# Patient Record
Sex: MALE | Race: WHITE | NOT HISPANIC OR LATINO | Employment: FULL TIME
[De-identification: names, ages, dates, MRNs, and addresses within clinical notes are randomized per-mention and may not be internally consistent; named-entity substitution may affect disease eponyms.]

---

## 2017-01-20 ENCOUNTER — SURGERY (OUTPATIENT)
Age: 52
End: 2017-01-20

## 2017-01-20 ENCOUNTER — ANESTHESIA EVENT (OUTPATIENT)
Dept: ENDOSCOPY | Facility: HOSPITAL | Age: 52
End: 2017-01-20
Payer: COMMERCIAL

## 2017-01-20 ENCOUNTER — ANESTHESIA (OUTPATIENT)
Dept: ENDOSCOPY | Facility: HOSPITAL | Age: 52
End: 2017-01-20
Payer: COMMERCIAL

## 2017-01-20 PROBLEM — Z13.9 SCREENING: Status: ACTIVE | Noted: 2017-01-20

## 2017-01-20 PROCEDURE — D9220A PRA ANESTHESIA: Mod: 33,CRNA,, | Performed by: NURSE ANESTHETIST, CERTIFIED REGISTERED

## 2017-01-20 PROCEDURE — D9220A PRA ANESTHESIA: Mod: 33,ANES,, | Performed by: ANESTHESIOLOGY

## 2017-01-20 PROCEDURE — 63600175 PHARM REV CODE 636 W HCPCS: Performed by: NURSE ANESTHETIST, CERTIFIED REGISTERED

## 2017-01-20 RX ORDER — PROPOFOL 10 MG/ML
VIAL (ML) INTRAVENOUS CONTINUOUS PRN
Status: DISCONTINUED | OUTPATIENT
Start: 2017-01-20 | End: 2017-01-20

## 2017-01-20 RX ORDER — PROPOFOL 10 MG/ML
VIAL (ML) INTRAVENOUS
Status: DISCONTINUED | OUTPATIENT
Start: 2017-01-20 | End: 2017-01-20

## 2017-01-20 RX ADMIN — PROPOFOL 200 MG: 10 INJECTION, EMULSION INTRAVENOUS at 08:01

## 2017-01-20 RX ADMIN — PROPOFOL 200 MCG/KG/MIN: 10 INJECTION, EMULSION INTRAVENOUS at 08:01

## 2017-01-20 NOTE — ANESTHESIA RELEASE NOTE
Anesthesia Release from PACU Note    Patient: Edi Marie    Procedure(s) Performed: Procedure(s) (LRB):  COLONOSCOPY (N/A)    Anesthesia type: GA    Post pain: Adequate analgesia    Post assessment: no apparent anesthetic complications    Last Vitals:   Vitals:    01/20/17 0931   BP: 123/83   Pulse: 69   Resp: 18   Temp:    SpO2: 98%       Post vital signs: stable    Level of consciousness: awake    Complications: none    Airway Patency: patent    Respiratory: spontaneous    Cardiovascular: stable    Hydration: euvolemic

## 2017-01-20 NOTE — TRANSFER OF CARE
"Anesthesia Transfer of Care Note    Patient: Edi Marie    Procedure(s) Performed: Procedure(s) (LRB):  COLONOSCOPY (N/A)    Patient location: PACU    Anesthesia Type: general    Transport from OR: Transported from OR on room air with adequate spontaneous ventilation    Post pain: adequate analgesia    Post assessment: no apparent anesthetic complications and tolerated procedure well    Post vital signs: stable    Level of consciousness: sedated    Nausea/Vomiting: no nausea/vomiting    Complications: none          Last vitals:   Visit Vitals    /79 (BP Location: Left arm, Patient Position: Lying, BP Method: Automatic)    Pulse 82    Temp 36.4 °C (97.5 °F) (Oral)    Resp 18    Ht 5' 11" (1.803 m)    Wt 86.2 kg (190 lb)    SpO2 98%    BMI 26.5 kg/m2     "

## 2017-01-20 NOTE — ANESTHESIA POSTPROCEDURE EVALUATION
"Anesthesia Post Evaluation    Patient: Edi Marie    Procedure(s) Performed: Procedure(s) (LRB):  COLONOSCOPY (N/A)    Final Anesthesia Type: general  Patient location during evaluation: PACU  Patient participation: Yes- Able to Participate  Level of consciousness: awake and alert  Post-procedure vital signs: reviewed and stable  Pain management: adequate  Airway patency: patent  PONV status at discharge: No PONV  Anesthetic complications: no      Cardiovascular status: blood pressure returned to baseline  Respiratory status: spontaneous ventilation and room air  Hydration status: euvolemic  Follow-up not needed.        Visit Vitals    /83 (BP Location: Left arm, Patient Position: Sitting, BP Method: Automatic)    Pulse 69    Temp 36.6 °C (97.8 °F) (Axillary)    Resp 18    Ht 5' 11" (1.803 m)    Wt 86.2 kg (190 lb)    SpO2 98%    BMI 26.5 kg/m2       Pain/Hiro Score: Pain Assessment Performed: Yes (1/20/2017  9:32 AM)  Presence of Pain: denies (1/20/2017  9:32 AM)  Ihro Score: 10 (1/20/2017  9:32 AM)      "

## 2017-01-20 NOTE — ANESTHESIA PREPROCEDURE EVALUATION
01/20/2017  Edi Marie is a 51 y.o., male.    [facility] Anesthesia Evaluation    I have reviewed the Patient Summary Reports.    I have reviewed the Nursing Notes.      Review of Systems  Anesthesia Hx:  No problems with previous Anesthesia    Hematology/Oncology:  Hematology Normal   Oncology Normal     EENT/Dental:EENT/Dental Normal   Cardiovascular:  Cardiovascular Normal     Pulmonary:  Pulmonary Normal    Renal/:   Chronic Renal Disease renal calculi    Hepatic/GI:  Hepatic/GI Normal    Musculoskeletal:  Musculoskeletal Normal    Neurological:  Neurology Normal    Endocrine:  Endocrine Normal    Dermatological:  Skin Normal    Psych:  Psychiatric Normal           Physical Exam  General:  Well nourished    Airway/Jaw/Neck:  Airway Findings: Mouth Opening: Normal Tongue: Normal  General Airway Assessment: Adult  Mallampati: II  TM Distance: Normal, at least 6 cm        Eyes/Ears/Nose:  EYES/EARS/NOSE FINDINGS: Normal   Dental:  Dental Findings: In tact   Chest/Lungs:  Chest/Lungs Clear    Heart/Vascular:  Heart Findings: Normal Heart murmur: negative Vascular Findings: Normal    Abdomen:  Abdomen Findings: Normal    Musculoskeletal:  Musculoskeletal Findings: Normal   Skin:  Skin Findings: Normal    Mental Status:  Mental Status Findings: Normal        Anesthesia Plan  Type of Anesthesia, risks & benefits discussed:  Anesthesia Type:  general  Patient's Preference:   Intra-op Monitoring Plan:   Intra-op Monitoring Plan Comments:   Post Op Pain Control Plan:   Post Op Pain Control Plan Comments:   Induction:   IV  Beta Blocker:  Patient is not currently on a Beta-Blocker (No further documentation required).       Informed Consent: Patient understands risks and agrees with Anesthesia plan.  Questions answered. Anesthesia consent signed with patient.  ASA Score: 2     Day of Surgery Review of History &  Physical:    H&P update referred to the surgeon.         Ready For Surgery From Anesthesia Perspective.

## 2017-01-27 ENCOUNTER — TELEPHONE (OUTPATIENT)
Dept: ENDOSCOPY | Facility: HOSPITAL | Age: 52
End: 2017-01-27

## 2017-04-09 ENCOUNTER — PATIENT MESSAGE (OUTPATIENT)
Dept: INTERNAL MEDICINE | Facility: CLINIC | Age: 52
End: 2017-04-09

## 2017-04-09 DIAGNOSIS — R06.83 SNORING: Primary | ICD-10-CM

## 2017-05-05 ENCOUNTER — PATIENT MESSAGE (OUTPATIENT)
Dept: INTERNAL MEDICINE | Facility: CLINIC | Age: 52
End: 2017-05-05

## 2017-08-07 ENCOUNTER — OFFICE VISIT (OUTPATIENT)
Dept: OTOLARYNGOLOGY | Facility: CLINIC | Age: 52
End: 2017-08-07
Payer: COMMERCIAL

## 2017-08-07 VITALS
DIASTOLIC BLOOD PRESSURE: 77 MMHG | BODY MASS INDEX: 27.99 KG/M2 | HEART RATE: 86 BPM | TEMPERATURE: 98 F | SYSTOLIC BLOOD PRESSURE: 113 MMHG | WEIGHT: 199.94 LBS | HEIGHT: 71 IN

## 2017-08-07 DIAGNOSIS — S09.92XD NASAL TRAUMA, SUBSEQUENT ENCOUNTER: ICD-10-CM

## 2017-08-07 DIAGNOSIS — J34.89 NASAL VALVE BLOCKAGE: ICD-10-CM

## 2017-08-07 DIAGNOSIS — J34.2 NASAL SEPTAL DEVIATION: ICD-10-CM

## 2017-08-07 DIAGNOSIS — J34.89 NASAL OBSTRUCTION: Primary | ICD-10-CM

## 2017-08-07 DIAGNOSIS — R06.83 SNORING: ICD-10-CM

## 2017-08-07 PROCEDURE — 99203 OFFICE O/P NEW LOW 30 MIN: CPT | Mod: S$GLB,,, | Performed by: OTOLARYNGOLOGY

## 2017-08-07 PROCEDURE — 3008F BODY MASS INDEX DOCD: CPT | Mod: S$GLB,,, | Performed by: OTOLARYNGOLOGY

## 2017-08-07 PROCEDURE — 99999 PR PBB SHADOW E&M-EST. PATIENT-LVL III: CPT | Mod: PBBFAC,,, | Performed by: OTOLARYNGOLOGY

## 2017-08-07 NOTE — PATIENT INSTRUCTIONS
Indications for elective septoplasty, turbinate reduction procedures and balloon sinuplasty discussed  Pt. introduced to Dr. RACIEL Archer re: septoplasty + procedures for nasal obstruction  Avoid ASA

## 2017-08-07 NOTE — PROGRESS NOTES
"Subjective:       Patient ID: Edi Marie is a 52 y.o. male.    Chief Complaint: No chief complaint on file.    HPI: Mr. Marie is a 52-year-old  gentleman with a long-standing history of septal deviation for over 30 years.  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  He consulted with Dr. ALEXANDER Florence with regard to the possibility of balloon sinuplasty as a relatively "quick fix"  for his nasal obstruction symptoms specifically.  He was hoping to avoid anesthesia and surgery specifically.  He was advised to consider an elective septoplasty procedure under anesthesia.  The patient is asking my opinion about treatment options for his problem.  He has a previous history of nasal trauma.  He admits to snoring.  He completed an annual physical exam a Dr. More Villanueva in July in 2016.  His medical problem list includeshistiocytosis, spermatocele and surgical history of vasectomy.  His handwritten reason for the visit today is "nasal obstruction".    PMH:   PSH:  Family hx:  ALL:  Habits: 2 alcoholic drinks /week; 3 cups of  Coffee daily  OCC: Executive  Review of Systems     Nose:  Positive for snoring.    Other:  Negative for rash.         Objective:    /77 P 86 T 97.6 Ht 5'11" Wt 199 lbs  Gen.  Alert and oriented gentleman in no acute distress.  I have introduced him Dr. RACIEL Archer later during the visit today.  He also performed an examination on the patient nose.  Physical Exam   Constitutional: He is oriented to person, place, and time. He appears well-developed and well-nourished.   HENT:   Head: Normocephalic.   Right Ear: Hearing, tympanic membrane and ear canal normal. No drainage. No foreign bodies. No mastoid tenderness. Tympanic membrane is not perforated. No decreased hearing is noted.   Left Ear: Hearing, tympanic membrane and ear canal normal. No drainage. No foreign bodies. No mastoid tenderness. Tympanic membrane is not perforated. No decreased hearing is noted.   Nose: Nasal " deformity present. No nose lacerations or nasal septal hematoma. No epistaxis. Right sinus exhibits no maxillary sinus tenderness and no frontal sinus tenderness. Left sinus exhibits no maxillary sinus tenderness and no frontal sinus tenderness.       Mouth/Throat: Uvula is midline, oropharynx is clear and moist and mucous membranes are normal. He does not have dentures. No oral lesions. No trismus in the jaw. No uvula swelling or dental caries. No oropharyngeal exudate or tonsillar abscesses.   Neck: No thyromegaly present.   Pulmonary/Chest: Effort normal. No stridor.   Lymphadenopathy:     He has no cervical adenopathy.   Neurological: He is alert and oriented to person, place, and time.   Skin: No rash noted.   Psychiatric: His behavior is normal.       Assessment:       1. Nasal obstruction, right  90%    2. Nasal trauma, subsequent encounter    3. Snoring    4. Nasal valve blockage, right      5.    Nasal septal deviation, right superior/anterior, left inferior  Plan:         Indications for elective septoplasty, turbinate reduction procedures and balloon sinuplasty discussed  Pt. introduced to Dr. RACIEL Archer re: septoplasty + procedures for nasal obstruction  Avoid ASA

## 2017-09-08 ENCOUNTER — PATIENT MESSAGE (OUTPATIENT)
Dept: INTERNAL MEDICINE | Facility: CLINIC | Age: 52
End: 2017-09-08

## 2017-09-11 ENCOUNTER — HOSPITAL ENCOUNTER (OUTPATIENT)
Dept: RADIOLOGY | Facility: HOSPITAL | Age: 52
Discharge: HOME | End: 2017-09-11
Attending: INTERNAL MEDICINE
Payer: COMMERCIAL

## 2017-09-11 ENCOUNTER — OFFICE VISIT (OUTPATIENT)
Dept: INTERNAL MEDICINE | Facility: CLINIC | Age: 52
End: 2017-09-11
Payer: COMMERCIAL

## 2017-09-11 VITALS
OXYGEN SATURATION: 97 % | HEART RATE: 67 BPM | SYSTOLIC BLOOD PRESSURE: 124 MMHG | WEIGHT: 201.75 LBS | BODY MASS INDEX: 28.24 KG/M2 | DIASTOLIC BLOOD PRESSURE: 82 MMHG | HEIGHT: 71 IN

## 2017-09-11 DIAGNOSIS — M79.604 PAIN IN BOTH LOWER EXTREMITIES: Primary | ICD-10-CM

## 2017-09-11 DIAGNOSIS — M79.605 PAIN IN BOTH LOWER EXTREMITIES: Primary | ICD-10-CM

## 2017-09-11 DIAGNOSIS — M79.604 PAIN IN BOTH LOWER EXTREMITIES: ICD-10-CM

## 2017-09-11 DIAGNOSIS — M79.605 PAIN IN BOTH LOWER EXTREMITIES: ICD-10-CM

## 2017-09-11 PROCEDURE — 99214 OFFICE O/P EST MOD 30 MIN: CPT | Mod: S$GLB,,, | Performed by: INTERNAL MEDICINE

## 2017-09-11 PROCEDURE — 73521 X-RAY EXAM HIPS BI 2 VIEWS: CPT | Mod: TC

## 2017-09-11 PROCEDURE — 73521 X-RAY EXAM HIPS BI 2 VIEWS: CPT | Mod: 26,,, | Performed by: RADIOLOGY

## 2017-09-11 PROCEDURE — 99999 PR PBB SHADOW E&M-EST. PATIENT-LVL III: CPT | Mod: PBBFAC,,, | Performed by: INTERNAL MEDICINE

## 2017-09-11 PROCEDURE — 3008F BODY MASS INDEX DOCD: CPT | Mod: S$GLB,,, | Performed by: INTERNAL MEDICINE

## 2017-09-11 NOTE — PROGRESS NOTES
Subjective:      Patient ID: Edi Marie is a 52 y.o. male.    Chief Complaint: Leg Pain    HPI:  Leg Pain    Incident onset: No incident, years on and off. There was no injury mechanism. Pain location: Both legs, moves around. The quality of the pain is described as aching. The pain is moderate (moderate at night, times he has to get up at night). Pertinent negatives include no inability to bear weight or muscle weakness. Nothing aggravates the symptoms. He has tried nothing for the symptoms.   The symptoms are worse at night., it is an aching not a jumping or moving.  He has not had any back problems  Patient has not been flexible  Played soccer in college, no major injury      Patient Active Problem List   Diagnosis    Spermatocele    Histiocytosis    H/O vasectomy (05/30/2014)    Screening     Past Medical History:   Diagnosis Date    Hemorrhoid     Histiocytosis     Kidney stones     Spermatocele      Past Surgical History:   Procedure Laterality Date    APPENDECTOMY      COLONOSCOPY N/A 1/20/2017    Procedure: COLONOSCOPY;  Surgeon: Danis Frank MD;  Location: [address];  Service: Endoscopy;  Laterality: N/A;    KNEE SURGERY      right knee    LITHOTRIPSY       Family History   Problem Relation Age of Onset    Heart disease Father      MI 2004    Cancer Paternal Grandfather      colon    Melanoma Neg Hx     Lupus Neg Hx     Psoriasis Neg Hx     Eczema Neg Hx      Review of Systems   Constitutional: Negative for activity change and unexpected weight change.   HENT: Negative for hearing loss, rhinorrhea and trouble swallowing.    Eyes: Negative for discharge and visual disturbance.   Respiratory: Negative for chest tightness and wheezing.    Cardiovascular: Negative for chest pain and palpitations.   Gastrointestinal: Negative for blood in stool, constipation, diarrhea and vomiting.   Endocrine: Negative for polydipsia and polyuria.   Genitourinary: Negative for difficulty  "urinating, hematuria and urgency.   Musculoskeletal: Negative for arthralgias, joint swelling and neck pain.   Neurological: Negative for weakness and headaches.   Psychiatric/Behavioral: Negative for confusion and dysphoric mood.     Objective:     Vitals:    09/11/17 0913   BP: 124/82   Pulse: 67   SpO2: 97%   Weight: 91.5 kg (201 lb 11.5 oz)   Height: 5' 11" (1.803 m)   PainSc:   4   PainLoc: Leg     Body mass index is 28.13 kg/m².  Physical Exam   Abdomen: bowel sound normal  Right hip: restriction motion significant  Left hip: internal rotation better than left   When attempting a straight leg raise both thighs cramp  Assessment:     1. Pain in both lower extremities      Plan:   Edi was seen today for leg pain.    Diagnoses and all orders for this visit:    Pain in both lower extremities  -     X-Ray Hip 2 View Left; Future  -     X-Ray Hip 2 View Right; Future  -     Ambulatory consult to Sports Medicine       Return in about 1 month (around 10/11/2017) for Follow up .       Medication List      as of 9/11/2017  9:48 AM     You have not been prescribed any medications.       "

## 2017-09-11 NOTE — PATIENT INSTRUCTIONS
Consult: Dr. Cavazos Sports Medicine  Xray of right and left hip    If needed  Follow up in one month

## 2017-09-18 ENCOUNTER — PATIENT MESSAGE (OUTPATIENT)
Dept: OTOLARYNGOLOGY | Facility: CLINIC | Age: 52
End: 2017-09-18

## 2017-09-20 ENCOUNTER — OFFICE VISIT (OUTPATIENT)
Dept: SPORTS MEDICINE | Facility: CLINIC | Age: 52
End: 2017-09-20
Payer: COMMERCIAL

## 2017-09-20 VITALS — BODY MASS INDEX: 28.14 KG/M2 | TEMPERATURE: 98 F | WEIGHT: 201 LBS | HEIGHT: 71 IN

## 2017-09-20 DIAGNOSIS — M16.0 PRIMARY OSTEOARTHRITIS OF BOTH HIPS: Primary | ICD-10-CM

## 2017-09-20 PROCEDURE — 99243 OFF/OP CNSLTJ NEW/EST LOW 30: CPT | Mod: 25,S$GLB,, | Performed by: FAMILY MEDICINE

## 2017-09-20 PROCEDURE — 20610 DRAIN/INJ JOINT/BURSA W/O US: CPT | Mod: 50,S$GLB,, | Performed by: FAMILY MEDICINE

## 2017-09-20 PROCEDURE — 99999 PR PBB SHADOW E&M-EST. PATIENT-LVL III: CPT | Mod: PBBFAC,,, | Performed by: FAMILY MEDICINE

## 2017-09-20 RX ORDER — TRIAMCINOLONE ACETONIDE 40 MG/ML
40 INJECTION, SUSPENSION INTRA-ARTICULAR; INTRAMUSCULAR
Status: DISCONTINUED | OUTPATIENT
Start: 2017-09-20 | End: 2017-09-20 | Stop reason: HOSPADM

## 2017-09-20 RX ADMIN — TRIAMCINOLONE ACETONIDE 40 MG: 40 INJECTION, SUSPENSION INTRA-ARTICULAR; INTRAMUSCULAR at 09:09

## 2017-09-20 NOTE — PROCEDURES
Large Joint Aspiration/Injection  Date/Time: 9/20/2017 9:06 AM  Performed by: MARCO CAPUTO  Authorized by: MARCO CAPUTO     Consent Done?:  Yes (Verbal)  Indications:  Pain  Procedure site marked: Yes    Timeout: Prior to procedure the correct patient, procedure, and site was verified      Location:  Hip  Site:  R greater trochanteric bursa and L greater trochanteric bursa  Prep: Patient was prepped and draped in usual sterile fashion    Ultrasonic Guidance for needle placement: No  Needle size:  22 G  Approach:  Lateral  Medications:  40 mg triamcinolone acetonide 40 mg/mL; 40 mg triamcinolone acetonide 40 mg/mL  Patient tolerance:  Patient tolerated the procedure well with no immediate complications

## 2017-09-20 NOTE — LETTER
September 20, 2017      More Villanueva MD  [address]  [facility] [address]           [facility]  [address]  [facility] [address]  Phone: [phone]          Patient: Edi Marie   MR Number: [id number]   YOB: 1965   Date of Visit: 9/20/2017       Dear Dr. More Villanueva:    Thank you for referring Edi Marie to me for evaluation. Attached you will find relevant portions of my assessment and plan of care.    If you have questions, please do not hesitate to call me. I look forward to following Edi Marie along with you.    Sincerely,    Mariano Ortiz MD    Enclosure  CC:  No Recipients    If you would like to receive this communication electronically, please contact [email or web address] or [phone] to request more information on [facility] Link access.    For providers and/or their staff who would like to refer a patient to [facility], please contact us through our one-stop-shop provider referral line, [facility], at [phone].    If you feel you have received this communication in error or would no longer like to receive these types of communications, please e-mail [email or web address]

## 2017-09-20 NOTE — PROGRESS NOTES
Edi Marie, a 52 y.o. male, is here for evaluation of RIGHT and LEFT hip.     This patient visit is a consult from the following provider: More Villanueva  Today's office visit notes will be made available to the consulting/refering provider via the mail, a fax, and/or an in basket message through the electronic medical record    HISTORY OF PRESENT ILLNESS   Location: anterior thigh, bilateral   Onset: insidious  Palliative:    Relative rest   Oral analgesics  Provocative:   ADLs   Prior: none  Progression: worsening discomfort   Quality:    Ache, worse in evening    Sharp at times   Radiation: none  Severity: per nursing documentation  Timing: intermittent with use  Trauma: none      Review of systems (ROS):  A 10+ review of systems was performed with pertinent positives and negatives noted above in the history of present illness. Other systems were negative unless otherwise specified.    PHYSICAL EXAMINATION  General:  The patient is alert and oriented x 3.  Mood is pleasant.  Observation of ears, eyes and nose reveal no gross abnormalities.  HEENT: NCAT, sclera nonicteric  Lungs: Respirations are equal and unlabored.   Gait is coordinated. Patient can toe walk and heel walk without difficulty.    HIP/PELVIS EXAMINATION    Observation/Inspection  Gait:   Nonantalgic   Alignment:  Neutral   Scars:   None   Muscle atrophy: None   Effusion:  None   Warmth:  None   Discoloration:   None   Leg lengths:   Equal   Pelvis:    Level     Tenderness/Crepitus (T/C):      T / C  Trochanteric bursa   - / -  Piriformis    - / -  SI joint    - / -  Psoas tendon   - / -  Rectus insertion  - / -  Adductor insertion  - / -  Pubic symphysis  - / -    ROM: (* = pain)    Flexion:      90 degrees*  External rotation:   40 degrees*  Internal rotation with axial load:  30 degrees*  Internal rotation without axial load:  40 degrees*  Abduction:    45 degrees  Adduction:     20 degrees    Special Tests:  Pain w/ forced internal  rotation (FADIR):  +   Pain w/ forced external rotation (WES):  +  Circumduction test:     -  Stinchfield test:     +  Log roll:       +   Snapping hip (internal):    -   Sit-up pain:      -   Resisted sit-up pain:     -   Resisted sit-up with adductor contraction pain:  -   Step-down test:     +  Trendelenburg test:     -  Bridge test      +     Extremity Neuro-vascular Examination:   Sensation:  Grossly intact to light touch all dermatomal regions.   Motor Function:  Fully intact motor function at hip, knee, foot and ankle    DTRs;  quadriceps and  achilles 2+.  No clonus and downgoing Babinski.    Vascular status:  DP and PT pulses 2+, brisk capillary refill, symmetric.    Skin:  intact, compartments soft.    Other Findings:    ASSESSMENT & PLAN  Assessment:   #1 Tonnis Grade III osteoarthritis of hip, bilateral   W/ greater trochanteric bursitis    No evidence of neurologic pathology  No evidence of vascular pathology    Imaging studies reviewed:   X-ray pelvis and hip, bilateral 17.09    Plan:    We discussed the importance of appropriate diet, weight, and regular exercise including quadriceps strengthening     We discussed options including:  #1 watchful waiting  #2 physical therapy aimed at:   Core stability   RoM hip   Strengthening quadriceps   Gait training   #3 injection therapy:   CSI GTB    Right,     Left,    CSI iaHip    Right,     Left,    Orthobiologics   #4 consultation re: THAs     The patient chooses #3 csi gtb bilat    Pain management: handout given  Bracing:   Physical therapy:   Activity (e.g. 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 fPT, f/u in 12 w  Ineffective-->CSI iaHip, f/u in 2 w   Ineffective-->orthobiologics?  Should symptoms worsen or fail to resolve, consider:  Revisiting the above options

## 2017-10-16 ENCOUNTER — OFFICE VISIT (OUTPATIENT)
Dept: SPORTS MEDICINE | Facility: CLINIC | Age: 52
End: 2017-10-16
Payer: COMMERCIAL

## 2017-10-16 VITALS — HEIGHT: 71 IN | WEIGHT: 201 LBS | BODY MASS INDEX: 28.14 KG/M2 | TEMPERATURE: 99 F

## 2017-10-16 DIAGNOSIS — M70.62 GREATER TROCHANTERIC BURSITIS, LEFT: ICD-10-CM

## 2017-10-16 DIAGNOSIS — M70.61 GREATER TROCHANTERIC BURSITIS OF RIGHT HIP: Primary | ICD-10-CM

## 2017-10-16 PROCEDURE — 99999 PR PBB SHADOW E&M-EST. PATIENT-LVL III: CPT | Mod: PBBFAC,,, | Performed by: FAMILY MEDICINE

## 2017-10-16 PROCEDURE — 99214 OFFICE O/P EST MOD 30 MIN: CPT | Mod: S$GLB,,, | Performed by: FAMILY MEDICINE

## 2017-10-16 NOTE — PROGRESS NOTES
Edi Marie, a 52 y.o. male, is here for evaluation of RIGHT and LEFT hip.     HISTORY OF PRESENT ILLNESS   Location: anterior thigh, bilateral   Onset: insidious  Palliative:    Relative rest   Oral analgesics   CSI, GTB, 09/20/17, 70%  Provocative:   ADLs   Prior: none  Progression: resolving discomfort   Quality:    Ache, worse in evening    Sharp at times   Radiation: none  Severity: per nursing documentation  Timing: intermittent with use  Trauma: none      Review of systems (ROS):  A 10+ review of systems was performed with pertinent positives and negatives noted above in the history of present illness. Other systems were negative unless otherwise specified.    PHYSICAL EXAMINATION  General:  The patient is alert and oriented x 3.  Mood is pleasant.  Observation of ears, eyes and nose reveal no gross abnormalities.  HEENT: NCAT, sclera nonicteric  Lungs: Respirations are equal and unlabored.   Gait is coordinated. Patient can toe walk and heel walk without difficulty.    HIP/PELVIS EXAMINATION    Observation/Inspection  Gait:   Nonantalgic   Alignment:  Neutral   Scars:   None   Muscle atrophy: None   Effusion:  None   Warmth:  None   Discoloration:   None   Leg lengths:   Equal   Pelvis:    Level     Tenderness/Crepitus (T/C):      T / C  Trochanteric bursa   - / -  Piriformis    - / -  SI joint    - / -  Psoas tendon   - / -  Rectus insertion  - / -  Adductor insertion  - / -  Pubic symphysis  - / -    ROM: (* = pain)    Flexion:      90 degrees*  External rotation:   40 degrees*  Internal rotation with axial load:  30 degrees*  Internal rotation without axial load:  40 degrees*  Abduction:    45 degrees  Adduction:     20 degrees    Special Tests:  Pain w/ forced internal rotation (FADIR):  +   Pain w/ forced external rotation (WES):  +  Circumduction test:     -  Stinchfield test:     +  Log roll:       +   Snapping hip (internal):    -   Sit-up pain:      -   Resisted sit-up pain:     -  "  Resisted sit-up with adductor contraction pain:  -   Step-down test:     +  Trendelenburg test:     -  Bridge test      +     Extremity Neuro-vascular Examination:   Sensation:  Grossly intact to light touch all dermatomal regions.   Motor Function:  Fully intact motor function at hip, knee, foot and ankle    DTRs;  quadriceps and  achilles 2+.  No clonus and downgoing Babinski.    Vascular status:  DP and PT pulses 2+, brisk capillary refill, symmetric.    Skin:  intact, compartments soft.    Other Findings:    ASSESSMENT & PLAN  Assessment:   #1 Tonnis Grade III osteoarthritis of hip, bilateral   W/ greater trochanteric bursitis    No evidence of neurologic pathology  No evidence of vascular pathology    Imaging studies reviewed:   X-ray pelvis and hip, bilateral 17.09    Plan:    We discussed the importance of appropriate diet, weight, and regular exercise including quadriceps strengthening     We discussed options including:  #1 watchful waiting  #2 physical therapy aimed at:   Core stability   RoM hip   Strengthening quadriceps   Gait training   #3 injection therapy:   CSI GTB    Right, effective 70%, repeat frequency     Left, effective 70%, repeat frequency   CSI iaHip    Right,     Left,    Orthobiologics   #4 consultation re: THAs     The patient chooses #2    Pain management: handout given  Bracing:   Physical therapy: hgPT, handout given, begin as above   Activity (e.g. 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 The patient demonstrated understanding of the exercises and proper " technique of their execution. This interaction took 15 minutes.

## 2017-10-25 ENCOUNTER — OFFICE VISIT (OUTPATIENT)
Dept: OTOLARYNGOLOGY | Facility: CLINIC | Age: 52
End: 2017-10-25
Payer: COMMERCIAL

## 2017-10-25 VITALS
WEIGHT: 197.56 LBS | BODY MASS INDEX: 27.55 KG/M2 | DIASTOLIC BLOOD PRESSURE: 83 MMHG | SYSTOLIC BLOOD PRESSURE: 139 MMHG | HEART RATE: 91 BPM

## 2017-10-25 DIAGNOSIS — J34.2 NASAL SEPTAL DEVIATION: ICD-10-CM

## 2017-10-25 DIAGNOSIS — R06.83 SNORING: ICD-10-CM

## 2017-10-25 DIAGNOSIS — M95.0 NASAL DEFORMITY, ACQUIRED: Primary | ICD-10-CM

## 2017-10-25 DIAGNOSIS — J34.3 NASAL TURBINATE HYPERTROPHY: ICD-10-CM

## 2017-10-25 DIAGNOSIS — J34.89 NASAL OBSTRUCTION: ICD-10-CM

## 2017-10-25 PROCEDURE — 99999 PR PBB SHADOW E&M-EST. PATIENT-LVL III: CPT | Mod: PBBFAC,,, | Performed by: OTOLARYNGOLOGY

## 2017-10-25 PROCEDURE — 99214 OFFICE O/P EST MOD 30 MIN: CPT | Mod: S$GLB,,, | Performed by: OTOLARYNGOLOGY

## 2017-10-25 NOTE — LETTER
October 25, 2017      Norberto Mullins III, MD  [address]  [facility] [address]           [facility]  [address]  [facility] [address]  Phone: [phone]  Fax: [phone]          Patient: Edi Marie   MR Number: [id number]   YOB: 1965   Date of Visit: 10/25/2017       Dear Dr. Norberto Mullins III:    Thank you for referring Edi Marie to me for evaluation. Attached you will find relevant portions of my assessment and plan of care.    If you have questions, please do not hesitate to call me. I look forward to following Edi Marie along with you.    Sincerely,    Sutton KASIE Archer III, MD    Enclosure  CC:  No Recipients    If you would like to receive this communication electronically, please contact [email or web address] or [phone] to request more information on [facility] Link access.    For providers and/or their staff who would like to refer a patient to [facility], please contact us through our one-stop-shop provider referral line, [facility], at [phone].    If you feel you have received this communication in error or would no longer like to receive these types of communications, please e-mail [email or web address]

## 2017-10-26 NOTE — CONSULTS
Mr. Marie presents referred by Dr. Mullins for consultation.    VITAL SIGNS:  Per nurses' notes.    CHIEF COMPLAINT:  Nasal obstruction and snoring.    HISTORY OF PRESENT ILLNESS:  This is a 52-year-old white male who was seen by   Dr. Mullins this past August for similar complaints.  He was found to have   marked nasal deformity with nasal obstruction.  He was referred to me for   evaluation and possible surgical intervention.    REVIEW OF SYSTEMS:  CONSTITUTIONAL: Weight loss or weight gain: Negative.  ALLERGY/IMMUNOLOGIC: Negative.  ENT/Mouth:  Hearing Loss/Dizziness/Tinnitus: Negative.  Ear Infections/Otalgia: Negative.  Rhinitis/Sinusitis/Epistaxis: Negative.  Headache/Facial Pain: Negative.  Nasal Obstruction/Snoring/ANAY: Negative.  Throat: Infections/Pain: Negative.  Hoarseness/Speech Disturbance: Negative.  Salivary Glands Disorder: Negative.  Trauma: Hx: Negative.    Cardiovascular:  MI/Angina: Negative.  Hypertension: Negative.  Endo: DM/Steroids: Negative.  Eyes: Negative.  GI: Dysphagia/Reflux: Negative.  : GYN Pregnancy: Negative.      Renal: Dialysis: Negative.  Lymph: Neck Mass/Lymphadenopathy: Negative.  Musculoskeletal: Negative.  Hem: Bleeding Disorders/Anemia: Negative.  Neuro: Cranial/Neuralgia: Negative.  Pulm: Asthma/SOB/Cough: Negative.  Skin/Breast: Negative.    PAST MEDICAL/FAMILY/SOCIAL HISTORY:    Additional Past Medical History   ENT Surgery: Negative.   Occupational Exposure: Negative.    Problems: Negative.   Cancer: Negative.   Positive for kidney stones, histiocytosis.  Past surgeries include   appendectomy, knee surgery, lithotripsy and colonoscopy as well as a nasal   surgery and possible septoplasty 30 years ago when he was in high school in   [place] tonsil after a nasal trauma.  He states he also had a nasal trauma   after that time and has had persistent nasal obstruction since then.  He denies   any symptoms of sleep apnea.  No daytime somnolence.  He does  snore loudly,   however, according to his wife.    Past Family History   Family history hearing loss: Negative.   Family history cancer: Positive for colon cancer.   Positive for heart disease.    Past Social History   Tobacco: Nonsmoker.   Alcohol: Regular social drinker.    MEDICATIONS:  Per MedCard.    ALLERGIES:  Per MedCard.    EXAMINATION:  General Appearance:  Well-developed, well-nourished 52-year-old white male in no   apparent distress.  Communication Ability:  Good.  EARS, NOSE, THROAT, MOUTH;  EARS:   External auditory canals: Clear.   Hearing: Grossly Intact.   Tympanic membranes: Clear.  NOSE:   External: With bilateral internal valve collapse, right greater than left and a   positive [place] maneuver.   Intranasal: He has a marked septal deviation to the right side, 2+ turbinates,   all of these deformities creating approximately 90% obstruction.  MOUTH:   Intraorally: Lips, teeth and gums: Normal.   Oropharynx: Normal.   Mucosa: Normal.  THROAT:   Tongue: Normal.   Palate: Significantly elongated palate.   Tonsils: He has 2+ tonsils.   Posterior pharynx: Normal.  HEAD/FACE INSPECTION: Normal and atraumatic.   Palpation/Percussion:  Non tender.   Facial Strength: Normal and symmetric.   Salivary glands: Normal.    NECK: Supple.  THYROID: No masses.  LYMPHATICS: No nodes.  RESPIRATORY:   Effort: Normal.  EYES:   Ocular Mobility: Normal.   Vision: Grossly intact.  NEURO/PSYCH:   Cranial nerves: 2-12 grossly intact.   Orientation: x3.   Mood/Affect: Normal.    IMPRESSION:  A 52-year-old white male with nasal and septal deformity and nasal   obstruction.    RECOMMENDATION:  I have discussed my findings with him in detail as well as my   recommendations for treatment.  My recommendation would be for nasal   reconstruction with possible osteotomies, bilateral  grafts,   septoplasty, submucosal resection of turbinates and also possible auricular   cartilage if his previous septal surgery has left him  with minimal septal   cartilage remaining.  He will discuss this with his wife.  He does wish to   proceed before the end of the year if he does go with the surgical option.  He   will contact us by tomorrow to let us know.      GERI  dd: 10/25/2017 14:15:50 (CDT)  td: 10/26/2017 00:14:40 (CDT)  Doc ID   #[id number]  Job ID #[id number]    CC:

## 2017-11-06 ENCOUNTER — OFFICE VISIT (OUTPATIENT)
Dept: SPORTS MEDICINE | Facility: CLINIC | Age: 52
End: 2017-11-06
Payer: COMMERCIAL

## 2017-11-06 VITALS — HEIGHT: 71 IN | TEMPERATURE: 98 F | BODY MASS INDEX: 27.58 KG/M2 | WEIGHT: 197 LBS

## 2017-11-06 DIAGNOSIS — M25.552 BILATERAL HIP PAIN: ICD-10-CM

## 2017-11-06 DIAGNOSIS — R53.81 PHYSICAL DECONDITIONING: ICD-10-CM

## 2017-11-06 DIAGNOSIS — M70.61 GREATER TROCHANTERIC BURSITIS OF BOTH HIPS: ICD-10-CM

## 2017-11-06 DIAGNOSIS — M25.551 BILATERAL HIP PAIN: ICD-10-CM

## 2017-11-06 DIAGNOSIS — M16.0 PRIMARY OSTEOARTHRITIS OF BOTH HIPS: Primary | ICD-10-CM

## 2017-11-06 DIAGNOSIS — M70.62 GREATER TROCHANTERIC BURSITIS OF BOTH HIPS: ICD-10-CM

## 2017-11-06 PROCEDURE — 99999 PR PBB SHADOW E&M-EST. PATIENT-LVL III: CPT | Mod: PBBFAC,,, | Performed by: FAMILY MEDICINE

## 2017-11-06 PROCEDURE — 20610 DRAIN/INJ JOINT/BURSA W/O US: CPT | Mod: 50,S$GLB,, | Performed by: FAMILY MEDICINE

## 2017-11-06 PROCEDURE — 99214 OFFICE O/P EST MOD 30 MIN: CPT | Mod: 25,S$GLB,, | Performed by: FAMILY MEDICINE

## 2017-11-06 RX ORDER — TRIAMCINOLONE ACETONIDE 40 MG/ML
40 INJECTION, SUSPENSION INTRA-ARTICULAR; INTRAMUSCULAR
Status: DISCONTINUED | OUTPATIENT
Start: 2017-11-06 | End: 2017-11-06 | Stop reason: HOSPADM

## 2017-11-06 RX ADMIN — TRIAMCINOLONE ACETONIDE 40 MG: 40 INJECTION, SUSPENSION INTRA-ARTICULAR; INTRAMUSCULAR at 03:11

## 2017-11-06 NOTE — PROGRESS NOTES
Edi Mraie, a 52 y.o. male, is here for evaluation of RIGHT and LEFT hip.     HISTORY OF PRESENT ILLNESS   Location: anterior thigh, bilateral, L > R   Onset: insidious  Palliative:    Relative rest   Oral analgesics   CSI, GTB, 09/20/17, 70%   HgPT, noncompliant   Provocative:   ADLs   Prior: none  Progression: worsening discomfort   Quality:    Ache, worse in evening    Sharp at times   Radiation: none  Severity: per nursing documentation  Timing: intermittent with use  Trauma: none      Review of systems (ROS):  A 10+ review of systems was performed with pertinent positives and negatives noted above in the history of present illness. Other systems were negative unless otherwise specified.    PHYSICAL EXAMINATION  General:  The patient is alert and oriented x 3.  Mood is pleasant.  Observation of ears, eyes and nose reveal no gross abnormalities.  HEENT: NCAT, sclera nonicteric  Lungs: Respirations are equal and unlabored.   Gait is coordinated. Patient can toe walk and heel walk without difficulty.    HIP/PELVIS EXAMINATION    Observation/Inspection  Gait:   Nonantalgic   Alignment:  Neutral   Scars:   None   Muscle atrophy: None   Effusion:  None   Warmth:  None   Discoloration:   None   Leg lengths:   Equal   Pelvis:    Level     Tenderness/Crepitus (T/C):      T / C  Trochanteric bursa   - / -  Piriformis    - / -  SI joint    - / -  Psoas tendon   - / -  Rectus insertion  - / -  Adductor insertion  - / -  Pubic symphysis  - / -    ROM: (* = pain)    Flexion:      90 degrees*  External rotation:   40 degrees*  Internal rotation with axial load:  30 degrees*  Internal rotation without axial load:  40 degrees*  Abduction:    45 degrees  Adduction:     20 degrees    Special Tests:  Pain w/ forced internal rotation (FADIR):  +   Pain w/ forced external rotation (WES):  +  Circumduction test:     -  Stinchfield test:     +  Log roll:       +   Snapping hip (internal):    -   Sit-up pain:      -   Resisted  sit-up pain:     -   Resisted sit-up with adductor contraction pain:  -   Step-down test:     +  Trendelenburg test:     -  Bridge test      +     Extremity Neuro-vascular Examination:   Sensation:  Grossly intact to light touch all dermatomal regions.   Motor Function:  Fully intact motor function at hip, knee, foot and ankle    DTRs;  quadriceps and  achilles 2+.  No clonus and downgoing Babinski.    Vascular status:  DP and PT pulses 2+, brisk capillary refill, symmetric.    Skin:  intact, compartments soft.    Other Findings:    ASSESSMENT & PLAN  Assessment:   #1 Ton[place] Grade III osteoarthritis of hip, bilateral   W/ greater trochanteric bursitis    No evidence of neurologic pathology  No evidence of vascular pathology    Imaging studies reviewed:   X-ray pelvis and hip, bilateral 17.09    Plan:    We discussed the importance of appropriate diet, weight, and regular exercise including quadriceps strengthening     We discussed options including:  #1 watchful waiting  #2 physical therapy aimed at:   Core stability   RoM hip   Strengthening quadriceps   Gait training   #3 injection therapy:   CSI GTB    Right, effective 70%, repeat     Left, effective 70%, repeat    CSI iaHip    Right,     Left,    Orthobiologics   #4 consultation re: THAs     The patient chooses #2 and #3 csi gtb bilat    Pain management: handout given  Bracing:   Physical therapy:    hgPT, handout given, prior as above    fPT, @ [facility], begin as above   Activity (e.g. 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  Should symptoms worsen or fail to resolve, consider:  Revisiting the above options

## 2017-11-06 NOTE — PROCEDURES
Large Joint Aspiration/Injection  Date/Time: 11/6/2017 3:42 PM  Performed by: MARCO CAPUTO  Authorized by: MARCO CAPUTO     Consent Done?:  Yes (Verbal)  Indications:  Pain  Procedure site marked: Yes    Timeout: Prior to procedure the correct patient, procedure, and site was verified      Location:  Hip  Site:  R greater trochanteric bursa and L greater trochanteric bursa  Prep: Patient was prepped and draped in usual sterile fashion    Ultrasonic Guidance for needle placement: No  Needle size:  22 G  Approach:  Lateral  Medications:  40 mg triamcinolone acetonide 40 mg/mL; 40 mg triamcinolone acetonide 40 mg/mL  Patient tolerance:  Patient tolerated the procedure well with no immediate complications

## 2017-11-16 ENCOUNTER — HOSPITAL ENCOUNTER (INPATIENT)
Facility: HOSPITAL | Age: 52
LOS: 2 days | Discharge: HOME | DRG: 301 | End: 2017-11-19
Attending: EMERGENCY MEDICINE | Admitting: INTERNAL MEDICINE
Payer: COMMERCIAL

## 2017-11-16 DIAGNOSIS — S89.90XA LEG INJURY: ICD-10-CM

## 2017-11-16 DIAGNOSIS — I82.412 ACUTE DEEP VEIN THROMBOSIS (DVT) OF FEMORAL VEIN OF LEFT LOWER EXTREMITY: ICD-10-CM

## 2017-11-16 DIAGNOSIS — M79.606 LEG PAIN: ICD-10-CM

## 2017-11-16 DIAGNOSIS — I82.422 ACUTE DEEP VEIN THROMBOSIS (DVT) OF ILIAC VEIN OF LEFT LOWER EXTREMITY: Primary | ICD-10-CM

## 2017-11-16 DIAGNOSIS — I82.402 ACUTE DEEP VEIN THROMBOSIS (DVT) OF LEFT LOWER EXTREMITY, UNSPECIFIED VEIN: ICD-10-CM

## 2017-11-16 DIAGNOSIS — S89.92XA INJURY OF LEFT LOWER EXTREMITY, INITIAL ENCOUNTER: ICD-10-CM

## 2017-11-16 LAB
ALBUMIN SERPL BCP-MCNC: 4 G/DL
ALP SERPL-CCNC: 92 U/L
ALT SERPL W/O P-5'-P-CCNC: 23 U/L
ANION GAP SERPL CALC-SCNC: 10 MMOL/L
AST SERPL-CCNC: 17 U/L
BASOPHILS # BLD AUTO: 0.02 K/UL
BASOPHILS NFR BLD: 0.2 %
BILIRUB SERPL-MCNC: 0.9 MG/DL
BUN SERPL-MCNC: 26 MG/DL
CALCIUM SERPL-MCNC: 9.8 MG/DL
CHLORIDE SERPL-SCNC: 105 MMOL/L
CO2 SERPL-SCNC: 24 MMOL/L
CREAT SERPL-MCNC: 1 MG/DL
DIFFERENTIAL METHOD: ABNORMAL
EOSINOPHIL # BLD AUTO: 0.1 K/UL
EOSINOPHIL NFR BLD: 1.4 %
ERYTHROCYTE [DISTWIDTH] IN BLOOD BY AUTOMATED COUNT: 13.6 %
EST. GFR  (AFRICAN AMERICAN): >60 ML/MIN/1.73 M^2
EST. GFR  (NON AFRICAN AMERICAN): >60 ML/MIN/1.73 M^2
GLUCOSE SERPL-MCNC: 100 MG/DL
HCT VFR BLD AUTO: 46.7 %
HGB BLD-MCNC: 15.9 G/DL
INR PPP: 1
LYMPHOCYTES # BLD AUTO: 1.5 K/UL
LYMPHOCYTES NFR BLD: 15.4 %
MCH RBC QN AUTO: 31.2 PG
MCHC RBC AUTO-ENTMCNC: 34 G/DL
MCV RBC AUTO: 92 FL
MONOCYTES # BLD AUTO: 1.1 K/UL
MONOCYTES NFR BLD: 10.8 %
NEUTROPHILS # BLD AUTO: 7.2 K/UL
NEUTROPHILS NFR BLD: 71.9 %
PLATELET # BLD AUTO: 234 K/UL
PMV BLD AUTO: 9.7 FL
POTASSIUM SERPL-SCNC: 4.7 MMOL/L
PROT SERPL-MCNC: 7.9 G/DL
PROTHROMBIN TIME: 10.7 SEC
RBC # BLD AUTO: 5.1 M/UL
SODIUM SERPL-SCNC: 139 MMOL/L
WBC # BLD AUTO: 10.03 K/UL

## 2017-11-16 PROCEDURE — 25000003 PHARM REV CODE 250: Performed by: EMERGENCY MEDICINE

## 2017-11-16 PROCEDURE — G0378 HOSPITAL OBSERVATION PER HR: HCPCS

## 2017-11-16 PROCEDURE — 80053 COMPREHEN METABOLIC PANEL: CPT

## 2017-11-16 PROCEDURE — 85610 PROTHROMBIN TIME: CPT

## 2017-11-16 PROCEDURE — 99285 EMERGENCY DEPT VISIT HI MDM: CPT

## 2017-11-16 PROCEDURE — 20000000 HC ICU ROOM

## 2017-11-16 PROCEDURE — 85025 COMPLETE CBC W/AUTO DIFF WBC: CPT

## 2017-11-16 RX ORDER — HYDROCODONE BITARTRATE AND ACETAMINOPHEN 5; 325 MG/1; MG/1
1 TABLET ORAL EVERY 4 HOURS PRN
Status: DISCONTINUED | OUTPATIENT
Start: 2017-11-16 | End: 2017-11-19 | Stop reason: HOSPADM

## 2017-11-16 RX ORDER — HEPARIN SODIUM,PORCINE/D5W 25000/250
17 INTRAVENOUS SOLUTION INTRAVENOUS CONTINUOUS
Status: DISCONTINUED | OUTPATIENT
Start: 2017-11-16 | End: 2017-11-17

## 2017-11-16 RX ADMIN — HYDROCODONE BITARTRATE AND ACETAMINOPHEN 1 TABLET: 5; 325 TABLET ORAL at 11:11

## 2017-11-17 ENCOUNTER — SURGERY (OUTPATIENT)
Age: 52
End: 2017-11-17

## 2017-11-17 LAB
APTT BLDCRRT: 51.4 SEC
BASOPHILS # BLD AUTO: 0.03 K/UL
BASOPHILS NFR BLD: 0.3 %
DIFFERENTIAL METHOD: NORMAL
EOSINOPHIL # BLD AUTO: 0.2 K/UL
EOSINOPHIL NFR BLD: 1.9 %
ERYTHROCYTE [DISTWIDTH] IN BLOOD BY AUTOMATED COUNT: 13.9 %
FACT X PPP CHRO-ACNC: 0.41 IU/ML
HCT VFR BLD AUTO: 43.9 %
HGB BLD-MCNC: 14.6 G/DL
LYMPHOCYTES # BLD AUTO: 1.8 K/UL
LYMPHOCYTES NFR BLD: 20.4 %
MCH RBC QN AUTO: 30.7 PG
MCHC RBC AUTO-ENTMCNC: 33.3 G/DL
MCV RBC AUTO: 92 FL
MONOCYTES # BLD AUTO: 0.9 K/UL
MONOCYTES NFR BLD: 9.7 %
NEUTROPHILS # BLD AUTO: 6 K/UL
NEUTROPHILS NFR BLD: 67.1 %
PLATELET # BLD AUTO: 231 K/UL
PMV BLD AUTO: 9.8 FL
RBC # BLD AUTO: 4.76 M/UL
WBC # BLD AUTO: 8.97 K/UL

## 2017-11-17 PROCEDURE — 85025 COMPLETE CBC W/AUTO DIFF WBC: CPT

## 2017-11-17 PROCEDURE — 37212 THROMBOLYTIC VENOUS THERAPY: CPT | Mod: ,,, | Performed by: INTERNAL MEDICINE

## 2017-11-17 PROCEDURE — 63600175 PHARM REV CODE 636 W HCPCS: Performed by: EMERGENCY MEDICINE

## 2017-11-17 PROCEDURE — 63600175 PHARM REV CODE 636 W HCPCS

## 2017-11-17 PROCEDURE — 85520 HEPARIN ASSAY: CPT

## 2017-11-17 PROCEDURE — 25000003 PHARM REV CODE 250: Performed by: INTERNAL MEDICINE

## 2017-11-17 PROCEDURE — 20000000 HC ICU ROOM

## 2017-11-17 PROCEDURE — 85730 THROMBOPLASTIN TIME PARTIAL: CPT

## 2017-11-17 PROCEDURE — 94761 N-INVAS EAR/PLS OXIMETRY MLT: CPT

## 2017-11-17 PROCEDURE — 36415 COLL VENOUS BLD VENIPUNCTURE: CPT

## 2017-11-17 PROCEDURE — 06HN33Z INSERTION OF INFUSION DEVICE INTO LEFT FEMORAL VEIN, PERCUTANEOUS APPROACH: ICD-10-PCS | Performed by: INTERNAL MEDICINE

## 2017-11-17 PROCEDURE — 63600175 PHARM REV CODE 636 W HCPCS: Performed by: INTERNAL MEDICINE

## 2017-11-17 PROCEDURE — 99223 1ST HOSP IP/OBS HIGH 75: CPT | Mod: ,,, | Performed by: INTERNAL MEDICINE

## 2017-11-17 PROCEDURE — 25500020 PHARM REV CODE 255

## 2017-11-17 PROCEDURE — 25000003 PHARM REV CODE 250: Performed by: EMERGENCY MEDICINE

## 2017-11-17 PROCEDURE — 25000003 PHARM REV CODE 250

## 2017-11-17 PROCEDURE — 99152 MOD SED SAME PHYS/QHP 5/>YRS: CPT | Mod: ,,, | Performed by: INTERNAL MEDICINE

## 2017-11-17 PROCEDURE — 3E04317 INTRODUCTION OF OTHER THROMBOLYTIC INTO CENTRAL VEIN, PERCUTANEOUS APPROACH: ICD-10-PCS | Performed by: INTERNAL MEDICINE

## 2017-11-17 PROCEDURE — 37212 THROMBOLYTIC VENOUS THERAPY: CPT

## 2017-11-17 PROCEDURE — C1769 GUIDE WIRE: HCPCS

## 2017-11-17 PROCEDURE — 99223 1ST HOSP IP/OBS HIGH 75: CPT | Mod: 57,,, | Performed by: NURSE PRACTITIONER

## 2017-11-17 RX ORDER — ONDANSETRON 2 MG/ML
4 INJECTION INTRAMUSCULAR; INTRAVENOUS EVERY 12 HOURS PRN
Status: DISCONTINUED | OUTPATIENT
Start: 2017-11-17 | End: 2017-11-19 | Stop reason: HOSPADM

## 2017-11-17 RX ADMIN — BIVALIRUDIN 1.75 MG/KG/HR: 250 INJECTION, POWDER, LYOPHILIZED, FOR SOLUTION INTRAVENOUS at 09:11

## 2017-11-17 RX ADMIN — HYDROCODONE BITARTRATE AND ACETAMINOPHEN 1 TABLET: 5; 325 TABLET ORAL at 09:11

## 2017-11-17 RX ADMIN — BIVALIRUDIN 1.75 MG/KG/HR: 250 INJECTION, POWDER, LYOPHILIZED, FOR SOLUTION INTRAVENOUS at 11:11

## 2017-11-17 RX ADMIN — HEPARIN SODIUM AND DEXTROSE 17 UNITS/KG/HR: 10000; 5 INJECTION INTRAVENOUS at 11:11

## 2017-11-17 NOTE — H&P
[facility]  Cardiology  History and Physical     Patient Name: Edi Marie  MRN: [id number]  Admission Date: 11/16/2017  Code Status: Full Code   Attending Provider: Guanaco Lolyd MD   Primary Care Physician: More Villanueva MD  Principal Problem:<principal problem not specified>    Patient information was obtained from patient and ER records.     Subjective:     Chief Complaint:  LLE pain and swelling      HPI:  Edi Marie is a 52 y.o. male with a PMHx of kidney stones who presented to the ED with complaint of left leg pain. The patient reports he hit his left ankle at work about a month ago, the area formed a lump and bruising. His pain has been progressively worsening over the past month. Began at his calf and now encompasses his entire left leg.  He has also noted calf swelling. He reports the pain makes it difficult for him to withstand weight on his left leg. No prolonged immobility or long distance travel. He has no prior history of DVT. No history of bleeding. His mother has a history of DVT.     Past Medical History:   Diagnosis Date    Arthritis     Hemorrhoid     Histiocytosis     Kidney stones     Spermatocele        Past Surgical History:   Procedure Laterality Date    APPENDECTOMY      COLONOSCOPY N/A 1/20/2017    Procedure: COLONOSCOPY;  Surgeon: Danis Frank MD;  Location: [facility] ([address] [place] [address]);  Service: Endoscopy;  Laterality: N/A;    KNEE SURGERY      right knee    LITHOTRIPSY         Review of patient's allergies indicates:  No Known Allergies    No current facility-administered medications on file prior to encounter.      No current outpatient prescriptions on file prior to encounter.     Family History     Problem Relation (Age of Onset)    Cancer Paternal Grandfather    Heart disease Father        Social History Main Topics    Smoking status: Never Smoker    Smokeless tobacco: Never Used    Alcohol use 4.2 oz/week     7 Glasses of wine per week    Drug  use: No    Sexual activity: Yes     Review of Systems   Constitution: Negative. Negative for weakness, malaise/fatigue, weight gain and weight loss.   HENT: Negative.  Negative for nosebleeds.    Eyes: Negative.    Cardiovascular: Positive for leg swelling. Negative for chest pain, claudication, dyspnea on exertion, near-syncope, orthopnea, palpitations, paroxysmal nocturnal dyspnea and syncope.   Respiratory: Negative.  Negative for cough, hemoptysis and shortness of breath.    Endocrine: Negative.    Hematologic/Lymphatic: Negative.  Negative for bleeding problem. Does not bruise/bleed easily.   Skin: Negative.    Musculoskeletal: Positive for myalgias. Negative for falls.   Gastrointestinal: Negative.  Negative for abdominal pain, change in bowel habit, constipation, diarrhea, heartburn, hematemesis, hematochezia, hemorrhoids, melena, nausea and vomiting.   Genitourinary: Negative.  Negative for hematuria.   Neurological: Negative.  Negative for headaches, light-headedness, numbness and paresthesias.   Psychiatric/Behavioral: Negative.    Allergic/Immunologic: Negative.      Objective:     Vital Signs (Most Recent):  Temp: 97.5 °F (36.4 °C) (11/17/17 0731)  Pulse: 77 (11/17/17 0731)  Resp: 17 (11/17/17 0731)  BP: 112/74 (11/17/17 0731)  SpO2: 96 % (11/17/17 0405) Vital Signs (24h Range):  Temp:  [97.5 °F (36.4 °C)-98.3 °F (36.8 °C)] 97.5 °F (36.4 °C)  Pulse:  [73-85] 77  Resp:  [15-20] 17  SpO2:  [93 %-98 %] 96 %  BP: (112-159)/() 112/74     Weight: 83.9 kg (185 lb)  Body mass index is 25.8 kg/m².    SpO2: 96 %  O2 Device (Oxygen Therapy): room air      Intake/Output Summary (Last 24 hours) at 11/17/17 0902  Last data filed at 11/17/17 0500   Gross per 24 hour   Intake           107.25 ml   Output              475 ml   Net          -367.75 ml       Lines/Drains/Airways     Peripheral Intravenous Line                 Peripheral IV - Single Lumen 11/16/17 1938 Left Forearm less than 1 day         Peripheral  IV - Single Lumen 11/16/17 2048 Right Forearm less than 1 day                Physical Exam   Constitutional: He is oriented to person, place, and time. He appears well-developed and well-nourished. No distress.   HENT:   Head: Normocephalic and atraumatic.   Eyes: Right eye exhibits no discharge. Left eye exhibits no discharge.   Neck: No JVD present.   Cardiovascular: Normal rate, regular rhythm, normal heart sounds and intact distal pulses.  Exam reveals no gallop and no friction rub.    No murmur heard.  Pulmonary/Chest: Effort normal and breath sounds normal. He has no wheezes. He has no rales.   Abdominal: Soft. Bowel sounds are normal.   Musculoskeletal: He exhibits edema to LLE, tenderness to palpation, venous engorgement.   Neurological: He is alert and oriented to person, place, and time.   Skin: Skin is warm and dry. He is not diaphoretic.   Psychiatric: He has a normal mood and affect. His behavior is normal. Judgment and thought content normal.       Significant Labs:   BMP:   Recent Labs  Lab 11/16/17 1939         K 4.7      CO2 24   BUN 26*   CREATININE 1.0   CALCIUM 9.8   , CMP   Recent Labs  Lab 11/16/17 1939      K 4.7      CO2 24      BUN 26*   CREATININE 1.0   CALCIUM 9.8   PROT 7.9   ALBUMIN 4.0   BILITOT 0.9   ALKPHOS 92   AST 17   ALT 23   ANIONGAP 10   ESTGFRAFRICA >60   EGFRNONAA >60   , CBC   Recent Labs  Lab 11/16/17 1939 11/17/17  0508   WBC 10.03 8.97   HGB 15.9 14.6   HCT 46.7 43.9    231   , INR   Recent Labs  Lab 11/16/17 1939   INR 1.0    and All pertinent lab results from the last 24 hours have been reviewed.    Significant Imaging:      Venous US LLE  Clinical history: Leg pain    Findings:    Duplex and color flow Doppler is remarkable for thrombus involving the left common femoral vein, left superficial femoral vein, left popliteal vein, left peroneal vein.  Thrombus is partially occlusive involving the left common femoral vein,  with occlusive thrombus involving the remainder of the thrombosed vessels.  The posterior tibial veins and anterior tibial veins are patent.  There is partial thrombus involving the left iliac vein.  There is no reflux to suggest valvular incompetence.    Assessment and Plan:     Acute deep vein thrombosis (DVT) of left lower extremity    Patient admitted with extensive DVT of LLE following traumatic ankle injury 1 mo ago.   US performed noting thrombus involving the left common femoral vein, left superficial femoral vein, left popliteal vein, left peroneal vein.  Thrombus is partially occlusive involving the left common femoral vein, with occlusive thrombus involving the remainder of the thrombosed vessels.  The posterior tibial veins and anterior tibial veins are patent.  There is partial thrombus involving the left iliac vein.  Swelling and tenderness on exam, difficulty bearing weight.  Heparin gtt  NPO for catheter directed thrombolysis today; will initiate NOAC prior to discharge            VTE Risk Mitigation         Ordered     heparin 25,000 units in dextrose 5% 250 mL (100 units/mL) bolus from bag; PRN BOLUS  As needed (PRN)     Route:  Intravenous        11/16/17 2309     heparin 25,000 units in dextrose 5% 250 mL (100 units/mL) bolus from bag; PRN BOLUS  As needed (PRN)     Route:  Intravenous        11/16/17 2309     Low Risk of VTE  Once      11/16/17 2223     heparin 25,000 units in dextrose 5% 250 mL (100 units/mL) infusion; MALE  Continuous     Route:  Intravenous        11/16/17 2107          Artem Martinez NP  Cardiology   [facility]

## 2017-11-17 NOTE — ASSESSMENT & PLAN NOTE
Patient admitted with extensive DVT of LLE following traumatic ankle injury 1 mo ago.   US performed noting thrombus involving the left common femoral vein, left superficial femoral vein, left popliteal vein, left peroneal vein.  Thrombus is partially occlusive involving the left common femoral vein, with occlusive thrombus involving the remainder of the thrombosed vessels.  The posterior tibial veins and anterior tibial veins are patent.  There is partial thrombus involving the left iliac vein.  Swelling and tenderness on exam, difficulty bearing weight.  Heparin gtt  NPO for catheter directed thrombolysis today; will initiate NOAC prior to discharge

## 2017-11-17 NOTE — SUBJECTIVE & OBJECTIVE
Past Medical History:   Diagnosis Date    Arthritis     Hemorrhoid     Histiocytosis     Kidney stones     Spermatocele        Past Surgical History:   Procedure Laterality Date    APPENDECTOMY      COLONOSCOPY N/A 1/20/2017    Procedure: COLONOSCOPY;  Surgeon: Danis Frank MD;  Location: [address];  Service: Endoscopy;  Laterality: N/A;    KNEE SURGERY      right knee    LITHOTRIPSY         Review of patient's allergies indicates:  No Known Allergies    No current facility-administered medications on file prior to encounter.      No current outpatient prescriptions on file prior to encounter.     Family History     Problem Relation (Age of Onset)    Cancer Paternal Grandfather    Heart disease Father        Social History Main Topics    Smoking status: Never Smoker    Smokeless tobacco: Never Used    Alcohol use 4.2 oz/week     7 Glasses of wine per week    Drug use: No    Sexual activity: Yes     Review of Systems   Constitution: Negative. Negative for weakness, malaise/fatigue, weight gain and weight loss.   HENT: Negative.  Negative for nosebleeds.    Eyes: Negative.    Cardiovascular: Positive for leg swelling. Negative for chest pain, claudication, dyspnea on exertion, near-syncope, orthopnea, palpitations, paroxysmal nocturnal dyspnea and syncope.   Respiratory: Negative.  Negative for cough, hemoptysis and shortness of breath.    Endocrine: Negative.    Hematologic/Lymphatic: Negative.  Negative for bleeding problem. Does not bruise/bleed easily.   Skin: Negative.    Musculoskeletal: Positive for myalgias. Negative for falls.   Gastrointestinal: Negative.  Negative for abdominal pain, change in bowel habit, constipation, diarrhea, heartburn, hematemesis, hematochezia, hemorrhoids, melena, nausea and vomiting.   Genitourinary: Negative.  Negative for hematuria.   Neurological: Negative.  Negative for headaches, light-headedness, numbness and paresthesias.   Psychiatric/Behavioral:  Negative.    Allergic/Immunologic: Negative.      Objective:     Vital Signs (Most Recent):  Temp: 97.5 °F (36.4 °C) (11/17/17 0731)  Pulse: 77 (11/17/17 0731)  Resp: 17 (11/17/17 0731)  BP: 112/74 (11/17/17 0731)  SpO2: 96 % (11/17/17 0405) Vital Signs (24h Range):  Temp:  [97.5 °F (36.4 °C)-98.3 °F (36.8 °C)] 97.5 °F (36.4 °C)  Pulse:  [73-85] 77  Resp:  [15-20] 17  SpO2:  [93 %-98 %] 96 %  BP: (112-159)/() 112/74     Weight: 83.9 kg (185 lb)  Body mass index is 25.8 kg/m².    SpO2: 96 %  O2 Device (Oxygen Therapy): room air      Intake/Output Summary (Last 24 hours) at 11/17/17 0902  Last data filed at 11/17/17 0500   Gross per 24 hour   Intake           107.25 ml   Output              475 ml   Net          -367.75 ml       Lines/Drains/Airways     Peripheral Intravenous Line                 Peripheral IV - Single Lumen 11/16/17 1938 Left Forearm less than 1 day         Peripheral IV - Single Lumen 11/16/17 2048 Right Forearm less than 1 day                Physical Exam   Constitutional: He is oriented to person, place, and time. He appears well-developed and well-nourished. No distress.   HENT:   Head: Normocephalic and atraumatic.   Eyes: Right eye exhibits no discharge. Left eye exhibits no discharge.   Neck: No JVD present.   Cardiovascular: Normal rate, regular rhythm, normal heart sounds and intact distal pulses.  Exam reveals no gallop and no friction rub.    No murmur heard.  Pulmonary/Chest: Effort normal and breath sounds normal. He has no wheezes. He has no rales.   Abdominal: Soft. Bowel sounds are normal.   Musculoskeletal: He exhibits edema.   Neurological: He is alert and oriented to person, place, and time.   Skin: Skin is warm and dry. He is not diaphoretic.   Psychiatric: He has a normal mood and affect. His behavior is normal. Judgment and thought content normal.       Significant Labs:   BMP:   Recent Labs  Lab 11/16/17 1939         K 4.7      CO2 24   BUN 26*    CREATININE 1.0   CALCIUM 9.8   , CMP   Recent Labs  Lab 11/16/17 1939      K 4.7      CO2 24      BUN 26*   CREATININE 1.0   CALCIUM 9.8   PROT 7.9   ALBUMIN 4.0   BILITOT 0.9   ALKPHOS 92   AST 17   ALT 23   ANIONGAP 10   ESTGFRAFRICA >60   EGFRNONAA >60   , CBC   Recent Labs  Lab 11/16/17 1939 11/17/17  0508   WBC 10.03 8.97   HGB 15.9 14.6   HCT 46.7 43.9    231   , INR   Recent Labs  Lab 11/16/17 1939   INR 1.0    and All pertinent lab results from the last 24 hours have been reviewed.    Significant Imaging:      Venous US LLE  Clinical history: Leg pain    Findings:    Duplex and color flow Doppler is remarkable for thrombus involving the left common femoral vein, left superficial femoral vein, left popliteal vein, left peroneal vein.  Thrombus is partially occlusive involving the left common femoral vein, with occlusive thrombus involving the remainder of the thrombosed vessels.  The posterior tibial veins and anterior tibial veins are patent.  There is partial thrombus involving the left iliac vein.  There is no reflux to suggest valvular incompetence.

## 2017-11-17 NOTE — ED NOTES
Called back by floor nurse. Nurse reports that room is not set up for pt at this time. Informed nurse that room has been assigned for over allowed 30 minutes.

## 2017-11-17 NOTE — HPI
Edi Marie is a 52 y.o. male with a PMHx of kidney stones who presented to the ED with complaint of left leg pain. The patient reports he hit his left ankle at work about a month ago, the area formed a lump and bruising. His pain has been progressively worsening over the past month. Calf pain began approximately  2 weeks ago and now encompasses his entire left leg.  He has also noted calf swelling. He reports the pain makes it difficult for him to withstand weight on his left leg. No prolonged immobility or long distance travel. He has no prior history of DVT, cancer, or known clotting disorders. No history of bleeding. His mother has a history of DVT.

## 2017-11-17 NOTE — PLAN OF CARE
TN went to meet with patient, family present at bedside.  Patient is independent and lives at home with his wife.  He does not have home health or medical equipment at home. Patient drives to follow-up appointments. No anticipated discharge needs, will continue to follow.    Future Appointments  Date Time Provider Department Center   12/5/2017 3:00 PM Artem Martinez NP [facility] CARDIO [facility]     Follow-up With  Details  Why  Contact Info   Artem Martinez NP  On 12/5/2017  at 3:00 pm--Cardiology Follow-Up  [address] [place] [address]  [phone]        11/17/17 1028   Discharge Assessment   Assessment Type Discharge Planning Assessment   Confirmed/corrected address and phone number on facesheet? Yes   Assessment information obtained from? Patient   Prior to hospitilization cognitive status: Alert/Oriented   Prior to hospitalization functional status: Independent   Current cognitive status: Alert/Oriented   Current Functional Status: Independent   Facility Arrived From: Home   Lives With spouse   Able to Return to Prior Arrangements yes   Is patient able to care for self after discharge? Yes   Who are your caregiver(s) and their phone number(s)? Promise Banks-[phone]   Patient's perception of discharge disposition home or selfcare   Readmission Within The Last 30 Days no previous admission in last 30 days   Equipment Currently Used at Home none   Do you have any problems affording any of your prescribed medications? No   Is the patient taking medications as prescribed? yes   Does the patient have transportation home? Yes   Transportation Available family or friend will provide   Dialysis Name and Scheduled days N/A   Does the patient receive services at the [facility]? No   Discharge Plan A Home with family   Discharge Plan B Home with family;Home Health   Patient/Family In Agreement With Plan yes     Esther Morales RN  Transition Navigator  [phone]

## 2017-11-17 NOTE — ED PROVIDER NOTES
Encounter Date: 11/16/2017    SCRIBE #1 NOTE: I, Opal Dorado, am scribing for, and in the presence of, Dr. Nolasco.       History     Chief Complaint   Patient presents with    Leg Injury     left ankle injured on month ago when he struck it on pallet at work, now having increase pain to left calf with swelling, denies SOB     Time seen by provider: 6:40 PM    This is a 52 y.o. male with a PMHx of kidney stones who presents with complaint of left leg pain. The patient reports he hit his left ankle at work about a month ago, the area formed a lump and bruising and still is painful. He describes last night he felt that the pain and lump moved to his calf and he noted calf swelling. He reports the pain made it difficult for him to bare weight on his left leg last night. He is concerned for a blood clot as his mother has a history of DVT and he is going on a 9 hour car ride tomorrow. The patient has no other complaints.       The history is provided by the patient.     Review of patient's allergies indicates:  No Known Allergies  Past Medical History:   Diagnosis Date    Hemorrhoid     Histiocytosis     Kidney stones     Spermatocele      Past Surgical History:   Procedure Laterality Date    APPENDECTOMY      COLONOSCOPY N/A 1/20/2017    Procedure: COLONOSCOPY;  Surgeon: Danis Frank MD;  Location: [facility] ([address] [place] [address]);  Service: Endoscopy;  Laterality: N/A;    KNEE SURGERY      right knee    LITHOTRIPSY       Family History   Problem Relation Age of Onset    Heart disease Father      MI 2004    Cancer Paternal Grandfather      colon    Melanoma Neg Hx     Lupus Neg Hx     Psoriasis Neg Hx     Eczema Neg Hx      Social History   Substance Use Topics    Smoking status: Never Smoker    Smokeless tobacco: Never Used    Alcohol use 4.2 oz/week     7 Glasses of wine per week     Review of Systems   Constitutional: Negative for chills and fever.   HENT: Negative for facial swelling.    Eyes: Negative for  visual disturbance.   Respiratory: Negative for shortness of breath.    Cardiovascular: Positive for leg swelling. Negative for chest pain.   Gastrointestinal: Negative for abdominal pain.   Genitourinary: Negative for difficulty urinating.   Musculoskeletal: Positive for gait problem (secondary to pain) and myalgias.   Skin: Negative for color change.   Neurological: Negative for speech difficulty, weakness and numbness.       Physical Exam     Initial Vitals [11/16/17 1830]   BP Pulse Resp Temp SpO2   (!) 159/108 85 20 98.3 °F (36.8 °C) 97 %      MAP       125         Physical Exam    Nursing note and vitals reviewed.  Constitutional: He appears well-developed and well-nourished. He is not diaphoretic. No distress.   HENT:   Head: Normocephalic and atraumatic.   Eyes: Conjunctivae and EOM are normal. Pupils are equal, round, and reactive to light. No scleral icterus.   Neck: Normal range of motion. Neck supple.   Cardiovascular: Normal rate, regular rhythm, normal heart sounds and intact distal pulses.   No murmur heard.  Pulses:       Dorsalis pedis pulses are 2+ on the right side, and 2+ on the left side.        Posterior tibial pulses are 2+ on the right side, and 2+ on the left side.   Pulmonary/Chest: Breath sounds normal. No respiratory distress. He has no wheezes. He has no rhonchi. He has no rales.   Abdominal: Soft. Bowel sounds are normal. He exhibits no distension. There is no tenderness.   Musculoskeletal: Normal range of motion. He exhibits edema and tenderness.   +elizabeth's sign, left. Calf tenderness. Mild swelling. Old bruise noted to area.    Neurological: He is alert and oriented to person, place, and time. He has normal strength. No cranial nerve deficit.   Skin: Skin is warm and dry. No rash noted. No erythema.   Psychiatric: He has a normal mood and affect.         ED Course   Procedures  Labs Reviewed   CBC W/ AUTO DIFFERENTIAL - Abnormal; Notable for the following:        Result Value    MCH  31.2 (*)     Mono # 1.1 (*)     Lymph% 15.4 (*)     All other components within normal limits   COMPREHENSIVE METABOLIC PANEL - Abnormal; Notable for the following:     BUN, Bld 26 (*)     All other components within normal limits   PROTIME-INR        Imaging Results          X-Ray Tibia Fibula 2 View Left (Final result)  Result time 11/16/17 19:32:50    Final result by Micah Escalera MD (11/16/17 19:32:50)                 Impression:       No evidence of acute fracture or malalignment of the left tibia/fibula.              Electronically signed by: MICAH ESCALERA MD  Date:     11/16/17  Time:    19:32              Narrative:    Exam: [id number]  11/16/17  19:20:00 [id number] ([facility]) : XR TIBIA FIBULA 2 VIEW LEFT    Technique:    Frontal, lateral, and oblique radiographs of the left tibia/fibula.    Comparison:     None     Findings:      The bone mineralization is within normal limits.  There is joint space narrowing in the distal tibiofibular articulation.  The soft tissues are unremarkable.  There is no evidence of a fracture or dislocation of the left tibia/fibula.                             US Lower Extremity Veins Left (Final result)     Abnormal  Result time 11/16/17 19:09:10    Final result by Terry Macedo MD (11/16/17 19:09:10)                 Impression:        Deep venous thrombosis extending from the left iliac vein to the peroneal vein, noting thrombosis is partial involving the left iliac vein and common femoral vein, with complete occlusion involving the superficial femoral vein, popliteal vein, peroneal vein.    EPIC Notification System activated.        Electronically signed by: TERRY MACEDO MD  Date:     11/16/17  Time:    19:09              Narrative:    Comparison: None    Clinical history: Leg pain    Findings:    Duplex and color flow Doppler is remarkable for thrombus involving the left common femoral vein, left superficial femoral vein, left popliteal vein, left peroneal vein.  Thrombus is  partially occlusive involving the left common femoral vein, with occlusive thrombus involving the remainder of the thrombosed vessels.  The posterior tibial veins and anterior tibial veins are patent.  There is partial thrombus involving the left iliac vein.  There is no reflux to suggest valvular incompetence.                                 Medical Decision Making:   History:   Old Medical Records: I decided to obtain old medical records.  Clinical Tests:   Radiological Study: Ordered and Reviewed  ED Management:  53 yo male with extensive left lower extremity DVT. Case discussed with Dr. Lloyd who will admit the patient and start on heparin drip.                   ED Course      Clinical Impression:   The primary encounter diagnosis was Acute deep vein thrombosis (DVT) of left lower extremity, unspecified vein. Diagnoses of Leg injury and Leg pain were also pertinent to this visit.          I, Dr. Orlando Moore, personally performed the services described in this documentation. All medical record entries made by the scribe were at my direction and in my presence. I have reviewed the chart and agree that the record reflects my personal performance and is accurate and complete. Orlando Moore MD.  12:39 AM 11/17/2017                   Orlando Moore MD  11/16/17 2101       Orlando Moore MD  11/17/17 0039

## 2017-11-17 NOTE — ED NOTES
Wife at BS. Pt and wife updated on plan of care. Pt resting in bed. No acute distress noted. Respirations even and unlabored. Will continue to monitor.

## 2017-11-17 NOTE — PLAN OF CARE
Problem: Venous Thromboembolic Disease, DVT and PE (Infant)  Goal: Signs and Symptoms of Listed Potential Problems Will be Absent, Minimized or Managed (Venous Thromboembolic Disease, DVT and PE)  Signs and symptoms of listed potential problems will be absent, minimized or managed by discharge/transition of care (reference Venous Thromboembolic Disease, DVT and PE (Infant) CPG).  Heparin therapy initiated and maintained. Labs acquired and monitored. All safety measures maintained

## 2017-11-17 NOTE — ED NOTES
Pt presents to ED with left lower leg injury X 1 month ago. Pt reports that pain has been persistent since the injury. Reports that he hit his ankle on a pallet at work. Pt reports that the pain extended into his calf. Pt reports that he wants to be evaluated because he is supposed to go on a road trip tomorrow. Reports that mother advised him it may be a blood clot.

## 2017-11-17 NOTE — ED NOTES
Attempted to call report. Nurse reports that she is currently in a pt's room and unable to take report at this time. Informed nurse that RN will be up for BS report.

## 2017-11-18 PROCEDURE — 94761 N-INVAS EAR/PLS OXIMETRY MLT: CPT

## 2017-11-18 PROCEDURE — 63600175 PHARM REV CODE 636 W HCPCS: Performed by: INTERNAL MEDICINE

## 2017-11-18 PROCEDURE — 20000000 HC ICU ROOM

## 2017-11-18 PROCEDURE — 25000003 PHARM REV CODE 250: Performed by: EMERGENCY MEDICINE

## 2017-11-18 PROCEDURE — 25000003 PHARM REV CODE 250: Performed by: INTERNAL MEDICINE

## 2017-11-18 PROCEDURE — 99232 SBSQ HOSP IP/OBS MODERATE 35: CPT | Mod: ,,, | Performed by: INTERNAL MEDICINE

## 2017-11-18 RX ADMIN — BIVALIRUDIN 1.75 MG/KG/HR: 250 INJECTION, POWDER, LYOPHILIZED, FOR SOLUTION INTRAVENOUS at 03:11

## 2017-11-18 RX ADMIN — HYDROCODONE BITARTRATE AND ACETAMINOPHEN 1 TABLET: 5; 325 TABLET ORAL at 02:11

## 2017-11-18 RX ADMIN — BIVALIRUDIN 1.75 MG/KG/HR: 250 INJECTION, POWDER, LYOPHILIZED, FOR SOLUTION INTRAVENOUS at 10:11

## 2017-11-18 RX ADMIN — HYDROCODONE BITARTRATE AND ACETAMINOPHEN 1 TABLET: 5; 325 TABLET ORAL at 08:11

## 2017-11-18 RX ADMIN — ALTEPLASE 10 MG: 2.2 INJECTION, POWDER, LYOPHILIZED, FOR SOLUTION INTRAVENOUS at 06:11

## 2017-11-18 RX ADMIN — BIVALIRUDIN 1.75 MG/KG/HR: 250 INJECTION, POWDER, LYOPHILIZED, FOR SOLUTION INTRAVENOUS at 12:11

## 2017-11-18 RX ADMIN — BIVALIRUDIN 1.75 MG/KG/HR: 250 INJECTION, POWDER, LYOPHILIZED, FOR SOLUTION INTRAVENOUS at 08:11

## 2017-11-18 RX ADMIN — BIVALIRUDIN 1.75 MG/KG/HR: 250 INJECTION, POWDER, LYOPHILIZED, FOR SOLUTION INTRAVENOUS at 04:11

## 2017-11-18 RX ADMIN — ALTEPLASE 10 MG: 2.2 INJECTION, POWDER, LYOPHILIZED, FOR SOLUTION INTRAVENOUS at 02:11

## 2017-11-18 RX ADMIN — BIVALIRUDIN 1.75 MG/KG/HR: 250 INJECTION, POWDER, LYOPHILIZED, FOR SOLUTION INTRAVENOUS at 06:11

## 2017-11-18 RX ADMIN — BIVALIRUDIN 1.75 MG/KG/HR: 250 INJECTION, POWDER, LYOPHILIZED, FOR SOLUTION INTRAVENOUS at 02:11

## 2017-11-18 RX ADMIN — ALTEPLASE 10 MG: 2.2 INJECTION, POWDER, LYOPHILIZED, FOR SOLUTION INTRAVENOUS at 10:11

## 2017-11-18 RX ADMIN — HYDROCODONE BITARTRATE AND ACETAMINOPHEN 1 TABLET: 5; 325 TABLET ORAL at 06:11

## 2017-11-18 NOTE — PROGRESS NOTES
Received care on patient present from [facility] with Nurse and Tech. Patient is AAOX4 VS stable, patient is without any pain. patient verbalized he is having some sensation to the Left leg where the Eckos system is connected he is on Alteplase, infusing through the system. Coolant fluid is NS is infusing through the Eckos system. Left foot is cool to touch noted all toes are with some small discoloration, noted good capillary refill. Noted posterior pulse with audible and palpable. All lines are free flowing. POC  Discussed with patient and wife, orientation to the ICU all questions and concerns addressed.

## 2017-11-18 NOTE — PLAN OF CARE
Problem: Patient Care Overview  Goal: Plan of Care Review  Pt. on room air.  Will cont to monitor

## 2017-11-18 NOTE — PROGRESS NOTES
[facility]  Cardiology  Progress Note    Patient Name: Edi Marie  MRN: [id number]  Admission Date: 11/16/2017  Hospital Length of Stay: 1 days  Code Status: Full Code   Attending Physician: Guanaco Lloyd MD   Primary Care Physician: More Villanueva MD  Expected Discharge Date:   Principal Problem:<principal problem not specified>    Subjective:     Hospital Course:   11/17/2017 Patient admitted with extensive DVTs to left common femoral vein, left superficial femoral vein, left popliteal vein, left peroneal vein. Partial thrombus involving the left iliac vein. Partially occlusive thrombus  involving the left common femoral vein, with occlusive thrombus involving the remainder of the thrombosed vessels.  The posterior tibial veins and anterior tibial veins are patent.   There is no reflux to suggest valvular incompetence. Patient with exquisite tenderness and painful weight bearing to LLE. LLE edematous. NPO for catheter directed thrombolysis this afternoon.     Interval History: Patient doing well with no acute concerns. Pain in left leg is improved. No bleeding.      Review of Systems   Constitution: Negative.   HENT: Negative.    Eyes: Negative.    Cardiovascular: Negative.    Respiratory: Negative.    Endocrine: Negative.    Hematologic/Lymphatic: Negative.    Skin: Negative.    Musculoskeletal: Negative.    Gastrointestinal: Negative.    Neurological: Negative.    Psychiatric/Behavioral: Negative.      Objective:     Vital Signs (Most Recent):  Temp: 98.7 °F (37.1 °C) (11/18/17 1115)  Pulse: 77 (11/18/17 1130)  Resp: 13 (11/18/17 1130)  BP: 113/74 (11/18/17 1130)  SpO2: 96 % (11/18/17 1130) Vital Signs (24h Range):  Temp:  [98.1 °F (36.7 °C)-98.7 °F (37.1 °C)] 98.7 °F (37.1 °C)  Pulse:  [63-84] 77  Resp:  [8-30] 13  SpO2:  [93 %-97 %] 96 %  BP: ()/(63-88) 113/74     Weight: 83.9 kg (184 lb 15.5 oz)  Body mass index is 25.8 kg/m².     SpO2: 96 %  O2 Device (Oxygen Therapy): room  air      Intake/Output Summary (Last 24 hours) at 11/18/17 1149  Last data filed at 11/18/17 1115   Gross per 24 hour   Intake          1177.42 ml   Output             2355 ml   Net         -1177.58 ml       Lines/Drains/Airways     Peripheral Intravenous Line                 Peripheral IV - Single Lumen 11/16/17 1938 Left Forearm 1 day         Peripheral IV - Single Lumen 11/16/17 2048 Right Forearm 1 day                Physical Exam   Constitutional: He is oriented to person, place, and time. He appears well-developed and well-nourished. No distress.   Examination of the digits showed no clubbing or cyanosis   HENT:   Head: Normocephalic and atraumatic.   Eyes: Conjunctivae are normal. Pupils are equal, round, and reactive to light. Right eye exhibits no discharge.   Neck: Normal range of motion. Neck supple. No JVD present. No thyromegaly present.   No carotid bruits   Cardiovascular: Normal rate, regular rhythm, S1 normal, S2 normal, normal heart sounds, intact distal pulses and normal pulses.  PMI is not displaced.  Exam reveals no gallop, no friction rub and no opening snap.    No murmur heard.  Pulmonary/Chest: Effort normal and breath sounds normal. No respiratory distress. He has no wheezes. He has no rales. He exhibits no tenderness.   Abdominal: Soft. Bowel sounds are normal. He exhibits no distension and no mass. There is no tenderness. There is no guarding.   No hepatosplenomegaly   Musculoskeletal: Normal range of motion. He exhibits no edema or tenderness.   Lymphadenopathy:     He has no cervical adenopathy.   Neurological: He is alert and oriented to person, place, and time.   Skin: Skin is warm. No rash noted. He is not diaphoretic. No erythema.   Psychiatric: He has a normal mood and affect.   Nursing note and vitals reviewed.      Significant Labs:   BMP:   Recent Labs  Lab 11/16/17 1939         K 4.7      CO2 24   BUN 26*   CREATININE 1.0   CALCIUM 9.8   , CBC   Recent  Labs  Lab 11/16/17  1939 11/17/17  0508   WBC 10.03 8.97   HGB 15.9 14.6   HCT 46.7 43.9    231    and Troponin No results for input(s): TROPONINI in the last 48 hours.    Significant Imaging: X-Ray: CXR: X-Ray Chest 1 View (CXR): No results found for this visit on 11/16/17.    Assessment and Plan:         Acute deep vein thrombosis (DVT) of left lower extremity    Patient admitted with extensive DVT of LLE following traumatic ankle injury 1 mo ago.   US performed noting thrombus involving the left common femoral vein, left superficial femoral vein, left popliteal vein, left peroneal vein.  Thrombus is partially occlusive involving the left common femoral vein, with occlusive thrombus involving the remainder of the thrombosed vessels.  The posterior tibial veins and anterior tibial veins are patent.  There is partial thrombus involving the left iliac vein.  EKOS catheter placed in CFV to popliteal vein.    Continuous tPA at 1 mg/hr and angiomax                 VTE Risk Mitigation         Ordered     Low Risk of VTE  Once      11/16/17 6460          Loly Garrison MD  Cardiology  [facility]

## 2017-11-18 NOTE — PROCEDURES
Post Procedure Note: Catheter directed thrombolysis - EKOS    The pt was brought to the cath lab and under sterile technique, left ATV pedal access obtained under US guidance and placement confirmed with fluoroscopy. 50 cm EKOS catheter placed in CFV to popliteal vein. Please see full report for details. The pt tolerated the procedure well without complications. Sheath sutured in place.     Vitals:    11/17/17 0851 11/17/17 1131 11/17/17 1614 11/17/17 2018   BP:  118/74 122/76    BP Location:   Left arm    Patient Position:   Lying    Pulse:  79 80    Resp:  18 19    Temp:  97.8 °F (36.6 °C) 98.1 °F (36.7 °C)    TempSrc:   Oral    SpO2: 95%  96% 95%   Weight:       Height:             Gen: NAD  Ext:  no evidence of hematoma    Plan:  -Post cath care per protocol  -Continuous tPA at 1 mg/hr and angiomax

## 2017-11-18 NOTE — SUBJECTIVE & OBJECTIVE
Interval History: Patient doing well with no acute concerns. Pain in left leg is improved. No bleeding.      Review of Systems   Constitution: Negative.   HENT: Negative.    Eyes: Negative.    Cardiovascular: Negative.    Respiratory: Negative.    Endocrine: Negative.    Hematologic/Lymphatic: Negative.    Skin: Negative.    Musculoskeletal: Negative.    Gastrointestinal: Negative.    Neurological: Negative.    Psychiatric/Behavioral: Negative.      Objective:     Vital Signs (Most Recent):  Temp: 98.7 °F (37.1 °C) (11/18/17 1115)  Pulse: 77 (11/18/17 1130)  Resp: 13 (11/18/17 1130)  BP: 113/74 (11/18/17 1130)  SpO2: 96 % (11/18/17 1130) Vital Signs (24h Range):  Temp:  [98.1 °F (36.7 °C)-98.7 °F (37.1 °C)] 98.7 °F (37.1 °C)  Pulse:  [63-84] 77  Resp:  [8-30] 13  SpO2:  [93 %-97 %] 96 %  BP: ()/(63-88) 113/74     Weight: 83.9 kg (184 lb 15.5 oz)  Body mass index is 25.8 kg/m².     SpO2: 96 %  O2 Device (Oxygen Therapy): room air      Intake/Output Summary (Last 24 hours) at 11/18/17 1149  Last data filed at 11/18/17 1115   Gross per 24 hour   Intake          1177.42 ml   Output             2355 ml   Net         -1177.58 ml       Lines/Drains/Airways     Peripheral Intravenous Line                 Peripheral IV - Single Lumen 11/16/17 1938 Left Forearm 1 day         Peripheral IV - Single Lumen 11/16/17 2048 Right Forearm 1 day                Physical Exam   Constitutional: He is oriented to person, place, and time. He appears well-developed and well-nourished. No distress.   Examination of the digits showed no clubbing or cyanosis   HENT:   Head: Normocephalic and atraumatic.   Eyes: Conjunctivae are normal. Pupils are equal, round, and reactive to light. Right eye exhibits no discharge.   Neck: Normal range of motion. Neck supple. No JVD present. No thyromegaly present.   No carotid bruits   Cardiovascular: Normal rate, regular rhythm, S1 normal, S2 normal, normal heart sounds, intact distal pulses and normal  pulses.  PMI is not displaced.  Exam reveals no gallop, no friction rub and no opening snap.    No murmur heard.  Pulmonary/Chest: Effort normal and breath sounds normal. No respiratory distress. He has no wheezes. He has no rales. He exhibits no tenderness.   Abdominal: Soft. Bowel sounds are normal. He exhibits no distension and no mass. There is no tenderness. There is no guarding.   No hepatosplenomegaly   Musculoskeletal: Normal range of motion. He exhibits no edema or tenderness.   Lymphadenopathy:     He has no cervical adenopathy.   Neurological: He is alert and oriented to person, place, and time.   Skin: Skin is warm. No rash noted. He is not diaphoretic. No erythema.   Psychiatric: He has a normal mood and affect.   Nursing note and vitals reviewed.      Significant Labs:   BMP:   Recent Labs  Lab 11/16/17 1939         K 4.7      CO2 24   BUN 26*   CREATININE 1.0   CALCIUM 9.8   , CBC   Recent Labs  Lab 11/16/17 1939 11/17/17  0508   WBC 10.03 8.97   HGB 15.9 14.6   HCT 46.7 43.9    231    and Troponin No results for input(s): TROPONINI in the last 48 hours.    Significant Imaging: X-Ray: CXR: X-Ray Chest 1 View (CXR): No results found for this visit on 11/16/17.

## 2017-11-18 NOTE — PLAN OF CARE
Problem: Patient Care Overview  Goal: Plan of Care Review  Outcome: Ongoing (interventions implemented as appropriate)   11/17/17 1500   Coping/Psychosocial   Plan Of Care Reviewed With patient;spouse   Plan of care reviewed with patient, spouse at bedside, medication effect and side effect reviewed with patient, pending venogram procedure as ordered by Md. Prepared for procedure, voices understanding, continue on heparin drip as ordered, no apparent distress noted, no evidence of bleeding present. Encourage patient to call as needed, voices understanding, call light within his reach, bed in low position, continue to monitor.

## 2017-11-18 NOTE — PROGRESS NOTES
Upon hourly assessment, noted patient left extremity is warm to touch just as above the line indicator which was marked at the beginning of his admission. Patient verbalized feeling more sensation to touch when asked, he remain without any pain.

## 2017-11-18 NOTE — PLAN OF CARE
Informed by Juan Daniel, pharmacy, that angiomax will run out shortly and they will be unable to refill this medication  Informed Dr. Garrison, ordering physician, of this. Dr. Garrison stated when that happens we will just infuse the TPA currently infusing.

## 2017-11-18 NOTE — PLAN OF CARE
Problem: Patient Care Overview  Goal: Plan of Care Review  Pt received on RA , no apparent distress noted. Will cont to monitor.

## 2017-11-19 VITALS
HEART RATE: 91 BPM | WEIGHT: 184.75 LBS | DIASTOLIC BLOOD PRESSURE: 80 MMHG | TEMPERATURE: 99 F | BODY MASS INDEX: 25.86 KG/M2 | SYSTOLIC BLOOD PRESSURE: 121 MMHG | RESPIRATION RATE: 33 BRPM | OXYGEN SATURATION: 95 % | HEIGHT: 71 IN

## 2017-11-19 PROBLEM — S89.90XA LEG INJURY: Status: ACTIVE | Noted: 2017-11-19

## 2017-11-19 LAB — POCT GLUCOSE: 99 MG/DL (ref 70–110)

## 2017-11-19 PROCEDURE — 25000003 PHARM REV CODE 250: Performed by: INTERNAL MEDICINE

## 2017-11-19 PROCEDURE — 99239 HOSP IP/OBS DSCHRG MGMT >30: CPT | Mod: ,,, | Performed by: INTERNAL MEDICINE

## 2017-11-19 PROCEDURE — 25000003 PHARM REV CODE 250: Performed by: EMERGENCY MEDICINE

## 2017-11-19 PROCEDURE — 94761 N-INVAS EAR/PLS OXIMETRY MLT: CPT

## 2017-11-19 PROCEDURE — 63600175 PHARM REV CODE 636 W HCPCS: Performed by: INTERNAL MEDICINE

## 2017-11-19 RX ORDER — HYDROCODONE BITARTRATE AND ACETAMINOPHEN 5; 325 MG/1; MG/1
1 TABLET ORAL EVERY 4 HOURS PRN
Qty: 20 TABLET | Refills: 0 | Status: SHIPPED | OUTPATIENT
Start: 2017-11-19 | End: 2017-11-21 | Stop reason: SDUPTHER

## 2017-11-19 RX ADMIN — HYDROCODONE BITARTRATE AND ACETAMINOPHEN 1 TABLET: 5; 325 TABLET ORAL at 05:11

## 2017-11-19 RX ADMIN — ALTEPLASE 10 MG: 2.2 INJECTION, POWDER, LYOPHILIZED, FOR SOLUTION INTRAVENOUS at 03:11

## 2017-11-19 RX ADMIN — BIVALIRUDIN 1.75 MG/KG/HR: 250 INJECTION, POWDER, LYOPHILIZED, FOR SOLUTION INTRAVENOUS at 08:11

## 2017-11-19 RX ADMIN — BIVALIRUDIN 1.75 MG/KG/HR: 250 INJECTION, POWDER, LYOPHILIZED, FOR SOLUTION INTRAVENOUS at 05:11

## 2017-11-19 RX ADMIN — BIVALIRUDIN 1.75 MG/KG/HR: 250 INJECTION, POWDER, LYOPHILIZED, FOR SOLUTION INTRAVENOUS at 03:11

## 2017-11-19 NOTE — PLAN OF CARE
"Dangled on the side of the bed for 10 minutes, denies any lightheadedness, denies pain, stiffness only; ambulated 150 feet; tolerated well. Returned to bed complained of feeling a "little woozy" Noted lowered blood pressure and heart rate; blood sugar checked reading 99. Encouraged to eat more at supper; given orange juice; had been following a low carb diet at home; explained that you are recovering and your body needs more fuel to heal. After drinking OJ, felt better; HR and blood pressure back to previous range reading.  "

## 2017-11-19 NOTE — PLAN OF CARE
Problem: Patient Care Overview  Goal: Plan of Care Review  Outcome: Ongoing (interventions implemented as appropriate)  Pt remains on Ekos machine and tolerating well. Left leg remains warm and pink with brisk cap refill and palpable pulses throughout to BLE. Pt has been medicated for pain with 1 pain pill and received good relief. Pt's wife remained at his bedside throughout the night. Pt stable and resting.

## 2017-11-19 NOTE — DISCHARGE SUMMARY
DC Summary    Admission Date: 11/17/2017  Discharge Date: 11/19/2017  Attending Physician: Loly Garrison    Condition on Discharge: Stable    Final Diagnosis:   Acute DVT of left leg  Procedures:   EKOS cathter placement with Tpa and angiomax   History of Present Illness : Refer to H&P admission note    Laboratory/Data :   Lab Results   Component Value Date     11/16/2017    K 4.7 11/16/2017     11/16/2017    CO2 24 11/16/2017    BUN 26 (H) 11/16/2017    CREATININE 1.0 11/16/2017     11/16/2017    AST 17 11/16/2017    ALT 23 11/16/2017    ALBUMIN 4.0 11/16/2017    PROT 7.9 11/16/2017    BILITOT 0.9 11/16/2017    WBC 8.97 11/17/2017    HGB 14.6 11/17/2017    HCT 43.9 11/17/2017    MCV 92 11/17/2017     11/17/2017    INR 1.0 11/16/2017    PSA 0.70 07/12/2016    CHOL 210 (H) 07/12/2016    HDL 52 07/12/2016    LDLCALC 138.6 07/12/2016    TRIG 97 07/12/2016       Hospital Course: Patient with acute DVT of left leg. He had EKOS catheter placed with Tpa and angiomax. Pain improved. Patient feeling better after catheter was removed. He walked around unit with no acute concerns.   eliquis 10 mg po bid for 7 days  Then eliquis 5 mg po bid    Discharge Medications:    Edi Marie   Home Medication Instructions KIMBERLY:[id number]    Printed on:11/19/17 8554   Medication Information                      apixaban 5 mg Tab  Take 2 tablets (10 mg total) by mouth 2 (two) times daily.             apixaban 5 mg Tab  Take 1 tablet (5 mg total) by mouth 2 (two) times daily.             hydrocodone-acetaminophen 5-325mg (NORCO) 5-325 mg per tablet  Take 1 tablet by mouth every 4 (four) hours as needed.               Discharge Instructions: Heart healthy diet, compliance with medications  Follow up Appointments: f/u with NP 12/5/2017    Disposition: discharge HOME    The importance anticoagulation was explained to patient in details. Anticoagulation is to prevent CVA and PE and Sudden death. With  anticoagulation there is risk of bleeding but benefits clearly out weight any risk. Patient advice to report any bleeding to cardiologist as soon as possible and do not discontinue medication without consent of cardiologist. All patient questions were answered regarding medication and patient verbalized understanding.    Time spent on discharge 30 minutes, planning and evaluating patient for discharge.

## 2017-11-19 NOTE — PLAN OF CARE
Plan to discharge home ; IV's removed; given information on DVT, medication, read  and encouraged to review again at home and follow- up appointment with Artem; verbalized understanding of instructions. Escorted via wheelchair to the main entrance for discharge.

## 2017-11-19 NOTE — PLAN OF CARE
Sheath pulled; pressure held for  20 minutes; no hematoma, bruising or swelling noted at the puncture site; pressure dressing applied.

## 2017-11-19 NOTE — PLAN OF CARE
Discharge orders noted, no HH or HME ordered.    Future Appointments  Date Time Provider Department Center   12/5/2017 3:00 PM Artem Martinez, [facility]       Pt's nurse will go over medications/signs and symptoms prior to discharge       11/19/17 1709   Final Note   Assessment Type Final Discharge Note   Discharge Disposition Home   What phone number can be called within the next 1-3 days to see how you are doing after discharge? [phone]   Hospital Follow Up  Appt(s) scheduled? Yes   Right Care Referral Info   Post Acute Recommendation No Care     Mitzi Wadsworth, RN Transitional Navigator  [phone]

## 2017-11-19 NOTE — PLAN OF CARE
Problem: Patient Care Overview  Goal: Plan of Care Review  Outcome: Ongoing (interventions implemented as appropriate)  Received pt on RA; no distress noted

## 2017-11-20 DIAGNOSIS — I82.4Y2 ACUTE DEEP VEIN THROMBOSIS (DVT) OF PROXIMAL VEIN OF LEFT LOWER EXTREMITY: Primary | ICD-10-CM

## 2017-11-21 RX ORDER — HYDROCODONE BITARTRATE AND ACETAMINOPHEN 5; 325 MG/1; MG/1
1 TABLET ORAL EVERY 4 HOURS PRN
Qty: 20 TABLET | Refills: 0 | Status: SHIPPED | OUTPATIENT
Start: 2017-11-21 | End: 2017-12-21

## 2017-11-21 NOTE — TELEPHONE ENCOUNTER
----- Message from Clarissa Godoy sent at 11/21/2017 10:25 AM CST -----  Contact: self, [phone]  Patient is calling back in regards to prescription requested. Please advise.

## 2017-11-29 ENCOUNTER — HOSPITAL ENCOUNTER (OUTPATIENT)
Dept: RADIOLOGY | Facility: HOSPITAL | Age: 52
Discharge: HOME | End: 2017-11-29
Attending: NURSE PRACTITIONER
Payer: COMMERCIAL

## 2017-11-29 DIAGNOSIS — I82.4Y2 ACUTE DEEP VEIN THROMBOSIS (DVT) OF PROXIMAL VEIN OF LEFT LOWER EXTREMITY: ICD-10-CM

## 2017-11-29 PROCEDURE — 93971 EXTREMITY STUDY: CPT | Mod: 26,,, | Performed by: RADIOLOGY

## 2017-11-29 PROCEDURE — 93971 EXTREMITY STUDY: CPT | Mod: TC

## 2017-11-30 ENCOUNTER — PATIENT MESSAGE (OUTPATIENT)
Dept: INTERNAL MEDICINE | Facility: CLINIC | Age: 52
End: 2017-11-30

## 2017-11-30 ENCOUNTER — PATIENT MESSAGE (OUTPATIENT)
Dept: CARDIOLOGY | Facility: CLINIC | Age: 52
End: 2017-11-30

## 2017-12-05 ENCOUNTER — OFFICE VISIT (OUTPATIENT)
Dept: CARDIOLOGY | Facility: CLINIC | Age: 52
End: 2017-12-05
Payer: COMMERCIAL

## 2017-12-05 VITALS — DIASTOLIC BLOOD PRESSURE: 78 MMHG | SYSTOLIC BLOOD PRESSURE: 116 MMHG | WEIGHT: 187.5 LBS | BODY MASS INDEX: 26.15 KG/M2

## 2017-12-05 DIAGNOSIS — I82.422 ACUTE DEEP VEIN THROMBOSIS (DVT) OF ILIAC VEIN OF LEFT LOWER EXTREMITY: Primary | ICD-10-CM

## 2017-12-05 DIAGNOSIS — I87.002 POST-THROMBOTIC SYNDROME OF LEFT LOWER EXTREMITY: ICD-10-CM

## 2017-12-05 LAB
POC ACTIVATED CLOTTING TIME K: 202 SEC (ref 74–137)
SAMPLE: ABNORMAL

## 2017-12-05 PROCEDURE — 99214 OFFICE O/P EST MOD 30 MIN: CPT | Mod: S$GLB,,, | Performed by: NURSE PRACTITIONER

## 2017-12-05 PROCEDURE — 99999 PR PBB SHADOW E&M-EST. PATIENT-LVL III: CPT | Mod: PBBFAC,,, | Performed by: NURSE PRACTITIONER

## 2017-12-05 NOTE — PROGRESS NOTES
Subjective:       Patient ID: Edi Marie is a 52 y.o. male with recent hospitalization for extensive DVT here for follow up     Chief Complaint: Hospital Follow up     11/17/2017 Patient admitted with extensive DVTs to left common femoral vein, left superficial femoral vein, left popliteal vein, left peroneal vein. Partial thrombus involving the left iliac vein. Partially occlusive thrombus  involving the left common femoral vein, with occlusive thrombus involving the remainder of the thrombosed vessels.  The posterior tibial veins and anterior tibial veins are patent.   There is no reflux to suggest valvular incompetence. Patient with exquisite tenderness and painful weight bearing to LLE. LLE edematous. Patient underwent catheter directed thrombolysis with EKOS Tpa and angiomax.  He was subsequently started on Eliquis. Denies any bleeding or excessive bruising.   US repeated on 11/20/2017- There is reidentification of largely occlusive thrombus within the left external iliac, common femoral, femoral, popliteal, and peroneal veins.  There is thrombosis of the posterior tibial veins, not seen previously.  The anterior tibial and greater saphenous veins are patent.   Patient recalls ankle pain began without injury. He was seen by sports med for SI dysfunction and mentioned the ankle pain to sports med who felt occult injury was cause of pain. His LLE pain progressed over the course of a month. He continues to have edema and pain to LLE and is concerned that his clot burden has not decreased. Patient given extensive education on acute vs chronic DVT, NOAC, indications for further invasive procedures, complications of DVT including possibility of clot migration causing massive PE and death. He expressed plans to travel up [place] to visit family via airline and has several more trips planned in the upcoming months. He was advised against airline travel as well as long road trips. POC discussed in detail with  patient and wife to continue NOAC, repeat venous US in 2 mo, thigh high compression stocking to LLE, frequent ambulation. He will be referred to heme/onc once DVT resolved for hypercoag work up. All questions were answered to patient's satisfaction. Printed information given regarding DVT.       Review of Systems   Constitutional: Negative for diaphoresis and fatigue.   HENT: Negative.    Eyes: Negative.    Respiratory: Negative.  Negative for cough, chest tightness, shortness of breath and wheezing.    Cardiovascular: Positive for leg swelling. Negative for chest pain and palpitations.   Gastrointestinal: Negative.  Negative for blood in stool.   Endocrine: Negative.    Genitourinary: Negative.    Musculoskeletal: Positive for arthralgias and myalgias.   Skin: Negative.    Allergic/Immunologic: Negative.    Neurological: Negative.    Hematological: Negative.  Does not bruise/bleed easily.   Psychiatric/Behavioral: Negative.        Objective:      Physical Exam   Constitutional: He is oriented to person, place, and time. He appears well-developed and well-nourished. No distress.   HENT:   Head: Normocephalic and atraumatic.   Eyes: Right eye exhibits no discharge. Left eye exhibits no discharge.   Neck: No JVD present.   Cardiovascular: Normal rate, regular rhythm, normal heart sounds and intact distal pulses.  Exam reveals no gallop and no friction rub.    No murmur heard.  Pulmonary/Chest: Effort normal and breath sounds normal. No respiratory distress. He has no wheezes. He has no rales. He exhibits no tenderness.   Abdominal: Soft. Bowel sounds are normal.   Musculoskeletal: Normal range of motion. He exhibits edema (2+ LLE) and tenderness (LLE).   Neurological: He is alert and oriented to person, place, and time.   Skin: Skin is warm and dry. Capillary refill takes less than 2 seconds. He is not diaphoretic.   Psychiatric: He has a normal mood and affect. His behavior is normal. Judgment and thought content  normal.       Assessment:       1. Acute deep vein thrombosis (DVT) of iliac vein of left lower extremity    2. Post-thrombotic syndrome of left lower extremity        Plan:       Edi was seen today for follow-up.    Diagnoses and all orders for this visit:    Acute deep vein thrombosis (DVT) of iliac vein of left lower extremity  -     US Lower Extremity Veins Left; Future    Post-thrombotic syndrome of left lower extremity  -     US Lower Extremity Veins Left; Future    Please refer to HPI for POC, extensively detailed       Vitals:    12/05/17 1508   BP: 116/78   Weight: 85 kg (187 lb 8 oz)       Results for orders placed or performed during the hospital encounter of 11/16/17   CBC auto differential   Result Value Ref Range    WBC 10.03 3.90 - 12.70 K/uL    RBC 5.10 4.60 - 6.20 M/uL    Hemoglobin 15.9 14.0 - 18.0 g/dL    Hematocrit 46.7 40.0 - 54.0 %    MCV 92 82 - 98 fL    MCH 31.2 (H) 27.0 - 31.0 pg    MCHC 34.0 32.0 - 36.0 g/dL    RDW 13.6 11.5 - 14.5 %    Platelets 234 150 - 350 K/uL    MPV 9.7 9.2 - 12.9 fL    Gran # 7.2 1.8 - 7.7 K/uL    Lymph # 1.5 1.0 - 4.8 K/uL    Mono # 1.1 (H) 0.3 - 1.0 K/uL    Eos # 0.1 0.0 - 0.5 K/uL    Baso # 0.02 0.00 - 0.20 K/uL    Gran% 71.9 38.0 - 73.0 %    Lymph% 15.4 (L) 18.0 - 48.0 %    Mono% 10.8 4.0 - 15.0 %    Eosinophil% 1.4 0.0 - 8.0 %    Basophil% 0.2 0.0 - 1.9 %    Differential Method Automated    Comprehensive metabolic panel   Result Value Ref Range    Sodium 139 136 - 145 mmol/L    Potassium 4.7 3.5 - 5.1 mmol/L    Chloride 105 95 - 110 mmol/L    CO2 24 23 - 29 mmol/L    Glucose 100 70 - 110 mg/dL    BUN, Bld 26 (H) 6 - 20 mg/dL    Creatinine 1.0 0.5 - 1.4 mg/dL    Calcium 9.8 8.7 - 10.5 mg/dL    Total Protein 7.9 6.0 - 8.4 g/dL    Albumin 4.0 3.5 - 5.2 g/dL    Total Bilirubin 0.9 0.1 - 1.0 mg/dL    Alkaline Phosphatase 92 55 - 135 U/L    AST 17 10 - 40 U/L    ALT 23 10 - 44 U/L    Anion Gap 10 8 - 16 mmol/L    eGFR if African American >60 >60 mL/min/1.73 m^2     eGFR if non African American >60 >60 mL/min/1.73 m^2   Protime-INR   Result Value Ref Range    Prothrombin Time 10.7 9.0 - 12.5 sec    INR 1.0 0.8 - 1.2   Anti-Xa Heparin Monitoring   Result Value Ref Range    Heparin Anti-Xa 0.41 0.30 - 0.70 IU/mL   CBC auto differential   Result Value Ref Range    WBC 8.97 3.90 - 12.70 K/uL    RBC 4.76 4.60 - 6.20 M/uL    Hemoglobin 14.6 14.0 - 18.0 g/dL    Hematocrit 43.9 40.0 - 54.0 %    MCV 92 82 - 98 fL    MCH 30.7 27.0 - 31.0 pg    MCHC 33.3 32.0 - 36.0 g/dL    RDW 13.9 11.5 - 14.5 %    Platelets 231 150 - 350 K/uL    MPV 9.8 9.2 - 12.9 fL    Gran # 6.0 1.8 - 7.7 K/uL    Lymph # 1.8 1.0 - 4.8 K/uL    Mono # 0.9 0.3 - 1.0 K/uL    Eos # 0.2 0.0 - 0.5 K/uL    Baso # 0.03 0.00 - 0.20 K/uL    Gran% 67.1 38.0 - 73.0 %    Lymph% 20.4 18.0 - 48.0 %    Mono% 9.7 4.0 - 15.0 %    Eosinophil% 1.9 0.0 - 8.0 %    Basophil% 0.3 0.0 - 1.9 %    Differential Method Automated    APTT   Result Value Ref Range    aPTT 51.4 (H) 21.0 - 32.0 sec   POCT glucose   Result Value Ref Range    POCT Glucose 99 70 - 110 mg/dL   ISTAT ACT-K   Result Value Ref Range    POC ACTIVATED CLOTTING TIME K 202 (H) 74 - 137 sec    Sample KENNA      Current Outpatient Prescriptions on File Prior to Visit   Medication Sig Dispense Refill    apixaban 5 mg Tab Take 1 tablet (5 mg total) by mouth 2 (two) times daily. 60 tablet 3    hydrocodone-acetaminophen 5-325mg (NORCO) 5-325 mg per tablet Take 1 tablet by mouth every 4 (four) hours as needed. 20 tablet 0     No current facility-administered medications on file prior to visit.

## 2017-12-14 ENCOUNTER — OFFICE VISIT (OUTPATIENT)
Dept: INTERNAL MEDICINE | Facility: CLINIC | Age: 52
End: 2017-12-14
Payer: COMMERCIAL

## 2017-12-14 VITALS
DIASTOLIC BLOOD PRESSURE: 82 MMHG | SYSTOLIC BLOOD PRESSURE: 121 MMHG | HEIGHT: 71 IN | OXYGEN SATURATION: 98 % | WEIGHT: 188.94 LBS | BODY MASS INDEX: 26.45 KG/M2 | HEART RATE: 87 BPM

## 2017-12-14 DIAGNOSIS — I82.402 ACUTE DEEP VEIN THROMBOSIS (DVT) OF LEFT LOWER EXTREMITY, UNSPECIFIED VEIN: Primary | ICD-10-CM

## 2017-12-14 PROCEDURE — 99999 PR PBB SHADOW E&M-EST. PATIENT-LVL IV: CPT | Mod: PBBFAC,,, | Performed by: INTERNAL MEDICINE

## 2017-12-14 PROCEDURE — 99214 OFFICE O/P EST MOD 30 MIN: CPT | Mod: S$GLB,,, | Performed by: INTERNAL MEDICINE

## 2017-12-14 NOTE — PROGRESS NOTES
Subjective:      Patient ID: Edi Marie is a 52 y.o. male.    Chief Complaint: Hospital Follow Up    HPI:  HPI   11/2017  Details     Narrative        Date of Procedure:  11/17/2017    A. Indication/Pre-Operative Diagnosis     The patient is a 52 year old male that was referred for catheterization by Dr. Guanaco Chua for Extensive ilio-fem-pop DVT with post phlebitic syndrome.     B. Summary/Post-Operative Diagnosis       Successful placement of EKOS catheter into Left iliac vein via RATV access.    C. HPI     I have reviewed the history and physical completed on 11/17/2017. The patient has been examined and I concur with the findings from 11/17/2017.     Patient history was obtained from the patient.     Counseling: The patient was counseled regarding the potential risks and benefits of this procedure, as well as possible alternative approaches to the problem and gave informed consent.    The ASA Score was Class III.     [facility] Risk Score for MACE is 1.20%. [facility] Risk Score for Death is 0.10%.     Height: 71 in.      Weight: 185 lbs.      BMI: 25.80 kg/m2    OUTPATIENT MEDICATIONS: Medications were reviewed.  ALLERGIES: Allergies were reviewed.    Laboratory data revealed:     11/16/2017 PTI  10.7, NA  139, GLU  100, CREAT  1.0, BUN  26, INR  1.0, K  4.7   11/17/2017 WBCIR  8.97, HGB  14.6, PLT  231, HCT  43.9   11/17/2017 APTT  51.4                D. Details of Procedure     PROCEDURES PERFORMED: Peripheral Thrombolysis and Venogram - Cath Lab Procedure    ANESTHESIA: Conscious sedation was achieved with 125 mcg of FENTANYL 100MCG/2ML and 3 mg of Versed. Local anesthesia was achieved with 20 ml of Lidocaine 1%. Moderate conscious sedation was performed and cardiorespiratory functions were monitored the   entire procedure by Merced Early RN. Sedation began at 07:23 PM and concluded at 08:14 PM, totalling 50.9 minutes.     PRIMARY SURGEON: Taurus Godfrey MD    COMPLICATIONS: There were no  complications.    Medications given on sterile field: Lidocaine 1% (20 ml).    Medications given during procedure: Heparin 1000u/500cc Bag (3 bags), Versed (3 mg), Fentanyl 100mcg/2ml (125 mcg) and Angiomax (1.75 mg/kg/hr).    The patient was brought to the catheterization laboratory after premedication with oral Benadryl 50 mg. Bilateral groin prepped and draped. The Kit, Manifold was flushed and connected to contrast.     Left  ANTERIOR TIBIAL VEIN    After local anesthesia, a Micropuncture W/ 4cm Needle was inserted into the distal Left Anterior Tibial Vein.     INFERIOR VENA CAVA    A Wire Reg Ex Angled .035 X 260 was inserted into the infra renal Inferior Vena Cava. The Micropuncture W/ 4cm Needle was removed.     Left  ANTERIOR TIBIAL VEIN    A Sheath 6fr X 11cm was inserted into the distal Left Anterior Tibial Vein. The Wire Reg Ex Angled .035 X 260 was removed.     INFERIOR VENA CAVA    A Wire Amplatz Ss .035 X 260cm was inserted into the infra renal Inferior Vena Cava.     Left  COMMON FEMORAL VEIN    A Cath Ekos 50cm X 135cm was inserted into the Left Common Femoral Vein. The Wire Amplatz Ss .035 X 260cm was removed. Infusion initiated. The Sheath 6fr X 11cm was sutured in.     The Cath Ekos 50cm X 135cm was secured in place. Total Visipaque injected was 10.0 ml. Total Visipaque used was 150.0 ml.       Fluoroscopy Time 9.6 minutes   Radiation Dose 23.6 mGy   Contrast Injected 10 ml Visipaque   Contrast Used  150 ml Visipaque            Procedure log documented by Merced Early RN and verified by Taurus Godfrey MD    ESTIMATED BLOOD LOSS is < 50 cc.    SPECIMEN: No specimen.     E. Recommendations     1. Routine post-cath care.  2. tPA at 1 mg/hr  3. Angiomax   4. Continue x approx 36 hours  5. F/u venous doppler at 2 weeks  6. NOAC after catheter directed thrombolysis    I certify that I was present for catheter insertion, catheter manipulation, angiography, and angiographic interpretation of this  patient.     This document has been electronically    SIGNED BY: Taurus Godfrey MD On: 11/22/2017 17:38     Reviewed By List     Artem Martinez NP on 12/5/2017 12:05   External Result Report     External Result Report   Narrative     Left lower extremity duplex venous ultrasound.    Technique: The left lower extremity deep venous system was imaged by ultrasound from the groin to the upper calf.  Veins were analyzed with transducer compression, color doppler, and venous waveform analysis.  This exam does not exclude clot in the deep muscular veins.    Comparison: 11/16/17    FINDINGS:    There is reidentification of largely occlusive thrombus within the left external iliac, common femoral, femoral, popliteal, and peroneal veins.  There is thrombosis of the posterior tibial veins, not seen previously.  The anterior tibial and greater saphenous veins are patent.   Impression          Extensive largely occlusive deep vein thrombosis of the left lower extremity.      Electronically signed by: AIXA YOUNGBLOOD MD  Date: 11/29/17  Time: 17:03     Encounter     View Encounter              Office Visit     12/5/2017  [place] - Cardiology   Artem Martinez NP   Internal Medicine   Acute deep vein thrombosis (DVT) of iliac vein of left lower extremity +1 more   Dx   Follow-up   Reason for Visit    Blood Pressure     Flowsheet Row Most Recent Value   /78   Additional Documentation     Vitals:    /78    Wt 85 kg (187 lb 8 oz)    BMI 26.15 kg/m²    BSA 2.06 m²    Flowsheets:    Custom Formula Data,    Anthropometrics       SmartForms:    [facility] AMB - FALL RISK       Encounter Info:    Billing Info,    History,    Detailed Report,    Patient Education,    Care Plan,    Allergies,    Patient-Entered Questionnaires       Instructions         Return in about 2 months (around 2/5/2018).    After Visit Summary (Printed 12/5/2017)   Fall Risk     Patient Mobility Status: Ambulatory   Number of falls in the past 12  months?: 0   Fall Risk?: No   Progress Notes     Expand All Collapse All    Subjective:       Patient ID: Edi Marie is a 52 y.o. male with recent hospitalization for extensive DVT here for follow up      Chief Complaint: Hospital Follow up      11/17/2017 Patient admitted with extensive DVTs to left common femoral vein, left superficial femoral vein, left popliteal vein, left peroneal vein. Partial thrombus involving the left iliac vein. Partially occlusive thrombus  involving the left common femoral vein, with occlusive thrombus involving the remainder of the thrombosed vessels.  The posterior tibial veins and anterior tibial veins are patent.   There is no reflux to suggest valvular incompetence. Patient with exquisite tenderness and painful weight bearing to LLE. LLE edematous. Patient underwent catheter directed thrombolysis with EKOS Tpa and angiomax.  He was subsequently started on Eliquis. Denies any bleeding or excessive bruising.   US repeated on 11/20/2017- There is reidentification of largely occlusive thrombus within the left external iliac, common femoral, femoral, popliteal, and peroneal veins.  There is thrombosis of the posterior tibial veins, not seen previously.  The anterior tibial and greater saphenous veins are patent.   Patient recalls ankle pain began without injury. He was seen by sports med for SI dysfunction and mentioned the ankle pain to sports med who felt occult injury was cause of pain. His LLE pain progressed over the course of a month. He continues to have edema and pain to LLE and is concerned that his clot burden has not decreased. Patient given extensive education on acute vs chronic DVT, NOAC, indications for further invasive procedures, complications of DVT including possibility of clot migration causing massive PE and death. He expressed plans to travel up north to visit family via airline and has several more trips planned in the upcoming months. He was advised against  airline travel as well as long road trips. POC discussed in detail with patient and wife to continue NOAC, repeat venous US in 2 mo, thigh high compression stocking to LLE, frequent ambulation. He will be referred to heme/onc once DVT resolved for hypercoag work up. All questions were answered to patient's satisfaction. Printed information given regarding DVT.              Patient Active Problem List   Diagnosis    Spermatocele    Histiocytosis    H/O vasectomy (05/30/2014)    Screening    Acute deep vein thrombosis (DVT) of left lower extremity    Leg injury    Post-thrombotic syndrome of left lower extremity     Past Medical History:   Diagnosis Date    Arthritis     Hemorrhoid     Histiocytosis     Kidney stones     Spermatocele      Past Surgical History:   Procedure Laterality Date    APPENDECTOMY      COLONOSCOPY N/A 1/20/2017    Procedure: COLONOSCOPY;  Surgeon: Danis Frank MD;  Location: [facility] ([address] [place] [address]);  Service: Endoscopy;  Laterality: N/A;    KNEE SURGERY      right knee    LITHOTRIPSY       Family History   Problem Relation Age of Onset    Heart disease Father      MI 2004    Cancer Paternal Grandfather      colon    Melanoma Neg Hx     Lupus Neg Hx     Psoriasis Neg Hx     Eczema Neg Hx      Review of Systems   Constitutional: Negative for activity change and unexpected weight change.   HENT: Negative for hearing loss, rhinorrhea and trouble swallowing.    Eyes: Negative for discharge and visual disturbance.   Respiratory: Negative for chest tightness and wheezing.    Cardiovascular: Negative for chest pain and palpitations.   Gastrointestinal: Negative for blood in stool, constipation, diarrhea and vomiting.   Endocrine: Negative for polydipsia and polyuria.   Genitourinary: Negative for difficulty urinating, hematuria and urgency.   Musculoskeletal: Negative for arthralgias, joint swelling and neck pain.   Neurological: Negative for weakness and headaches.  "  Psychiatric/Behavioral: Negative for confusion and dysphoric mood.   : no additional symptoms  Objective:     Vitals:    12/14/17 1322   BP: 121/82   Pulse: 87   SpO2: 98%   Weight: 85.7 kg (188 lb 15 oz)   Height: 5' 11" (1.803 m)   PainSc: 0-No pain     Body mass index is 26.35 kg/m².  Physical Exam: no exam  Assessment:     1. Acute deep vein thrombosis (DVT) of left lower extremity, unspecified vein      Plan:   Edi was seen today for hospital follow up.    Diagnoses and all orders for this visit:    Acute deep vein thrombosis (DVT) of left lower extremity, unspecified vein  2nd opinion regarding the extent of this DVT and whether the patient may need filter    Etiology of extensive DVT.  -     Ambulatory consult to Hematology  -     Ambulatory Referral to Interventional Cardiology      Return in about 1 month (around 1/14/2018) for Follow up.     Medication List          Accurate as of 12/14/17  2:40 PM. If you have any questions, ask your nurse or doctor.               CONTINUE taking these medications    apixaban 5 mg Tab  Take 1 tablet (5 mg total) by mouth 2 (two) times daily.     hydrocodone-acetaminophen 5-325mg 5-325 mg per tablet  Commonly known as:  NORCO  Take 1 tablet by mouth every 4 (four) hours as needed.            "

## 2017-12-18 ENCOUNTER — TELEPHONE (OUTPATIENT)
Dept: HEMATOLOGY/ONCOLOGY | Facility: CLINIC | Age: 52
End: 2017-12-18

## 2017-12-21 ENCOUNTER — INITIAL CONSULT (OUTPATIENT)
Dept: CARDIOLOGY | Facility: CLINIC | Age: 52
End: 2017-12-21
Payer: COMMERCIAL

## 2017-12-21 ENCOUNTER — INITIAL CONSULT (OUTPATIENT)
Dept: HEMATOLOGY/ONCOLOGY | Facility: CLINIC | Age: 52
End: 2017-12-21
Payer: COMMERCIAL

## 2017-12-21 VITALS
DIASTOLIC BLOOD PRESSURE: 86 MMHG | HEART RATE: 75 BPM | OXYGEN SATURATION: 99 % | SYSTOLIC BLOOD PRESSURE: 139 MMHG | BODY MASS INDEX: 26.23 KG/M2 | WEIGHT: 187.38 LBS | HEIGHT: 71 IN

## 2017-12-21 VITALS
BODY MASS INDEX: 26.27 KG/M2 | RESPIRATION RATE: 18 BRPM | DIASTOLIC BLOOD PRESSURE: 83 MMHG | TEMPERATURE: 98 F | OXYGEN SATURATION: 99 % | SYSTOLIC BLOOD PRESSURE: 126 MMHG | HEART RATE: 74 BPM | HEIGHT: 71 IN | WEIGHT: 187.63 LBS

## 2017-12-21 DIAGNOSIS — I82.402 ACUTE DEEP VEIN THROMBOSIS (DVT) OF LEFT LOWER EXTREMITY, UNSPECIFIED VEIN: ICD-10-CM

## 2017-12-21 DIAGNOSIS — I82.402 ACUTE DEEP VEIN THROMBOSIS (DVT) OF LEFT LOWER EXTREMITY, UNSPECIFIED VEIN: Primary | ICD-10-CM

## 2017-12-21 DIAGNOSIS — I87.002 POST-THROMBOTIC SYNDROME OF LEFT LOWER EXTREMITY: Primary | ICD-10-CM

## 2017-12-21 PROCEDURE — 99999 PR PBB SHADOW E&M-EST. PATIENT-LVL IV: CPT | Mod: PBBFAC,,, | Performed by: INTERNAL MEDICINE

## 2017-12-21 PROCEDURE — 99214 OFFICE O/P EST MOD 30 MIN: CPT | Mod: S$GLB,,, | Performed by: INTERNAL MEDICINE

## 2017-12-21 PROCEDURE — 99205 OFFICE O/P NEW HI 60 MIN: CPT | Mod: S$GLB,,, | Performed by: INTERNAL MEDICINE

## 2017-12-21 PROCEDURE — 99999 PR PBB SHADOW E&M-EST. PATIENT-LVL III: CPT | Mod: PBBFAC,,,

## 2017-12-21 NOTE — PROGRESS NOTES
PATIENT: Edi Marie  MRN: [id number]  DATE: 12/21/2017      Diagnosis:   1. Acute deep vein thrombosis (DVT) of left lower extremity, unspecified vein        Chief Complaint: No chief complaint on file.    Subjective:   1.  Initial History: Mr. Marie is a 52 y.o. who initially presented to [facility] on 11/16/17 with complaint of left leg pain.  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  The patient then began having worsening swelling of the left leg over the subsequent month.  When in the hospital the patient underwent an US on 11/16/17 showing partially occlusive thrombus involving the left common femoral vein and left iliac vein with completely occlusive thrombus of the left superficial femoral vein, left popliteal vein, and left peroneal vein. The patient then underwent catheter assisted thrombolysis on 11/17/17 with the use of a EKOS device.  The patient was then continued on continuous tPA and angiomax for 30 hours and then discharged home on Eliquis 10mg PO for 7 days and then 5mg BID thereafter.  The patient then had a follow up US performed on 11/29/17 which showed  PCP on  reidentification of largely occlusive thrombus within the left external iliac, common femoral, femoral, popliteal, and peroneal veins and new thrombosis of the posterior tibial vein.  The patient saw his PCP on 12/14/17 and was then sent to the Hematology clinic given the new thrombus in the posterior tibial vein seen.  The patient denies any history of clotting.  He has undergone prior surgeries with appendectomy and right knee surgery without developing clots.  The patient states his mother developed a clot in an unknown location after a groin injury when she was 60.  SHe was on coumadin for 2 years and then taken off.  The patient denies any other family history of clotting.  The patient denies any present sings of bleeding  including mucocutaneous bleeding, hematemesis, hemoptysis, hematuria, melena, BRBPR.  The patient denies any FH of early pregnancy loss.  He states he has a daughter with suspected Behçet disease.    Past Medical History:   Past Medical History:   Diagnosis Date    Arthritis     Hemorrhoid     Histiocytosis     Kidney stones     Spermatocele        Past Surgical HIstory:   Past Surgical History:   Procedure Laterality Date    APPENDECTOMY      COLONOSCOPY N/A 1/20/2017    Procedure: COLONOSCOPY;  Surgeon: Danis Frank MD;  Location: [address];  Service: Endoscopy;  Laterality: N/A;    KNEE SURGERY      right knee    LITHOTRIPSY         Family History:   Family History   Problem Relation Age of Onset    Heart disease Father      MI 2004    Cancer Paternal Grandfather      colon    Melanoma Neg Hx     Lupus Neg Hx     Psoriasis Neg Hx     Eczema Neg Hx        Social History:  reports that he has never smoked. He has never used smokeless tobacco. He reports that he drinks about 4.2 oz of alcohol per week . He reports that he does not use drugs.    Allergies:  Review of patient's allergies indicates:  No Known Allergies    Medications:  Current Outpatient Prescriptions   Medication Sig Dispense Refill    apixaban 5 mg Tab Take 1 tablet (5 mg total) by mouth 2 (two) times daily. 60 tablet 3     No current facility-administered medications for this visit.        Review of Systems   Constitutional: Negative for chills, fever and unexpected weight change.   HENT: Negative for sore throat and trouble swallowing.    Eyes: Negative for photophobia and visual disturbance.   Respiratory: Negative for cough, chest tightness and shortness of breath.    Cardiovascular: Positive for leg swelling (worse at the end of the day. ). Negative for chest pain and palpitations.   Gastrointestinal: Negative for abdominal pain, constipation, diarrhea, nausea and vomiting.   Endocrine: Negative for cold intolerance  "and heat intolerance.   Genitourinary: Negative for difficulty urinating, dysuria and hematuria.   Musculoskeletal: Negative for arthralgias, back pain and myalgias.   Skin: Negative for color change and rash.   Neurological: Negative for dizziness, light-headedness, numbness and headaches.       ECOG Performance Status: 1   Objective:      Vitals:   Vitals:    12/21/17 0916   BP: 126/83   BP Location: Left arm   Patient Position: Sitting   BP Method: Large (Automatic)   Pulse: 74   Resp: 18   Temp: 98.2 °F (36.8 °C)   TempSrc: Oral   SpO2: 99%   Weight: 85.1 kg (187 lb 9.8 oz)   Height: 5' 11" (1.803 m)     BMI: Body mass index is 26.17 kg/m².    Physical Exam   Constitutional: He is oriented to person, place, and time. He appears well-developed and well-nourished. No distress.   HENT:   Head: Normocephalic and atraumatic.   Mouth/Throat: Oropharynx is clear and moist. No oropharyngeal exudate.   Eyes: EOM are normal. Pupils are equal, round, and reactive to light. Right eye exhibits no discharge. Left eye exhibits no discharge. No scleral icterus.   Neck: Normal range of motion. No JVD present.   Cardiovascular: Normal rate, regular rhythm, normal heart sounds and intact distal pulses.  Exam reveals no gallop and no friction rub.    No murmur heard.  Pulmonary/Chest: Effort normal and breath sounds normal. No respiratory distress. He has no wheezes. He has no rales. He exhibits no tenderness.   Abdominal: Soft. Bowel sounds are normal. He exhibits no distension and no mass. There is no tenderness. There is no rebound.   Musculoskeletal: Normal range of motion. He exhibits no edema or tenderness.   Swelling in the left leg.   Neurological: He is alert and oriented to person, place, and time. No cranial nerve deficit. Coordination normal.   Skin: Skin is warm and dry. No rash noted. He is not diaphoretic. No erythema.   Psychiatric: He has a normal mood and affect. His behavior is normal.       Laboratory Data:  No " visits with results within 1 Week(s) from this visit.   Latest known visit with results is:   Admission on 11/16/2017, Discharged on 11/19/2017   Component Date Value Ref Range Status    WBC 11/16/2017 10.03  3.90 - 12.70 K/uL Final    RBC 11/16/2017 5.10  4.60 - 6.20 M/uL Final    Hemoglobin 11/16/2017 15.9  14.0 - 18.0 g/dL Final    Hematocrit 11/16/2017 46.7  40.0 - 54.0 % Final    MCV 11/16/2017 92  82 - 98 fL Final    MCH 11/16/2017 31.2* 27.0 - 31.0 pg Final    MCHC 11/16/2017 34.0  32.0 - 36.0 g/dL Final    RDW 11/16/2017 13.6  11.5 - 14.5 % Final    Platelets 11/16/2017 234  150 - 350 K/uL Final    MPV 11/16/2017 9.7  9.2 - 12.9 fL Final    Gran # 11/16/2017 7.2  1.8 - 7.7 K/uL Final    Lymph # 11/16/2017 1.5  1.0 - 4.8 K/uL Final    Mono # 11/16/2017 1.1* 0.3 - 1.0 K/uL Final    Eos # 11/16/2017 0.1  0.0 - 0.5 K/uL Final    Baso # 11/16/2017 0.02  0.00 - 0.20 K/uL Final    Gran% 11/16/2017 71.9  38.0 - 73.0 % Final    Lymph% 11/16/2017 15.4* 18.0 - 48.0 % Final    Mono% 11/16/2017 10.8  4.0 - 15.0 % Final    Eosinophil% 11/16/2017 1.4  0.0 - 8.0 % Final    Basophil% 11/16/2017 0.2  0.0 - 1.9 % Final    Differential Method 11/16/2017 Automated   Final    Sodium 11/16/2017 139  136 - 145 mmol/L Final    Potassium 11/16/2017 4.7  3.5 - 5.1 mmol/L Final    Chloride 11/16/2017 105  95 - 110 mmol/L Final    CO2 11/16/2017 24  23 - 29 mmol/L Final    Glucose 11/16/2017 100  70 - 110 mg/dL Final    BUN, Bld 11/16/2017 26* 6 - 20 mg/dL Final    Creatinine 11/16/2017 1.0  0.5 - 1.4 mg/dL Final    Calcium 11/16/2017 9.8  8.7 - 10.5 mg/dL Final    Total Protein 11/16/2017 7.9  6.0 - 8.4 g/dL Final    Albumin 11/16/2017 4.0  3.5 - 5.2 g/dL Final    Total Bilirubin 11/16/2017 0.9  0.1 - 1.0 mg/dL Final    Comment: For infants and newborns, interpretation of results should be based  on gestational age, weight and in agreement with clinical  observations.  Premature Infant recommended  reference ranges:  Up to 24 hours.............<8.0 mg/dL  Up to 48 hours............<12.0 mg/dL  3-5 days..................<15.0 mg/dL  6-29 days.................<15.0 mg/dL      Alkaline Phosphatase 11/16/2017 92  55 - 135 U/L Final    AST 11/16/2017 17  10 - 40 U/L Final    ALT 11/16/2017 23  10 - 44 U/L Final    Anion Gap 11/16/2017 10  8 - 16 mmol/L Final    eGFR if African American 11/16/2017 >60  >60 mL/min/1.73 m^2 Final    eGFR if non African American 11/16/2017 >60  >60 mL/min/1.73 m^2 Final    Comment: Calculation used to obtain the estimated glomerular filtration  rate (eGFR) is the CKD-EPI equation.       Prothrombin Time 11/16/2017 10.7  9.0 - 12.5 sec Final    INR 11/16/2017 1.0  0.8 - 1.2 Final    Comment: Coumadin Therapy:  2.0 - 3.0 for INR for all indicators except mechanical heart valves  and antiphospholipid syndromes which should use 2.5 - 3.5.      Heparin Anti-Xa 11/17/2017 0.41  0.30 - 0.70 IU/mL Final    Comment: Expected therapeutic range for Unfractionated heparin (UFH)  is 0.3-0.7 IU/mL.  The therapeutic range for low molecular weight heparins   (LMWH) varies with the type and , but is   typically between 0.4 and 1.1 IU/mL.      WBC 11/17/2017 8.97  3.90 - 12.70 K/uL Final    RBC 11/17/2017 4.76  4.60 - 6.20 M/uL Final    Hemoglobin 11/17/2017 14.6  14.0 - 18.0 g/dL Final    Hematocrit 11/17/2017 43.9  40.0 - 54.0 % Final    MCV 11/17/2017 92  82 - 98 fL Final    MCH 11/17/2017 30.7  27.0 - 31.0 pg Final    MCHC 11/17/2017 33.3  32.0 - 36.0 g/dL Final    RDW 11/17/2017 13.9  11.5 - 14.5 % Final    Platelets 11/17/2017 231  150 - 350 K/uL Final    MPV 11/17/2017 9.8  9.2 - 12.9 fL Final    Gran # 11/17/2017 6.0  1.8 - 7.7 K/uL Final    Lymph # 11/17/2017 1.8  1.0 - 4.8 K/uL Final    Mono # 11/17/2017 0.9  0.3 - 1.0 K/uL Final    Eos # 11/17/2017 0.2  0.0 - 0.5 K/uL Final    Baso # 11/17/2017 0.03  0.00 - 0.20 K/uL Final    Gran% 11/17/2017 67.1  38.0 -  73.0 % Final    Lymph% 11/17/2017 20.4  18.0 - 48.0 % Final    Mono% 11/17/2017 9.7  4.0 - 15.0 % Final    Eosinophil% 11/17/2017 1.9  0.0 - 8.0 % Final    Basophil% 11/17/2017 0.3  0.0 - 1.9 % Final    Differential Method 11/17/2017 Automated   Final    aPTT 11/17/2017 51.4* 21.0 - 32.0 sec Final    POCT Glucose 11/19/2017 99  70 - 110 mg/dL Final    POC ACTIVATED CLOTTING TIME K 11/19/2017 202* 74 - 137 sec Final    Sample 11/19/2017 KENNA   Final         Imaging:    US LE 11/16/17  Duplex and color flow Doppler is remarkable for thrombus involving the left common femoral vein, left superficial femoral vein, left popliteal vein, left peroneal vein.  Thrombus is partially occlusive involving the left common femoral vein, with occlusive thrombus involving the remainder of the thrombosed vessels.  The posterior tibial veins and anterior tibial veins are patent.  There is partial thrombus involving the left iliac vein.  There is no reflux to suggest valvular incompetence.    US LE 11/29/17  There is reidentification of largely occlusive thrombus within the left external iliac, common femoral, femoral, popliteal, and peroneal veins.  There is thrombosis of the posterior tibial veins, not seen previously.  The anterior tibial and greater saphenous veins are patent.      Assessment:       1. Acute deep vein thrombosis (DVT) of left lower extremity, unspecified vein         Plan:     Acute deep vein thrombosis (DVT) of left lower extremity, unspecified vein  -The patient has recently been diagnosed with a provoked DVT after injuring his ankle at work.  -The patient does not require a hypercoagulable work up at this time as it is unlikely the patient has an inherited thrombophilia  -Will follow up with the patient with a repeat US of the LE and D-dimer 3 months form the start of treatment.  -The patient does not need a IVC filter placed as he has not proven he has failed treatment for his DVT and, in fact, the  patient states he is feeling better with regards to swelling and pain in his left leg since starting treatment.  -The most recent US shows a new clot in the posterior tibial veins which may have been present after instrumentation and tPA directed therapy on 11/17/17   -Would continue Eliquis 5mg twice daily until next clinic visit where at that time it will be determined based on d-dimer and US if the patient needs to continue on anticoagulation any longer.    Ayaan Ferrer MD PGY-IV  Hematology and Oncology  Pager:[phone]    Distress Screening Results: Psychosocial Distress screening score of Distress Score: 0 noted and reviewed. No intervention indicated.

## 2017-12-21 NOTE — Clinical Note
Gudelia Wyatt.  The patient will need a follow up at the end of February with an US of the LE right before the visit as well as a D-dimer on the day of the visit.  Thanks.

## 2017-12-21 NOTE — PROGRESS NOTES
Interventional Cardiology Clinic Note  Reason for Visit: New patient, recent LLE DVT s/p EKOS, 2nd opinion about IVC filter and activity  Referring physician: Dr. More Villanueva    HPI:   Mr. Marie is a 51 y/o gentleman with PMHx LLE DVT s/p EKOS, who now presents as a new patient for consideration of an IVC filter.    Patient was an otherwise healthy gentleman until his DVT, he had no other medical problems requiring medications. 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 He initially was seen by a foot and ankle specialist, but eventually went to the ED at [place] on 11/16/2017 with unbearable L calf pain. He was found to have deep venous thrombosis seen on LE U/S extending from the left iliac vein to the peroneal vein, with thrombosis that was partially involving the left iliac vein and common femoral vein, with complete occlusion involving the superficial femoral vein, popliteal vein, peroneal vein. He was taken for catheter-directed thrombolysis with EKOS, with subsequent resolution of his calf pain.    He was discharged on a 7 day course of Eliquis 10 mg BID, and has been continued on Eliquis 5 mg BID since then. A repeat LE U/S on 11/29/2017 showed reidentification of the largely occlusive thrombus within the left external iliac, common femoral, femoral, popliteal, and peroneal veins, as well as thrombosis of the posterior tibial veins, not seen previously, with patent anterior tibial and greater saphenous veins.    He otherwise feels well, denies any chest pain, shortness of breath, palpitations, claudication, syncope, PND, or orthopnea. He does still have some residual LLE edema around his calf, but no longer has any pain. He uses compression stockings on a regular basis. He is fairly active, lifts weights and goes to the gym regularly with no issue. He reports that he is now  following up with a hematologist.    He is a non-smoker.  Rare EtOH use.  Mother had DVT in her 60's, no other known bleeding disorders in the family.    Patient is planning air travel in late January and is concerned about his DVT burden. He would also like to discuss any limitations in his activity. In addition, he was sent by his PCP to our office for our opinion about placing an IVC filter.    ROS:    Constitution: Negative for fever, chills, weight loss or gain.   HENT: Negative for sore throat, rhinorrhea, or headache.  Eyes: Negative for blurred or double vision.   Cardiovascular: See above  Pulmonary: Negative for SOB   Gastrointestinal: Negative for abdominal pain, nausea, vomiting, or diarrhea.   : Negative for dysuria.   Neurological: Negative for focal weakness or sensory changes.  PMH:     Past Medical History:   Diagnosis Date    Arthritis     Hemorrhoid     Histiocytosis     Kidney stones     Spermatocele      Past Surgical History:   Procedure Laterality Date    APPENDECTOMY      COLONOSCOPY N/A 1/20/2017    Procedure: COLONOSCOPY;  Surgeon: Danis Frank MD;  Location: [address]);  Service: Endoscopy;  Laterality: N/A;    KNEE SURGERY      right knee    LITHOTRIPSY       Allergies:   Review of patient's allergies indicates:  No Known Allergies  Medications:     Current Outpatient Prescriptions on File Prior to Visit   Medication Sig Dispense Refill    apixaban 5 mg Tab Take 1 tablet (5 mg total) by mouth 2 (two) times daily. 60 tablet 3    [DISCONTINUED] hydrocodone-acetaminophen 5-325mg (NORCO) 5-325 mg per tablet Take 1 tablet by mouth every 4 (four) hours as needed. 20 tablet 0     No current facility-administered medications on file prior to visit.      Social History:     Social History   Substance Use Topics    Smoking status: Never Smoker    Smokeless tobacco: Never Used    Alcohol use 4.2 oz/week     7 Glasses of wine per week     Family History:     Family History  "  Problem Relation Age of Onset    Heart disease Father      MI 2004    Cancer Paternal Grandfather      colon    Melanoma Neg Hx     Lupus Neg Hx     Psoriasis Neg Hx     Eczema Neg Hx      Physical Exam:     Vitals:    12/21/17 1002 12/21/17 1004   BP: 137/88 139/86   BP Location: Left arm Right arm   Patient Position: Sitting Sitting   BP Method: Large (Automatic) Large (Automatic)   Pulse: 75 75   SpO2: 98% 99%   Weight: 85 kg (187 lb 6.3 oz) 85 kg (187 lb 6.3 oz)   Height: 5' 11" (1.803 m) 5' 11" (1.803 m)       Constitutional: NAD, conversant  HEENT: Sclera anicteric, PERRLA, EOMI  Neck: No JVD, no carotid bruits  CV: RRR, no murmur, normal S1/S2  Pulm: CTAB, no wheezes, rales, or ronchi  GI: Abdomen soft, NTND, +BS  Extremities: Left lower extremity with increased edema around the calf compared to the right, warm and well perfused, 2+ radial B/L, normal Singh's B/L, 2+ DP and PT pulses B/L  Skin: No ecchymosis, erythema, or ulcers  Psych: AOx3, appropriate affect  Neuro: No gross deficits    Labs:     Lab Results   Component Value Date     11/16/2017    K 4.7 11/16/2017     11/16/2017    CO2 24 11/16/2017    BUN 26 (H) 11/16/2017    CREATININE 1.0 11/16/2017    ANIONGAP 10 11/16/2017        Lab Results   Component Value Date    WBC 8.97 11/17/2017    HGB 14.6 11/17/2017    HCT 43.9 11/17/2017     11/17/2017    GRAN 6.0 11/17/2017    GRAN 67.1 11/17/2017     Lab Results   Component Value Date    CHOL 210 (H) 07/12/2016    HDL 52 07/12/2016    LDLCALC 138.6 07/12/2016    TRIG 97 07/12/2016          Imaging:     US Lower Extremity (11/16/2017)  IMPRESSION:  Deep venous thrombosis extending from the left iliac vein to the peroneal vein, noting thrombosis is partial involving the left iliac vein and common femoral vein, with complete occlusion involving the superficial femoral vein, popliteal vein, peroneal vein.    US Lower Extremity (11/29/2017)  FINDINGS:  There is reidentification of " largely occlusive thrombus within the left external iliac, common femoral, femoral, popliteal, and peroneal veins.  There is thrombosis of the posterior tibial veins, not seen previously.  The anterior tibial and greater saphenous veins are patent.    Assessment and Plan:     1. Chronic DVT of LLE as above  - Continue Eliquis 5 mg BID and compression stockings  - Would not recommend IVC filter at this time  - Recommended frequent ambulation and/or leg exercises while flying or driving  - Agree with hematology evaluation  - Follow up with outpatient cardiologist (Dr. Lloyd)    Signed:  Blayne Hanks MD  Cardiology Fellow, PGY-5  Pager: [phone]  12/21/2017 10:55 AM    I have personally taken the history and examined this patient and agree with the resident's note as stated above.  I discussed the effiacy of IVC filters for DVT with the patient at length.  He has no indication for an IVC filter at th is time.  On the basis of his 11/28/17 left lower extremity venous duplex study, his catheter directed thrombolysis procedure on 11/17/17 at [facility] was un successful.  However the patient has very minimal swelling or the left calf at this time nad denies any pain.  It is likely he has collateralized the veous drainage of his left lower extremity.  As he has no post DVT syndrome, eulalio further endovascular procedure on the left LE is indicated at this time.  -agree with ehamology follow-up to inestigate etiology of DVT  -agree with NOAC  -agree with wearing thigh high compression stocking on left leg  -addtionally 7/12/16 lipid panel reviewed; rec heart healthy diet  All of the patient's questions were answered.

## 2017-12-21 NOTE — LETTER
December 24, 2017      More Villanueva MD  [address]  [facility] [address]           [facility]  [address]  [facility] [address]  Phone: [phone]          Patient: Edi Marie   MR Number: [id number]   YOB: 1965   Date of Visit: 12/21/2017       Dear Dr. More Villanueva:    Thank you for referring Edi Marie to me for evaluation. Attached you will find relevant portions of my assessment and plan of care.    If you have questions, please do not hesitate to call me. I look forward to following Edi Marie along with you.    Sincerely,    Andrei Patrick MD    Enclosure  CC:  No Recipients    If you would like to receive this communication electronically, please contact [email or web address] or [phone] to request more information on [facility] access.    For providers and/or their staff who would like to refer a patient to [facility], please contact us through our one-stop-shop provider referral line, [facility], at [phone].    If you feel you have received this communication in error or would no longer like to receive these types of communications, please e-mail [email or web address]

## 2017-12-26 NOTE — PROGRESS NOTES
Patient seen and evaluated with Dr. Ferrer. Agree with assessment and plan.  Likely induced DVT by trauma.   SOFIA choosing wisely recommends against thrombophilia testing.  Recommend 3 months of NOAC therapy then return visit after 3 months of therapy with d-dimer and imaging to plan further therapy.

## 2018-02-26 ENCOUNTER — HOSPITAL ENCOUNTER (OUTPATIENT)
Dept: RADIOLOGY | Facility: HOSPITAL | Age: 53
Discharge: HOME | End: 2018-02-26
Attending: INTERNAL MEDICINE
Payer: COMMERCIAL

## 2018-02-26 DIAGNOSIS — I82.402 ACUTE DEEP VEIN THROMBOSIS (DVT) OF LEFT LOWER EXTREMITY, UNSPECIFIED VEIN: ICD-10-CM

## 2018-02-26 PROCEDURE — 93970 EXTREMITY STUDY: CPT | Mod: 26,,, | Performed by: RADIOLOGY

## 2018-02-26 PROCEDURE — 93970 EXTREMITY STUDY: CPT | Mod: TC

## 2018-03-01 ENCOUNTER — OFFICE VISIT (OUTPATIENT)
Dept: HEMATOLOGY/ONCOLOGY | Facility: CLINIC | Age: 53
End: 2018-03-01
Payer: COMMERCIAL

## 2018-03-01 ENCOUNTER — LAB VISIT (OUTPATIENT)
Dept: LAB | Facility: HOSPITAL | Age: 53
End: 2018-03-01
Attending: INTERNAL MEDICINE
Payer: COMMERCIAL

## 2018-03-01 VITALS
WEIGHT: 190.25 LBS | HEIGHT: 71 IN | DIASTOLIC BLOOD PRESSURE: 84 MMHG | RESPIRATION RATE: 18 BRPM | OXYGEN SATURATION: 98 % | BODY MASS INDEX: 26.64 KG/M2 | HEART RATE: 72 BPM | SYSTOLIC BLOOD PRESSURE: 132 MMHG | TEMPERATURE: 98 F

## 2018-03-01 DIAGNOSIS — I82.502 CHRONIC DEEP VEIN THROMBOSIS (DVT) OF LEFT LOWER EXTREMITY, UNSPECIFIED VEIN: Primary | ICD-10-CM

## 2018-03-01 DIAGNOSIS — I82.402 ACUTE DEEP VEIN THROMBOSIS (DVT) OF LEFT LOWER EXTREMITY, UNSPECIFIED VEIN: ICD-10-CM

## 2018-03-01 LAB — D DIMER PPP IA.FEU-MCNC: 0.22 MG/L FEU

## 2018-03-01 PROCEDURE — 36415 COLL VENOUS BLD VENIPUNCTURE: CPT

## 2018-03-01 PROCEDURE — 85379 FIBRIN DEGRADATION QUANT: CPT

## 2018-03-01 PROCEDURE — 99214 OFFICE O/P EST MOD 30 MIN: CPT | Mod: S$GLB,,, | Performed by: INTERNAL MEDICINE

## 2018-03-01 PROCEDURE — 99999 PR PBB SHADOW E&M-EST. PATIENT-LVL III: CPT | Mod: PBBFAC,,,

## 2018-03-01 NOTE — Clinical Note
Yoselin, Mrs Felder.  The patient needs a follow up with me in 3 months with an US of the lower extremity and D-Dimer.  Thanks.

## 2018-04-05 RX ORDER — APIXABAN 5 MG/1
5 TABLET, FILM COATED ORAL 2 TIMES DAILY
Qty: 60 TABLET | Refills: 3 | Status: SHIPPED | OUTPATIENT
Start: 2018-04-05 | End: 2018-06-07 | Stop reason: SDUPTHER

## 2018-06-04 ENCOUNTER — HOSPITAL ENCOUNTER (OUTPATIENT)
Dept: RADIOLOGY | Facility: HOSPITAL | Age: 53
Discharge: HOME | End: 2018-06-04
Attending: INTERNAL MEDICINE
Payer: COMMERCIAL

## 2018-06-04 DIAGNOSIS — I82.502 CHRONIC DEEP VEIN THROMBOSIS (DVT) OF LEFT LOWER EXTREMITY, UNSPECIFIED VEIN: ICD-10-CM

## 2018-06-04 PROCEDURE — 93971 EXTREMITY STUDY: CPT | Mod: TC

## 2018-06-04 PROCEDURE — 93971 EXTREMITY STUDY: CPT | Mod: 26,,, | Performed by: RADIOLOGY

## 2018-06-07 ENCOUNTER — OFFICE VISIT (OUTPATIENT)
Dept: HEMATOLOGY/ONCOLOGY | Facility: CLINIC | Age: 53
End: 2018-06-07
Payer: COMMERCIAL

## 2018-06-07 ENCOUNTER — LAB VISIT (OUTPATIENT)
Dept: LAB | Facility: HOSPITAL | Age: 53
End: 2018-06-07
Payer: COMMERCIAL

## 2018-06-07 VITALS
HEART RATE: 69 BPM | WEIGHT: 187.19 LBS | DIASTOLIC BLOOD PRESSURE: 77 MMHG | TEMPERATURE: 99 F | SYSTOLIC BLOOD PRESSURE: 121 MMHG | BODY MASS INDEX: 26.21 KG/M2 | OXYGEN SATURATION: 100 % | HEIGHT: 71 IN

## 2018-06-07 DIAGNOSIS — M70.62 GREATER TROCHANTERIC BURSITIS OF BOTH HIPS: ICD-10-CM

## 2018-06-07 DIAGNOSIS — M70.61 GREATER TROCHANTERIC BURSITIS OF BOTH HIPS: ICD-10-CM

## 2018-06-07 DIAGNOSIS — I82.502 CHRONIC DEEP VEIN THROMBOSIS (DVT) OF LEFT LOWER EXTREMITY, UNSPECIFIED VEIN: Primary | ICD-10-CM

## 2018-06-07 DIAGNOSIS — I82.502 CHRONIC DEEP VEIN THROMBOSIS (DVT) OF LEFT LOWER EXTREMITY, UNSPECIFIED VEIN: ICD-10-CM

## 2018-06-07 LAB — D DIMER PPP IA.FEU-MCNC: 0.25 MG/L FEU

## 2018-06-07 PROCEDURE — 3008F BODY MASS INDEX DOCD: CPT | Mod: CPTII,S$GLB,, | Performed by: INTERNAL MEDICINE

## 2018-06-07 PROCEDURE — 99214 OFFICE O/P EST MOD 30 MIN: CPT | Mod: S$GLB,,, | Performed by: INTERNAL MEDICINE

## 2018-06-07 PROCEDURE — 36415 COLL VENOUS BLD VENIPUNCTURE: CPT

## 2018-06-07 PROCEDURE — 99999 PR PBB SHADOW E&M-EST. PATIENT-LVL III: CPT | Mod: PBBFAC,,,

## 2018-06-07 PROCEDURE — 85379 FIBRIN DEGRADATION QUANT: CPT

## 2018-06-07 NOTE — Clinical Note
Yoselin, Mrs Felder.  Please schedule the patient to see me in 6 months with D-dimer and US of the lower extremity performed prior to the appt.  Thanks.

## 2018-06-07 NOTE — PROGRESS NOTES
PATIENT: Edi Marie  MRN: [id number]  DATE: 6/7/2018      Diagnosis:   1. Chronic deep vein thrombosis (DVT) of left lower extremity, unspecified vein    2. Greater trochanteric bursitis of both hips        Chief Complaint: Chronic deep vein thrombosis (DVT) of left lower extremity,     Subjective:   1.  Initial History: Mr. Marie is a 52 y.o. who initially presented to [facility] on 11/16/17 with complaint of left leg pain.  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  The patient then began having worsening swelling of the left leg over the subsequent month.  When in the hospital the patient underwent an US on 11/16/17 showing partially occlusive thrombus involving the left common femoral vein and left iliac vein with completely occlusive thrombus of the left superficial femoral vein, left popliteal vein, and left peroneal vein. The patient then underwent catheter assisted thrombolysis on 11/17/17 with the use of a EKOS device.  The patient was then continued on continuous tPA and angiomax for 30 hours and then discharged home on Eliquis 10mg PO for 7 days and then 5mg BID thereafter.  The patient then had a follow up US performed on 11/29/17 which showed  PCP on  reidentification of largely occlusive thrombus within the left external iliac, common femoral, femoral, popliteal, and peroneal veins and new thrombosis of the posterior tibial vein.  The patient saw his PCP on 12/14/17 and was then sent to the Hematology clinic given the new thrombus in the posterior tibial vein seen.  The patient denied any personal history of clotting.  He had undergone prior surgeries with appendectomy and right knee surgery without developing clots.  The patient stated his mother developed a clot in an unknown location after a groin injury when she was 60.  She was on coumadin for 2 years and then taken off.  The patient denied  any other family history of clotting.  The patient denied any FH of early pregnancy loss.  He stated he has a daughter with suspected Behçet disease.    Interval history:  The patient states he still has symptoms of clot in his left foot although improved.  He states he recently was on a trip to [place] and the [facility] and had some discomfort and swelling in his left foot after a significant amount of walking.  The patient denies any persistent swelling in the left foot and states the swelling improved with elevation.  He denies CP, SOB.    Past Medical History:  Past Medical History:   Diagnosis Date    Arthritis     Hemorrhoid     Histiocytosis     Kidney stones     Spermatocele        Past Surgical HIstory:   Past Surgical History:   Procedure Laterality Date    APPENDECTOMY      COLONOSCOPY N/A 1/20/2017    Procedure: COLONOSCOPY;  Surgeon: Danis Frank MD;  Location: [facility] ([address] [place] [address]);  Service: Endoscopy;  Laterality: N/A;    KNEE SURGERY      right knee    LITHOTRIPSY         Family History:   Family History   Problem Relation Age of Onset    Heart disease Father         MI 2004    Cancer Paternal Grandfather         colon    Melanoma Neg Hx     Lupus Neg Hx     Psoriasis Neg Hx     Eczema Neg Hx        Social History:  reports that he has never smoked. He has never used smokeless tobacco. He reports that he drinks about 4.2 oz of alcohol per week . He reports that he does not use drugs.    Allergies:  Review of patient's allergies indicates:  No Known Allergies    Medications:  Current Outpatient Prescriptions   Medication Sig Dispense Refill    apixaban (ELIQUIS) 5 mg Tab Take 1 tablet (5 mg total) by mouth 2 (two) times daily. 60 tablet 6     No current facility-administered medications for this visit.        Review of Systems   Respiratory: Negative for cough and shortness of breath.    Cardiovascular: Negative for chest pain and palpitations.   Musculoskeletal:         "Occasional swelling of the left ankle   Skin: Negative for color change and rash.       ECOG Performance Status: 1   Objective:      Vitals:   Vitals:    06/07/18 0948   BP: 121/77   Pulse: 69   Temp: 98.5 °F (36.9 °C)   SpO2: 100%   Weight: 84.9 kg (187 lb 2.7 oz)   Height: 5' 11" (1.803 m)     BMI: Body mass index is 26.11 kg/m².    Physical Exam   Constitutional: He is oriented to person, place, and time. He appears well-developed and well-nourished. No distress.   HENT:   Head: Normocephalic and atraumatic.   Cardiovascular: Normal rate, regular rhythm and normal heart sounds.  Exam reveals no gallop and no friction rub.    No murmur heard.  Pulmonary/Chest: Effort normal and breath sounds normal. No respiratory distress. He has no wheezes. He has no rales. He exhibits no tenderness.   Musculoskeletal:   Slight swelling in the left ankle.   Neurological: He is alert and oriented to person, place, and time.   Skin: He is not diaphoretic.       Laboratory Data:  Lab Visit on 06/07/2018   Component Date Value Ref Range Status    D-Dimer 06/07/2018 0.25  <0.50 mg/L FEU Final    Comment: The quantitative D-dimer assay should be used as an aid in   the diagnosis of deep vein thrombosis and pulmonary embolism  in patients with the appropriate presentation and clinical  history. The upper limit of the reference interval and the clinical   cut off   point are identical. Causes of a positive (>0.50 mg/L FEU) D-Dimer   test  include, but are not limited to: DVT, PE, DIC, thrombolytic   therapy, anticoagulant therapy, recent surgery, trauma, or   pregnancy, disseminated malignancy, aortic aneurysm, cirrhosis,  and severe infection. False negative results may occur in   patients with distal DVT.           Imaging:    US LE 6/04/18     Partially occlusive deep venous thrombosis of the left superficial femoral and popliteal vein, mildly improved from prior exam.    US LE 2/26/18    Right - There is no evidence of acute venous " thrombosis in the common femoral, superficial femoral, greater saphenous, popliteal, peroneal, anterior tibial and posterior tibial veins.  There is no reflux to suggest valvular incompetence.    Left - There is occlusive thrombosis of the superficial femoral vein.  There is partially occlusive thrombosis of the popliteal vein.  There is no evidence of acute venous thrombosis in the common femoral, greater saphenous, peroneal, anterior tibial and posterior tibial veins.  There is no reflux to suggest valvular incompetence.    Impression:    Deep venous occlusive thrombosis of the left superficial femoral vein and partially occlusive thrombosis of the left popliteal vein.  Overall mildly improved from prior.        US LE 11/16/17  Duplex and color flow Doppler is remarkable for thrombus involving the left common femoral vein, left superficial femoral vein, left popliteal vein, left peroneal vein.  Thrombus is partially occlusive involving the left common femoral vein, with occlusive thrombus involving the remainder of the thrombosed vessels.  The posterior tibial veins and anterior tibial veins are patent.  There is partial thrombus involving the left iliac vein.  There is no reflux to suggest valvular incompetence.    US LE 11/29/17  There is reidentification of largely occlusive thrombus within the left external iliac, common femoral, femoral, popliteal, and peroneal veins.  There is thrombosis of the posterior tibial veins, not seen previously.  The anterior tibial and greater saphenous veins are patent.      Assessment:       1. Chronic deep vein thrombosis (DVT) of left lower extremity, unspecified vein    2. Greater trochanteric bursitis of both hips         Plan:     Chronic deep vein thrombosis (DVT) of left lower extremity, unspecified vein  -The patient has left lower extremity DVT provoked after injuring his ankle at work.  -On the first scan on 11/16/17 the patient had a thrombus involving the left common  femoral vein, left superficial femoral vein, left popliteal vein, and left peroneal vein; a partially occlusive involving the left common femoral vein and a partial thrombus involving the left iliac vein.  -Recent US done on 6/04/18 shows continued partially occlusive deep venous thrombosis of the left superficial femoral and popliteal vein.  -After discussion with the patient the decision was made to continue anticoagulation for an additional 6 months  -Will repeat US of the left LE and D-dimer at that time.  -The patient was instructed to contact a physician if he develops any signs of bleeding.  -He expressed understanding    Greater Trochanteric Bursitis - the patient has been previously seen in the sports medicine clinic with injection given in bilateral hips on 11/06/17  -The patient was instructed that he could hold Eliquis safely the day prior to the procedure and continue back on Eliquis the evening of the procedure.  -Will contact the patient's Sports medicine physician Dr. Mariano Ortiz concerning these recommendations.    -Discussed with Dr Herve Ferrer MD PGY-IV  Hematology and Oncology  Pager:[phone]    Patient seen and evaluated with Dr. Ferrer. Agree with assessment and plan as outlined above.   Plan to continue anticoagulation for another 6 months due to improving clot burden. Patient is comfortable with this plan.  Sadia Edgar MD

## 2018-07-27 NOTE — PROGRESS NOTES
Edi Marie, a 53 y.o. male, is here for evaluation of RIGHT and LEFT hip.     HISTORY OF PRESENT ILLNESS   Location: anterior thigh, bilateral, L > R   Onset: insidious  Palliative:    Relative rest   Oral analgesics   CSI, GTB, 09/20/17, 70%   HgPT, noncompliant    fPT, Needs new pt referral    L/R CSI, GTB 11/06/17 80% Improvement  Provocative:   ADLs   Prior:    PMH DVT 11/17/18 L. LE  Progression: Plateaud discomfort   Quality:    Ache, worse in evening    Sharp at times   Radiation: none  Severity: per nursing documentation  Timing: intermittent with use  Trauma: none      Review of systems (ROS):  A 10+ review of systems was performed with pertinent positives and negatives noted above in the history of present illness. Other systems were negative unless otherwise specified.    PHYSICAL EXAMINATION  General:  The patient is alert and oriented x 3.  Mood is pleasant.  Observation of ears, eyes and nose reveal no gross abnormalities.  HEENT: NCAT, sclera nonicteric  Lungs: Respirations are equal and unlabored.   Gait is coordinated. Patient can toe walk and heel walk without difficulty.    HIP/PELVIS EXAMINATION    Observation/Inspection  Gait:   Nonantalgic   Alignment:  Neutral   Scars:   None   Muscle atrophy: None   Effusion:  None   Warmth:  None   Discoloration:   None   Leg lengths:   Equal   Pelvis:    Level     Tenderness/Crepitus (T/C):      T / C  Trochanteric bursa   - / -  Piriformis    - / -  SI joint    - / -  Psoas tendon   - / -  Rectus insertion  - / -  Adductor insertion  - / -  Pubic symphysis  - / -    ROM: (* = pain)    Flexion:      90 degrees*  External rotation:   40 degrees*  Internal rotation with axial load:  30 degrees*  Internal rotation without axial load:  40 degrees*  Abduction:    45 degrees  Adduction:     20 degrees    Special Tests:  Pain w/ forced internal rotation (FADIR):  +   Pain w/ forced external rotation (WES):  +  Circumduction test:     -  Iraida  test:     +  Log roll:       +   Snapping hip (internal):    -   Sit-up pain:      -   Resisted sit-up pain:     -   Resisted sit-up with adductor contraction pain:  -   Step-down test:     +  Trendelenburg test:     -  Bridge test      +     Extremity Neuro-vascular Examination:   Sensation:  Grossly intact to light touch all dermatomal regions.   Motor Function:  Fully intact motor function at hip, knee, foot and ankle    DTRs;  quadriceps and  achilles 2+.  No clonus and downgoing Babinski.    Vascular status:  DP and PT pulses 2+, brisk capillary refill, symmetric.    Skin:  intact, compartments soft.    Other Findings:    ASSESSMENT & PLAN  Assessment:   #1 Tonnis Grade III osteoarthritis of hip, bilateral   W/ greater trochanteric bursitis  #2 s/p DVT, lower extremity, left    No evidence of neurologic pathology  No evidence of vascular pathology    Imaging studies reviewed:   X-ray pelvis and hip, bilateral 17.09    Plan:    We discussed the importance of appropriate diet, weight, and regular exercise including quadriceps strengthening     We discussed options including:  #1 watchful waiting  #2 physical therapy aimed at:   Core stability   RoM hip   Strengthening quadriceps   Gait training   #3 injection therapy:   CSI GTB    Right, effective 70%, repeat     Left, effective 70%, repeat    CSI iaHip    Right,     Left,    Orthobiologics   #4 consultation re: THAs     The patient chooses #2 and #3 csi gtb bilat    Pain management: handout given  Bracing:   Physical therapy:    hgPT, handout given, prior as above    fPT, @ [facility], begin as above   Activity (e.g. 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

## 2018-07-30 ENCOUNTER — OFFICE VISIT (OUTPATIENT)
Dept: SPORTS MEDICINE | Facility: CLINIC | Age: 53
End: 2018-07-30
Payer: COMMERCIAL

## 2018-07-30 VITALS — BODY MASS INDEX: 26.18 KG/M2 | WEIGHT: 187 LBS | TEMPERATURE: 99 F | HEIGHT: 71 IN

## 2018-07-30 DIAGNOSIS — M70.62 GREATER TROCHANTERIC BURSITIS OF BOTH HIPS: ICD-10-CM

## 2018-07-30 DIAGNOSIS — M70.61 GREATER TROCHANTERIC BURSITIS OF BOTH HIPS: ICD-10-CM

## 2018-07-30 DIAGNOSIS — M16.0 PRIMARY OSTEOARTHRITIS OF BOTH HIPS: Primary | ICD-10-CM

## 2018-07-30 PROCEDURE — 99999 PR PBB SHADOW E&M-EST. PATIENT-LVL III: CPT | Mod: PBBFAC,,, | Performed by: FAMILY MEDICINE

## 2018-07-30 PROCEDURE — 99214 OFFICE O/P EST MOD 30 MIN: CPT | Mod: 25,S$GLB,, | Performed by: FAMILY MEDICINE

## 2018-07-30 PROCEDURE — 20611 DRAIN/INJ JOINT/BURSA W/US: CPT | Mod: 50,S$GLB,, | Performed by: FAMILY MEDICINE

## 2018-07-30 PROCEDURE — 3008F BODY MASS INDEX DOCD: CPT | Mod: CPTII,S$GLB,, | Performed by: FAMILY MEDICINE

## 2018-07-30 RX ORDER — TRIAMCINOLONE ACETONIDE 40 MG/ML
40 INJECTION, SUSPENSION INTRA-ARTICULAR; INTRAMUSCULAR
Status: DISCONTINUED | OUTPATIENT
Start: 2018-07-30 | End: 2018-07-30 | Stop reason: HOSPADM

## 2018-07-30 RX ADMIN — TRIAMCINOLONE ACETONIDE 40 MG: 40 INJECTION, SUSPENSION INTRA-ARTICULAR; INTRAMUSCULAR at 03:07

## 2018-07-30 RX ADMIN — TRIAMCINOLONE ACETONIDE 40 MG: 40 INJECTION, SUSPENSION INTRA-ARTICULAR; INTRAMUSCULAR at 04:07

## 2018-07-30 NOTE — PROCEDURES
"Large Joint Aspiration/Injection  Date/Time: 7/30/2018 3:55 PM  Performed by: MARCO CAPUTO  Authorized by: MARCO CAPUTO     Consent Done?:  Yes (Verbal)  Indications:  Pain  Procedure site marked: Yes    Timeout: Prior to procedure the correct patient, procedure, and site was verified      Location:  Hip  Site:  R greater trochanteric bursa  Prep: Patient was prepped and draped in usual sterile fashion    Ultrasonic Guidance for needle placement: Yes  Images are saved and documented.  Needle size:  22 G  Approach:  Lateral  Medications:  40 mg triamcinolone acetonide 40 mg/mL  Patient tolerance:  Patient tolerated the procedure well with no immediate complications    Additional Comments: Description of ultrasound utilization for needle guidance:   Ultrasound guidance used for needle localization. Images saved and stored for documentation. The greater trochanter and its bursa were visualized. Dynamic visualization of the 22g x 1.5" needle was continuous throughout the procedure.      "

## 2018-07-30 NOTE — PROCEDURES
"Large Joint Aspiration/Injection  Date/Time: 7/30/2018 4:04 PM  Performed by: MARCO CAPUTO  Authorized by: MARCO CAPUTO     Consent Done?:  Yes (Verbal)  Indications:  Pain  Procedure site marked: Yes    Timeout: Prior to procedure the correct patient, procedure, and site was verified      Location:  Hip  Site:  R greater trochanteric bursa and L greater trochanteric bursa  Prep: Patient was prepped and draped in usual sterile fashion    Ultrasonic Guidance for needle placement: Yes  Images are saved and documented.  Needle size:  22 G  Approach:  Lateral  Medications:  40 mg triamcinolone acetonide 40 mg/mL; 40 mg triamcinolone acetonide 40 mg/mL  Patient tolerance:  Patient tolerated the procedure well with no immediate complications    Additional Comments: Description of ultrasound utilization for needle guidance:   Ultrasound guidance used for needle localization. Images saved and stored for documentation. The greater trochanter and its bursa were visualized. Dynamic visualization of the 22g x 1.5" needle was continuous throughout the procedure.      "

## 2018-08-15 ENCOUNTER — CLINICAL SUPPORT (OUTPATIENT)
Dept: REHABILITATION | Facility: HOSPITAL | Age: 53
End: 2018-08-15
Attending: FAMILY MEDICINE
Payer: COMMERCIAL

## 2018-08-15 DIAGNOSIS — M25.552 PAIN OF BOTH HIP JOINTS: ICD-10-CM

## 2018-08-15 DIAGNOSIS — M25.551 PAIN OF BOTH HIP JOINTS: ICD-10-CM

## 2018-08-15 DIAGNOSIS — R29.898 WEAKNESS OF BOTH LOWER EXTREMITIES: ICD-10-CM

## 2018-08-15 DIAGNOSIS — M25.659 STIFFNESS OF HIP JOINT, UNSPECIFIED LATERALITY: ICD-10-CM

## 2018-08-15 PROCEDURE — 97140 MANUAL THERAPY 1/> REGIONS: CPT

## 2018-08-15 PROCEDURE — 97110 THERAPEUTIC EXERCISES: CPT

## 2018-08-15 PROCEDURE — 97161 PT EVAL LOW COMPLEX 20 MIN: CPT

## 2018-08-15 NOTE — PLAN OF CARE
[facility]  Physical Therapy Initial Evaluation    Name: Edi Marie  Clinic Number: [id number]    Therapy Diagnosis:   Encounter Diagnoses   Name Primary?    Stiffness of hip joint, unspecified laterality     Pain of both hip joints     Weakness of both lower extremities      Physician: Mariano Ortiz, *    Physician Orders: PT Eval and Treat   Medical Diagnosis from Referral: M16.0 (ICD-10-CM) - Primary osteoarthritis of both hips  Evaluation Date: 8/15/2018  Authorization Period Expiration: 12/31/2018  Plan of Care Expiration: 10/10/2018  Visit # / Visits authorized: 1/20    Time In: 1115  Time Out: 1205   Total Billable Time: 50 minutes    Precautions: Standard and Hx of DVT in L LE    Subjective   Date of onset: gradually worsening  History of current condition - Edi reports: LE pain with sleeping and activity. He has had steroid injections which have helped. However, he had a DVT at the end of November 2017 in L LE following an  injury to L ankle. He is still taking prophylactic DVT meds.       Past Medical History:   Diagnosis Date    Arthritis     DVT (deep venous thrombosis)     Hemorrhoid     Histiocytosis     Kidney stones     Spermatocele      Edi Marie  has a past surgical history that includes Appendectomy; Knee surgery; Lithotripsy; and COLONOSCOPY (N/A, 1/20/2017).    Edi has a current medication list which includes the following prescription(s): apixaban.    Review of patient's allergies indicates:  No Known Allergies     Imaging, X-ray: OA    Prior Therapy: none   Occupation: own a business in [place]  Prior Level of Function: Independent and pain free ADLs and recreational activities.    Current Level of Function: Moderate pain with ADLs and recreational activities.      Pain:  Current 2/10, worst 7/10, best 0/10 , the same  Location: bilateral hips   Description: tight and sore  Aggravating Factors: ambulation, sleeping,  squatting  Easing Factors: injection steroid 2 weeks ago    Pts goals: decrease pain, learn an ex routine get out of painful cycle    Objective     Observation: Pt is healthy and in no acute distress    Posture: unremarkable    Hip Range of Motion:   Right Passive Left Passive   Flexion 98 97   Ext. Rotation 39 37   Int. Rotation 9 8       Lower Extremity Strength  Right LE  Left LE    Quadriceps: 4/5 Quadriceps: 4-/5   Hamstrings: 4/5 Hamstrings: 4-/5   Iliopsoas: 4-/5 Iliopsoas: 3+/5   Hip extension:  4-/5 Hip extension: 3+/5   PGM: 3+/5 PGM: 3+/5       Special Tests:   FABERs:  - but tight B (R worse than L)  Hip Scour: + on the L    Flexibility:    Ely's test: R = + with quad soreness ; L = + with quad soreness     Hamstrings: R = min tightness ; L = mod tightness    Jong test: R = + worse than L ; L = +    Joint Mobility: Long axis distraction: hypomobile B    Palpation: no tenderness to palpation    Edema: none      CMS Impairment/Limitation/Restriction for FOTO Hip Survey    Therapist reviewed FOTO scores for Edi Marie on 8/15/2018.   FOTO documents entered into [employer] - see Media section.    Limitation Score: 37%         TREATMENT   Treatment Time In: 1140  Treatment Time Out: 1205  Total Treatment time separate from Evaluation: 23 minutes    Edi received therapeutic exercises to develop flexibility for 15 minutes including:  HSS 2x30s  HK hip flexor stretch 2x30s  HK hip adductor stretch 2x30s  Standing hip add stretch 2x30s    Edi received the following manual therapy techniques: Joint mobilizations were applied to the: B hips for 8 minutes, including:  Grades III and IV Lateral distraction B hips with belt     Home Exercises and Patient Education Provided    Written Home Exercises Provided: yes.  Exercises were reviewed and Edi was able to demonstrate them prior to the end of the session.   Pt received a written copy of exercises to perform at home. Edi demonstrated good  understanding  of the education provided.     See EMR under patient instructions for exercises given.   Assessment   Edi is a 53 y.o. male referred to outpatient Physical Therapy with a medical diagnosis of B hip OA. Pt's cc is stiffness and pain in B hips. He presents with weak hip musculature and severe limitation with hip ROM, monico internal rotation, secondary to tight B femoroacetabular jt capsules and muscular tightness. He will benefit from skilled PT in order to improve ROM and reduce muscular imbalances and pain in order to return to his gym routine and functional activities with minimal difficulty.      Pt prognosis is Good.   Pt will benefit from skilled outpatient Physical Therapy to address the deficits stated above and in the chart below, provide pt/family education, and to maximize pt's level of independence.     Plan of care discussed with patient: Yes  Pt's spiritual, cultural and educational needs considered and patient is agreeable to the plan of care and goals as stated below:     Anticipated Barriers for therapy: compliance with HEP    Medical Necessity is demonstrated by the following  History  Co-morbidities and personal factors that may impact the plan of care Co-morbidities:   Hx of DVT    Personal Factors:   no deficits     low   Examination  Body Structures and Functions, activity limitations and participation restrictions that may impact the plan of care Body Regions:   lower extremities    Body Systems:    gross symmetry  ROM  strength  gross coordinated movement  balance  motor control  motor learning    Participation Restrictions:   Gym routine    Activity limitations:   Learning and applying knowledge  no deficits    General Tasks and Commands  no deficits    Communication  no deficits    Mobility  squatting    Self care  no deficits    Domestic Life  no deficits    Interactions/Relationships  no deficits    Life Areas  no deficits    Community and Social Life  no deficits         moderate    Clinical Presentation stable and uncomplicated low   Decision Making/ Complexity Score: low     Goals:  Short Term Goals: 4 weeks  1. Pt displays increase of hip PROM in flexion to 110 degrees bilaterally.   2. Pt displays increase in hip PROM in IR to 10-15 degrees bilaterally.   3. Pt will demonstrate 4/5 gross LE strength in order to gain strength required in order to start higher activity exercises.   4.  Pt will report pain at worst </= 5/10.     Long Term Goals: 8 weeks   1. Pt displays increase of hip PROM in flexion to 120 degrees bilaterally.   2. Pt displays increase in hip PROM in IR to 20-25 degrees bilaterally.   3. Pt will demonstrate 5/5 gross LE strength in order to return to gym routine without difficulty.   4. Pt will report pain at worst </= 2/10.  4.) Pt will demonstrate excellent recall of HEP in order to complete independently.       Plan   Plan of care Certification: 8/15/2018 to 10/10/2018.    Outpatient Physical Therapy 1 times weekly for 8 weeks to include the following interventions: Electrical Stimulation IFC, Gait Training, Manual Therapy, Moist Heat/ Ice, Neuromuscular Re-ed, Therapeutic Activites and Therapeutic Exercise.     Leila Posey, SPT    I certify that I was present in the room directing the student in service delivery and guiding them using my skilled judgment. As the co-signing therapist I have reviewed the students documentation and am responsible for the treatment, assessment, and plan.     Alejandra Clark, PT, DPT, OCS

## 2018-08-21 ENCOUNTER — CLINICAL SUPPORT (OUTPATIENT)
Dept: REHABILITATION | Facility: HOSPITAL | Age: 53
End: 2018-08-21
Attending: FAMILY MEDICINE
Payer: COMMERCIAL

## 2018-08-21 DIAGNOSIS — R29.898 WEAKNESS OF BOTH LOWER EXTREMITIES: ICD-10-CM

## 2018-08-21 DIAGNOSIS — M25.659 STIFFNESS OF HIP JOINT, UNSPECIFIED LATERALITY: ICD-10-CM

## 2018-08-21 DIAGNOSIS — M25.552 PAIN OF BOTH HIP JOINTS: ICD-10-CM

## 2018-08-21 DIAGNOSIS — M25.551 PAIN OF BOTH HIP JOINTS: ICD-10-CM

## 2018-08-21 PROCEDURE — 97110 THERAPEUTIC EXERCISES: CPT

## 2018-08-21 PROCEDURE — 97140 MANUAL THERAPY 1/> REGIONS: CPT

## 2018-08-21 NOTE — PROGRESS NOTES
Physical Therapy Daily Treatment Note     Name: Edi Marie  Clinic Number: [id number]    Therapy Diagnosis:   Encounter Diagnoses   Name Primary?    Stiffness of hip joint, unspecified laterality     Pain of both hip joints     Weakness of both lower extremities      Physician: Mariano Ortiz, *  Physician Orders: PT Eval and Treat   Medical Diagnosis from Referral: M16.0 (ICD-10-CM) - Primary osteoarthritis of both hips  Evaluation Date: 8/15/2018  Authorization Period Expiration: 12/31/2018  Plan of Care Expiration: 10/10/2018  Visit # / Visits authorized: 2/20     Time In: 0800  Time Out: 0857  Total Billable Time: 28 minutes     Precautions: Standard and Hx of DVT in L LE      Subjective     Pt reports: w/ no c/o pn in either hip but cont to have some stiffness.    He was compliant with home exercise program.  Response to previous treatment: No adverse effects  Functional change: Feeling less tightness in B hips    Pain: 0/10  Location: bilateral Hips    Objective     Edi received therapeutic exercises to develop strength, endurance, ROM, flexibility, posture and core stabilization for 20 minutes including:  HSS strap 3 x 30 sec  Hip flexor stretch EOT w/ strap 3 x 30 sec   TA activation 30 x   Hook lying hip abd 3 x 10 3 sec hold w/ TA activation  Hook lying hip add 3 x 10 3 sec hold w/ TA activation  Bridges 3 x 10 3 sec hold w/ TA activation    Edi received the following manual therapy techniques: Manual traction were applied to the: Hips for 8 minutes, including:  Grades III and IV Lateral distraction B hips with belt       Home Exercises Provided and Patient Education Provided     Education provided:   - Pt edu on proper exercise technique.    Written Home Exercises Provided: Patient instructed to cont prior HEP.  Exercises were reviewed and Edi was able to demonstrate them prior to the end of the session.  Edi demonstrated good  understanding of the education provided.        Assessment     Pt rudy tx well w/ no c/o pn.  Stiffness in B hip decreased after tx session.  Pt demonstrated increased hip strength during therex.  Pt cont to lack some hip strength and core stability.    Edi is progressing well towards his goals.   Pt prognosis is Good.     Pt will continue to benefit from skilled outpatient physical therapy to address the deficits listed in the problem list box on initial evaluation, provide pt/family education and to maximize pt's level of independence in the home and community environment.     Pt's spiritual, cultural and educational needs considered and pt agreeable to plan of care and goals.    Anticipated barriers to physical therapy: None    Goals: Goals:  Short Term Goals: 4 weeks  1. Pt displays increase of hip PROM in flexion to 110 degrees bilaterally.   2. Pt displays increase in hip PROM in IR to 10-15 degrees bilaterally.   3. Pt will demonstrate 4/5 gross LE strength in order to gain strength required in order to start higher activity exercises.   4.  Pt will report pain at worst </= 5/10.     Long Term Goals: 8 weeks   1. Pt displays increase of hip PROM in flexion to 120 degrees bilaterally.   2. Pt displays increase in hip PROM in IR to 20-25 degrees bilaterally.   3. Pt will demonstrate 5/5 gross LE strength in order to return to gym routine without difficulty.   4. Pt will report pain at worst </= 2/10.  4.) Pt will demonstrate excellent recall of HEP in order to complete independently.       Plan     Cont to progress towards goals set by PT.  Work to improve hip strength and flexibility next visit.      Waldemar Newman, PTA

## 2018-08-28 ENCOUNTER — CLINICAL SUPPORT (OUTPATIENT)
Dept: REHABILITATION | Facility: HOSPITAL | Age: 53
End: 2018-08-28
Attending: FAMILY MEDICINE
Payer: COMMERCIAL

## 2018-08-28 DIAGNOSIS — M25.551 PAIN OF BOTH HIP JOINTS: ICD-10-CM

## 2018-08-28 DIAGNOSIS — R29.898 WEAKNESS OF BOTH LOWER EXTREMITIES: ICD-10-CM

## 2018-08-28 DIAGNOSIS — M25.659 STIFFNESS OF HIP JOINT, UNSPECIFIED LATERALITY: ICD-10-CM

## 2018-08-28 DIAGNOSIS — M25.552 PAIN OF BOTH HIP JOINTS: ICD-10-CM

## 2018-08-28 PROCEDURE — 97110 THERAPEUTIC EXERCISES: CPT

## 2018-08-28 PROCEDURE — 97140 MANUAL THERAPY 1/> REGIONS: CPT

## 2018-08-28 NOTE — PROGRESS NOTES
"  Physical Therapy Daily Treatment Note     Name: Edi Marie  Clinic Number: [id number]    Therapy Diagnosis:   Encounter Diagnoses   Name Primary?    Stiffness of hip joint, unspecified laterality     Pain of both hip joints     Weakness of both lower extremities      Physician: Mariano Ortiz, *  Physician Orders: PT Eval and Treat   Medical Diagnosis from Referral: M16.0 (ICD-10-CM) - Primary osteoarthritis of both hips  Evaluation Date: 8/15/2018  Authorization Period Expiration: 12/31/2018  Plan of Care Expiration: 10/10/2018  Visit # / Visits authorized: 3/20     Time In: 0800  Time Out: 0900  Total Billable Time: 60 minutes     Precautions: Standard and Hx of DVT in L LE      Subjective     Pt reports: no c/o pain but does have soreness after going to the beach for 4 days.  He was compliant with home exercise program.  Response to previous treatment: soreness in B hips  Functional change: n/a    Pain: 0/10  Location: bilateral Hips    Objective     Edi received therapeutic exercises to develop strength, endurance, ROM, flexibility, posture and core stabilization for 40 minutes including:  Bike (seat height 8) L4.0 x 10 min   HSS strap 3 x 30 sec B  Half kneeling hip flexor stretch 3 x 30" B  Half kneeling hip adductor stretch 3 x 30" B  Bridges 3 x 10 3 sec hold w/ TA activation  SL clams x 30 each  DKTC w/ SB x 30    Edi received the following manual therapy techniques: Manual traction were applied to the: Hips for 20 minutes, including:  Grades III and IV Lateral distraction B hips with belt   Long axis distraction B hips  Posterior mobs (pt in qped) B hips      Home Exercises Provided and Patient Education Provided     Education provided:   - Pt edu on proper exercise technique.    Written Home Exercises Provided: Patient instructed to cont prior HEP.  Exercises were reviewed and Edi was able to demonstrate them prior to the end of the session.  Edi demonstrated good  understanding " of the education provided.       Assessment   Pt tolerated treatment well without increased c/o pain.  Initiated bike today for warm up and to help decrease stiffness and improve mobility prior to performing exercises.  After joint mobs and stretching, decreased tightness.  Will continue to monitor symptoms and progress as tolerated.    Edi is progressing well towards his goals.   Pt prognosis is Good.     Pt will continue to benefit from skilled outpatient physical therapy to address the deficits listed in the problem list box on initial evaluation, provide pt/family education and to maximize pt's level of independence in the home and community environment.     Pt's spiritual, cultural and educational needs considered and pt agreeable to plan of care and goals.    Anticipated barriers to physical therapy: None    Goals: Goals:  Short Term Goals: 4 weeks  1. Pt displays increase of hip PROM in flexion to 110 degrees bilaterally.   2. Pt displays increase in hip PROM in IR to 10-15 degrees bilaterally.   3. Pt will demonstrate 4/5 gross LE strength in order to gain strength required in order to start higher activity exercises.   4.  Pt will report pain at worst </= 5/10.     Long Term Goals: 8 weeks   1. Pt displays increase of hip PROM in flexion to 120 degrees bilaterally.   2. Pt displays increase in hip PROM in IR to 20-25 degrees bilaterally.   3. Pt will demonstrate 5/5 gross LE strength in order to return to gym routine without difficulty.   4. Pt will report pain at worst </= 2/10.  4.) Pt will demonstrate excellent recall of HEP in order to complete independently.       Plan     Cont to progress towards goals set by PT.  Work to improve hip strength and flexibility next visit.      Alejandra Clark, PT, DPT, OCS

## 2018-09-04 ENCOUNTER — CLINICAL SUPPORT (OUTPATIENT)
Dept: REHABILITATION | Facility: HOSPITAL | Age: 53
End: 2018-09-04
Attending: FAMILY MEDICINE
Payer: COMMERCIAL

## 2018-09-04 DIAGNOSIS — M25.659 STIFFNESS OF HIP JOINT, UNSPECIFIED LATERALITY: ICD-10-CM

## 2018-09-04 DIAGNOSIS — M25.552 PAIN OF BOTH HIP JOINTS: ICD-10-CM

## 2018-09-04 DIAGNOSIS — M25.551 PAIN OF BOTH HIP JOINTS: ICD-10-CM

## 2018-09-04 DIAGNOSIS — R29.898 WEAKNESS OF BOTH LOWER EXTREMITIES: ICD-10-CM

## 2018-09-04 PROCEDURE — 97110 THERAPEUTIC EXERCISES: CPT

## 2018-09-04 PROCEDURE — 97140 MANUAL THERAPY 1/> REGIONS: CPT

## 2018-09-04 NOTE — PROGRESS NOTES
"  Physical Therapy Daily Treatment Note     Name: Edi Marie  Clinic Number: [id number]    Therapy Diagnosis:   Encounter Diagnoses   Name Primary?    Stiffness of hip joint, unspecified laterality     Pain of both hip joints     Weakness of both lower extremities      Physician: Mariano Ortiz, *  Physician Orders: PT Eval and Treat   Medical Diagnosis from Referral: M16.0 (ICD-10-CM) - Primary osteoarthritis of both hips  Evaluation Date: 8/15/2018  Authorization Period Expiration: 12/31/2018  Plan of Care Expiration: 10/10/2018  Visit # / Visits authorized: 4/20     Time In: 0755  Time Out: 0855  Total Billable Time: 60 minutes     Precautions: Standard and Hx of DVT in L LE      Subjective     Pt reports: no c/o pain but reports quad tightness.   He was compliant with home exercise program.  Response to previous treatment: quad tightness  Functional change: n/a    Pain: 0/10  Location: bilateral Hips    Objective     Edi received therapeutic exercises to develop strength, endurance, ROM, flexibility, posture and core stabilization for 45 minutes including:  Bike (seat height 8) L6.0 x 10 min   HSS on plinth 3 x 30 sec B  Prone quad stretch 3 x 30 sec B  Half kneeling hip flexor stretch 3 x 30" B  Half kneeling hip adductor stretch 3 x 30" B  Bridges on ball 3 x 10 3 sec hold w/ TA activation  SL clams OTB x 30 each  DKTC w/ SB x 30  Standing hip abd/ext OTB 3 x 10 B  SLS on airex 3 x 30" B - next w/ plyotoss    Edi received the following manual therapy techniques: Manual traction were applied to the: Hips for 15 minutes, including:  Grades III and IV Lateral distraction B hips with belt     Long axis distraction B hips      Home Exercises Provided and Patient Education Provided     Education provided:   - Pt edu on proper exercise technique.    Written Home Exercises Provided: Patient instructed to cont prior HEP.  Exercises were reviewed and Edi was able to demonstrate them prior to the " end of the session.  Edi demonstrated good  understanding of the education provided.       Assessment   Pt tolerated treatment well with progression of hip strengthening exercises.  Pt with major tightness noted in B quads.  Advised pt to perform prone quad stretch for HEP.  Initiated balance today for further stability of B hips.  Will continue to progress as tolerated, focusing on stretching and strengthening on B hips.    Edi is progressing well towards his goals.   Pt prognosis is Good.     Pt will continue to benefit from skilled outpatient physical therapy to address the deficits listed in the problem list box on initial evaluation, provide pt/family education and to maximize pt's level of independence in the home and community environment.     Pt's spiritual, cultural and educational needs considered and pt agreeable to plan of care and goals.    Anticipated barriers to physical therapy: None    Goals: Goals:  Short Term Goals: 4 weeks  1. Pt displays increase of hip PROM in flexion to 110 degrees bilaterally. Progressing, not met 8/21/18  2. Pt displays increase in hip PROM in IR to 10-15 degrees bilaterally. Progressing, not met 8/21/18  3. Pt will demonstrate 4/5 gross LE strength in order to gain strength required in order to start higher activity exercises. Progressing, not met 8/21/18  4.  Pt will report pain at worst </= 5/10. Progressing, not met 8/21/18     Long Term Goals: 8 weeks   1. Pt displays increase of hip PROM in flexion to 120 degrees bilaterally. Progressing, not met 8/21/18  2. Pt displays increase in hip PROM in IR to 20-25 degrees bilaterally. Progressing, not met 8/21/18  3. Pt will demonstrate 5/5 gross LE strength in order to return to gym routine without difficulty. Progressing, not met 8/21/18  4. Pt will report pain at worst </= 2/10. Progressing, not met 8/21/18  4.) Pt will demonstrate excellent recall of HEP in order to complete independently. Progressing, not met  8/21/18      Plan     Cont to progress towards goals set by PT.  Work to improve hip strength and flexibility next visit.      Alejandra Clark, PT, DPT, OCS

## 2018-09-11 ENCOUNTER — CLINICAL SUPPORT (OUTPATIENT)
Dept: REHABILITATION | Facility: HOSPITAL | Age: 53
End: 2018-09-11
Attending: FAMILY MEDICINE
Payer: COMMERCIAL

## 2018-09-11 DIAGNOSIS — R29.898 WEAKNESS OF BOTH LOWER EXTREMITIES: ICD-10-CM

## 2018-09-11 DIAGNOSIS — M25.552 PAIN OF BOTH HIP JOINTS: ICD-10-CM

## 2018-09-11 DIAGNOSIS — M25.551 PAIN OF BOTH HIP JOINTS: ICD-10-CM

## 2018-09-11 DIAGNOSIS — M25.659 STIFFNESS OF HIP JOINT, UNSPECIFIED LATERALITY: ICD-10-CM

## 2018-09-11 PROCEDURE — 97110 THERAPEUTIC EXERCISES: CPT

## 2018-09-11 PROCEDURE — 97140 MANUAL THERAPY 1/> REGIONS: CPT

## 2018-09-11 NOTE — PROGRESS NOTES
"  Physical Therapy Daily Treatment Note     Name: Edi Marie  Clinic Number: [id number]    Therapy Diagnosis:   Encounter Diagnoses   Name Primary?    Stiffness of hip joint, unspecified laterality     Pain of both hip joints     Weakness of both lower extremities      Physician: Mariano Ortiz, *  Physician Orders: PT Eval and Treat   Medical Diagnosis from Referral: M16.0 (ICD-10-CM) - Primary osteoarthritis of both hips  Evaluation Date: 8/15/2018  Authorization Period Expiration: 12/31/2018  Plan of Care Expiration: 10/10/2018  Visit # / Visits authorized: 5/20     Time In: 0900  Time Out: 1000  Total Billable Time: 31 minutes     Precautions: Standard and Hx of DVT in L LE      Subjective     Pt reports: no c/o pain.  Feels like the treatment is starting to work and his hips are feeling "different".  He was compliant with home exercise program.  Response to previous treatment: soreness  Functional change: n/a    Pain: 0/10  Location: bilateral Hips    Objective   9/11/18 FOTO intake: 36% limited    Edi received therapeutic exercises to develop strength, endurance, ROM, flexibility, posture and core stabilization for 45 minutes including:  Bike (seat height 8) L6.0 x 10 min   HSS on plinth 3 x 30 sec B  Prone quad stretch 3 x 30 sec B  Half kneeling hip flexor stretch 3 x 30" B  Half kneeling hip adductor stretch 3 x 30" B  Bridges on ball 3 x 10 5 sec hold w/ TA activation  SL clams OTB x 30 each NP  DKTC w/ SB x 30  Standing hip abd/ext OTB 3 x 10 B   Side stepping OTB x 2 laps  Mini squats  - NEXT  SLS on blue oval foam w/ plyotoss x 30 B    Edi received the following manual therapy techniques: Manual traction were applied to the: Hips for 15 minutes, including:  Grades III and IV Lateral distraction B hips with belt  Long axis distraction B hips      Home Exercises Provided and Patient Education Provided     Education provided:   - Pt edu on proper exercise technique.    Written Home " Exercises Provided: Patient instructed to cont prior HEP.  Exercises were reviewed and Edi was able to demonstrate them prior to the end of the session.  Edi demonstrated good  understanding of the education provided.       Assessment   Pt tolerated treatment well with progression of hip strengthening exercises.  Improvements noted in joint mobility and flexibility, bilaterally.  Will continue to progress as tolerated.  Next visit, initiate mini squats.      Edi is progressing well towards his goals.   Pt prognosis is Good.     Pt will continue to benefit from skilled outpatient physical therapy to address the deficits listed in the problem list box on initial evaluation, provide pt/family education and to maximize pt's level of independence in the home and community environment.     Pt's spiritual, cultural and educational needs considered and pt agreeable to plan of care and goals.    Anticipated barriers to physical therapy: None    Goals: Goals:  Short Term Goals: 4 weeks  1. Pt displays increase of hip PROM in flexion to 110 degrees bilaterally. Progressing, not met 8/21/18  2. Pt displays increase in hip PROM in IR to 10-15 degrees bilaterally. Progressing, not met 8/21/18  3. Pt will demonstrate 4/5 gross LE strength in order to gain strength required in order to start higher activity exercises. Progressing, not met 8/21/18  4.  Pt will report pain at worst </= 5/10. Progressing, not met 8/21/18     Long Term Goals: 8 weeks   1. Pt displays increase of hip PROM in flexion to 120 degrees bilaterally. Progressing, not met 8/21/18  2. Pt displays increase in hip PROM in IR to 20-25 degrees bilaterally. Progressing, not met 8/21/18  3. Pt will demonstrate 5/5 gross LE strength in order to return to gym routine without difficulty. Progressing, not met 8/21/18  4. Pt will report pain at worst </= 2/10. Progressing, not met 8/21/18  4.) Pt will demonstrate excellent recall of HEP in order to complete  independently. Progressing, not met 8/21/18      Plan     Cont to progress towards goals set by PT.  Work to improve hip strength and flexibility next visit.      Alejandra Clark, PT, DPT, OCS

## 2018-09-18 ENCOUNTER — CLINICAL SUPPORT (OUTPATIENT)
Dept: REHABILITATION | Facility: HOSPITAL | Age: 53
End: 2018-09-18
Attending: FAMILY MEDICINE
Payer: COMMERCIAL

## 2018-09-18 DIAGNOSIS — M25.552 PAIN OF BOTH HIP JOINTS: ICD-10-CM

## 2018-09-18 DIAGNOSIS — R29.898 WEAKNESS OF BOTH LOWER EXTREMITIES: ICD-10-CM

## 2018-09-18 DIAGNOSIS — M25.659 STIFFNESS OF HIP JOINT, UNSPECIFIED LATERALITY: ICD-10-CM

## 2018-09-18 DIAGNOSIS — M25.551 PAIN OF BOTH HIP JOINTS: ICD-10-CM

## 2018-09-18 PROCEDURE — 97140 MANUAL THERAPY 1/> REGIONS: CPT

## 2018-09-18 PROCEDURE — 97110 THERAPEUTIC EXERCISES: CPT

## 2018-09-18 NOTE — PROGRESS NOTES
"  Physical Therapy Daily Treatment Note     Name: Edi Marie  Clinic Number: [id number]    Therapy Diagnosis:   Encounter Diagnoses   Name Primary?    Stiffness of hip joint, unspecified laterality     Pain of both hip joints     Weakness of both lower extremities      Physician: Mariano Ortiz, *  Physician Orders: PT Eval and Treat   Medical Diagnosis from Referral: M16.0 (ICD-10-CM) - Primary osteoarthritis of both hips  Evaluation Date: 8/15/2018  Authorization Period Expiration: 12/31/2018  Plan of Care Expiration: 10/10/2018  Visit # / Visits authorized: 6/20     Time In: 0800  Time Out: 0900  Total Billable Time: 35 minutes     Precautions: Standard and Hx of DVT in L LE      Subjective     Pt reports: no c/o pain but does have some stiffness.  He was compliant with home exercise program.  Response to previous treatment: soreness  Functional change: n/a    Pain: 0/10  Location: bilateral Hips    Objective   9/11/18 FOTO intake: 36% limited    Edi received therapeutic exercises to develop strength, endurance, ROM, flexibility, posture and core stabilization for 45 minutes including:  Bike (seat height 8) L6.0 x 10 min   HSS on plinth 3 x 30 sec B  Prone quad stretch 3 x 30 sec B  Half kneeling hip flexor stretch 3 x 30" B  Half kneeling hip adductor stretch 3 x 30" B  Bridges on ball 3 x 10 5 sec hold w/ TA activation NP  SL clams OTB x 30 each NP  DKTC w/ SB x 30 NP  Standing hip abd/ext OTB 3 x 10 B   Side stepping OTB x 2 laps  Mini squats  3 x 10  SLS on blue oval foam w/ plyotoss x 30 B    Edi received the following manual therapy techniques: Manual traction were applied to the: Hips for 15 minutes, including:  Grades III and IV Lateral distraction B hips with belt  Long axis distraction B hips      Home Exercises Provided and Patient Education Provided     Education provided:   - Pt edu on proper exercise technique.    Written Home Exercises Provided: Patient instructed to cont prior " HEP.  Exercises were reviewed and Edi was able to demonstrate them prior to the end of the session.  Edi demonstrated good  understanding of the education provided.       Assessment   Pt tolerated treatment well today.  Hip mobility is improving.  However, stiffness continues to be present in the morning.  Continue working on strengthening and mobility exercises.    Edi is progressing well towards his goals.   Pt prognosis is Good.     Pt will continue to benefit from skilled outpatient physical therapy to address the deficits listed in the problem list box on initial evaluation, provide pt/family education and to maximize pt's level of independence in the home and community environment.     Pt's spiritual, cultural and educational needs considered and pt agreeable to plan of care and goals.    Anticipated barriers to physical therapy: None    Goals: Goals:  Short Term Goals: 4 weeks  1. Pt displays increase of hip PROM in flexion to 110 degrees bilaterally. Progressing, not met 8/21/18  2. Pt displays increase in hip PROM in IR to 10-15 degrees bilaterally. Progressing, not met 8/21/18  3. Pt will demonstrate 4/5 gross LE strength in order to gain strength required in order to start higher activity exercises. Progressing, not met 8/21/18  4.  Pt will report pain at worst </= 5/10. Progressing, not met 8/21/18     Long Term Goals: 8 weeks   1. Pt displays increase of hip PROM in flexion to 120 degrees bilaterally. Progressing, not met 8/21/18  2. Pt displays increase in hip PROM in IR to 20-25 degrees bilaterally. Progressing, not met 8/21/18  3. Pt will demonstrate 5/5 gross LE strength in order to return to gym routine without difficulty. Progressing, not met 8/21/18  4. Pt will report pain at worst </= 2/10. Progressing, not met 8/21/18  4.) Pt will demonstrate excellent recall of HEP in order to complete independently. Progressing, not met 8/21/18      Plan     Cont to progress towards goals set by PT.   Work to improve hip strength and mobility.    Alejandra Clark, PT, DPT, OCS

## 2018-09-27 ENCOUNTER — CLINICAL SUPPORT (OUTPATIENT)
Dept: REHABILITATION | Facility: HOSPITAL | Age: 53
End: 2018-09-27
Attending: FAMILY MEDICINE
Payer: COMMERCIAL

## 2018-09-27 DIAGNOSIS — M25.659 STIFFNESS OF HIP JOINT, UNSPECIFIED LATERALITY: ICD-10-CM

## 2018-09-27 DIAGNOSIS — M25.552 PAIN OF BOTH HIP JOINTS: ICD-10-CM

## 2018-09-27 DIAGNOSIS — M25.551 PAIN OF BOTH HIP JOINTS: ICD-10-CM

## 2018-09-27 DIAGNOSIS — R29.898 WEAKNESS OF BOTH LOWER EXTREMITIES: ICD-10-CM

## 2018-09-27 PROCEDURE — 97140 MANUAL THERAPY 1/> REGIONS: CPT

## 2018-09-27 PROCEDURE — 97110 THERAPEUTIC EXERCISES: CPT

## 2018-09-27 NOTE — PROGRESS NOTES
"  Physical Therapy Daily Treatment Note     Name: Edi Marie  Clinic Number: [id number]    Therapy Diagnosis:   Encounter Diagnoses   Name Primary?    Stiffness of hip joint, unspecified laterality     Pain of both hip joints     Weakness of both lower extremities      Physician: Mariano Ortiz, *  Physician Orders: PT Eval and Treat   Medical Diagnosis from Referral: M16.0 (ICD-10-CM) - Primary osteoarthritis of both hips  Evaluation Date: 8/15/2018  Authorization Period Expiration: 12/31/2018  Plan of Care Expiration: 10/10/2018  Visit # / Visits authorized: 7/20     Time In: 0755  Time Out: 0900  Total Billable Time: 45 minutes     Precautions: Standard and Hx of DVT in L LE      Subjective     Pt reports: no c/o pain but does c/o L quad soreness.  Will be out of town next week.  He was compliant with home exercise program.  Response to previous treatment: soreness  Functional change: n/a    Pain: 0/10  Location: bilateral Hips    Objective   9/11/18 FOTO intake: 36% limited    Edi received therapeutic exercises to develop strength, endurance, ROM, flexibility, posture and core stabilization for 45 minutes including:  Bike (seat height 8) L6.0 x 10 min   HSS on plinth 3 x 30 sec B  Prone quad stretch 3 x 30 sec B  Half kneeling hip flexor stretch 3 x 30" B  Half kneeling hip adductor stretch 3 x 30" B  Standing hip abd/ext GTB on blue oval foam 2 x 10 B   Side stepping GTB x 2 laps  Mini squats  3 x 10  Step downs 6" B 3 x 10  Static bkwds lunges 2 x 10 B  SLS on blue oval foam w/ plyotoss 3 x 30 B    Edi received the following manual therapy techniques: Manual traction were applied to the: Hips for 15 minutes, including:  Grades III and IV Lateral distraction B hips with belt  Long axis distraction B hips NP  PA joint mobs for anterior joint capsule B hip      Home Exercises Provided and Patient Education Provided     Education provided:   - Pt edu on proper exercise technique.  Advised pt to " stretch B quads everyday for improved mobility and decreased soreness.    Written Home Exercises Provided: Patient instructed to cont prior HEP.  Exercises were reviewed and Edi was able to demonstrate them prior to the end of the session.  Edi demonstrated good  understanding of the education provided.       Assessment   Pt tolerated treatment well today.  Pt rated his progress on GROC scale as a 3+ indicating about 50% improvement.  Initiated backward lunges and step downs today to further strengthen B LE.  Pt required VCs for proper technique throughout lunges and will need reminder cueing next visit.  Continue working on strengthening and mobility exercises.    Edi is progressing well towards his goals.   Pt prognosis is Good.     Pt will continue to benefit from skilled outpatient physical therapy to address the deficits listed in the problem list box on initial evaluation, provide pt/family education and to maximize pt's level of independence in the home and community environment.     Pt's spiritual, cultural and educational needs considered and pt agreeable to plan of care and goals.    Anticipated barriers to physical therapy: None    Goals: Goals:  Short Term Goals: 4 weeks  1. Pt displays increase of hip PROM in flexion to 110 degrees bilaterally. Progressing, not met 8/21/18  2. Pt displays increase in hip PROM in IR to 10-15 degrees bilaterally. Progressing, not met 8/21/18  3. Pt will demonstrate 4/5 gross LE strength in order to gain strength required in order to start higher activity exercises. Progressing, not met 8/21/18  4.  Pt will report pain at worst </= 5/10. Progressing, not met 8/21/18     Long Term Goals: 8 weeks   1. Pt displays increase of hip PROM in flexion to 120 degrees bilaterally. Progressing, not met 8/21/18  2. Pt displays increase in hip PROM in IR to 20-25 degrees bilaterally. Progressing, not met 8/21/18  3. Pt will demonstrate 5/5 gross LE strength in order to return  to gym routine without difficulty. Progressing, not met 8/21/18  4. Pt will report pain at worst </= 2/10. Progressing, not met 8/21/18  4.) Pt will demonstrate excellent recall of HEP in order to complete independently. Progressing, not met 8/21/18      Plan     Cont to progress towards goals set by PT.  Work to improve hip strength and mobility.    Alejandra Clark, PT, DPT, OCS

## 2018-10-16 ENCOUNTER — CLINICAL SUPPORT (OUTPATIENT)
Dept: REHABILITATION | Facility: HOSPITAL | Age: 53
End: 2018-10-16
Attending: FAMILY MEDICINE
Payer: COMMERCIAL

## 2018-10-16 DIAGNOSIS — M25.552 PAIN OF BOTH HIP JOINTS: ICD-10-CM

## 2018-10-16 DIAGNOSIS — M25.551 PAIN OF BOTH HIP JOINTS: ICD-10-CM

## 2018-10-16 DIAGNOSIS — M25.659 STIFFNESS OF HIP JOINT, UNSPECIFIED LATERALITY: ICD-10-CM

## 2018-10-16 DIAGNOSIS — R29.898 WEAKNESS OF BOTH LOWER EXTREMITIES: ICD-10-CM

## 2018-10-16 PROCEDURE — 97110 THERAPEUTIC EXERCISES: CPT

## 2018-10-16 NOTE — PLAN OF CARE
Physical Therapy Daily Treatment/Progress Note     Name: Edi Marie  Clinic Number: [id number]    Therapy Diagnosis:   Encounter Diagnoses   Name Primary?    Stiffness of hip joint, unspecified laterality     Pain of both hip joints     Weakness of both lower extremities      Physician: Mariano Ortiz, *  Physician Orders: PT Eval and Treat   Medical Diagnosis from Referral: M16.0 (ICD-10-CM) - Primary osteoarthritis of both hips  Evaluation Date: 8/15/2018  Authorization Period Expiration: 12/31/2018  UPDATED Plan of Care Expiration: 12/11/2018  Visit # / Visits authorized: 8/20     Time In: 0800  Time Out: 0900  Total Billable Time: 30 minutes     Precautions: Standard and Hx of DVT in L LE      Subjective     Pt reports: that his hips feel pretty good today.  Stretched them out real good on Sunday.  He was compliant with home exercise program.  Response to previous treatment: soreness  Functional change: n/a    Pain: 0/10  Location: bilateral Hips    Objective   MEASUREMENTS:     9/11/18 FOTO intake: 36% limited     Hip Range of Motion:    Right Passive Left Passive   Flexion 101 100   Ext. Rotation 39 37   Int. Rotation 15 10         Lower Extremity Strength  Right LE   Left LE     Quadriceps: 5/5 Quadriceps: 5/5   Hamstrings: 5/5 Hamstrings: 5/5   Iliopsoas: 4-/5 Iliopsoas: 4-/5   Hip extension:  4/5 Hip extension: 4/5   PGM: 4/5 PGM: 4-/5         Special Tests:   FABERs:  - but tight on the R  Hip Scour: + on the L     Flexibility:               Ely's test: R = + with quad  ; L = + with quad               Hamstrings: R = min tightness ; L = min tightness               Jong test: R = + worse than L ; L = +     Joint Mobility: Long axis distraction: hypomobile B     Palpation: no tenderness to palpation     Edema: none       TREATMENT:   Edi received therapeutic exercises to develop strength, endurance, ROM, flexibility, posture and core stabilization for 55 minutes including:  Bike (seat  "height 8) L6.0 x 10 min   HSS on plinth 3 x 30 sec B  Prone quad stretch 3 x 30 sec B  Half kneeling hip flexor stretch 3 x 30" B NP  Half kneeling hip adductor stretch 3 x 30" B NP  Bridges GTB hip abd simultaneous 3 x 10   SL clams GTB 30 x 3" B  Standing hip abd/ext GTB on blue oval foam 2 x 10 B   Side stepping GTB x 2 laps   Mini squats  3 x 10  Step downs 6" B 3 x 10  Static bkwds lunges 2 x 10 B  SLS on blue oval foam w/ plyotoss 3 x 30 B    Edi received the following manual therapy techniques: Manual traction were applied to the: Hips for 0 minutes, including:  Grades III and IV Lateral distraction B hips with belt  Long axis distraction B hips NP  PA joint mobs for anterior joint capsule B hip      Home Exercises Provided and Patient Education Provided     Education provided:   - Pt edu on proper exercise technique.      Written Home Exercises Provided: Patient instructed to cont prior HEP.  Exercises were reviewed and Edi was able to demonstrate them prior to the end of the session.  Edi demonstrated good  understanding of the education provided.       Assessment   Re-evaluation done today.  Pt has made good progress in physical therapy thus far.  He presents with improved ROM, improved LE strength, and min improvements in flexibility.  However, deficits remain.  Pt continues to present with tightness throughout B hips and weakness in B glut max/med.  Pt will continue to benefit from physical therapy, specifically glut/core strengthening, LE stretching, and joint mobs prn, to further improve his remaining impairments and functional limitations.      Edi is progressing well towards his goals.   Pt prognosis is Good.     Pt will continue to benefit from skilled outpatient physical therapy to address the deficits listed in the problem list box on initial evaluation, provide pt/family education and to maximize pt's level of independence in the home and community environment.     Pt's spiritual, " cultural and educational needs considered and pt agreeable to plan of care and goals.    Anticipated barriers to physical therapy: None    Goals: Goals:  Short Term Goals: 4 weeks  1. Pt displays increase of hip PROM in flexion to 110 degrees bilaterally. Ongoing 10/16/18  2. Pt displays increase in hip PROM in IR to 5-10 degrees bilaterally. Ongoing 10/16/18  3. Pt will demonstrate 4/5 gross LE strength in order to gain strength required in order to start higher activity exercises. Partially MET 10/16/18  4.  Pt will report pain at worst </= 5/10. MET 10/16/18     Long Term Goals: 8 weeks   1. Pt displays increase of hip PROM in flexion to 120 degrees bilaterally. Ongoing 10/16/18  2. Pt displays increase in hip PROM in IR to 15-20 degrees bilaterally. Ongoing 10/16/18  3. Pt will demonstrate 5/5 gross LE strength in order to return to gym routine without difficulty. Partially MET 10/16/18  4. Pt will report pain at worst </= 2/10. MET 10/16/18  4.) Pt will demonstrate excellent recall of HEP in order to complete independently. Ongoing 10/16/18      Plan   Certification Period: 10/16/18 to 12/11/18  Recommended Treatment Plan: 1 times per week for 8 weeks: Gait Training, Manual Therapy, Moist Heat/ Ice, Neuromuscular Re-ed, Patient Education, Therapeutic Activites and Therapeutic Exercise  Other Recommendations: modalities prn, ASTYM prn, kinesiotape prn, Functional Dry Needling prn       Therapist: Alejandra Clark, PT, DPT, OCS    I CERTIFY THE NEED FOR THESE SERVICES FURNISHED UNDER THIS PLAN OF TREATMENT AND WHILE UNDER MY CARE    Physician's comments: ________________________________________________________________________________________________________________________________________________      Physician's Name: ___________________________________

## 2018-10-16 NOTE — PROGRESS NOTES
Physical Therapy Daily Treatment/Progress Note     Name: Edi Marie  Clinic Number: [id number]    Therapy Diagnosis:   Encounter Diagnoses   Name Primary?    Stiffness of hip joint, unspecified laterality     Pain of both hip joints     Weakness of both lower extremities      Physician: Mariano Ortiz, *  Physician Orders: PT Eval and Treat   Medical Diagnosis from Referral: M16.0 (ICD-10-CM) - Primary osteoarthritis of both hips  Evaluation Date: 8/15/2018  Authorization Period Expiration: 12/31/2018  UPDATED Plan of Care Expiration: 12/11/2018  Visit # / Visits authorized: 8/20     Time In: 0800  Time Out: 0900  Total Billable Time: 30 minutes     Precautions: Standard and Hx of DVT in L LE      Subjective     Pt reports: that his hips feel pretty good today.  Stretched them out real good on Sunday.  He was compliant with home exercise program.  Response to previous treatment: soreness  Functional change: n/a    Pain: 0/10  Location: bilateral Hips    Objective   MEASUREMENTS:     9/11/18 FOTO intake: 36% limited     Hip Range of Motion:    Right Passive Left Passive   Flexion 101 100   Ext. Rotation 39 37   Int. Rotation 15 10         Lower Extremity Strength  Right LE   Left LE     Quadriceps: 5/5 Quadriceps: 5/5   Hamstrings: 5/5 Hamstrings: 5/5   Iliopsoas: 4-/5 Iliopsoas: 4-/5   Hip extension:  4/5 Hip extension: 4/5   PGM: 4/5 PGM: 4-/5         Special Tests:   FABERs:  - but tight on the R  Hip Scour: + on the L     Flexibility:               Ely's test: R = + with quad  ; L = + with quad               Hamstrings: R = min tightness ; L = min tightness               Jong test: R = + worse than L ; L = +     Joint Mobility: Long axis distraction: hypomobile B     Palpation: no tenderness to palpation     Edema: none       TREATMENT:   Edi received therapeutic exercises to develop strength, endurance, ROM, flexibility, posture and core stabilization for 55 minutes including:  Bike (seat  "height 8) L6.0 x 10 min   HSS on plinth 3 x 30 sec B  Prone quad stretch 3 x 30 sec B  Half kneeling hip flexor stretch 3 x 30" B NP  Half kneeling hip adductor stretch 3 x 30" B NP  Bridges GTB hip abd simultaneous 3 x 10   SL clams GTB 30 x 3" B  Standing hip abd/ext GTB on blue oval foam 2 x 10 B   Side stepping GTB x 2 laps   Mini squats  3 x 10  Step downs 6" B 3 x 10  Static bkwds lunges 2 x 10 B  SLS on blue oval foam w/ plyotoss 3 x 30 B    Edi received the following manual therapy techniques: Manual traction were applied to the: Hips for 0 minutes, including:  Grades III and IV Lateral distraction B hips with belt  Long axis distraction B hips NP  PA joint mobs for anterior joint capsule B hip      Home Exercises Provided and Patient Education Provided     Education provided:   - Pt edu on proper exercise technique.      Written Home Exercises Provided: Patient instructed to cont prior HEP.  Exercises were reviewed and Edi was able to demonstrate them prior to the end of the session.  Edi demonstrated good  understanding of the education provided.       Assessment   Re-evaluation done today.  Pt has made good progress in physical therapy thus far.  He presents with improved ROM, improved LE strength, and min improvements in flexibility.  However, deficits remain.  Pt continues to present with tightness throughout B hips and weakness in B glut max/med.  Pt will continue to benefit from physical therapy, specifically glut/core strengthening, LE stretching, and joint mobs prn, to further improve his remaining impairments and functional limitations.      Edi is progressing well towards his goals.   Pt prognosis is Good.     Pt will continue to benefit from skilled outpatient physical therapy to address the deficits listed in the problem list box on initial evaluation, provide pt/family education and to maximize pt's level of independence in the home and community environment.     Pt's spiritual, " cultural and educational needs considered and pt agreeable to plan of care and goals.    Anticipated barriers to physical therapy: None    Goals: Goals:  Short Term Goals: 4 weeks  1. Pt displays increase of hip PROM in flexion to 110 degrees bilaterally. Ongoing 10/16/18  2. Pt displays increase in hip PROM in IR to 5-10 degrees bilaterally. Ongoing 10/16/18  3. Pt will demonstrate 4/5 gross LE strength in order to gain strength required in order to start higher activity exercises. Partially MET 10/16/18  4.  Pt will report pain at worst </= 5/10. MET 10/16/18     Long Term Goals: 8 weeks   1. Pt displays increase of hip PROM in flexion to 120 degrees bilaterally. Ongoing 10/16/18  2. Pt displays increase in hip PROM in IR to 15-20 degrees bilaterally. Ongoing 10/16/18  3. Pt will demonstrate 5/5 gross LE strength in order to return to gym routine without difficulty. Partially MET 10/16/18  4. Pt will report pain at worst </= 2/10. MET 10/16/18  4.) Pt will demonstrate excellent recall of HEP in order to complete independently. Ongoing 10/16/18      Plan   Certification Period: 10/16/18 to 12/11/18  Recommended Treatment Plan: 1 times per week for 8 weeks: Gait Training, Manual Therapy, Moist Heat/ Ice, Neuromuscular Re-ed, Patient Education, Therapeutic Activites and Therapeutic Exercise  Other Recommendations: modalities prn, ASTYM prn, kinesiotape prn, Functional Dry Needling prn       Therapist: Alejandra Clark, PT, DPT, OCS    I CERTIFY THE NEED FOR THESE SERVICES FURNISHED UNDER THIS PLAN OF TREATMENT AND WHILE UNDER MY CARE    Physician's comments: ________________________________________________________________________________________________________________________________________________      Physician's Name: ___________________________________

## 2018-11-01 ENCOUNTER — CLINICAL SUPPORT (OUTPATIENT)
Dept: REHABILITATION | Facility: HOSPITAL | Age: 53
End: 2018-11-01
Attending: FAMILY MEDICINE
Payer: COMMERCIAL

## 2018-11-01 ENCOUNTER — OFFICE VISIT (OUTPATIENT)
Dept: SPORTS MEDICINE | Facility: CLINIC | Age: 53
End: 2018-11-01
Payer: COMMERCIAL

## 2018-11-01 ENCOUNTER — HOSPITAL ENCOUNTER (OUTPATIENT)
Dept: RADIOLOGY | Facility: HOSPITAL | Age: 53
Discharge: HOME | End: 2018-11-01
Attending: FAMILY MEDICINE
Payer: COMMERCIAL

## 2018-11-01 VITALS — TEMPERATURE: 98 F | BODY MASS INDEX: 26.18 KG/M2 | WEIGHT: 187 LBS | HEIGHT: 71 IN

## 2018-11-01 DIAGNOSIS — M25.551 PAIN OF BOTH HIP JOINTS: ICD-10-CM

## 2018-11-01 DIAGNOSIS — R53.81 PHYSICAL DECONDITIONING: ICD-10-CM

## 2018-11-01 DIAGNOSIS — R29.898 WEAKNESS OF BOTH LOWER EXTREMITIES: ICD-10-CM

## 2018-11-01 DIAGNOSIS — M16.0 BILATERAL PRIMARY OSTEOARTHRITIS OF HIP: ICD-10-CM

## 2018-11-01 DIAGNOSIS — M25.659 STIFFNESS OF HIP JOINT, UNSPECIFIED LATERALITY: ICD-10-CM

## 2018-11-01 DIAGNOSIS — M70.62 GREATER TROCHANTERIC BURSITIS OF BOTH HIPS: Primary | ICD-10-CM

## 2018-11-01 DIAGNOSIS — M25.552 PAIN OF BOTH HIP JOINTS: ICD-10-CM

## 2018-11-01 DIAGNOSIS — M70.61 GREATER TROCHANTERIC BURSITIS OF BOTH HIPS: Primary | ICD-10-CM

## 2018-11-01 PROCEDURE — 73521 X-RAY EXAM HIPS BI 2 VIEWS: CPT | Mod: TC,FY,PO

## 2018-11-01 PROCEDURE — 99214 OFFICE O/P EST MOD 30 MIN: CPT | Mod: 25,S$GLB,, | Performed by: FAMILY MEDICINE

## 2018-11-01 PROCEDURE — 97110 THERAPEUTIC EXERCISES: CPT

## 2018-11-01 PROCEDURE — 99999 PR PBB SHADOW E&M-EST. PATIENT-LVL III: CPT | Mod: PBBFAC,,, | Performed by: FAMILY MEDICINE

## 2018-11-01 PROCEDURE — 3008F BODY MASS INDEX DOCD: CPT | Mod: CPTII,S$GLB,, | Performed by: FAMILY MEDICINE

## 2018-11-01 PROCEDURE — 20611 DRAIN/INJ JOINT/BURSA W/US: CPT | Mod: 50,S$GLB,, | Performed by: FAMILY MEDICINE

## 2018-11-01 PROCEDURE — 73521 X-RAY EXAM HIPS BI 2 VIEWS: CPT | Mod: 26,,, | Performed by: RADIOLOGY

## 2018-11-01 RX ORDER — TRIAMCINOLONE ACETONIDE 40 MG/ML
40 INJECTION, SUSPENSION INTRA-ARTICULAR; INTRAMUSCULAR
Status: DISCONTINUED | OUTPATIENT
Start: 2018-11-01 | End: 2018-11-01 | Stop reason: HOSPADM

## 2018-11-01 RX ADMIN — TRIAMCINOLONE ACETONIDE 40 MG: 40 INJECTION, SUSPENSION INTRA-ARTICULAR; INTRAMUSCULAR at 09:11

## 2018-11-01 NOTE — PROCEDURES
"Large Joint Aspiration/Injection: R greater trochanteric bursa, L greater trochanteric bursa  Date/Time: 11/1/2018 9:25 AM  Performed by: Mariano Ortiz MD  Authorized by: Mariano Ortiz MD     Consent Done?:  Yes (Verbal)  Indications:  Pain  Procedure site marked: Yes    Timeout: Prior to procedure the correct patient, procedure, and site was verified      Location:  Hip  Site:  R greater trochanteric bursa and L greater trochanteric bursa  Prep: Patient was prepped and draped in usual sterile fashion    Ultrasonic Guidance for needle placement: Yes  Images are saved and documented.  Needle size:  22 G  Approach:  Lateral  Medications:  40 mg triamcinolone acetonide 40 mg/mL; 40 mg triamcinolone acetonide 40 mg/mL  Patient tolerance:  Patient tolerated the procedure well with no immediate complications    Additional Comments: Description of ultrasound utilization for needle guidance:   Ultrasound guidance used for needle localization. Images saved and stored for documentation. The greater trochanter and its bursa were visualized. Dynamic visualization of the 22g x 1.5" needle was continuous throughout the procedure.      "

## 2018-11-01 NOTE — PROGRESS NOTES
"  Physical Therapy Daily Treatment/Progress Note     Name: Edi Marie  Clinic Number: [id number]    Therapy Diagnosis:   Encounter Diagnoses   Name Primary?    Stiffness of hip joint, unspecified laterality     Pain of both hip joints     Weakness of both lower extremities      Physician: Maraino Ortiz, *  Physician Orders: PT Eval and Treat   Medical Diagnosis from Referral: M16.0 (ICD-10-CM) - Primary osteoarthritis of both hips  Evaluation Date: 8/15/2018  Authorization Period Expiration: 12/31/2018  UPDATED Plan of Care Expiration: 12/11/2018  Visit # / Visits authorized: 9/20     Time In: 0930  Time Out: 1030  Total Billable Time: 45 minutes     Precautions: Standard and Hx of DVT in L LE      Subjective     Pt reports: that he went to [employer] last week and his hips were hurting pretty badly at the end of every day. Just had appt. with Dr. Ortiz and got injections in each hip and is already feeling better because of them.   He was compliant with home exercise program.  Response to previous treatment: soreness  Functional change: n/a    Pain: 1/10  Location: bilateral Hips    Objective   MEASUREMENTS:     9/11/18 FOTO intake: 36% limited     TREATMENT:   Edi received therapeutic exercises to develop strength, endurance, ROM, flexibility, posture and core stabilization for 55 minutes including:  Bike (seat height 8) L6.0 x 10 min   HSS on plinth 3 x 30 sec B NP  Prone quad stretch 3 x 30 sec B  Half kneeling hip flexor stretch 3 x 30" B NP  Half kneeling hip adductor stretch 3 x 30" B NP  Supine SLR 3 x 15 B  Sidelying hip abduction 3 x 15 B  Prone hip extension 3 x 15 B  Bridges GTB hip abd simultaneous 3 x 10   SL clams GTB 30 x 3" B  Standing hip abd/ext GTB on blue oval foam 3 x 10 B   Side stepping GTB x 2 laps   Mini squats  3 x 10 NP  Step downs 6" B 3 x 10  Static bkwds lunges x 10 B  SL RDL on foam 2 x 10 B  SLS on blue oval foam w/ plyotoss 3 x 30 B NP    Edi received " the following manual therapy techniques: Manual traction were applied to the: Hips for 0 minutes, including:  Grades III and IV Lateral distraction B hips with belt NP  Long axis distraction B hips NP  PA joint mobs for anterior joint capsule B hip      Home Exercises Provided and Patient Education Provided     Education provided:   - Pt edu on proper exercise technique.      Written Home Exercises Provided: Patient instructed to cont prior HEP.  Exercises were reviewed and Edi was able to demonstrate them prior to the end of the session.  Edi demonstrated good  understanding of the education provided.       Assessment   Pt tolerated exercises well today. Began transitioning treatment more towards strengthening of B hips and core. Pt with mild extensor lag of B knees during SLR but was able to correct with vc's. Pt was able to perform all exercises without c/o pain. Pt did demo some muscle weakness of B glute meds as seen by difficulty to hold sidelying clam. Pt was re-given HEP from last visit and educated on the importance of doing HEP.    Edi is progressing well towards his goals.   Pt prognosis is Good.     Pt will continue to benefit from skilled outpatient physical therapy to address the deficits listed in the problem list box on initial evaluation, provide pt/family education and to maximize pt's level of independence in the home and community environment.     Pt's spiritual, cultural and educational needs considered and pt agreeable to plan of care and goals.    Anticipated barriers to physical therapy: None    Goals: Goals:  Short Term Goals: 4 weeks  1. Pt displays increase of hip PROM in flexion to 110 degrees bilaterally. Ongoing 10/16/18  2. Pt displays increase in hip PROM in IR to 5-10 degrees bilaterally. Ongoing 10/16/18  3. Pt will demonstrate 4/5 gross LE strength in order to gain strength required in order to start higher activity exercises. Partially MET 10/16/18  4.  Pt will report  pain at worst </= 5/10. MET 10/16/18     Long Term Goals: 8 weeks   1. Pt displays increase of hip PROM in flexion to 120 degrees bilaterally. Ongoing 10/16/18  2. Pt displays increase in hip PROM in IR to 15-20 degrees bilaterally. Ongoing 10/16/18  3. Pt will demonstrate 5/5 gross LE strength in order to return to gym routine without difficulty. Partially MET 10/16/18  4. Pt will report pain at worst </= 2/10. MET 10/16/18  4.) Pt will demonstrate excellent recall of HEP in order to complete independently. Ongoing 10/16/18      Plan     Continue previously established POC focusing on B hip flexibility and strengthening.    [place] Longoria, SPT    I certify that I was present in the room directing the student in service delivery and guiding them using my skilled judgment. As the co-signing therapist I have reviewed the students documentation and am responsible for the treatment, assessment, and plan.      Alejandra Gudino, PT, DPT

## 2018-11-01 NOTE — PROGRESS NOTES
Edi Marie, a 53 y.o. male, is here for evaluation of RIGHT and LEFT hip.  Patient returns form [employer] [facility] [employer] walking a lot last week.  Patient reports he could tell that his hip was hurting by the end of the day.      HISTORY OF PRESENT ILLNESS   Location: anterior thigh, bilateral, L > R   Onset: insidious  Palliative:    Relative rest   Oral analgesics   CSI, GTB, 09/20/17, 70%   HgPT, noncompliant    fPT, Needs new pt referral    L/R CSI, GTB 11/06/17 80% Improvement   L/R CSI, GTB 18.07.30 60% improvement     Provocative:   ADLs   Prior:    PMH DVT 11/17/18 L. LE  Progression: Plateaud discomfort   Quality:    Ache, worse in evening    Sharp at times   Radiation: none  Severity: per nursing documentation  Timing: intermittent with use  Trauma: none      Review of systems (ROS):  A 10+ review of systems was performed with pertinent positives and negatives noted above in the history of present illness. Other systems were negative unless otherwise specified.    PHYSICAL EXAMINATION  General:  The patient is alert and oriented x 3.  Mood is pleasant.  Observation of ears, eyes and nose reveal no gross abnormalities.  HEENT: NCAT, sclera nonicteric  Lungs: Respirations are equal and unlabored.   Gait is coordinated. Patient can toe walk and heel walk without difficulty.    HIP/PELVIS EXAMINATION    Observation/Inspection  Gait:   Nonantalgic   Alignment:  Neutral   Scars:   None   Muscle atrophy: None   Effusion:  None   Warmth:  None   Discoloration:   None   Leg lengths:   Equal   Pelvis:    Level     Tenderness/Crepitus (T/C):      T / C  Trochanteric bursa   - / -  Piriformis    - / -  SI joint    - / -  Psoas tendon   - / -  Rectus insertion  - / -  Adductor insertion  - / -  Pubic symphysis  - / -    ROM: (* = pain)    Flexion:      90 degrees*  External rotation:   40 degrees*  Internal rotation with axial load:  30 degrees*  Internal rotation without axial load:  40 degrees*  Abduction:    45  degrees  Adduction:     20 degrees    Special Tests:  Pain w/ forced internal rotation (FADIR):  +   Pain w/ forced external rotation (WES):  +  Circumduction test:     -  Stinchfield test:     +  Log roll:       +   Snapping hip (internal):    -   Sit-up pain:      -   Resisted sit-up pain:     -   Resisted sit-up with adductor contraction pain:  -   Step-down test:     +  Trendelenburg test:     -  Bridge test      +     Extremity Neuro-vascular Examination:   Sensation:  Grossly intact to light touch all dermatomal regions.   Motor Function:  Fully intact motor function at hip, knee, foot and ankle    DTRs;  quadriceps and  achilles 2+.  No clonus and downgoing Babinski.    Vascular status:  DP and PT pulses 2+, brisk capillary refill, symmetric.    Skin:  intact, compartments soft.    Other Findings:    ASSESSMENT & PLAN  Assessment:   #1 Tonnis Grade III osteoarthritis of hip, bilateral   W/ greater trochanteric bursitis  #2 s/p DVT, lower extremity, left    No evidence of neurologic pathology  No evidence of vascular pathology    Imaging studies reviewed:   X-ray pelvis and hip, bilateral 18.10    Plan:    We discussed the importance of appropriate diet, weight, and regular exercise including quadriceps strengthening     We discussed options including:  #1 watchful waiting  #2 continue physical therapy aimed at:   Core stability   RoM hip   Strengthening quadriceps   Gait training   #3 injection therapy:   CSI GTB    Right, effective 70%, repeat     Left, effective 70%, repeat    CSI iaHip    Right,     Left,    Orthobiologics   #4 consultation re: THAs     The patient chooses #2 and #3 csi gtb bilat    Pain management: handout given  Bracing:   Physical therapy:    hgPT, handout given, prior as above    fPT, @ [facility], continue as above   Activity (e.g. 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

## 2018-11-06 ENCOUNTER — CLINICAL SUPPORT (OUTPATIENT)
Dept: REHABILITATION | Facility: HOSPITAL | Age: 53
End: 2018-11-06
Attending: FAMILY MEDICINE
Payer: COMMERCIAL

## 2018-11-06 DIAGNOSIS — M25.659 STIFFNESS OF HIP JOINT, UNSPECIFIED LATERALITY: ICD-10-CM

## 2018-11-06 DIAGNOSIS — M25.551 PAIN OF BOTH HIP JOINTS: ICD-10-CM

## 2018-11-06 DIAGNOSIS — M25.552 PAIN OF BOTH HIP JOINTS: ICD-10-CM

## 2018-11-06 DIAGNOSIS — R29.898 WEAKNESS OF BOTH LOWER EXTREMITIES: ICD-10-CM

## 2018-11-06 PROCEDURE — 97110 THERAPEUTIC EXERCISES: CPT

## 2018-11-06 NOTE — PROGRESS NOTES
"  Physical Therapy Daily Treatment/Progress Note     Name: Edi Marie  Clinic Number: [id number]    Therapy Diagnosis:   Encounter Diagnoses   Name Primary?    Stiffness of hip joint, unspecified laterality     Pain of both hip joints     Weakness of both lower extremities      Physician: Mariano Ortiz, *  Physician Orders: PT Eval and Treat   Medical Diagnosis from Referral: M16.0 (ICD-10-CM) - Primary osteoarthritis of both hips  Evaluation Date: 8/15/2018  Authorization Period Expiration: 12/31/2018  UPDATED Plan of Care Expiration: 12/11/2018  Visit # / Visits authorized: 10/20     Time In: 0900  Time Out: 1000  Total Billable Time: 55 minutes     Precautions: Standard and Hx of DVT in L LE      Subjective     Pt reports: that his hips are feeling great and he is having no pain or tightness since the injections  He was compliant with home exercise program.  Response to previous treatment: soreness  Functional change: n/a    Pain: 0/10  Location: bilateral Hips    Objective   MEASUREMENTS:     9/11/18 FOTO intake: 36% limited  11/6/18 FOTO intake: 36% limited     TREATMENT:   Edi received therapeutic exercises to develop strength, endurance, ROM, flexibility, posture and core stabilization for 55 minutes including:  Bike (seat height 8) L6.0 x 10 min   HSS on plinth 3 x 30 sec B NP  Prone quad stretch on half bolster 3 x 30 sec B  Half kneeling hip flexor stretch 3 x 30" B NP  Half kneeling hip adductor stretch 3 x 30" B NP  Supine SLR 3 x 15 B  Sidelying hip abduction 3 x 15 B  Prone hip extension 3 x 15 B  Bridges GTB hip abd simultaneous 3 x 10   SL clams GTB 30 x 3" B  Bridge on SB 30 x 3"  Bird Dog w/ 1# wand 2 x 10  Standing hip abd/ext GTB on dynadisc 3 x 10 B   Side stepping GTB x 2 laps   Mini squats on BOSU 2 x 10   Step downs 6" B 3 x 10  Static bkwds lunges x 10 B  SL RDL on foam 2 x 10 B  SLS on blue oval foam w/ plyotoss 3 x 30 B NP    Edi received the following manual therapy " techniques: Manual traction were applied to the: Hips for 0 minutes, including:  Grades III and IV Lateral distraction B hips with belt NP  Long axis distraction B hips NP  PA joint mobs for anterior joint capsule B hip      Home Exercises Provided and Patient Education Provided     Education provided:   - Pt edu on proper exercise technique.      Written Home Exercises Provided: Patient instructed to cont prior HEP.  Exercises were reviewed and Edi was able to demonstrate them prior to the end of the session.  Edi demonstrated good  understanding of the education provided.       Assessment   Pt tolerated exercises well today. Pt tolerated progression of exercises well with no c/o hip pain throughout. Pt demo'ed difficulty with higher level core activities such as bird dogs and had difficulty maintaining core/hip activation with extremity movement as seen by hip drop each time the leg moved. However, hips remained more level once the wand was added for tactile cueing.  Pt did have difficulty with higher level balance activities as well and had difficulty balancing on the bosu while performing squats.     Edi is progressing well towards his goals.   Pt prognosis is Good.     Pt will continue to benefit from skilled outpatient physical therapy to address the deficits listed in the problem list box on initial evaluation, provide pt/family education and to maximize pt's level of independence in the home and community environment.     Pt's spiritual, cultural and educational needs considered and pt agreeable to plan of care and goals.    Anticipated barriers to physical therapy: None    Goals: Goals:  Short Term Goals: 4 weeks  1. Pt displays increase of hip PROM in flexion to 110 degrees bilaterally. Ongoing 10/16/18  2. Pt displays increase in hip PROM in IR to 5-10 degrees bilaterally. Ongoing 10/16/18  3. Pt will demonstrate 4/5 gross LE strength in order to gain strength required in order to start higher  activity exercises. Partially MET 10/16/18  4.  Pt will report pain at worst </= 5/10. MET 10/16/18     Long Term Goals: 8 weeks   1. Pt displays increase of hip PROM in flexion to 120 degrees bilaterally. Ongoing 10/16/18  2. Pt displays increase in hip PROM in IR to 15-20 degrees bilaterally. Ongoing 10/16/18  3. Pt will demonstrate 5/5 gross LE strength in order to return to gym routine without difficulty. Partially MET 10/16/18  4. Pt will report pain at worst </= 2/10. MET 10/16/18  4.) Pt will demonstrate excellent recall of HEP in order to complete independently. Ongoing 10/16/18      Plan     Continue previously established POC focusing on B hip flexibility and strengthening.    Florida Longoria, SPT    I certify that I was present in the room directing the student in service delivery and guiding them using my skilled judgment. As the co-signing therapist I have reviewed the students documentation and am responsible for the treatment, assessment, and plan.      Alejandra Clark, PT, DPT, OCS

## 2018-11-13 ENCOUNTER — CLINICAL SUPPORT (OUTPATIENT)
Dept: REHABILITATION | Facility: HOSPITAL | Age: 53
End: 2018-11-13
Attending: FAMILY MEDICINE
Payer: COMMERCIAL

## 2018-11-13 DIAGNOSIS — R29.898 WEAKNESS OF BOTH LOWER EXTREMITIES: ICD-10-CM

## 2018-11-13 DIAGNOSIS — M25.659 STIFFNESS OF HIP JOINT, UNSPECIFIED LATERALITY: ICD-10-CM

## 2018-11-13 DIAGNOSIS — M25.551 PAIN OF BOTH HIP JOINTS: ICD-10-CM

## 2018-11-13 DIAGNOSIS — M25.552 PAIN OF BOTH HIP JOINTS: ICD-10-CM

## 2018-11-13 PROCEDURE — 97110 THERAPEUTIC EXERCISES: CPT

## 2018-11-13 NOTE — PROGRESS NOTES
"  Physical Therapy Daily Treatment/Progress Note     Name: Edi Marie  Clinic Number: [id number]    Therapy Diagnosis:   Encounter Diagnoses   Name Primary?    Stiffness of hip joint, unspecified laterality     Pain of both hip joints     Weakness of both lower extremities      Physician: Mariano Ortiz, *  Physician Orders: PT Eval and Treat   Medical Diagnosis from Referral: M16.0 (ICD-10-CM) - Primary osteoarthritis of both hips  Evaluation Date: 8/15/2018  Authorization Period Expiration: 12/31/2018  UPDATED Plan of Care Expiration: 12/11/2018  Visit # / Visits authorized: 11/20     Time In: 0800  Time Out: 0900  Total Billable Time: 30 minutes     Precautions: Standard and Hx of DVT in L LE      Subjective     Pt reports: that he is doing good.  He was compliant with home exercise program.  Response to previous treatment: soreness  Functional change: n/a    Pain: 0/10  Location: bilateral Hips    Objective   MEASUREMENTS:     9/11/18 FOTO intake: 36% limited  11/6/18 FOTO intake: 36% limited     TREATMENT:   Edi received therapeutic exercises to develop strength, endurance, ROM, flexibility, posture and core stabilization for 55 minutes including:  Elliptical x 10 min   HSS on plinth 3 x 30 sec B   Prone quad stretch on half bolster 3 x 30 sec B  SL clams GTB 30 x 3"   Bridge on SB 30 x 3"   Bird Dog w/ 1# wand 2 x 10 NP  Standing hip flex/abd/ext GTB on dynadisc 2 x 15 B   Step ups w/ knee drive 12" box 2 x 10 B  Side stepping GTB x 2 laps   Mini squats on BOSU 2 x 10 NP  TrX SL squats - NEXT  Fwd walking lunges x 1 lap  Lateral lunges (static) x 10 B  SL RDL on blue oval foam 2 x 10 B  SLS on airex w/ plyotoss 3 x 20 B   Shuttle 125# DLE 3 x 10   Sidelying shuttle 62.5# R/L 3 x 10 each    Edi received the following manual therapy techniques: Manual traction were applied to the: Hips for 0 minutes, including:  Grades III and IV Lateral distraction B hips with belt NP  Long axis distraction B " hips NP  PA joint mobs for anterior joint capsule B hip      Home Exercises Provided and Patient Education Provided     Education provided:   - Pt edu on proper exercise technique.      Written Home Exercises Provided: Patient instructed to cont prior HEP.  Exercises were reviewed and Edi was able to demonstrate them prior to the end of the session.  Edi demonstrated good  understanding of the education provided.       Assessment   Pt tolerated exercises well today with progression of exercises.  Most limited with balance, secondary to decreased B ankle and hip stability, throughout most of the exercises on the turf.  No c/o hip pain throughout, just c/o instability.  Improved coordination with increased repetitions.  Continue to progress as tolerated.      Edi is progressing well towards his goals.   Pt prognosis is Good.     Pt will continue to benefit from skilled outpatient physical therapy to address the deficits listed in the problem list box on initial evaluation, provide pt/family education and to maximize pt's level of independence in the home and community environment.     Pt's spiritual, cultural and educational needs considered and pt agreeable to plan of care and goals.    Anticipated barriers to physical therapy: None    Goals: Goals:  Short Term Goals: 4 weeks  1. Pt displays increase of hip PROM in flexion to 110 degrees bilaterally. Ongoing 10/16/18  2. Pt displays increase in hip PROM in IR to 5-10 degrees bilaterally. Ongoing 10/16/18  3. Pt will demonstrate 4/5 gross LE strength in order to gain strength required in order to start higher activity exercises. Partially MET 10/16/18  4.  Pt will report pain at worst </= 5/10. MET 10/16/18     Long Term Goals: 8 weeks   1. Pt displays increase of hip PROM in flexion to 120 degrees bilaterally. Ongoing 10/16/18  2. Pt displays increase in hip PROM in IR to 15-20 degrees bilaterally. Ongoing 10/16/18  3. Pt will demonstrate 5/5 gross LE  strength in order to return to gym routine without difficulty. Partially MET 10/16/18  4. Pt will report pain at worst </= 2/10. MET 10/16/18  4.) Pt will demonstrate excellent recall of HEP in order to complete independently. Ongoing 10/16/18      Plan     Continue previously established POC focusing on B hip flexibility and strengthening.    Alejandra Clark, PT, DPT, OCS

## 2018-11-20 ENCOUNTER — CLINICAL SUPPORT (OUTPATIENT)
Dept: REHABILITATION | Facility: HOSPITAL | Age: 53
End: 2018-11-20
Attending: FAMILY MEDICINE
Payer: COMMERCIAL

## 2018-11-20 DIAGNOSIS — M25.659 STIFFNESS OF HIP JOINT, UNSPECIFIED LATERALITY: ICD-10-CM

## 2018-11-20 DIAGNOSIS — M25.552 PAIN OF BOTH HIP JOINTS: ICD-10-CM

## 2018-11-20 DIAGNOSIS — M25.551 PAIN OF BOTH HIP JOINTS: ICD-10-CM

## 2018-11-20 DIAGNOSIS — R29.898 WEAKNESS OF BOTH LOWER EXTREMITIES: ICD-10-CM

## 2018-11-20 PROCEDURE — 97110 THERAPEUTIC EXERCISES: CPT

## 2018-11-20 NOTE — PROGRESS NOTES
"  Physical Therapy Daily Treatment/Progress Note     Name: Edi Marie  Clinic Number: [id number]    Therapy Diagnosis:   Encounter Diagnoses   Name Primary?    Stiffness of hip joint, unspecified laterality     Pain of both hip joints     Weakness of both lower extremities      Physician: Mariano Ortiz, *  Physician Orders: PT Eval and Treat   Medical Diagnosis from Referral: M16.0 (ICD-10-CM) - Primary osteoarthritis of both hips  Evaluation Date: 8/15/2018  Authorization Period Expiration: 12/31/2018  UPDATED Plan of Care Expiration: 12/11/2018  Visit # / Visits authorized: 12/20     Time In: 0800  Time Out: 0900  Total Billable Time: 45 minutes     Precautions: Standard and Hx of DVT in L LE      Subjective     Pt reports: that he is feeling good and his hips don't feel tight today.  He was compliant with home exercise program.  Response to previous treatment: soreness  Functional change: n/a    Pain: 0/10  Location: bilateral Hips    Objective   MEASUREMENTS:     9/11/18 FOTO intake: 36% limited  11/6/18 FOTO intake: 36% limited     TREATMENT:   Edi received therapeutic exercises to develop strength, endurance, ROM, flexibility, posture and core stabilization for 55 minutes including:  Elliptical x 10 min   HSS on plinth 3 x 30 sec B   Prone quad stretch on half bolster 3 x 30 sec B  SL clams GTB 30 x 3"   TrX SL squats 2 x 10 B  biodex catch game SLS x 1 min static/1 min level 12 B  Side stepping GTB x 2 laps   Monster walks (fwd/bwd) GTB x 2 laps   SLS on airex w/ plyotoss 3 way 3 x 20 B   Sidelying shuttle 62.5# R/L 3 x 10 each  Wall sits 3 x 30"  Bridge with shoulders on SB 30 x 3"     -not performed today-  SL RDL on blue oval foam 2 x 10 B  Shuttle 125# DLE 3 x 10   Fwd walking lunges x 1 lap  Lateral lunges (static) x 10 B  Bird Dog w/ 1# wand 2 x 10 NP  Standing hip flex/abd/ext GTB on dynadisc 2 x 15 B NP  Step ups w/ knee drive 12" box 2 x 10 B NP  Mini squats on BOSU 2 x 10 " NP    Edi received the following manual therapy techniques: Manual traction were applied to the: Hips for 0 minutes, including:  Grades III and IV Lateral distraction B hips with belt NP  Long axis distraction B hips NP  PA joint mobs for anterior joint capsule B hip      Home Exercises Provided and Patient Education Provided     Education provided:   - Pt edu on proper exercise technique.      Written Home Exercises Provided: Patient instructed to cont prior HEP.  Exercises were reviewed and Edi was able to demonstrate them prior to the end of the session.  Edi demonstrated good  understanding of the education provided.       Assessment   Pt tolerated exercises well today with addition of new exercises as well as exercise progression. Pt's balance was challenged today and pt showed more difficulty with lateral balance challenges as compared to anterior/posterior challenges. Pt req'd some verbal cues to correct valgus during sidelying shuttle as well as wall sit. Continue to progress pt with balance and strength exercises as tolerated.      Edi is progressing well towards his goals.   Pt prognosis is Good.     Pt will continue to benefit from skilled outpatient physical therapy to address the deficits listed in the problem list box on initial evaluation, provide pt/family education and to maximize pt's level of independence in the home and community environment.     Pt's spiritual, cultural and educational needs considered and pt agreeable to plan of care and goals.    Anticipated barriers to physical therapy: None    Goals: Goals:  Short Term Goals: 4 weeks  1. Pt displays increase of hip PROM in flexion to 110 degrees bilaterally. Ongoing 10/16/18  2. Pt displays increase in hip PROM in IR to 5-10 degrees bilaterally. Ongoing 10/16/18  3. Pt will demonstrate 4/5 gross LE strength in order to gain strength required in order to start higher activity exercises. Partially MET 10/16/18  4.  Pt will report  pain at worst </= 5/10. MET 10/16/18     Long Term Goals: 8 weeks   1. Pt displays increase of hip PROM in flexion to 120 degrees bilaterally. Ongoing 10/16/18  2. Pt displays increase in hip PROM in IR to 15-20 degrees bilaterally. Ongoing 10/16/18  3. Pt will demonstrate 5/5 gross LE strength in order to return to gym routine without difficulty. Partially MET 10/16/18  4. Pt will report pain at worst </= 2/10. MET 10/16/18  4.) Pt will demonstrate excellent recall of HEP in order to complete independently. Ongoing 10/16/18      Plan     Continue previously established POC focusing on B hip flexibility and strengthening.    Florida Longoria, SPT    I certify that I was present in the room directing the student in service delivery and guiding them using my skilled judgment. As the co-signing therapist I have reviewed the students documentation and am responsible for the treatment, assessment, and plan.        Alejandra Clark, PT, DPT, OCS

## 2018-11-28 ENCOUNTER — CLINICAL SUPPORT (OUTPATIENT)
Dept: REHABILITATION | Facility: HOSPITAL | Age: 53
End: 2018-11-28
Attending: FAMILY MEDICINE
Payer: COMMERCIAL

## 2018-11-28 DIAGNOSIS — M25.659 STIFFNESS OF HIP JOINT, UNSPECIFIED LATERALITY: ICD-10-CM

## 2018-11-28 DIAGNOSIS — M25.552 PAIN OF BOTH HIP JOINTS: ICD-10-CM

## 2018-11-28 DIAGNOSIS — R29.898 WEAKNESS OF BOTH LOWER EXTREMITIES: ICD-10-CM

## 2018-11-28 DIAGNOSIS — M25.551 PAIN OF BOTH HIP JOINTS: ICD-10-CM

## 2018-11-28 PROCEDURE — 97110 THERAPEUTIC EXERCISES: CPT

## 2018-12-05 ENCOUNTER — CLINICAL SUPPORT (OUTPATIENT)
Dept: REHABILITATION | Facility: HOSPITAL | Age: 53
End: 2018-12-05
Attending: FAMILY MEDICINE
Payer: COMMERCIAL

## 2018-12-05 DIAGNOSIS — M25.551 PAIN OF BOTH HIP JOINTS: ICD-10-CM

## 2018-12-05 DIAGNOSIS — M25.552 PAIN OF BOTH HIP JOINTS: ICD-10-CM

## 2018-12-05 DIAGNOSIS — M25.659 STIFFNESS OF HIP JOINT, UNSPECIFIED LATERALITY: ICD-10-CM

## 2018-12-05 DIAGNOSIS — R29.898 WEAKNESS OF BOTH LOWER EXTREMITIES: ICD-10-CM

## 2018-12-05 PROCEDURE — 97110 THERAPEUTIC EXERCISES: CPT

## 2018-12-05 NOTE — PROGRESS NOTES
"  Physical Therapy Daily Treatment/Progress Note     Name: Edi Marie  Clinic Number: [id number]    Therapy Diagnosis:   Encounter Diagnoses   Name Primary?    Stiffness of hip joint, unspecified laterality     Pain of both hip joints     Weakness of both lower extremities      Physician: Mariano Ortiz, *  Physician Orders: PT Eval and Treat   Medical Diagnosis from Referral: M16.0 (ICD-10-CM) - Primary osteoarthritis of both hips  Evaluation Date: 8/15/2018  Authorization Period Expiration: 12/31/2018  UPDATED Plan of Care Expiration: 12/11/2018  Visit # / Visits authorized: 14/20     Time In: 0800  Time Out: 0855  Total Billable Time: 45 minutes     Precautions: Standard and Hx of DVT in L LE      Subjective     Pt reports: that he is feeling good today.  Was really sore after last visit.  He was compliant with home exercise program.  Response to previous treatment: soreness  Functional change: n/a    Pain: 0/10  Location: bilateral Hips    Objective   MEASUREMENTS:     9/11/18 FOTO intake: 36% limited  11/6/18 FOTO intake: 36% limited     TREATMENT:   Edi received therapeutic exercises to develop strength, endurance, ROM, flexibility, posture and core stabilization for 55 minutes including:  Elliptical x 10 min   HSS on plinth 3 x 30 sec B   Prone quad stretch on half bolster 3 x 30 sec B  TrX curtsey lunges 2 x fatigue B  TrX lateral lunges 2 x 10 B    Side stepping BTB x 2 laps    Monster walks (fwd/bwd) BTB x 2 laps   Captain moshe 3 x 20"   SLS on airex w/ plyotoss 3 way x 30 each B   Bridge with shoulders on SB 30 x 3"            -not performed today-  SL clams GTB 30 x 3"   Sidelying shuttle 50# R/L 3 x 10 each  biodex catch game SLS x 1 min static/1 min level 12 B  SL RDL on blue oval foam 2 x 10 B  Shuttle 125# DLE 3 x 10   Fwd walking lunges x 1 lap  Wall sits 3 x 30"  Bird Dog w/ 1# wand 2 x 10 NP  Standing hip flex/abd/ext GTB on dynadisc 2 x 15 B NP  Step ups w/ knee drive 12" box 2 " x 10 B NP  Mini squats on BOSU 2 x 10 NP    Edi received the following manual therapy techniques: Manual traction were applied to the: Hips for 0 minutes, including:  Grades III and IV Lateral distraction B hips with belt NP  Long axis distraction B hips NP  PA joint mobs for anterior joint capsule B hip      Home Exercises Provided and Patient Education Provided     Education provided:   - Pt edu on proper exercise technique.      Written Home Exercises Provided: Patient instructed to cont prior HEP.  Exercises were reviewed and Edi was able to demonstrate them prior to the end of the session.  Edi demonstrated good  understanding of the education provided.       Assessment   Pt tolerated exercises well today.  Pt demonstrates improved strength as seen by increased resistance with monster walks and addition of captain moshe.  Pt with improved technique for SL curtsey lunges.  Pt may be ready for discharge next visit.     Edi is progressing well towards his goals.   Pt prognosis is Good.     Pt will continue to benefit from skilled outpatient physical therapy to address the deficits listed in the problem list box on initial evaluation, provide pt/family education and to maximize pt's level of independence in the home and community environment.     Pt's spiritual, cultural and educational needs considered and pt agreeable to plan of care and goals.    Anticipated barriers to physical therapy: None    Goals: Goals:  Short Term Goals: 4 weeks  1. Pt displays increase of hip PROM in flexion to 110 degrees bilaterally. Ongoing 10/16/18  2. Pt displays increase in hip PROM in IR to 5-10 degrees bilaterally. Ongoing 10/16/18  3. Pt will demonstrate 4/5 gross LE strength in order to gain strength required in order to start higher activity exercises. Partially MET 10/16/18  4.  Pt will report pain at worst </= 5/10. MET 10/16/18     Long Term Goals: 8 weeks   1. Pt displays increase of hip PROM in flexion to 120  degrees bilaterally. Ongoing 10/16/18  2. Pt displays increase in hip PROM in IR to 15-20 degrees bilaterally. Ongoing 10/16/18  3. Pt will demonstrate 5/5 gross LE strength in order to return to gym routine without difficulty. Partially MET 10/16/18  4. Pt will report pain at worst </= 2/10. MET 10/16/18  4.) Pt will demonstrate excellent recall of HEP in order to complete independently. Ongoing 10/16/18      Plan     Continue previously established POC focusing on B hip flexibility and strengthening.  Re-assess and discharge next visit.    Alejandra Clark, PT, DPT, OCS

## 2018-12-12 NOTE — PROGRESS NOTES
PATIENT: Edi Marie  MRN: [id number]  DATE: 12/13/2018      Diagnosis:   1. Chronic deep vein thrombosis (DVT) of left lower extremity, unspecified vein        Chief Complaint: No chief complaint on file.    Subjective:   1.  Initial History: Mr. Marie is a 53 y.o. who initially presented to [facility] on 11/16/17 with complaint of left leg pain.  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  The patient then began having worsening swelling of the left leg over the subsequent month.  When in the hospital the patient underwent an US on 11/16/17 showing partially occlusive thrombus involving the left common femoral vein and left iliac vein with completely occlusive thrombus of the left superficial femoral vein, left popliteal vein, and left peroneal vein. The patient then underwent catheter assisted thrombolysis on 11/17/17 with the use of a EKOS device.  The patient was then continued on continuous tPA and angiomax for 30 hours and then discharged home on Eliquis 10mg PO for 7 days and then 5mg BID thereafter.  The patient then had a follow up US performed on 11/29/17 which showed  PCP on  reidentification of largely occlusive thrombus within the left external iliac, common femoral, femoral, popliteal, and peroneal veins and new thrombosis of the posterior tibial vein.  The patient saw his PCP on 12/14/17 and was then sent to the Hematology clinic given the new thrombus in the posterior tibial vein seen.  The patient denied any personal history of clotting.  He had undergone prior surgeries with appendectomy and right knee surgery without developing clots.  The patient stated his mother developed a clot in an unknown location after a groin injury when she was 60.  She was on coumadin for 2 years and then taken off.  The patient denied any other family history of clotting.  The patient denied any FH of early  pregnancy loss.  He stated he has a daughter with suspected Behçet disease.  US of the left lower extremity on 2/26/18 showed deep venous occlusive thrombosis of the left superficial femoral vein and partially occlusive thrombosis of the left popliteal vein mildly improved from prior US.  Repeat US on 6/04/18 showed partially occlusive deep venous thrombosis of the left superficial femoral and popliteal vein, mildly improved from prior exam. US done on 12/13/18 showed patency of the popliteal vein and partial thrombus of the left superficial femoral vein.    Interval history:  The patient states the swelling in his left leg has improved.  He notices some increase in veins in the left lower extremity.  He also denies leg pain and skin changes.      Past Medical History:  Past Medical History:   Diagnosis Date    Arthritis     DVT (deep venous thrombosis)     Hemorrhoid     Histiocytosis     Kidney stones     Spermatocele        Past Surgical HIstory:   Past Surgical History:   Procedure Laterality Date    APPENDECTOMY      COLONOSCOPY N/A 1/20/2017    Procedure: COLONOSCOPY;  Surgeon: Danis Frank MD;  [facility]  Service: Endoscopy;  Laterality: N/A;    COLONOSCOPY N/A 1/20/2017    Performed by Danis Frank MD at [facility]    KNEE SURGERY      right knee    LITHOTRIPSY         Family History:   Family History   Problem Relation Age of Onset    Heart disease Father         MI 2004    Cancer Paternal Grandfather         colon    Melanoma Neg Hx     Lupus Neg Hx     Psoriasis Neg Hx     Eczema Neg Hx        Social History:  reports that  has never smoked. he has never used smokeless tobacco. He reports that he drinks about 4.2 oz of alcohol per week. He reports that he does not use drugs.    Allergies:  Review of patient's allergies indicates:  No Known Allergies    Medications:  Current Outpatient Medications   Medication Sig Dispense Refill    apixaban (ELIQUIS) 5  "mg Tab Take 1 tablet (5 mg total) by mouth 2 (two) times daily. 60 tablet 6     No current facility-administered medications for this visit.        Review of Systems   Respiratory: Negative for cough and shortness of breath.    Cardiovascular: Negative for chest pain and palpitations.   Musculoskeletal:        No swelling in the left leg.  Increased prominence in the veins of the left leg   Skin: Negative for color change and rash.       ECOG Performance Status:0    Objective:      Vitals:   Vitals:    12/13/18 0936   BP: (!) 144/82   BP Location: Left arm   Patient Position: Sitting   Pulse: 80   Resp: 17   Temp: 99 °F (37.2 °C)   SpO2: 98%   Weight: 86.4 kg (190 lb 7.6 oz)   Height: 5' 11" (1.803 m)     BMI: Body mass index is 26.57 kg/m².    Physical Exam   Constitutional: He is oriented to person, place, and time. He appears well-developed and well-nourished. No distress.   HENT:   Head: Normocephalic and atraumatic.   Cardiovascular: Normal rate, regular rhythm and normal heart sounds. Exam reveals no gallop and no friction rub.   No murmur heard.  Pulmonary/Chest: Effort normal and breath sounds normal. No respiratory distress. He has no wheezes. He has no rales. He exhibits no tenderness.   Musculoskeletal:   Slight prominence of the veins of the left lower leg.  No swelling in the left leg or ankle.   Neurological: He is alert and oriented to person, place, and time.   Skin: He is not diaphoretic.       Laboratory Data:  No visits with results within 1 Week(s) from this visit.   Latest known visit with results is:   Lab Visit on 06/07/2018   Component Date Value Ref Range Status    D-Dimer 06/07/2018 0.25  <0.50 mg/L FEU Final    Comment: The quantitative D-dimer assay should be used as an aid in   the diagnosis of deep vein thrombosis and pulmonary embolism  in patients with the appropriate presentation and clinical  history. The upper limit of the reference interval and the clinical   cut off   point are " identical. Causes of a positive (>0.50 mg/L FEU) D-Dimer   test  include, but are not limited to: DVT, PE, DIC, thrombolytic   therapy, anticoagulant therapy, recent surgery, trauma, or   pregnancy, disseminated malignancy, aortic aneurysm, cirrhosis,  and severe infection. False negative results may occur in   patients with distal DVT.           Imaging:    US LE 12/13/18    Partial thrombus of the left superficial femoral vein, again noted.  The previously partially occluded popliteal vein appears patent.    US LE 6/04/18     Partially occlusive deep venous thrombosis of the left superficial femoral and popliteal vein, mildly improved from prior exam.    US LE 2/26/18    Right - There is no evidence of acute venous thrombosis in the common femoral, superficial femoral, greater saphenous, popliteal, peroneal, anterior tibial and posterior tibial veins.  There is no reflux to suggest valvular incompetence.    Left - There is occlusive thrombosis of the superficial femoral vein.  There is partially occlusive thrombosis of the popliteal vein.  There is no evidence of acute venous thrombosis in the common femoral, greater saphenous, peroneal, anterior tibial and posterior tibial veins.  There is no reflux to suggest valvular incompetence.    Impression:    Deep venous occlusive thrombosis of the left superficial femoral vein and partially occlusive thrombosis of the left popliteal vein.  Overall mildly improved from prior.    US LE 11/29/17  There is reidentification of largely occlusive thrombus within the left external iliac, common femoral, femoral, popliteal, and peroneal veins.  There is thrombosis of the posterior tibial veins, not seen previously.  The anterior tibial and greater saphenous veins are patent.    US LE 11/16/17  Duplex and color flow Doppler is remarkable for thrombus involving the left common femoral vein, left superficial femoral vein, left popliteal vein, left peroneal vein.  Thrombus is  partially occlusive involving the left common femoral vein, with occlusive thrombus involving the remainder of the thrombosed vessels.  The posterior tibial veins and anterior tibial veins are patent.  There is partial thrombus involving the left iliac vein.  There is no reflux to suggest valvular incompetence.        Assessment:       1. Chronic deep vein thrombosis (DVT) of left lower extremity, unspecified vein         Plan:     Chronic deep vein thrombosis (DVT) of left lower extremity, unspecified vein  -The patient has left lower extremity DVT provoked after injuring his ankle at work.  -On the first scan on 11/16/17 the patient had a thrombus involving the left common femoral vein, left superficial femoral vein, left popliteal vein, and left peroneal vein; a partially occlusive involving the left common femoral vein and a partial thrombus involving the left iliac vein.  -The patient has been on anticoagulation with Eliquis for 12 months  -Repeat US today showed patency of the popliteal vein and partial thrombus of the left superficial femoral vein.  -The patient is low risk for recurrent VTE although at slightly increased risk compared to the general population  -The patient was instructed that he could stop taking his anticoagulation  -The patient was encouraged to be vigilant for signs of recurrent VTE including but not limited to swelling in the upper or lower extremities, sudden onset of cough, CP or SOB.  -On long flights and road trips the patient was encouraged to move around frequently   -The patient was encouraged to notify any potential surgeon about his prior VTE history.  -The patient expressed understanding.  All questions were answered to his satisfaction.  -Will not schedule follow up at this time; however, the patient was encouraged to contact our office with any further questions or if he developed any concerning symptoms.    -Discussed with Dr Fatoumata Ferrer MD PGY-IV  Hematology  and Oncology  Pager:[phone]    Distress Screening Results: Psychosocial Distress screening score of Distress Score: 0 noted and reviewed. No intervention indicated.

## 2018-12-13 ENCOUNTER — HOSPITAL ENCOUNTER (OUTPATIENT)
Dept: RADIOLOGY | Facility: HOSPITAL | Age: 53
Discharge: HOME | End: 2018-12-13
Attending: INTERNAL MEDICINE
Payer: COMMERCIAL

## 2018-12-13 ENCOUNTER — OFFICE VISIT (OUTPATIENT)
Dept: HEMATOLOGY/ONCOLOGY | Facility: CLINIC | Age: 53
End: 2018-12-13
Payer: COMMERCIAL

## 2018-12-13 VITALS
HEIGHT: 71 IN | BODY MASS INDEX: 26.67 KG/M2 | WEIGHT: 190.5 LBS | RESPIRATION RATE: 17 BRPM | OXYGEN SATURATION: 98 % | TEMPERATURE: 99 F | DIASTOLIC BLOOD PRESSURE: 82 MMHG | SYSTOLIC BLOOD PRESSURE: 144 MMHG | HEART RATE: 80 BPM

## 2018-12-13 DIAGNOSIS — I82.502 CHRONIC DEEP VEIN THROMBOSIS (DVT) OF LEFT LOWER EXTREMITY, UNSPECIFIED VEIN: ICD-10-CM

## 2018-12-13 DIAGNOSIS — I82.502 CHRONIC DEEP VEIN THROMBOSIS (DVT) OF LEFT LOWER EXTREMITY, UNSPECIFIED VEIN: Primary | ICD-10-CM

## 2018-12-13 PROCEDURE — 93971 EXTREMITY STUDY: CPT | Mod: 26,,, | Performed by: RADIOLOGY

## 2018-12-13 PROCEDURE — 3008F BODY MASS INDEX DOCD: CPT | Mod: CPTII,S$GLB,, | Performed by: INTERNAL MEDICINE

## 2018-12-13 PROCEDURE — 93971 EXTREMITY STUDY: CPT | Mod: TC

## 2018-12-13 PROCEDURE — 99999 PR PBB SHADOW E&M-EST. PATIENT-LVL III: CPT | Mod: PBBFAC,,,

## 2018-12-13 PROCEDURE — 99214 OFFICE O/P EST MOD 30 MIN: CPT | Mod: S$GLB,,, | Performed by: INTERNAL MEDICINE

## 2018-12-27 ENCOUNTER — HOSPITAL ENCOUNTER (EMERGENCY)
Facility: HOSPITAL | Age: 53
Discharge: HOME | End: 2018-12-27
Attending: EMERGENCY MEDICINE
Payer: COMMERCIAL

## 2018-12-27 VITALS
BODY MASS INDEX: 25.47 KG/M2 | OXYGEN SATURATION: 98 % | DIASTOLIC BLOOD PRESSURE: 84 MMHG | RESPIRATION RATE: 16 BRPM | TEMPERATURE: 99 F | HEIGHT: 72 IN | HEART RATE: 74 BPM | SYSTOLIC BLOOD PRESSURE: 135 MMHG | WEIGHT: 188 LBS

## 2018-12-27 DIAGNOSIS — N20.1 LEFT URETERAL STONE: Primary | ICD-10-CM

## 2018-12-27 LAB
ALBUMIN SERPL BCP-MCNC: 3.7 G/DL
ALP SERPL-CCNC: 63 U/L
ALT SERPL W/O P-5'-P-CCNC: 23 U/L
ANION GAP SERPL CALC-SCNC: 8 MMOL/L
AST SERPL-CCNC: 21 U/L
BACTERIA #/AREA URNS AUTO: ABNORMAL /HPF
BASOPHILS # BLD AUTO: 0.07 K/UL
BASOPHILS NFR BLD: 0.8 %
BILIRUB SERPL-MCNC: 0.7 MG/DL
BILIRUB UR QL STRIP: NEGATIVE
BUN SERPL-MCNC: 18 MG/DL
CALCIUM SERPL-MCNC: 9.1 MG/DL
CHLORIDE SERPL-SCNC: 110 MMOL/L
CLARITY UR REFRACT.AUTO: ABNORMAL
CO2 SERPL-SCNC: 20 MMOL/L
COLOR UR AUTO: ABNORMAL
CREAT SERPL-MCNC: 0.9 MG/DL
DIFFERENTIAL METHOD: ABNORMAL
EOSINOPHIL # BLD AUTO: 0.2 K/UL
EOSINOPHIL NFR BLD: 1.9 %
ERYTHROCYTE [DISTWIDTH] IN BLOOD BY AUTOMATED COUNT: 13.6 %
EST. GFR  (AFRICAN AMERICAN): >60 ML/MIN/1.73 M^2
EST. GFR  (NON AFRICAN AMERICAN): >60 ML/MIN/1.73 M^2
GLUCOSE SERPL-MCNC: 85 MG/DL
GLUCOSE UR QL STRIP: NEGATIVE
HCT VFR BLD AUTO: 48.9 %
HGB BLD-MCNC: 16.7 G/DL
HGB UR QL STRIP: NEGATIVE
HYALINE CASTS UR QL AUTO: 0 /LPF
IMM GRANULOCYTES # BLD AUTO: 0.03 K/UL
IMM GRANULOCYTES NFR BLD AUTO: 0.3 %
KETONES UR QL STRIP: ABNORMAL
LEUKOCYTE ESTERASE UR QL STRIP: NEGATIVE
LYMPHOCYTES # BLD AUTO: 1.5 K/UL
LYMPHOCYTES NFR BLD: 16.9 %
MCH RBC QN AUTO: 32.4 PG
MCHC RBC AUTO-ENTMCNC: 34.2 G/DL
MCV RBC AUTO: 95 FL
MICROSCOPIC COMMENT: ABNORMAL
MONOCYTES # BLD AUTO: 0.7 K/UL
MONOCYTES NFR BLD: 8.3 %
NEUTROPHILS # BLD AUTO: 6.2 K/UL
NEUTROPHILS NFR BLD: 71.8 %
NITRITE UR QL STRIP: NEGATIVE
NRBC BLD-RTO: 0 /100 WBC
PH UR STRIP: 5 [PH] (ref 5–8)
PLATELET # BLD AUTO: 358 K/UL
PMV BLD AUTO: 10.2 FL
POTASSIUM SERPL-SCNC: 4.4 MMOL/L
PROT SERPL-MCNC: 6.5 G/DL
PROT UR QL STRIP: ABNORMAL
RBC # BLD AUTO: 5.16 M/UL
RBC #/AREA URNS AUTO: 4 /HPF (ref 0–4)
SODIUM SERPL-SCNC: 138 MMOL/L
SP GR UR STRIP: 1.03 (ref 1–1.03)
SQUAMOUS #/AREA URNS AUTO: 1 /HPF
URN SPEC COLLECT METH UR: ABNORMAL
WBC # BLD AUTO: 8.59 K/UL
WBC #/AREA URNS AUTO: 5 /HPF (ref 0–5)

## 2018-12-27 PROCEDURE — 96374 THER/PROPH/DIAG INJ IV PUSH: CPT

## 2018-12-27 PROCEDURE — 99284 EMERGENCY DEPT VISIT MOD MDM: CPT | Mod: ,,, | Performed by: PHYSICIAN ASSISTANT

## 2018-12-27 PROCEDURE — 80053 COMPREHEN METABOLIC PANEL: CPT

## 2018-12-27 PROCEDURE — 85025 COMPLETE CBC W/AUTO DIFF WBC: CPT

## 2018-12-27 PROCEDURE — 99284 EMERGENCY DEPT VISIT MOD MDM: CPT | Mod: 25

## 2018-12-27 PROCEDURE — 63600175 PHARM REV CODE 636 W HCPCS: Performed by: PHYSICIAN ASSISTANT

## 2018-12-27 PROCEDURE — 25000003 PHARM REV CODE 250: Performed by: PHYSICIAN ASSISTANT

## 2018-12-27 PROCEDURE — 96361 HYDRATE IV INFUSION ADD-ON: CPT

## 2018-12-27 PROCEDURE — 81001 URINALYSIS AUTO W/SCOPE: CPT

## 2018-12-27 RX ORDER — KETOROLAC TROMETHAMINE 30 MG/ML
10 INJECTION, SOLUTION INTRAMUSCULAR; INTRAVENOUS
Status: COMPLETED | OUTPATIENT
Start: 2018-12-27 | End: 2018-12-27

## 2018-12-27 RX ORDER — HYDROCODONE BITARTRATE AND ACETAMINOPHEN 5; 325 MG/1; MG/1
1 TABLET ORAL EVERY 6 HOURS PRN
Qty: 16 TABLET | Refills: 0 | Status: SHIPPED | OUTPATIENT
Start: 2018-12-27 | End: 2019-06-06

## 2018-12-27 RX ORDER — NAPROXEN 500 MG/1
500 TABLET ORAL
Status: COMPLETED | OUTPATIENT
Start: 2018-12-27 | End: 2018-12-27

## 2018-12-27 RX ORDER — ONDANSETRON 4 MG/1
4 TABLET, ORALLY DISINTEGRATING ORAL EVERY 8 HOURS PRN
Qty: 12 TABLET | Refills: 0 | Status: SHIPPED | OUTPATIENT
Start: 2018-12-27 | End: 2018-12-31

## 2018-12-27 RX ORDER — CEPHALEXIN 500 MG/1
500 CAPSULE ORAL EVERY 12 HOURS
Qty: 14 CAPSULE | Refills: 0 | Status: SHIPPED | OUTPATIENT
Start: 2018-12-27 | End: 2019-01-03

## 2018-12-27 RX ORDER — TAMSULOSIN HYDROCHLORIDE 0.4 MG/1
0.4 CAPSULE ORAL DAILY
Qty: 10 CAPSULE | Refills: 0 | Status: SHIPPED | OUTPATIENT
Start: 2018-12-27 | End: 2019-06-06

## 2018-12-27 RX ADMIN — NAPROXEN 500 MG: 500 TABLET ORAL at 01:12

## 2018-12-27 RX ADMIN — KETOROLAC TROMETHAMINE 10 MG: 30 INJECTION, SOLUTION INTRAMUSCULAR at 11:12

## 2018-12-27 RX ADMIN — SODIUM CHLORIDE 1000 ML: 0.9 INJECTION, SOLUTION INTRAVENOUS at 11:12

## 2018-12-27 NOTE — ED TRIAGE NOTES
Pt states he thinks he has a kidney stone.  Onset left flank pain this am. States pain radiates to abdomen.  Denies nausea.  Denies urinary symptoms.

## 2018-12-27 NOTE — DISCHARGE INSTRUCTIONS
You may take over-the-counter anti-inflammatory such as ibuprofen or naproxen as needed for pain.  Take your prescribed pain medication as needed for breakthrough pain along with the Flomax as prescribed.  Return to the ER immediately for any pain that is not controlled with her pain medication, uncontrolled nausea or vomiting, fever greater than 100.4° or any other new or concerning symptoms.

## 2018-12-27 NOTE — ED PROVIDER NOTES
Encounter Date: 12/27/2018       History     Chief Complaint   Patient presents with    Flank Pain     left flank pain since this am. has had kidney stones before     53-year-old male with a history of chronic DVT of LLE presents to the ED with flank pain. Patient reports severe left-sided flank pain that began this morning.  Pain radiates around to the left side of the abdomen.  Patient has history of kidney stones and reports that this pain is similar.  He has previously required lithotripsy and stent placement.  Pain was initially waxing and waning in intensity, but has now become constant and severe.  He denies nausea, vomiting, changes in urination, fever.  No radiation of the pain to the testicles or scrotum.          Review of patient's allergies indicates:  No Known Allergies  Past Medical History:   Diagnosis Date    Arthritis     DVT (deep venous thrombosis)     Hemorrhoid     Histiocytosis     Kidney stones     Spermatocele      Past Surgical History:   Procedure Laterality Date    APPENDECTOMY      COLONOSCOPY N/A 1/20/2017    Performed by Danis Frank MD at [facility] ([facility])    KNEE SURGERY      right knee    LITHOTRIPSY       Family History   Problem Relation Age of Onset    Heart disease Father         MI 2004    Cancer Paternal Grandfather         colon    Melanoma Neg Hx     Lupus Neg Hx     Psoriasis Neg Hx     Eczema Neg Hx      Social History     Tobacco Use    Smoking status: Never Smoker    Smokeless tobacco: Never Used   Substance Use Topics    Alcohol use: Yes     Alcohol/week: 4.2 oz     Types: 7 Glasses of wine per week    Drug use: No     Review of Systems   Constitutional: Negative for fever.   HENT: Negative for sore throat.    Respiratory: Negative for shortness of breath.    Cardiovascular: Negative for chest pain.   Gastrointestinal: Positive for abdominal pain. Negative for nausea and vomiting.   Genitourinary: Positive for flank pain. Negative for difficulty  urinating, dysuria and testicular pain.   Musculoskeletal: Negative for back pain.   Skin: Negative for rash.   Neurological: Negative for weakness.   Hematological: Does not bruise/bleed easily.       Physical Exam     Initial Vitals [12/27/18 1105]   BP Pulse Resp Temp SpO2   (!) 154/94 92 18 97.5 °F (36.4 °C) 99 %      MAP       --         Physical Exam    Nursing note and vitals reviewed.  Constitutional: He appears well-developed and well-nourished. He is diaphoretic.  Non-toxic appearance. He does not appear ill. He appears distressed.   Mild distress due to pain. Patient pacing in exam room with hand placed on left flank.  Mild diaphoresis.   HENT:   Head: Normocephalic and atraumatic.   Neck: Normal range of motion. Neck supple.   Cardiovascular: Normal rate and regular rhythm. Exam reveals no gallop, no distant heart sounds and no friction rub.    No murmur heard.  Pulmonary/Chest: Effort normal and breath sounds normal. No accessory muscle usage. No tachypnea. No respiratory distress. He has no decreased breath sounds. He has no wheezes. He has no rhonchi. He has no rales.   Abdominal: He exhibits no distension. Bowel sounds are decreased. There is tenderness. There is CVA tenderness (on L).   TTP along the mid Left abdomen and LLQ.    Musculoskeletal: Normal range of motion.   Neurological: He is alert.   Skin: Skin is warm. No rash noted. No pallor.   Psychiatric: He has a normal mood and affect. His behavior is normal.         ED Course   Procedures  Labs Reviewed   URINALYSIS, REFLEX TO URINE CULTURE - Abnormal; Notable for the following components:       Result Value    Appearance, UA Hazy (*)     Protein, UA 1+ (*)     Ketones, UA Trace (*)     All other components within normal limits    Narrative:     Preferred Collection Type->Urine, Clean Catch   CBC W/ AUTO DIFFERENTIAL - Abnormal; Notable for the following components:    MCH 32.4 (*)     Platelets 358 (*)     Lymph% 16.9 (*)     All other  components within normal limits   URINALYSIS MICROSCOPIC - Abnormal; Notable for the following components:    Bacteria, UA Few (*)     All other components within normal limits    Narrative:     Preferred Collection Type->Urine, Clean Catch   COMPREHENSIVE METABOLIC PANEL - Abnormal; Notable for the following components:    CO2 20 (*)     All other components within normal limits          Imaging Results          CT Renal Stone Study ABD Pelvis WO (Final result)     Abnormal  Result time 12/27/18 12:24:31    Final result by Juan Daniel Elizalde MD (12/27/18 12:24:31)                 Impression:      1. Two stones in proximal left ureter, largest 4 mm, with minimal signs of obstruction.  2. Multiple hepatic cysts.  Left renal parapelvic cysts.  3. Other findings as above.  4.  This report was flagged in Epic as abnormal.      Electronically signed by: Juan Daniel Elizalde MD  Date:    12/27/2018  Time:    12:24             Narrative:    EXAMINATION:  CT RENAL STONE STUDY ABD PELVIS WO    CLINICAL HISTORY:  Flank pain, stone disease suspected;    TECHNIQUE:  Low dose axial images, sagittal and coronal reformations were obtained from the lung bases to the pubic symphysis.  Contrast was not administered.    COMPARISON:  CT renal stone study 03/04/2013    FINDINGS:  Visualized lung bases are clear.  The spleen, pancreas, gallbladder, and bile ducts are within normal limits on less sensitive, noncontrast exam.  The liver is normal size and again shows multiple hypodense lesions.  The subcentimeter hypodensities are too small to characterize but could be cysts.  The larger hypodensities do have features consistent with cysts with the largest in the left lobe 2.5 cm.    Adrenal glands are normal.  The kidneys are normal in size.  The right kidney shows no stone or sign of obstruction.  Right ureter is normal caliber.  No stones are seen within the left kidney, but two immediately adjacent stones are identified in the most  proximal ureter, largest 4 mm.  There are minimal signs of obstruction with mildly dilated collecting system.  The several parapelvic cysts are also present.  Distal left ureter is unremarkable.  Wall of the urinary bladder is smooth.  Prostate shows benign dystrophic calcifications.    Abdominal aorta tapers normally with minimal atherosclerosis calcification.  No enlarged retroperitoneal lymph nodes are seen.    The intestinal tract shows no obstruction or acute inflammatory process.  Stomach has a small hiatal hernia.  The small bowel is normal caliber.  The left colon demonstrates diverticulosis but no acute diverticulitis is detected.    Skeletal structures are intact without acute fracture.  Age-appropriate degenerative changes are seen in the spine.  Moderate degenerative joint disease is present at the hips with narrowing and spurs.  No abdominal wall hernias are detected.                                 Medical Decision Making:   History:   Old Medical Records: I decided to obtain old medical records.  Differential Diagnosis:   My differential diagnosis includes but is not limited to:  Renal stone, diverticulitis, UTI, bowel obstruction, perforated viscus.   Clinical Tests:   Lab Tests: Ordered and Reviewed  Radiological Study: Ordered and Reviewed       APC / Resident Notes:   52 yo M with hx of renal stones presents with L flank pain that began this morning. He is afebrile, but appears in moderate discomfort due to pain, mildly diaphoretic. TTP in LLQ, L CVA tenderness.    CT renal stone study reveals 2 prox ureteral stones on L. Minimal obstruction. UA with minimal blood, 5 WBCs, few bacteria.  Normal renal function.    I will treat with flomax, norco, antiemetics. Pt advised to take NSAIDs as first line analgesic, opiate meds for breakthrough. Will treat for infection given UA findings.  He has strained his urine in the past and does not wish to repeat this. Advised follow up with urology if no  improvement with treatment. ED return precautions given. Stable for discharge. I have reviewed the patient's records and discussed this case with my supervising physician.                    Clinical Impression:   The encounter diagnosis was Left ureteral stone.      Disposition:   Disposition: Discharged  Condition: Stable                        Radha Goss PA-C  12/27/18 1921

## 2019-01-23 ENCOUNTER — OFFICE VISIT (OUTPATIENT)
Dept: SPORTS MEDICINE | Facility: CLINIC | Age: 54
End: 2019-01-23
Payer: COMMERCIAL

## 2019-01-23 VITALS — BODY MASS INDEX: 25.47 KG/M2 | WEIGHT: 188 LBS | TEMPERATURE: 98 F | HEIGHT: 72 IN

## 2019-01-23 DIAGNOSIS — M16.0 PRIMARY OSTEOARTHRITIS OF BOTH HIPS: Primary | ICD-10-CM

## 2019-01-23 PROCEDURE — 99999 PR PBB SHADOW E&M-EST. PATIENT-LVL III: ICD-10-PCS | Mod: PBBFAC,,, | Performed by: FAMILY MEDICINE

## 2019-01-23 PROCEDURE — 99214 PR OFFICE/OUTPT VISIT, EST, LEVL IV, 30-39 MIN: ICD-10-PCS | Mod: 25,S$GLB,, | Performed by: FAMILY MEDICINE

## 2019-01-23 PROCEDURE — 3008F PR BODY MASS INDEX (BMI) DOCUMENTED: ICD-10-PCS | Mod: CPTII,S$GLB,, | Performed by: FAMILY MEDICINE

## 2019-01-23 PROCEDURE — 99214 OFFICE O/P EST MOD 30 MIN: CPT | Mod: 25,S$GLB,, | Performed by: FAMILY MEDICINE

## 2019-01-23 PROCEDURE — 20611 DRAIN/INJ JOINT/BURSA W/US: CPT | Mod: 50,S$GLB,, | Performed by: FAMILY MEDICINE

## 2019-01-23 PROCEDURE — 99999 PR PBB SHADOW E&M-EST. PATIENT-LVL III: CPT | Mod: PBBFAC,,, | Performed by: FAMILY MEDICINE

## 2019-01-23 PROCEDURE — 20611 LARGE JOINT ASPIRATION/INJECTION: R HIP JOINT, L HIP JOINT: ICD-10-PCS | Mod: 50,S$GLB,, | Performed by: FAMILY MEDICINE

## 2019-01-23 PROCEDURE — 3008F BODY MASS INDEX DOCD: CPT | Mod: CPTII,S$GLB,, | Performed by: FAMILY MEDICINE

## 2019-01-23 RX ORDER — TRIAMCINOLONE ACETONIDE 40 MG/ML
40 INJECTION, SUSPENSION INTRA-ARTICULAR; INTRAMUSCULAR
Status: DISCONTINUED | OUTPATIENT
Start: 2019-01-23 | End: 2019-01-23 | Stop reason: HOSPADM

## 2019-01-23 RX ADMIN — TRIAMCINOLONE ACETONIDE 40 MG: 40 INJECTION, SUSPENSION INTRA-ARTICULAR; INTRAMUSCULAR at 08:01

## 2019-01-23 NOTE — PROCEDURES
"Large Joint Aspiration/Injection: R hip joint, L hip joint  Date/Time: 1/23/2019 8:47 AM  Performed by: Mariano Ortiz MD  Authorized by: Mariano Ortiz MD     Consent Done?:  Yes (Verbal)  Indications:  Pain  Procedure site marked: Yes    Timeout: Prior to procedure the correct patient, procedure, and site was verified      Location:  Hip  Site:  R hip joint and L hip joint  Prep: Patient was prepped and draped in usual sterile fashion    Ultrasonic Guidance for needle placement: Yes  Images are saved and documented.  Needle size:  20 G  Approach:  Anterior  Medications:  40 mg triamcinolone acetonide 40 mg/mL; 40 mg triamcinolone acetonide 40 mg/mL  Patient tolerance:  Patient tolerated the procedure well with no immediate complications    Additional Comments: Description of ultrasound utilization for needle guidance:   Ultrasound guidance used for needle localization. Images saved and stored for documentation. The hip joint was visualized. Dynamic visualization of the 20g x 6.0" needle was continuous throughout the procedure.    Precautionary:  The patient's femoral artery, vein, and nerve were identified, marked with a skin marker, and visualized throughout the procedure, so as to avoid needle contact with those structures.      "

## 2019-01-23 NOTE — PROGRESS NOTES
Edi Marie, a 53 y.o. male, is here for evaluation of RIGHT and LEFT hip.  Patient returns form [place] a lot last week.  Patient reports he could tell that his hip was hurting by the end of the day.      HISTORY OF PRESENT ILLNESS   Location: anterior thigh, bilateral, L > R   Onset: insidious  Palliative:    Relative rest   Oral analgesics   CSI, GTB, 09/20/17, 70%   HgPT, noncompliant    fPT, Needs new pt ref   L/R CSI, GTB 11/06/17 80% Improvement    L/R CSI, GTB 18.07.30 60% improvement    L/R CSI, GTB 18.11.01 90% improvement for 2 months  Provocative:   ADLs   Prior:    PMH DVT 11/17/18 L. LE  Progression: Plateaud discomfort   Quality:    Ache, worse in evening    Sharp at times   Radiation: none  Severity: per nursing documentation  Timing: intermittent with use  Trauma: none      Review of systems (ROS):  A 10+ review of systems was performed with pertinent positives and negatives noted above in the history of present illness. Other systems were negative unless otherwise specified.    PHYSICAL EXAMINATION  General:  The patient is alert and oriented x 3.  Mood is pleasant.  Observation of ears, eyes and nose reveal no gross abnormalities.  HEENT: NCAT, sclera nonicteric  Lungs: Respirations are equal and unlabored.   Gait is coordinated. Patient can toe walk and heel walk without difficulty.    HIP/PELVIS EXAMINATION    Observation/Inspection  Gait:   Nonantalgic   Alignment:  Neutral   Scars:   None   Muscle atrophy: None   Effusion:  None   Warmth:  None   Discoloration:   None   Leg lengths:   Equal   Pelvis:    Level     Tenderness/Crepitus (T/C):      T / C  Trochanteric bursa   + / -  Piriformis    - / -  SI joint    - / -  Psoas tendon   - / -  Rectus insertion  - / -  Adductor insertion  - / -  Pubic symphysis  - / -    ROM: (* = pain)    Flexion:      90 degrees*  External rotation:   40 degrees*  Internal rotation with axial load:  30 degrees*  Internal rotation  without axial load:  40 degrees*  Abduction:    45 degrees  Adduction:     20 degrees    Special Tests:  Pain w/ forced internal rotation (FADIR):  +   Pain w/ forced external rotation (WES):  +  Circumduction test:     -  Stinchfield test:     +  Log roll:       +   Snapping hip (internal):    -   Sit-up pain:      -   Resisted sit-up pain:     -   Resisted sit-up with adductor contraction pain:  -   Step-down test:     +  Trendelenburg test:     -  Bridge test      +     Extremity Neuro-vascular Examination:   Sensation:  Grossly intact to light touch all dermatomal regions.   Motor Function:  Fully intact motor function at hip, knee, foot and ankle    DTRs;  quadriceps and  achilles 2+.  No clonus and downgoing Babinski.    Vascular status:  DP and PT pulses 2+, brisk capillary refill, symmetric.    Skin:  intact, compartments soft.    Other Findings:    ASSESSMENT & PLAN  Assessment:   #1 Tonnis Grade III osteoarthritis of hip, bilateral   W/ greater trochanteric bursitis  #2 s/p DVT, lower extremity, left    No evidence of neurologic pathology  No evidence of vascular pathology    Imaging studies reviewed:   X-ray pelvis and hip, bilateral 18.10    Plan:    We discussed the importance of appropriate diet, weight, and regular exercise including quadriceps strengthening     We discussed options including:  #1 watchful waiting  #2 re start physical therapy aimed at:   Core stability   RoM hip   Strengthening quadriceps   Gait training   #3 injection therapy:   CSI GTB    Right, effective 70%, repeat     Left, effective 70%, repeat    CSI iaHip    Right,     Left,    Orthobiologics   #4 consultation re: THAs     The patient chooses #3 csi iahip bilat    Pain management: handout given  Bracing:   Physical therapy:    fPT, @ [facility], [facility] as above   Activity (e.g. 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 He has upcoming trip to [place] for wife's 40th  Should symptoms worsen or fail to resolve, consider:  Revisiting the above options

## 2019-03-07 ENCOUNTER — PATIENT MESSAGE (OUTPATIENT)
Dept: INTERNAL MEDICINE | Facility: CLINIC | Age: 54
End: 2019-03-07

## 2019-04-10 ENCOUNTER — TELEPHONE (OUTPATIENT)
Dept: SPORTS MEDICINE | Facility: CLINIC | Age: 54
End: 2019-04-10

## 2019-04-10 NOTE — TELEPHONE ENCOUNTER
R/S patient for later in that day.    Kevin Soto   Sports Medicine Assistant   [facility]     ----- Message from Mari Bowles sent at 4/10/2019  1:41 PM CDT -----  Contact: self   Returning a call from Kevin.      Call back #[phone]

## 2019-04-16 ENCOUNTER — OFFICE VISIT (OUTPATIENT)
Dept: SPORTS MEDICINE | Facility: CLINIC | Age: 54
End: 2019-04-16
Payer: COMMERCIAL

## 2019-04-16 VITALS — TEMPERATURE: 98 F | WEIGHT: 188 LBS | BODY MASS INDEX: 25.47 KG/M2 | HEIGHT: 72 IN

## 2019-04-16 DIAGNOSIS — M16.0 PRIMARY OSTEOARTHRITIS OF BOTH HIPS: Primary | ICD-10-CM

## 2019-04-16 PROCEDURE — 99999 PR PBB SHADOW E&M-EST. PATIENT-LVL III: ICD-10-PCS | Mod: PBBFAC,,, | Performed by: FAMILY MEDICINE

## 2019-04-16 PROCEDURE — 99214 OFFICE O/P EST MOD 30 MIN: CPT | Mod: 25,S$GLB,, | Performed by: FAMILY MEDICINE

## 2019-04-16 PROCEDURE — 99999 PR PBB SHADOW E&M-EST. PATIENT-LVL III: CPT | Mod: PBBFAC,,, | Performed by: FAMILY MEDICINE

## 2019-04-16 PROCEDURE — 99214 PR OFFICE/OUTPT VISIT, EST, LEVL IV, 30-39 MIN: ICD-10-PCS | Mod: 25,S$GLB,, | Performed by: FAMILY MEDICINE

## 2019-04-16 PROCEDURE — 20611 LARGE JOINT ASPIRATION/INJECTION: R HIP JOINT, L HIP JOINT: ICD-10-PCS | Mod: 50,S$GLB,, | Performed by: FAMILY MEDICINE

## 2019-04-16 PROCEDURE — 20611 DRAIN/INJ JOINT/BURSA W/US: CPT | Mod: 50,S$GLB,, | Performed by: FAMILY MEDICINE

## 2019-04-16 PROCEDURE — 3008F BODY MASS INDEX DOCD: CPT | Mod: CPTII,S$GLB,, | Performed by: FAMILY MEDICINE

## 2019-04-16 PROCEDURE — 3008F PR BODY MASS INDEX (BMI) DOCUMENTED: ICD-10-PCS | Mod: CPTII,S$GLB,, | Performed by: FAMILY MEDICINE

## 2019-04-16 RX ORDER — TRIAMCINOLONE ACETONIDE 40 MG/ML
40 INJECTION, SUSPENSION INTRA-ARTICULAR; INTRAMUSCULAR
Status: DISCONTINUED | OUTPATIENT
Start: 2019-04-16 | End: 2019-04-16 | Stop reason: HOSPADM

## 2019-04-16 RX ADMIN — TRIAMCINOLONE ACETONIDE 40 MG: 40 INJECTION, SUSPENSION INTRA-ARTICULAR; INTRAMUSCULAR at 11:04

## 2019-04-16 NOTE — PROCEDURES
"Large Joint Aspiration/Injection: R hip joint, L hip joint  Date/Time: 4/16/2019 11:26 AM  Performed by: Mariano Ortiz MD  Authorized by: Mariano Ortiz MD     Consent Done?:  Yes (Verbal)  Indications:  Pain  Procedure site marked: Yes    Timeout: Prior to procedure the correct patient, procedure, and site was verified      Location:  Hip  Site:  R hip joint and L hip joint  Prep: Patient was prepped and draped in usual sterile fashion    Ultrasonic Guidance for needle placement: Yes  Images are saved and documented.  Needle size:  20 G  Approach:  Anterior  Medications:  40 mg triamcinolone acetonide 40 mg/mL; 40 mg triamcinolone acetonide 40 mg/mL  Patient tolerance:  Patient tolerated the procedure well with no immediate complications    Additional Comments: Description of ultrasound utilization for needle guidance:   Ultrasound guidance used for needle localization. Images saved and stored for documentation. The hip joint was visualized. Dynamic visualization of the 20g x 6.0" needle was continuous throughout the procedure.    Precautionary:  The patient's femoral artery, vein, and nerve were identified, marked with a skin marker, and visualized throughout the procedure, so as to avoid needle contact with those structures.      "

## 2019-04-16 NOTE — PROGRESS NOTES
Edi Marie, a 53 y.o. male, is here for evaluation of RIGHT and LEFT hip.      HISTORY OF PRESENT ILLNESS   Location: anterior thigh, bilateral, L > R   Onset: insidious  Palliative:    Relative rest   Oral analgesics   CSI, GTB, 09/20/17, 70%   HgPT, noncompliant    fPT, Needs new pt ref   L/R CSI, GTB 11/06/17 80% Improvement    L/R CSI, GTB 18.07.30 60% improvement    L/R CSI, GTB 18.11.01 90% improvement for 2 months   CSI,L/R iaHip, 19.01.23,  100% for 2 months then 90% right now.  Provocative:   ADLs   Prior:    PMH DVT 11/17/18 L. LE  Progression: Plateaud discomfort   Quality:    Ache, worse in evening    Sharp at times   Radiation: none  Severity: per nursing documentation  Timing: intermittent with use  Trauma: none      Review of systems (ROS):  A 10+ review of systems was performed with pertinent positives and negatives noted above in the history of present illness. Other systems were negative unless otherwise specified.    PHYSICAL EXAMINATION  General:  The patient is alert and oriented x 3.  Mood is pleasant.  Observation of ears, eyes and nose reveal no gross abnormalities.  HEENT: NCAT, sclera nonicteric  Lungs: Respirations are equal and unlabored.   Gait is coordinated. Patient can toe walk and heel walk without difficulty.    HIP/PELVIS EXAMINATION    Observation/Inspection  Gait:   Nonantalgic   Alignment:  Neutral   Scars:   None   Muscle atrophy: None   Effusion:  None   Warmth:  None   Discoloration:   None   Leg lengths:   Equal   Pelvis:    Level     Tenderness/Crepitus (T/C):      T / C  Trochanteric bursa   ++ / -  Piriformis    - / -  SI joint    - / -  Psoas tendon   - / -  Rectus insertion  ++ / -  Adductor insertion  - / -  Pubic symphysis  - / -    ROM: (* = pain)    Flexion:      90 degrees*  External rotation:   40 degrees*  Internal rotation with axial load:  30 degrees*  Internal rotation without axial load:  40 degrees*  Abduction:    45 degrees  Adduction:     20  degrees    Special Tests:  Pain w/ forced internal rotation (FADIR):  +   Pain w/ forced external rotation (WES):  +  Circumduction test:     -  Stinchfield test:     +  Log roll:       +   Snapping hip (internal):    -   Sit-up pain:      -   Resisted sit-up pain:     -   Resisted sit-up with adductor contraction pain:  -   Step-down test:     +  Trendelenburg test:     -  Bridge test      +     Extremity Neuro-vascular Examination:   Sensation:  Grossly intact to light touch all dermatomal regions.   Motor Function:  Fully intact motor function at hip, knee, foot and ankle    DTRs;  quadriceps and  achilles 2+.  No clonus and downgoing Babinski.    Vascular status:  DP and PT pulses 2+, brisk capillary refill, symmetric.    Skin:  intact, compartments soft.    Other Findings:    ASSESSMENT & PLAN  Assessment:   #1 Tonnis Grade III osteoarthritis of hip, bilateral   W/ greater trochanteric bursitis  #2 s/p DVT, lower extremity, left    No evidence of neurologic pathology  No evidence of vascular pathology    Imaging studies reviewed:   X-ray pelvis and hip, bilateral 18.10    Plan:    We discussed the importance of appropriate diet, weight, and regular exercise including quadriceps strengthening     We discussed options including:  #1 watchful waiting  #2 re start physical therapy aimed at:   Core stability   RoM hip   Strengthening quadriceps   Gait training   #3 injection therapy:   CSI GTB    Right, effective 70%, repeat     Left, effective 70%, repeat    CSI iaHip    Right,     Left,    Orthobiologics   #4 consultation re: THAs     The patient chooses #2 and #3 csi iahip bilat    Pain management: handout given  Bracing:   Physical therapy:    [facility], @ [facility], [facility] as above   Activity (e.g. 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  Should symptoms worsen or fail to resolve, consider:  Revisiting the above options

## 2019-04-27 ENCOUNTER — PATIENT MESSAGE (OUTPATIENT)
Dept: INTERNAL MEDICINE | Facility: CLINIC | Age: 54
End: 2019-04-27

## 2019-04-27 DIAGNOSIS — Z86.718 PERSONAL HISTORY OF DVT (DEEP VEIN THROMBOSIS): ICD-10-CM

## 2019-04-27 DIAGNOSIS — R60.0 LEG EDEMA, RIGHT: Primary | ICD-10-CM

## 2019-04-29 ENCOUNTER — TELEPHONE (OUTPATIENT)
Dept: HEMATOLOGY/ONCOLOGY | Facility: CLINIC | Age: 54
End: 2019-04-29

## 2019-04-29 ENCOUNTER — PATIENT MESSAGE (OUTPATIENT)
Dept: INTERNAL MEDICINE | Facility: CLINIC | Age: 54
End: 2019-04-29

## 2019-04-29 ENCOUNTER — PATIENT MESSAGE (OUTPATIENT)
Dept: HEMATOLOGY/ONCOLOGY | Facility: CLINIC | Age: 54
End: 2019-04-29

## 2019-04-29 ENCOUNTER — HOSPITAL ENCOUNTER (OUTPATIENT)
Dept: RADIOLOGY | Facility: HOSPITAL | Age: 54
Discharge: HOME | End: 2019-04-29
Attending: INTERNAL MEDICINE
Payer: COMMERCIAL

## 2019-04-29 DIAGNOSIS — M79.661 RIGHT CALF PAIN: ICD-10-CM

## 2019-04-29 DIAGNOSIS — I82.502 CHRONIC DEEP VEIN THROMBOSIS (DVT) OF LEFT LOWER EXTREMITY, UNSPECIFIED VEIN: ICD-10-CM

## 2019-04-29 DIAGNOSIS — I82.4Z3 ACUTE DEEP VEIN THROMBOSIS (DVT) OF DISTAL VEIN OF BOTH LOWER EXTREMITIES: Primary | ICD-10-CM

## 2019-04-29 DIAGNOSIS — I82.502 CHRONIC DEEP VEIN THROMBOSIS (DVT) OF LEFT LOWER EXTREMITY, UNSPECIFIED VEIN: Primary | ICD-10-CM

## 2019-04-29 PROCEDURE — 93970 EXTREMITY STUDY: CPT | Mod: 26,,, | Performed by: INTERNAL MEDICINE

## 2019-04-29 PROCEDURE — 93970 EXTREMITY STUDY: CPT | Mod: TC

## 2019-04-29 PROCEDURE — 93970 US LOWER EXTREMITY VEINS BILATERAL: ICD-10-PCS | Mod: 26,,, | Performed by: INTERNAL MEDICINE

## 2019-04-29 NOTE — TELEPHONE ENCOUNTER
Please schedule the ultrasound. My Chart message    Outpatient Procedures Ordered This Visit    US Lower Extremity Veins Right

## 2019-05-01 NOTE — TELEPHONE ENCOUNTER
I called the patient and let him know that the US performed showed a new DVT in the right leg and an extension of the DVT in the left leg.  I informed him that he would need to restart Eliquis and take 10mg BID for 7 days followed by 5 mg BID.  I instructed him that we would schedule him for a return appt.  The patient expressed understanding.  All questions were answered to his satisfaction.    Ayaan Ferrer MD  Hematology and Oncology Fellow PGY-VI  Pager:[phone]

## 2019-05-02 ENCOUNTER — PATIENT MESSAGE (OUTPATIENT)
Dept: HEMATOLOGY/ONCOLOGY | Facility: CLINIC | Age: 54
End: 2019-05-02

## 2019-06-01 ENCOUNTER — PATIENT MESSAGE (OUTPATIENT)
Dept: HEMATOLOGY/ONCOLOGY | Facility: CLINIC | Age: 54
End: 2019-06-01

## 2019-06-01 ENCOUNTER — PATIENT MESSAGE (OUTPATIENT)
Dept: SPORTS MEDICINE | Facility: CLINIC | Age: 54
End: 2019-06-01

## 2019-06-04 DIAGNOSIS — M16.0 PRIMARY OSTEOARTHRITIS OF BOTH HIPS: Primary | ICD-10-CM

## 2019-06-06 ENCOUNTER — OFFICE VISIT (OUTPATIENT)
Dept: HEMATOLOGY/ONCOLOGY | Facility: CLINIC | Age: 54
End: 2019-06-06
Payer: COMMERCIAL

## 2019-06-06 ENCOUNTER — LAB VISIT (OUTPATIENT)
Dept: LAB | Facility: HOSPITAL | Age: 54
End: 2019-06-06
Payer: COMMERCIAL

## 2019-06-06 VITALS
SYSTOLIC BLOOD PRESSURE: 139 MMHG | HEART RATE: 67 BPM | TEMPERATURE: 99 F | OXYGEN SATURATION: 98 % | BODY MASS INDEX: 25.98 KG/M2 | WEIGHT: 191.81 LBS | HEIGHT: 72 IN | DIASTOLIC BLOOD PRESSURE: 90 MMHG

## 2019-06-06 DIAGNOSIS — I82.4Z3 ACUTE DEEP VEIN THROMBOSIS (DVT) OF DISTAL VEIN OF BOTH LOWER EXTREMITIES: ICD-10-CM

## 2019-06-06 LAB
ALBUMIN SERPL BCP-MCNC: 3.9 G/DL (ref 3.5–5.2)
ALP SERPL-CCNC: 64 U/L (ref 55–135)
ALT SERPL W/O P-5'-P-CCNC: 20 U/L (ref 10–44)
ANION GAP SERPL CALC-SCNC: 6 MMOL/L (ref 8–16)
AST SERPL-CCNC: 17 U/L (ref 10–40)
BASOPHILS # BLD AUTO: 0.05 K/UL (ref 0–0.2)
BASOPHILS NFR BLD: 0.8 % (ref 0–1.9)
BILIRUB SERPL-MCNC: 0.6 MG/DL (ref 0.1–1)
BUN SERPL-MCNC: 23 MG/DL (ref 6–20)
CALCIUM SERPL-MCNC: 9.7 MG/DL (ref 8.7–10.5)
CHLORIDE SERPL-SCNC: 108 MMOL/L (ref 95–110)
CO2 SERPL-SCNC: 25 MMOL/L (ref 23–29)
CREAT SERPL-MCNC: 0.9 MG/DL (ref 0.5–1.4)
DIFFERENTIAL METHOD: ABNORMAL
EOSINOPHIL # BLD AUTO: 0.2 K/UL (ref 0–0.5)
EOSINOPHIL NFR BLD: 2.7 % (ref 0–8)
ERYTHROCYTE [DISTWIDTH] IN BLOOD BY AUTOMATED COUNT: 13.2 % (ref 11.5–14.5)
EST. GFR  (AFRICAN AMERICAN): >60 ML/MIN/1.73 M^2
EST. GFR  (NON AFRICAN AMERICAN): >60 ML/MIN/1.73 M^2
GLUCOSE SERPL-MCNC: 101 MG/DL (ref 70–110)
HCT VFR BLD AUTO: 47.7 % (ref 40–54)
HGB BLD-MCNC: 16 G/DL (ref 14–18)
IMM GRANULOCYTES # BLD AUTO: 0.02 K/UL (ref 0–0.04)
IMM GRANULOCYTES NFR BLD AUTO: 0.3 % (ref 0–0.5)
LYMPHOCYTES # BLD AUTO: 1.3 K/UL (ref 1–4.8)
LYMPHOCYTES NFR BLD: 19.8 % (ref 18–48)
MCH RBC QN AUTO: 30.6 PG (ref 27–31)
MCHC RBC AUTO-ENTMCNC: 33.5 G/DL (ref 32–36)
MCV RBC AUTO: 91 FL (ref 82–98)
MONOCYTES # BLD AUTO: 0.6 K/UL (ref 0.3–1)
MONOCYTES NFR BLD: 8.6 % (ref 4–15)
NEUTROPHILS # BLD AUTO: 4.5 K/UL (ref 1.8–7.7)
NEUTROPHILS NFR BLD: 67.8 % (ref 38–73)
NRBC BLD-RTO: 0 /100 WBC
PLATELET # BLD AUTO: 374 K/UL (ref 150–350)
PMV BLD AUTO: 10.1 FL (ref 9.2–12.9)
POTASSIUM SERPL-SCNC: 4.7 MMOL/L (ref 3.5–5.1)
PROT SERPL-MCNC: 6.9 G/DL (ref 6–8.4)
RBC # BLD AUTO: 5.23 M/UL (ref 4.6–6.2)
SODIUM SERPL-SCNC: 139 MMOL/L (ref 136–145)
WBC # BLD AUTO: 6.65 K/UL (ref 3.9–12.7)

## 2019-06-06 PROCEDURE — 99215 PR OFFICE/OUTPT VISIT, EST, LEVL V, 40-54 MIN: ICD-10-PCS | Mod: S$GLB,,, | Performed by: INTERNAL MEDICINE

## 2019-06-06 PROCEDURE — 36415 COLL VENOUS BLD VENIPUNCTURE: CPT

## 2019-06-06 PROCEDURE — 3008F PR BODY MASS INDEX (BMI) DOCUMENTED: ICD-10-PCS | Mod: CPTII,S$GLB,, | Performed by: INTERNAL MEDICINE

## 2019-06-06 PROCEDURE — 3008F BODY MASS INDEX DOCD: CPT | Mod: CPTII,S$GLB,, | Performed by: INTERNAL MEDICINE

## 2019-06-06 PROCEDURE — 99999 PR PBB SHADOW E&M-EST. PATIENT-LVL III: CPT | Mod: PBBFAC,,,

## 2019-06-06 PROCEDURE — 85025 COMPLETE CBC W/AUTO DIFF WBC: CPT

## 2019-06-06 PROCEDURE — 99999 PR PBB SHADOW E&M-EST. PATIENT-LVL III: ICD-10-PCS | Mod: PBBFAC,,,

## 2019-06-06 PROCEDURE — 99215 OFFICE O/P EST HI 40 MIN: CPT | Mod: S$GLB,,, | Performed by: INTERNAL MEDICINE

## 2019-06-06 PROCEDURE — 80053 COMPREHEN METABOLIC PANEL: CPT

## 2019-06-06 NOTE — PROGRESS NOTES
PATIENT: Edi Marie  MRN: [id number]  DATE: 6/6/2019      Diagnosis:   1. Acute deep vein thrombosis (DVT) of distal vein of both lower extremities        Chief Complaint: No chief complaint on file.    Subjective:   1.  Interval History: Mr. Marie is a 53 y.o. who presents for follow up on an acute on chronic DVT.  Since the last clinic visit the patient contacted our office on 4/29/19 complaining of pressure in his right calf.  US performed on 4/29/19 showed an occlusive thrombus in the left femoral and left popliteal vein as well as the right popliteal, right posterior tibial, and right peroneal vein.  The patient was started back on Eliquis 10mg BID for 7 days followed by 5 mg BID. Currently the patient states he feels well.  He states he had one other episode of right calf pain after restarting Eliquis but has not had any other episodes.  He denies any swelling in his lower extremities. He denies CP, SOB, fever, chills, night sweats, weight loss, new lumps or bumps, easy bruising or bleeding.  His last colonoscopy was on 1/20/17 with 3 polyps seen and recommendation for repeat colonoscopy in 3 years.    Clot History:  The patient initially presented to [facility] on 11/16/17 with complaint of left leg pain.  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  The patient then began having worsening swelling of the left leg over the subsequent month.  When in the hospital the patient underwent an US on 11/16/17 showing partially occlusive thrombus involving the left common femoral vein and left iliac vein with completely occlusive thrombus of the left superficial femoral vein, left popliteal vein, and left peroneal vein. The patient then underwent catheter assisted thrombolysis on 11/17/17 with the use of a EKOS device.  The patient was then continued on continuous tPA and angiomax for 30 hours and then discharged  home on Eliquis 10mg PO for 7 days and then 5mg BID thereafter.  The patient then had a follow up US performed on 11/29/17 which showed  PCP on  reidentification of largely occlusive thrombus within the left external iliac, common femoral, femoral, popliteal, and peroneal veins and new thrombosis of the posterior tibial vein.  The patient saw his PCP on 12/14/17 and was then sent to the Hematology clinic given the new thrombus in the posterior tibial vein seen.  The patient denied any personal history of clotting.  He had undergone prior surgeries with appendectomy and right knee surgery without developing clots.  The patient stated his mother developed a clot in an unknown location after a groin injury when she was 60.  She was on coumadin for 2 years and then taken off.  The patient denied any other family history of clotting.  The patient denied any FH of early pregnancy loss.  He stated he has a daughter with suspected Behçet disease.  US of the left lower extremity on 2/26/18 showed deep venous occlusive thrombosis of the left superficial femoral vein and partially occlusive thrombosis of the left popliteal vein mildly improved from prior US.  Repeat US on 6/04/18 showed partially occlusive deep venous thrombosis of the left superficial femoral and popliteal vein, mildly improved from prior exam. US done on 12/13/18 showed patency of the popliteal vein and partial thrombus of the left superficial femoral vein.  The patient completed 12 months of anticoagulation on 12/13/18 and was taken off of Eliquis.    Past Medical History:  Past Medical History:   Diagnosis Date    Arthritis     DVT (deep venous thrombosis)     Hemorrhoid     Histiocytosis     Kidney stones     Spermatocele        Past Surgical HIstory:   Past Surgical History:   Procedure Laterality Date    APPENDECTOMY      COLONOSCOPY N/A 1/20/2017    Performed by Danis Frank MD at [facility] (4TH FLR)    KNEE SURGERY      right knee     LITHOTRIPSY         Family History:   Family History   Problem Relation Age of Onset    Heart disease Father         MI 2004    Cancer Paternal Grandfather         colon    Melanoma Neg Hx     Lupus Neg Hx     Psoriasis Neg Hx     Eczema Neg Hx        Social History:  reports that he has never smoked. He has never used smokeless tobacco. He reports that he drinks about 4.2 oz of alcohol per week. He reports that he does not use drugs.    Allergies:  Review of patient's allergies indicates:  No Known Allergies    Medications:  Current Outpatient Medications   Medication Sig Dispense Refill    apixaban (ELIQUIS) 5 mg Tab Take 1 tablet (5 mg total) by mouth 2 (two) times daily. 60 tablet 6    HYDROcodone-acetaminophen (NORCO) 5-325 mg per tablet Take 1 tablet by mouth every 6 (six) hours as needed for Pain. No alcohol, no driving, no operating machinery, no working, no swimming, no extra Tylenol (acetaminophen) while taking this medication. 16 tablet 0    tamsulosin (FLOMAX) 0.4 mg Cap Take 1 capsule (0.4 mg total) by mouth once daily. 10 capsule 0     No current facility-administered medications for this visit.        Review of Systems   Constitutional: Negative for chills, diaphoresis, fatigue, fever and unexpected weight change.   HENT: Negative for sore throat and trouble swallowing.    Respiratory: Negative for cough and shortness of breath.    Cardiovascular: Negative for chest pain and palpitations.   Gastrointestinal: Negative for abdominal pain, constipation, diarrhea, nausea and vomiting.   Musculoskeletal:        No swelling in the left leg.  Increased prominence in the veins of the left leg   Skin: Negative for color change and rash.   Neurological: Negative for headaches.   Hematological: Negative for adenopathy. Does not bruise/bleed easily.       ECOG Performance Status:0    Objective:      Vitals:   There were no vitals filed for this visit.  BMI: There is no height or weight on file to  calculate BMI.    Physical Exam   Constitutional: He is oriented to person, place, and time. He appears well-developed and well-nourished. No distress.   HENT:   Head: Normocephalic and atraumatic.   Cardiovascular: Normal rate, regular rhythm and normal heart sounds. Exam reveals no gallop and no friction rub.   No murmur heard.  Pulmonary/Chest: Effort normal and breath sounds normal. No respiratory distress. He has no wheezes. He has no rales. He exhibits no tenderness.   Lymphadenopathy:        Head (right side): No submental and no submandibular adenopathy present.        Head (left side): No submental and no submandibular adenopathy present.     He has no cervical adenopathy.     He has no axillary adenopathy.        Right: No supraclavicular adenopathy present.        Left: No supraclavicular adenopathy present.   Neurological: He is alert and oriented to person, place, and time.   Skin: He is not diaphoretic.       Laboratory Data:  No visits with results within 1 Week(s) from this visit.   Latest known visit with results is:   Admission on 12/27/2018, Discharged on 12/27/2018   Component Date Value Ref Range Status    Specimen UA 12/27/2018 Urine, Clean Catch   Final    Color, UA 12/27/2018 Clarisse  Yellow, Straw, Clarisse Final    Appearance, UA 12/27/2018 Hazy* Clear Final    pH, UA 12/27/2018 5.0  5.0 - 8.0 Final    Specific Gravity, UA 12/27/2018 1.030  1.005 - 1.030 Final    Protein, UA 12/27/2018 1+* Negative Final    Comment: Recommend a 24 hour urine protein or a urine   protein/creatinine ratio if globulin induced proteinuria is  clinically suspected.      Glucose, UA 12/27/2018 Negative  Negative Final    Ketones, UA 12/27/2018 Trace* Negative Final    Bilirubin (UA) 12/27/2018 Negative  Negative Final    Occult Blood UA 12/27/2018 Negative  Negative Final    Nitrite, UA 12/27/2018 Negative  Negative Final    Leukocytes, UA 12/27/2018 Negative  Negative Final    WBC 12/27/2018 8.59  3.90 -  12.70 K/uL Final    RBC 12/27/2018 5.16  4.60 - 6.20 M/uL Final    Hemoglobin 12/27/2018 16.7  14.0 - 18.0 g/dL Final    Hematocrit 12/27/2018 48.9  40.0 - 54.0 % Final    Mean Corpuscular Volume 12/27/2018 95  82 - 98 fL Final    Mean Corpuscular Hemoglobin 12/27/2018 32.4* 27.0 - 31.0 pg Final    Mean Corpuscular Hemoglobin Conc 12/27/2018 34.2  32.0 - 36.0 g/dL Final    RDW 12/27/2018 13.6  11.5 - 14.5 % Final    Platelets 12/27/2018 358* 150 - 350 K/uL Final    MPV 12/27/2018 10.2  9.2 - 12.9 fL Final    Immature Granulocytes 12/27/2018 0.3  0.0 - 0.5 % Final    Gran # (ANC) 12/27/2018 6.2  1.8 - 7.7 K/uL Final    Immature Grans (Abs) 12/27/2018 0.03  0.00 - 0.04 K/uL Final    Comment: Mild elevation in immature granulocytes is non specific and   can be seen in a variety of conditions including stress response,   acute inflammation, trauma and pregnancy. Correlation with other   laboratory and clinical findings is essential.      Lymph # 12/27/2018 1.5  1.0 - 4.8 K/uL Final    Mono # 12/27/2018 0.7  0.3 - 1.0 K/uL Final    Eos # 12/27/2018 0.2  0.0 - 0.5 K/uL Final    Baso # 12/27/2018 0.07  0.00 - 0.20 K/uL Final    nRBC 12/27/2018 0  0 /100 WBC Final    Gran% 12/27/2018 71.8  38.0 - 73.0 % Final    Lymph% 12/27/2018 16.9* 18.0 - 48.0 % Final    Mono% 12/27/2018 8.3  4.0 - 15.0 % Final    Eosinophil% 12/27/2018 1.9  0.0 - 8.0 % Final    Basophil% 12/27/2018 0.8  0.0 - 1.9 % Final    Differential Method 12/27/2018 Automated   Final    RBC, UA 12/27/2018 4  0 - 4 /hpf Final    WBC, UA 12/27/2018 5  0 - 5 /hpf Final    Bacteria 12/27/2018 Few* None-Occ /hpf Final    Squam Epithel, UA 12/27/2018 1  /hpf Final    Hyaline Casts, UA 12/27/2018 0  0-1/lpf /lpf Final    Microscopic Comment 12/27/2018 SEE COMMENT   Final    Comment: Other formed elements not mentioned in the report are not   present in the microscopic examination.       Sodium 12/27/2018 138  136 - 145 mmol/L Final     Potassium 12/27/2018 4.4  3.5 - 5.1 mmol/L Final    Chloride 12/27/2018 110  95 - 110 mmol/L Final    CO2 12/27/2018 20* 23 - 29 mmol/L Final    Glucose 12/27/2018 85  70 - 110 mg/dL Final    BUN, Bld 12/27/2018 18  6 - 20 mg/dL Final    Creatinine 12/27/2018 0.9  0.5 - 1.4 mg/dL Final    Calcium 12/27/2018 9.1  8.7 - 10.5 mg/dL Final    Total Protein 12/27/2018 6.5  6.0 - 8.4 g/dL Final    Albumin 12/27/2018 3.7  3.5 - 5.2 g/dL Final    Total Bilirubin 12/27/2018 0.7  0.1 - 1.0 mg/dL Final    Comment: For infants and newborns, interpretation of results should be based  on gestational age, weight and in agreement with clinical  observations.  Premature Infant recommended reference ranges:  Up to 24 hours.............<8.0 mg/dL  Up to 48 hours............<12.0 mg/dL  3-5 days..................<15.0 mg/dL  6-29 days.................<15.0 mg/dL      Alkaline Phosphatase 12/27/2018 63  55 - 135 U/L Final    AST 12/27/2018 21  10 - 40 U/L Final    ALT 12/27/2018 23  10 - 44 U/L Final    Anion Gap 12/27/2018 8  8 - 16 mmol/L Final    eGFR if African American 12/27/2018 >60.0  >60 mL/min/1.73 m^2 Final    eGFR if non African American 12/27/2018 >60.0  >60 mL/min/1.73 m^2 Final    Comment: Calculation used to obtain the estimated glomerular filtration  rate (eGFR) is the CKD-EPI equation.            Imaging:    US LE 4/29/19    Occlusive thrombus in the left femoral and left popliteal vein as well as the right popliteal, right posterior tibial, and right peroneal vein.    US LE 12/13/18    Partial thrombus of the left superficial femoral vein, again noted.  The previously partially occluded popliteal vein appears patent.    US LE 6/04/18     Partially occlusive deep venous thrombosis of the left superficial femoral and popliteal vein, mildly improved from prior exam.    US LE 2/26/18    Right - There is no evidence of acute venous thrombosis in the common femoral, superficial femoral, greater saphenous,  popliteal, peroneal, anterior tibial and posterior tibial veins.  There is no reflux to suggest valvular incompetence.    Left - There is occlusive thrombosis of the superficial femoral vein.  There is partially occlusive thrombosis of the popliteal vein.  There is no evidence of acute venous thrombosis in the common femoral, greater saphenous, peroneal, anterior tibial and posterior tibial veins.  There is no reflux to suggest valvular incompetence.    Impression:    Deep venous occlusive thrombosis of the left superficial femoral vein and partially occlusive thrombosis of the left popliteal vein.  Overall mildly improved from prior.    US LE 11/29/17  There is reidentification of largely occlusive thrombus within the left external iliac, common femoral, femoral, popliteal, and peroneal veins.  There is thrombosis of the posterior tibial veins, not seen previously.  The anterior tibial and greater saphenous veins are patent.    US LE 11/16/17  Duplex and color flow Doppler is remarkable for thrombus involving the left common femoral vein, left superficial femoral vein, left popliteal vein, left peroneal vein.  Thrombus is partially occlusive involving the left common femoral vein, with occlusive thrombus involving the remainder of the thrombosed vessels.  The posterior tibial veins and anterior tibial veins are patent.  There is partial thrombus involving the left iliac vein.  There is no reflux to suggest valvular incompetence.        Assessment:       1. Acute deep vein thrombosis (DVT) of distal vein of both lower extremities         Plan:     Acute DVT of both lower extremities  -The patient had an initial left lower extremity DVT provoked after injuring his ankle at work.  -On the first scan on 11/16/17 the patient had a thrombus involving the left common femoral vein, left superficial femoral vein, left popliteal vein, and left peroneal vein; a partially occlusive involving the left common femoral vein and a  partial thrombus involving the left iliac vein.  -The patient was taken off of anticoagulation on 12/13/18 after 12 months of therapy  -On 4/29/19 the patient was diagnosed with a recurrent clot on US showing  an occlusive thrombus in the left femoral and left popliteal vein as well as the right popliteal, right posterior tibial, and right peroneal vein.  -The patient was restarted on Eliquis 4/29/19 with 10mg BID for 7 days followed by 5 mg BID  -There is not indication for hypercoagulable work up currently.  -The patient needs continued age appropriate cancer screening with PSA and repeat screening colonoscopy no earlier than 1/20/20  -The patient will follow up in 12 months with repeat labs at that time.    -Discussed with Dr Herve Ferrer MD PGY-IV  Hematology and Oncology  Pager:[phone]    Distress Screening Results: Psychosocial Distress screening score of Distress Score: 0 noted and reviewed. No intervention indicated.

## 2019-06-06 NOTE — Clinical Note
Yoselin Carmen Felder and Carmen Sharp.The patient will need f/u in 1 year with Dr Edgar with CBC and CMP prior to the visit.ThanksAyaan Ferrer MD PGY-VIHematology and OncologyPager:[phone]

## 2019-06-14 ENCOUNTER — CLINICAL SUPPORT (OUTPATIENT)
Dept: REHABILITATION | Facility: HOSPITAL | Age: 54
End: 2019-06-14
Attending: FAMILY MEDICINE
Payer: COMMERCIAL

## 2019-06-14 DIAGNOSIS — M25.551 PAIN OF BOTH HIP JOINTS: Primary | ICD-10-CM

## 2019-06-14 DIAGNOSIS — M25.659 STIFFNESS OF HIP JOINT, UNSPECIFIED LATERALITY: ICD-10-CM

## 2019-06-14 DIAGNOSIS — M25.552 PAIN OF BOTH HIP JOINTS: Primary | ICD-10-CM

## 2019-06-14 PROCEDURE — 97161 PT EVAL LOW COMPLEX 20 MIN: CPT

## 2019-06-14 PROCEDURE — 97110 THERAPEUTIC EXERCISES: CPT

## 2019-06-14 NOTE — PLAN OF CARE
[facility]  Physical Therapy Initial Evaluation    Name: Edi Marie  Clinic Number: [id number]    Therapy Diagnosis:   Encounter Diagnoses   Name Primary?    Pain of both hip joints Yes    Stiffness of hip joint, unspecified laterality      Physician: Mariano Ortiz, *    Physician Orders: PT Eval and Treat   Medical Diagnosis from Referral: M16.0 (ICD-10-CM) - Primary osteoarthritis of both hips  Evaluation Date: 6/14/2019  Authorization Period Expiration: 12/31/19  Plan of Care Expiration: 8/23/19  Visit # / Visits authorized: 1/ 20    Time In: 1315  Time Out: 1355  Total Billable Time: 40 minutes    Precautions: Standard and History of DVT,     Subjective   Date of onset: chronic   History of current condition - Edi reports: Finished therapy in December, had some injections in April and was scheduled to come to therapy then had some DVT's and had to post pone therapy until this date. He was out of the country in May and had just come back. He states that the doctor said his DVT's have begun to improve and isn't feeling the symptoms down his legs. His right hip pain is bothering him more than his left now and at times the pain will travel down the thigh and into the lower limb as well.       Past Medical History:   Diagnosis Date    Arthritis     DVT (deep venous thrombosis)     Hemorrhoid     Histiocytosis     Kidney stones     Spermatocele      Edi Marie  has a past surgical history that includes Appendectomy; Knee surgery; Lithotripsy; and Colonoscopy (N/A, 1/20/2017).    Edi has a current medication list which includes the following prescription(s): apixaban.    Review of patient's allergies indicates:  No Known Allergies     Imaging, X-Ray: 11/01/18  Advanced bilateral degenerative arthritic changes involving the hips are again appreciated.  Findings are quite similar to the examination of 09/11/2017.    Prior Therapy: Yes for hip in October  Social  History: lives with their spouse  Occupation: office job travels a lot   Prior Level of Function: Independent and pain free ADL's  Current Level of Function: pain with walking and pain when trying to sleep    Pain:  Current 0/10, worst 5/10, best 0/10   Location: Bilateral hip, can travel to thigh and down leg, mainly right over left  Description: Aching  Aggravating Factors: comes on at different times but most often noticeable at night  Easing Factors: pain medication, rest and elevation    Pts goals: Strengthen legs and hips and improve core     Objective     Observation: Patient is healthy and in not acute distress    Posture: normal    Hip Range of Motion:   Right Passive Left Passive   Ext. Rotation 25 25   Int. Rotation 0 0       Lower Extremity Strength  Right LE  Left LE    Quadriceps: 5/5 Quadriceps: 5/5   Hamstrings: 5/5 Hamstrings: 5/5   Iliopsoas: 4-/5 Iliopsoas: 4+/5   Hip extension:  4/5 Hip extension: 4/5   ADD 4/5 ADD 4+/5   PGM: 4/5 PGM: 4+/5   Hip ER: 5/5 Hip ER: 5/5   Hip IR: 5/5 Hip IR: 5/5     Functional Tests:  SL Squat Test: R: decreased control of knee medial/lateral knee exercusion ; L medial knee collapse   Squat: pelvis tilts back towards end range of squat, knee progress forward over toes, slight weight shift to left side    Special Tests:   NIA: - , tightness along R lateral hip  FABERs:  -     Flexibility:   Hamstrings: R = + ; L = +    Jong test: R = + ; L = +      Joint Mobility: decreased distraction mobility on R compared to L but both decreased overall distraction    Palpation: no tender areas of points    Edema: none noted    CMS Impairment/Limitation/Restriction for FOTO Hip Survey    Therapist reviewed FOTO scores for Edi Robertchetan on 6/14/2019.   FOTO documents entered into [employer] - see Media section.    Limitation Score: 43%         TREATMENT   Treatment Time In: 1330  Treatment Time Out: 1350  Total Treatment time separate from Evaluation: 20 minutes    Edi received  "therapeutic exercises to develop strength, endurance and ROM for 20 minutes including:  SLR with TA activation x 15 B  Prone quad stretch 2 x 30" B  Seated HS/GS stretch 2 x 30" B    Home Exercises and Patient Education Provided    Education provided re: POC, goals for therapy, role of therapy for care, exercises/HEP    Written Home Exercises Provided: Yes.  Exercises were reviewed and Edi was able to demonstrate them prior to the end of the session.   Pt received a written copy of exercises to perform at home. Edi demonstrated good  understanding of the education provided.     See EMR under patient instructions for exercises given.   Assessment   Edi is a 54 y.o. male referred to outpatient Physical Therapy with a medical diagnosis of bilateral hip pain. Pt presents with increased pain of right hip more than left, decreased strength, decreased range of motion, and decreased flexibility in bilateral hamstrings. Skilled therapy is required to improve strength, decrease pain, improve range of motion, and set up an exercise program to maintain gains gathered from therapy and improve ADL's without pain. Therapy will initially be focused on range of motion and improve flexibility along with hip mobilizations to reduce pain.    Pt prognosis is Good.   Pt will benefit from skilled outpatient Physical Therapy to address the deficits stated above and in the chart below, provide pt/family education, and to maximize pt's level of independence.     Plan of care discussed with patient: Yes  Pt's spiritual, cultural and educational needs considered and patient is agreeable to the plan of care and goals as stated below:     Anticipated Barriers for therapy: none    Medical Necessity is demonstrated by the following  History  Co-morbidities and personal factors that may impact the plan of care Co-morbidities:   history of DVT    Personal Factors:   no deficits     low   Examination  Body Structures and Functions, activity " limitations and participation restrictions that may impact the plan of care Body Regions:   lower extremities    Body Systems:    gross symmetry  ROM  strength  balance    Participation Restrictions:   Gym routine    Activity limitations:   Learning and applying knowledge  no deficits    General Tasks and Commands  no deficits    Communication  no deficits    Mobility  no deficits    Self care  no deficits    Domestic Life  no deficits    Interactions/Relationships  no deficits    Life Areas  no deficits    Community and Social Life  community life  recreation and leisure         moderate   Clinical Presentation stable and uncomplicated low   Decision Making/ Complexity Score: low     GOALS: Short Term Goals:  5 weeks  1.Report decreased Bilateral hip pain  < / =  2-3/10 at worst  to increase tolerance for ADL's and exercise routine  2. Increase B ER/IR ROM by 10 degrees where limited in order to perform ADLs without difficulty.  3. Increase strength by 1/3 MMT grade in R hip flexor strength  to increase tolerance for ADL and work activities.  4. Pt to tolerate HEP to improve ROM and independence with ADL's    Long Term Goals: 10 weeks  1.Report decreased Bilateral hip pain < / = 0-1/10  to increase tolerance for exercise routine and ADL's  2.Patient goal: Patient will improve strength and set up a exercise program to maintain strength gains in therapy  3.Increase strength to 4+/5 in hip extension strength  to increase tolerance for ADL and work activities.  4. Pt decrease limitation score <35% to show improved self perception    Plan   Plan of care Certification: 6/14/2019 to 8/23/19.    Outpatient Physical Therapy 2 times weekly for 10 weeks to include the following interventions: Gait Training, Manual Therapy, Moist Heat/ Ice, Neuromuscular Re-ed, Patient Education, Therapeutic Activites and Therapeutic Exercise.     Adelso Kelly, SPT    I certify that I was present in the room directing the student in  service delivery and guiding them using my skilled judgment. As the co-signing therapist I have reviewed the students documentation and am responsible for the treatment, assessment, and plan.      Alejandra Clark, PT, DPT, OCS

## 2019-06-19 ENCOUNTER — CLINICAL SUPPORT (OUTPATIENT)
Dept: REHABILITATION | Facility: HOSPITAL | Age: 54
End: 2019-06-19
Attending: FAMILY MEDICINE
Payer: COMMERCIAL

## 2019-06-19 DIAGNOSIS — M25.552 PAIN OF BOTH HIP JOINTS: ICD-10-CM

## 2019-06-19 DIAGNOSIS — M25.659 STIFFNESS OF HIP JOINT, UNSPECIFIED LATERALITY: ICD-10-CM

## 2019-06-19 DIAGNOSIS — M25.551 PAIN OF BOTH HIP JOINTS: ICD-10-CM

## 2019-06-19 PROCEDURE — 97110 THERAPEUTIC EXERCISES: CPT

## 2019-06-19 NOTE — PROGRESS NOTES
Physical Therapy Daily Treatment Note     Name: Edi Marie  Clinic Number: [id number]    Therapy Diagnosis:   Encounter Diagnoses   Name Primary?    Pain of both hip joints     Stiffness of hip joint, unspecified laterality      Physician: Mariano Ortiz, *    Visit Date: 6/19/2019    Physician Orders: PT Eval and Treat   Medical Diagnosis from Referral: M16.0 (ICD-10-CM) - Primary osteoarthritis of both hips  Evaluation Date: 6/14/2019  Authorization Period Expiration: 12/31/19  Plan of Care Expiration: 8/23/19  Visit # / Visits authorized: 2/ 20     Time In: 0801  Time Out: 0901  Total Billable Time: 55 minutes     Subjective     Pt reports: w/ pulsating sensation in B calfs and mild pn in B hips.  He was not compliant with home exercise program.  Response to previous treatment: no adverse effects  Functional change: Decreased b hip tightness.    Pain: 1/10  Location: bilateral Hips    Objective     dEi received therapeutic exercises to develop strength, endurance, ROM, flexibility, posture and core stabilization for 55 minutes including:  HSS plinthe 2 min ea  GSS strap 2 min ea  Prone hip flexion stretch w/ strap 2 min ea   SLR TA 3 x 10  Bridges 3 x 10   Hip abd w/ ring 3 x 10 3 sec hold   LAQ B 2# 30 x ea (increase next visit)   IR/ER on stool 3 min B       Home Exercises Provided and Patient Education Provided     Education provided:   - Pt edu on proper exercise technique.      Written Home Exercises Provided: Patient instructed to cont prior HEP.  Exercises were reviewed and Edi was able to demonstrate them prior to the end of the session.  Edi demonstrated good  understanding of the education provided.     See EMR under Patient Instructions for exercises provided prior visit.    Assessment     Pt rudy tx well w/ no increase in pn.  Pt demonstrated increased muscular endurance and hip strength during therex.  Pt cont to lack some Hip/quad strength and ROM.     Edi is progressing  well towards his goals.   Pt prognosis is Good.     Pt will continue to benefit from skilled outpatient physical therapy to address the deficits listed in the problem list box on initial evaluation, provide pt/family education and to maximize pt's level of independence in the home and community environment.     Pt's spiritual, cultural and educational needs considered and pt agreeable to plan of care and goals.     Anticipated barriers to physical therapy: none    Goals: GOALS: Short Term Goals:  5 weeks  1.Report decreased Bilateral hip pain  < / =  2-3/10 at worst  to increase tolerance for ADL's and exercise routine (progressing, not met)   2. Increase B ER/IR ROM by 10 degrees where limited in order to perform ADLs without difficulty.(progressing, not met)   3. Increase strength by 1/3 MMT grade in R hip flexor strength  to increase tolerance for ADL and work activities.(progressing, not met)   4. Pt to tolerate HEP to improve ROM and independence with ADL's(progressing, not met)      Long Term Goals: 10 weeks  1.Report decreased Bilateral hip pain < / = 0-1/10  to increase tolerance for exercise routine and ADL's(progressing, not met)   2.Patient goal: Patient will improve strength and set up a exercise program to maintain strength gains in therapy(progressing, not met)   3.Increase strength to 4+/5 in hip extension strength  to increase tolerance for ADL and work activities.(progressing, not met)   4. Pt decrease limitation score <35% to show improved self perception(progressing, not met)          Plan     Cont to progress towards goals set by PT.  Work to improve hip strength and ROM next visit.      Waldemar Newman, PTA

## 2019-06-28 ENCOUNTER — CLINICAL SUPPORT (OUTPATIENT)
Dept: REHABILITATION | Facility: HOSPITAL | Age: 54
End: 2019-06-28
Attending: FAMILY MEDICINE
Payer: COMMERCIAL

## 2019-06-28 DIAGNOSIS — M25.551 PAIN OF BOTH HIP JOINTS: ICD-10-CM

## 2019-06-28 DIAGNOSIS — M25.552 PAIN OF BOTH HIP JOINTS: ICD-10-CM

## 2019-06-28 DIAGNOSIS — M25.659 STIFFNESS OF HIP JOINT, UNSPECIFIED LATERALITY: ICD-10-CM

## 2019-06-28 PROCEDURE — 97110 THERAPEUTIC EXERCISES: CPT

## 2019-06-28 NOTE — PROGRESS NOTES
Physical Therapy Daily Treatment Note     Name: Edi Marie  Clinic Number: [id number]    Therapy Diagnosis:   Encounter Diagnoses   Name Primary?    Pain of both hip joints     Stiffness of hip joint, unspecified laterality      Physician: Mariano Ortiz, *    Visit Date: 6/28/2019    Physician Orders: PT Eval and Treat   Medical Diagnosis from Referral: M16.0 (ICD-10-CM) - Primary osteoarthritis of both hips  Evaluation Date: 6/14/2019  Authorization Period Expiration: 12/31/19  Plan of Care Expiration: 8/23/19  Visit # / Visits authorized: 3/ 20     Time In: 0755  Time Out: 83231  Total Billable Time: 30 minutes     Subjective     Pt states feeling well w/ no c/o pn in either hip.    He was not compliant with home exercise program.  Response to previous treatment: no adverse effects  Functional change: Decreased b hip tightness.    Pain: 0/10  Location: bilateral Hips    Objective     Edi received therapeutic exercises to develop strength, endurance, ROM, flexibility, posture and core stabilization for 30 minutes including:  HSS plinthe 2 min ea  GSS strap 2 min ea  Prone hip flexion stretch w/ strap 2 min ea   SLR TA 3 x 10  Bridges 3 x 10 3 sec hold   Lat step orange band 2 laps   Leg press 3 x 10 #110  Hip abd w/ ring 3 x 10 3 sec hold   LAQ B 3# 30 x 3 sec hold B   IR/ER on stool 3 min B       Home Exercises Provided and Patient Education Provided     Education provided:   - Pt edu on proper exercise technique.      Written Home Exercises Provided: Patient instructed to cont prior HEP.  Exercises were reviewed and Edi was able to demonstrate them prior to the end of the session.  Edi demonstrated good  understanding of the education provided.     See EMR under Patient Instructions for exercises provided prior visit.    Assessment   Pt displayed improved hi and quad strength during therex.  Pt rudy tx well w/ no c/o pn.  Pt cont to hacve some LE weakness.       Edi is progressing well  towards his goals.   Pt prognosis is Good.     Pt will continue to benefit from skilled outpatient physical therapy to address the deficits listed in the problem list box on initial evaluation, provide pt/family education and to maximize pt's level of independence in the home and community environment.     Pt's spiritual, cultural and educational needs considered and pt agreeable to plan of care and goals.     Anticipated barriers to physical therapy: none    Goals: GOALS: Short Term Goals:  5 weeks  1.Report decreased Bilateral hip pain  < / =  2-3/10 at worst  to increase tolerance for ADL's and exercise routine (progressing, not met)   2. Increase B ER/IR ROM by 10 degrees where limited in order to perform ADLs without difficulty.(progressing, not met)   3. Increase strength by 1/3 MMT grade in R hip flexor strength  to increase tolerance for ADL and work activities.(progressing, not met)   4. Pt to tolerate HEP to improve ROM and independence with ADL's(progressing, not met)      Long Term Goals: 10 weeks  1.Report decreased Bilateral hip pain < / = 0-1/10  to increase tolerance for exercise routine and ADL's(progressing, not met)   2.Patient goal: Patient will improve strength and set up a exercise program to maintain strength gains in therapy(progressing, not met)   3.Increase strength to 4+/5 in hip extension strength  to increase tolerance for ADL and work activities.(progressing, not met)   4. Pt decrease limitation score <35% to show improved self perception(progressing, not met)          Plan     Cont to progress towards goals set by PT.  Work to improve hip strength and ROM next visit.      Waldemar Newman, PTA

## 2019-07-30 ENCOUNTER — HOSPITAL ENCOUNTER (OUTPATIENT)
Dept: RADIOLOGY | Facility: HOSPITAL | Age: 54
Discharge: HOME | End: 2019-07-30
Attending: INTERNAL MEDICINE
Payer: COMMERCIAL

## 2019-07-30 ENCOUNTER — OFFICE VISIT (OUTPATIENT)
Dept: INTERNAL MEDICINE | Facility: CLINIC | Age: 54
End: 2019-07-30
Payer: COMMERCIAL

## 2019-07-30 VITALS
DIASTOLIC BLOOD PRESSURE: 80 MMHG | HEIGHT: 71 IN | WEIGHT: 191.56 LBS | BODY MASS INDEX: 26.82 KG/M2 | OXYGEN SATURATION: 98 % | HEART RATE: 85 BPM | SYSTOLIC BLOOD PRESSURE: 130 MMHG

## 2019-07-30 DIAGNOSIS — Z86.718 PERSONAL HISTORY OF DVT (DEEP VEIN THROMBOSIS): ICD-10-CM

## 2019-07-30 DIAGNOSIS — Z00.00 PHYSICAL EXAM: Primary | ICD-10-CM

## 2019-07-30 DIAGNOSIS — Z00.00 WELLNESS EXAMINATION: ICD-10-CM

## 2019-07-30 DIAGNOSIS — Z01.00 ENCOUNTER FOR VISION SCREENING: ICD-10-CM

## 2019-07-30 DIAGNOSIS — Z23 IMMUNIZATION DUE: ICD-10-CM

## 2019-07-30 PROCEDURE — 71046 X-RAY EXAM CHEST 2 VIEWS: CPT | Mod: TC

## 2019-07-30 PROCEDURE — 71046 XR CHEST PA AND LATERAL: ICD-10-PCS | Mod: 26,,, | Performed by: RADIOLOGY

## 2019-07-30 PROCEDURE — 99396 PREV VISIT EST AGE 40-64: CPT | Mod: S$GLB,,, | Performed by: INTERNAL MEDICINE

## 2019-07-30 PROCEDURE — 99999 PR PBB SHADOW E&M-EST. PATIENT-LVL IV: ICD-10-PCS | Mod: PBBFAC,,, | Performed by: INTERNAL MEDICINE

## 2019-07-30 PROCEDURE — 99999 PR PBB SHADOW E&M-EST. PATIENT-LVL IV: CPT | Mod: PBBFAC,,, | Performed by: INTERNAL MEDICINE

## 2019-07-30 PROCEDURE — 71046 X-RAY EXAM CHEST 2 VIEWS: CPT | Mod: 26,,, | Performed by: RADIOLOGY

## 2019-07-30 PROCEDURE — 99396 PR PREVENTIVE VISIT,EST,40-64: ICD-10-PCS | Mod: S$GLB,,, | Performed by: INTERNAL MEDICINE

## 2019-07-30 NOTE — PROGRESS NOTES
Subjective:      Patient ID: Edi Marie is a 54 y.o. male.    Chief Complaint: Annual Exam    HPI:  HPI     Patient is here for his annual exam. 7/30/2019    Patient continues with the hip arthritis right worse than left. ( options : steroid injections, platelets, and stemcell)    Patient had another blood clot: 4/29/2019 He had been off Eliquis for about 6 months when he had the clot. Not on Eliquis 10 mg bid.    We discussed goals of weight and exercise:     Annual exam:7/30/2019  Colonoscopy:ordered  Paternal grandfather: colon cancer , Colonoscopy in 2017 3 polyps due in 1/2020  Optho:patient will make appt  PSA: no prostate problems, no difficulty with urination ( PSA)  Flu: consider  Tetanus: due  Shingles: due  Pneumovax: not indicated  Prevnar: not indicated  Patient Active Problem List   Diagnosis    Spermatocele    Histiocytosis    H/O vasectomy (05/30/2014)    Screening    Chronic deep vein thrombosis (DVT) of left lower extremity    Leg injury    Post-thrombotic syndrome of left lower extremity    Greater trochanteric bursitis of both hips    Hip stiffness    Pain of both hip joints    Weakness of both lower extremities    Right calf pain    Acute deep vein thrombosis (DVT) of distal vein of both lower extremities     Past Medical History:   Diagnosis Date    Arthritis     DVT (deep venous thrombosis)     Hemorrhoid     Histiocytosis     Kidney stones     Spermatocele      Past Surgical History:   Procedure Laterality Date    APPENDECTOMY      COLONOSCOPY N/A 1/20/2017    Performed by Danis Frank [facility] at [facility] (4TH FLR)    KNEE SURGERY      right knee    LITHOTRIPSY       Family History   Problem Relation Age of Onset    Heart disease Father         MI 2004    Cancer Paternal Grandfather         colon    Melanoma Neg Hx     Lupus Neg Hx     Psoriasis Neg Hx     Eczema Neg Hx      Review of Systems   Constitutional: Negative for activity change and unexpected weight  "change.   HENT: Negative for hearing loss, rhinorrhea and trouble swallowing.    Eyes: Negative for discharge and visual disturbance.   Respiratory: Negative for chest tightness and wheezing.    Cardiovascular: Negative for chest pain and palpitations.   Gastrointestinal: Negative for blood in stool, constipation, diarrhea and vomiting.   Endocrine: Negative for polydipsia and polyuria.   Genitourinary: Negative for difficulty urinating, hematuria and urgency.   Musculoskeletal: Positive for arthralgias. Negative for joint swelling and neck pain.   Neurological: Negative for weakness and headaches.   Psychiatric/Behavioral: Negative for confusion and dysphoric mood.     Objective:     Vitals:    07/30/19 0800   BP: 130/80   Pulse: 85   SpO2: 98%   Weight: 86.9 kg (191 lb 9.3 oz)   Height: 5' 11" (1.803 m)   PainSc: 0-No pain     Body mass index is 26.72 kg/m².  Physical Exam   Constitutional: He is oriented to person, place, and time. He appears well-developed and well-nourished. No distress.   Neck: Carotid bruit is not present. No thyromegaly present.   Cardiovascular: Normal rate, regular rhythm and normal heart sounds. PMI is not displaced.   Pulmonary/Chest: Effort normal and breath sounds normal. No respiratory distress.   Abdominal: Soft. Bowel sounds are normal. He exhibits no distension. There is no tenderness.   Musculoskeletal: He exhibits no edema.   Neurological: He is alert and oriented to person, place, and time.     Assessment:     1. Physical exam    2. Wellness examination    3. Encounter for vision screening    4. Immunization due    5. Personal history of DVT (deep vein thrombosis)      Plan:   Edi was seen today for annual exam.    Diagnoses and all orders for this visit:    Physical exam: no new finding    Wellness examination: cbc and cmp reviewed, additional studies added  -     Lipid panel; Future  -     PSA, Screening; Future  -     X-Ray Chest PA And Lateral; Future    Encounter for " vision screening  -     Ambulatory consult to Optometry    Immunization due  Flu, Tdap, herpes zoster    Personal history of DVT (deep vein thrombosis)  Patient is seen in hematology, ? Work up, failed time off of apixaban, now on permanently , cancer work up colonoscopy with follow up in 1/2020,PSA and CXR ordered.        Problem List Items Addressed This Visit     None      Visit Diagnoses     Physical exam    -  Primary    Wellness examination        Relevant Orders    Lipid panel    PSA, Screening    X-Ray Chest PA And Lateral    Encounter for vision screening        Relevant Orders    Ambulatory consult to Optometry    Immunization due        Personal history of DVT (deep vein thrombosis)            Orders Placed This Encounter   Procedures    X-Ray Chest PA And Lateral     Standing Status:   Future     Standing Expiration Date:   9/28/2019    Lipid panel     Standing Status:   Future     Standing Expiration Date:   9/28/2019    PSA, Screening     Standing Status:   Future     Standing Expiration Date:   9/28/2019    Ambulatory consult to Optometry     Referral Priority:   Routine     Referral Type:   Vision (Optometry)     Referral Reason:   Specialty Services Required     Requested Specialty:   Optometry     Number of Visits Requested:   1     No follow-ups on file.     Medication List           Accurate as of 7/30/19  8:27 AM. If you have any questions, ask your nurse or doctor.               CONTINUE taking these medications    apixaban 5 mg Tab  Commonly known as:  ELIQUIS  Take 1 tablet (5 mg total) by mouth 2 (two) times daily.

## 2019-07-30 NOTE — PATIENT INSTRUCTIONS
Repeat colonoscopy is in 1/2020    Immunizations any pharmacy  Flu in the fall  Tdap: every 10 years  New shingles vaccine: SHINGRIX ( 2018) not live, 90%,  2 shots, one at day zero and the 2nd at 2-6 months: any pharmacy can give it.    Dr. Reji Sanders

## 2019-09-11 ENCOUNTER — PATIENT MESSAGE (OUTPATIENT)
Dept: INTERNAL MEDICINE | Facility: CLINIC | Age: 54
End: 2019-09-11

## 2019-09-11 DIAGNOSIS — I82.409 RECURRENT DEEP VEIN THROMBOSIS (DVT): Primary | ICD-10-CM

## 2019-09-12 ENCOUNTER — OFFICE VISIT (OUTPATIENT)
Dept: SPORTS MEDICINE | Facility: CLINIC | Age: 54
End: 2019-09-12
Payer: COMMERCIAL

## 2019-09-12 ENCOUNTER — TELEPHONE (OUTPATIENT)
Dept: HEMATOLOGY/ONCOLOGY | Facility: CLINIC | Age: 54
End: 2019-09-12

## 2019-09-12 ENCOUNTER — PATIENT MESSAGE (OUTPATIENT)
Dept: INTERNAL MEDICINE | Facility: CLINIC | Age: 54
End: 2019-09-12

## 2019-09-12 VITALS — WEIGHT: 191 LBS | HEIGHT: 71 IN | BODY MASS INDEX: 26.74 KG/M2 | TEMPERATURE: 98 F

## 2019-09-12 DIAGNOSIS — M16.0 PRIMARY OSTEOARTHRITIS OF BOTH HIPS: Primary | ICD-10-CM

## 2019-09-12 PROCEDURE — 3008F PR BODY MASS INDEX (BMI) DOCUMENTED: ICD-10-PCS | Mod: CPTII,S$GLB,, | Performed by: FAMILY MEDICINE

## 2019-09-12 PROCEDURE — 99214 PR OFFICE/OUTPT VISIT, EST, LEVL IV, 30-39 MIN: ICD-10-PCS | Mod: S$GLB,,, | Performed by: FAMILY MEDICINE

## 2019-09-12 PROCEDURE — 99999 PR PBB SHADOW E&M-EST. PATIENT-LVL III: ICD-10-PCS | Mod: PBBFAC,,, | Performed by: FAMILY MEDICINE

## 2019-09-12 PROCEDURE — 99999 PR PBB SHADOW E&M-EST. PATIENT-LVL III: CPT | Mod: PBBFAC,,, | Performed by: FAMILY MEDICINE

## 2019-09-12 PROCEDURE — 99214 OFFICE O/P EST MOD 30 MIN: CPT | Mod: S$GLB,,, | Performed by: FAMILY MEDICINE

## 2019-09-12 PROCEDURE — 3008F BODY MASS INDEX DOCD: CPT | Mod: CPTII,S$GLB,, | Performed by: FAMILY MEDICINE

## 2019-09-12 RX ORDER — MELOXICAM 7.5 MG/1
7.5 TABLET ORAL DAILY
Qty: 15 TABLET | Refills: 0 | Status: SHIPPED | OUTPATIENT
Start: 2019-09-12 | End: 2019-09-25 | Stop reason: SDUPTHER

## 2019-09-12 NOTE — TELEPHONE ENCOUNTER
Please call the patient and help him with ultrasounds of both legs and an appt with Dr. Reji Sanders in Hematology.

## 2019-09-12 NOTE — PROGRESS NOTES
Edi Marie, a 54 y.o. male, is here for evaluation of RIGHT and LEFT hip.      HISTORY OF PRESENT ILLNESS   Location: anterior thigh, bilateral, L > R   Onset: insidious  Palliative:    Relative rest   Oral analgesics   CSI, GTB, 09/20/17, 70%   HgPT, noncompliant    fPT, Needs new pt ref   L/R CSI, GTB 11/06/17 80% Improvement    L/R CSI, GTB 18.07.30 60% improvement    L/R CSI, GTB 18.11.01 90% improvement for 2 months   CSI,L/R iaHip, 19.01.23,  100% for 2 months then 90% right now.   CSI, L/R iaHip, 04.16.2019, significant continual Improvement   Provocative:   ADLs   Prior:    PMH DVT 11/17/18 L. LE  Progression: Plateaud discomfort   Quality:    Ache, worse in evening    Sharp at times   Radiation: none  Severity: per nursing documentation  Timing: intermittent with use  Trauma: none      Review of systems (ROS):  A 10+ review of systems was performed with pertinent positives and negatives noted above in the history of present illness. Other systems were negative unless otherwise specified.    PHYSICAL EXAMINATION  General:  The patient is alert and oriented x 3.  Mood is pleasant.  Observation of ears, eyes and nose reveal no gross abnormalities.  HEENT: NCAT, sclera nonicteric  Lungs: Respirations are equal and unlabored.   Gait is coordinated. Patient can toe walk and heel walk without difficulty.    HIP/PELVIS EXAMINATION    Observation/Inspection  Gait:   Nonantalgic   Alignment:  Neutral   Scars:   None   Muscle atrophy: None   Effusion:  None   Warmth:  None   Discoloration:   None   Leg lengths:   Equal   Pelvis:    Level     Tenderness/Crepitus (T/C):      T / C  Trochanteric bursa   ++ / -  Piriformis    - / -  SI joint    - / -  Psoas tendon   - / -  Rectus insertion  ++ / -  Adductor insertion  - / -  Pubic symphysis  - / -    ROM: (* = pain)    Flexion:      90 degrees*  External rotation:   40 degrees*  Internal rotation with axial load:  30 degrees*  Internal rotation without axial load:   40 degrees*  Abduction:    45 degrees  Adduction:     20 degrees    Special Tests:  Pain w/ forced internal rotation (FADIR):  +   Pain w/ forced external rotation (WES):  +  Circumduction test:     -  Stinchfield test:     +  Log roll:       +   Snapping hip (internal):    -   Sit-up pain:      -   Resisted sit-up pain:     -   Resisted sit-up with adductor contraction pain:  -   Step-down test:     +  Trendelenburg test:     -  Bridge test      +     Extremity Neuro-vascular Examination:   Sensation:  Grossly intact to light touch all dermatomal regions.   Motor Function:  Fully intact motor function at hip, knee, foot and ankle    DTRs;  quadriceps and  achilles 2+.  No clonus and downgoing Babinski.    Vascular status:  DP and PT pulses 2+, brisk capillary refill, symmetric.    Skin:  intact, compartments soft.    Other Findings:    ASSESSMENT & PLAN  Assessment:   #1 Tonnis Grade III osteoarthritis of hip, bilateral   W/ greater trochanteric bursitis  #2 s/p DVT, lower extremity, left   -->nfCSI    No evidence of neurologic pathology  No evidence of vascular pathology    Imaging studies reviewed:   X-ray pelvis and hip, bilateral 18.11    Plan:    We discussed the importance of appropriate diet, weight, and regular exercise including quadriceps strengthening     We discussed options including:  #1 watchful waiting  #2 re start physical therapy aimed at:   Core stability   RoM hip   Strengthening quadriceps   Gait training   #3 injection therapy:   CSI GTB    Right, effective 70%, repeat     Left, effective 70%, repeat    CSI iaHip    Right,     Left,    Orthobiologics   #4 consultation re: THAs     The patient chooses #3 prp series iahip bilat    Pain management: handout given, #3 = m  Bracing:   Physical therapy:    fPT, @ [facility], p as above   Activity (e.g. 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 lipogems  I = BRIGIDO  Should symptoms worsen or fail to resolve, consider:  Revisiting the above options

## 2019-09-13 NOTE — TELEPHONE ENCOUNTER
Dr. Villanueva,   The Vascular lab asked that the order be placed as Bilateral - rather than individual test.    Spoke to patient and scheduled appointment.

## 2019-09-16 ENCOUNTER — TELEPHONE (OUTPATIENT)
Dept: HEMATOLOGY/ONCOLOGY | Facility: CLINIC | Age: 54
End: 2019-09-16

## 2019-09-16 NOTE — TELEPHONE ENCOUNTER
Spoke to patient.  Informed him Dr Edgar will review US tomorrow and will call with instructions. Verbalized understanding    ----- Message from Radha Felder sent at 9/13/2019  3:35 PM CDT -----  Patient needs clearance for hip surgery and he has DVT and he has an US scheduled for Tuesday.  He said if Dr Edgar can review it and decides he needs to come in for it not a problem but otherwise can she send a message to let him know if all good.    ----- Message -----  From: Peggy Hammer RN  Sent: 9/13/2019  12:01 PM  To: [facility]  Pool    Can you please call the patient.  He has seen  Dr Ferrer and Dr Edgar in the past. Thank you    Peggy    ----- Message -----  From: Felisa Aguayo  Sent: 9/13/2019   8:31 AM  To: [facility] Staff    Please contact patient for an appointment, he was recommended to Dr. Sanders by Dr. Villanueva.  Thanks

## 2019-09-17 ENCOUNTER — TELEPHONE (OUTPATIENT)
Dept: HEMATOLOGY/ONCOLOGY | Facility: CLINIC | Age: 54
End: 2019-09-17

## 2019-09-17 ENCOUNTER — HOSPITAL ENCOUNTER (OUTPATIENT)
Dept: VASCULAR SURGERY | Facility: CLINIC | Age: 54
Discharge: HOME | End: 2019-09-17
Attending: INTERNAL MEDICINE
Payer: COMMERCIAL

## 2019-09-17 DIAGNOSIS — I82.409 RECURRENT DEEP VEIN THROMBOSIS (DVT): ICD-10-CM

## 2019-09-17 PROCEDURE — 93970 PR US DUPLEX, UPPER OR LOWER EXT VENOUS,COMPLETE BILAT: ICD-10-PCS | Mod: S$GLB,,, | Performed by: SURGERY

## 2019-09-17 PROCEDURE — 93970 EXTREMITY STUDY: CPT | Mod: S$GLB,,, | Performed by: SURGERY

## 2019-09-18 ENCOUNTER — TELEPHONE (OUTPATIENT)
Dept: HEMATOLOGY/ONCOLOGY | Facility: CLINIC | Age: 54
End: 2019-09-18

## 2019-09-18 ENCOUNTER — TELEPHONE (OUTPATIENT)
Dept: INTERNAL MEDICINE | Facility: CLINIC | Age: 54
End: 2019-09-18

## 2019-09-18 NOTE — TELEPHONE ENCOUNTER
This patient 's Hematology referral was approved.  It is seeming to take a long time to schedule.  Would you please check on this.  Dr. Villanueva

## 2019-09-25 RX ORDER — MELOXICAM 7.5 MG/1
TABLET ORAL
Qty: 15 TABLET | Refills: 0 | Status: SHIPPED | OUTPATIENT
Start: 2019-09-25 | End: 2020-09-15

## 2019-09-26 ENCOUNTER — TELEPHONE (OUTPATIENT)
Dept: HEMATOLOGY/ONCOLOGY | Facility: CLINIC | Age: 54
End: 2019-09-26

## 2019-09-26 ENCOUNTER — OFFICE VISIT (OUTPATIENT)
Dept: SPORTS MEDICINE | Facility: CLINIC | Age: 54
End: 2019-09-26

## 2019-09-26 VITALS — DIASTOLIC BLOOD PRESSURE: 81 MMHG | HEART RATE: 77 BPM | SYSTOLIC BLOOD PRESSURE: 123 MMHG

## 2019-09-26 DIAGNOSIS — M16.0 PRIMARY OSTEOARTHRITIS OF BOTH HIPS: Primary | ICD-10-CM

## 2019-09-26 PROCEDURE — 99999 PR PBB SHADOW E&M-EST. PATIENT-LVL II: ICD-10-PCS | Mod: PBBFAC,,, | Performed by: FAMILY MEDICINE

## 2019-09-26 PROCEDURE — 99499 UNLISTED E&M SERVICE: CPT | Mod: CSM,S$GLB,, | Performed by: FAMILY MEDICINE

## 2019-09-26 PROCEDURE — 99999 PR PBB SHADOW E&M-EST. PATIENT-LVL II: CPT | Mod: PBBFAC,,, | Performed by: FAMILY MEDICINE

## 2019-09-26 PROCEDURE — 0232T PR INJECT PLATELET PLASMA W/IMG HARVEST/PREPARATOIN: ICD-10-PCS | Mod: CSM,S$GLB,, | Performed by: FAMILY MEDICINE

## 2019-09-26 PROCEDURE — 0232T NJX PLATELET PLASMA: CPT | Mod: CSM,S$GLB,, | Performed by: FAMILY MEDICINE

## 2019-09-26 PROCEDURE — 99499 NO LOS: ICD-10-PCS | Mod: CSM,S$GLB,, | Performed by: FAMILY MEDICINE

## 2019-09-26 NOTE — PROGRESS NOTES
Patient indications  Edi Marie, a 54 y.o. male, presents today with:  #1 Tonnis Grade III osteoarthritis of hip, bilateral              W/ greater trochanteric bursitis    Procedure performed  Platelet rich plasma (PRP) injection performed using ultrasound guidance for exact placement of orthobiologic at pathologic site     Patient consent to perform procedure  See the electronic medical record for full written and signed patient consent to proceed with this procedure.    Description of procedure    A) Surgical time out  A surgical time out was performed, including verification of patient ID, procedure to be performed, site and side, availability of information and equipment, review of safety issues, and the patients agreement and consent.     B) Phlebotomy, platelet harvest, and PRP preparation  Sterile technique was used to phlebotomize 120mL of the patients blood from a peripheral vein. This blood was  into density-divided layers using an [employer] centrifuge. The layer of leukocyte poor (hematocrit 2%) PRP, a volume of 2.2mL, was drawn up to 4.0mL with platelet poor plasma, and divided into two sterile syringes in preparation for injection.    C) Platelet rich plasma delivery to pathologic site  Injection site: BILATERAL HIP  Using 1) physical examination and 2) musculoskeletal ultrasound, the anatomy was visualized and the diseased tissue was identified and confirmed. The procedure site was marked with a skin marker. The patient was placed in position maximizing 1) physician access to pathologic tissue and 2) patient comfort. The skin about the area was sterilized with ChloraPrep (2%w/v CHG, 70% IPA). The site of needle insertion was anesthetized using vapocoolant. Under ultrasound guidance, the needle was advanced to the site of tissue pathology, and the injectate was deposited under direct visualization.    D) Post-procedure care  Following the procedure, a sterile bandage was placed  over the injection site.     Complications: none     Estimated blood loss from injection procedure: none     Joint aspirate volume: 0 mL     Disposition  The patient tolerated the procedure well and there were no immediate complications. The patient was instructed to call the clinic immediately for any mild to moderate adverse side effects, or to call 911 in the event of an emergency.        Description of ultrasound utilization for needle / probe guidance  Ultrasound guidance was used for needle / probe localization. Images (and videos, if appropriate) were saved and stored for documentation. Dynamic visualization of the needle / probe was continuous throughout the procedure.    [id number]

## 2019-09-26 NOTE — TELEPHONE ENCOUNTER
Spoke to patient.  Informed him of U/S results and Dr Edgar will speak to surgeons regarding Eliquis if needed.  States he is not planning to have surgery until next year.      ----- Message from [place] RN sent at 9/26/2019  1:36 PM CDT -----  5 phone communication since 9/12.  There appears to be confusion because Dr Edgar's nurse has talked to pt.  and lay navigator has been trying to schedule consult with a different provider.      Peggy, unless you need help, lay navigator will leave the follow up scheduling of this established patient with you.    -La    ----- Message -----  From: Maricruz Larose  Sent: 9/26/2019  11:28 AM CDT  To: [facility]    Dr Villanueva put in a referral for pt to see someone in [place] please call to schedule

## 2020-01-06 PROBLEM — Z13.9 ENCOUNTER FOR HEALTH-RELATED SCREENING: Status: RESOLVED | Noted: 2017-01-20 | Resolved: 2020-01-06

## 2020-01-08 NOTE — PROGRESS NOTES
Edi Marie, a 54 y.o. male, is here for evaluation of RIGHT and LEFT hip.      HISTORY OF PRESENT ILLNESS   Location: anterior thigh, bilateral, L > R   Onset: insidious  Palliative:    Relative rest   Oral analgesics   CSI, GTB, 09/20/17, 70%   HgPT, noncompliant    fPT, Needs new pt ref   L/R CSI, GTB 11/06/17 80% Improvement    L/R CSI, GTB 18.07.30 60% improvement    L/R CSI, GTB 18.11.01 90% improvement for 2 months   CSI,L/R iaHip, 19.01.23,  100% for 2 months then 90% right now.   CSI, L/R iaHip, 04.16.2019, significant continual Improvement    fPT, @[facility] date: 06.14.2019, duration: 3 visits, minimal Improvement, d/c on 07.12.2019 due to no shows   PRP 1/1, L/R iaHip, 09.26.2019, minimal Improvement  Provocative:   ADLs   Prior:    PMH DVT 11/17/18 L. LE  Progression: Plateaud discomfort   Quality:    Ache, worse in evening    Sharp at times   Radiation: none  Severity: per nursing documentation  Timing: intermittent with use  Trauma: none      Review of systems (ROS):  A 10+ review of systems was performed with pertinent positives and negatives noted above in the history of present illness. Other systems were negative unless otherwise specified.    PHYSICAL EXAMINATION  General:  The patient is alert and oriented x 3.  Mood is pleasant.  Observation of ears, eyes and nose reveal no gross abnormalities.  HEENT: NCAT, sclera nonicteric  Lungs: Respirations are equal and unlabored.   Gait is coordinated. Patient can toe walk and heel walk without difficulty.    HIP/PELVIS EXAMINATION    Observation/Inspection  Gait:   Nonantalgic   Alignment:  Neutral   Scars:   None   Muscle atrophy: None   Effusion:  None   Warmth:  None   Discoloration:   None   Leg lengths:   Equal   Pelvis:    Level     Tenderness/Crepitus (T/C):      T / C  Trochanteric bursa   ++ / -  Piriformis    - / -  SI joint    - / -  Psoas tendon   - / -  Rectus insertion  ++ / -  Adductor insertion  - / -  Pubic symphysis  - /  -    ROM: (* = pain)    Flexion:      90 degrees*  External rotation:   40 degrees*  Internal rotation with axial load:  30 degrees*  Internal rotation without axial load:  40 degrees*  Abduction:    45 degrees  Adduction:     20 degrees    Special Tests:  Pain w/ forced internal rotation (FADIR):  +   Pain w/ forced external rotation (WES):  +  Circumduction test:     -  [facility] test:     +  Log roll:       +   Snapping hip (internal):    -   Sit-up pain:      -   Resisted sit-up pain:     -   Resisted sit-up with adductor contraction pain:  -   Step-down test:     +  Trendelenburg test:     -  Bridge test      +     Extremity Neuro-vascular Examination:   Sensation:  Grossly intact to light touch all dermatomal regions.   Motor Function:  Fully intact motor function at hip, knee, foot and ankle    DTRs;  quadriceps and  achilles 2+.  No clonus and downgoing Babinski.    Vascular status:  DP and PT pulses 2+, brisk capillary refill, symmetric.    Skin:  intact, compartments soft.    Other Findings:    ASSESSMENT & PLAN  Assessment:   #1 Tonnis Grade III osteoarthritis of hip, bilateral  #2 s/p DVT, lower extremity, left   -->nfCSI    No evidence of neurologic pathology  No evidence of vascular pathology    Imaging studies reviewed:   X-ray pelvis and hip, bilateral 20.01    Plan:    We discussed the importance of appropriate diet, weight, and regular exercise    We discussed options including:    Watchful waiting / relative rest    Physical therapy *   Injection therapy orthobio amnio b   Consultation    The patient chooses:  As above   * = prescribed, as below  CSI = corticosteroid injection  VSI = viscosupplement injection  PRPI = platelet rich plasma injection  ia = intra articular  R = right  L = left  B = bilateral     Physical Therapy        Formal (fPT), @ [facility] facility b   Formal (fPT), @ [facility] facility        Homegoing (hgPT), per concurrent fPT recommendations    Homegoing (hgPT), per prior fPT  recommendations    Homegoing (hgPT), handout provided        w/  (atPT)    [blank] = not prescribed  * = prescribed  b = prescribed, and begin as indicated  t = continue as indicated  r = prescribed, and restart as indicated  p = completed prior as indicate  hs = prescribed, and with high school   col = prescribed, and with college or university   nf = physical therapy was recommended, but patient is not interested in PT at this time    Activity (e.g. sports, work) restrictions    [blank] = as tolerated  pt = per physical therapist  at = per     Bracing    [blank] = not prescribed  r = recommended, but not fit with at todays visit  f = prescribed and fit with at todays visit  t = continue as indicated    Pain management    [blank] = No prescription necessary. A handout detailing dosing of appropriate over-the-counter musculoskeletal analgesics was made available to the patient.   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

## 2020-01-09 ENCOUNTER — OFFICE VISIT (OUTPATIENT)
Dept: SPORTS MEDICINE | Facility: CLINIC | Age: 55
End: 2020-01-09
Payer: COMMERCIAL

## 2020-01-09 ENCOUNTER — HOSPITAL ENCOUNTER (OUTPATIENT)
Dept: RADIOLOGY | Facility: HOSPITAL | Age: 55
Discharge: HOME | End: 2020-01-09
Attending: FAMILY MEDICINE
Payer: COMMERCIAL

## 2020-01-09 VITALS — BODY MASS INDEX: 26.74 KG/M2 | WEIGHT: 191 LBS | TEMPERATURE: 98 F | HEIGHT: 71 IN

## 2020-01-09 DIAGNOSIS — M25.551 BILATERAL HIP PAIN: ICD-10-CM

## 2020-01-09 DIAGNOSIS — M25.552 BILATERAL HIP PAIN: ICD-10-CM

## 2020-01-09 DIAGNOSIS — M16.0 PRIMARY OSTEOARTHRITIS OF BOTH HIPS: Primary | ICD-10-CM

## 2020-01-09 PROCEDURE — 99999 PR PBB SHADOW E&M-EST. PATIENT-LVL III: ICD-10-PCS | Mod: PBBFAC,,, | Performed by: FAMILY MEDICINE

## 2020-01-09 PROCEDURE — 99214 OFFICE O/P EST MOD 30 MIN: CPT | Mod: S$GLB,,, | Performed by: FAMILY MEDICINE

## 2020-01-09 PROCEDURE — 73521 X-RAY EXAM HIPS BI 2 VIEWS: CPT | Mod: TC

## 2020-01-09 PROCEDURE — 73521 X-RAY EXAM HIPS BI 2 VIEWS: CPT | Mod: 26,,, | Performed by: RADIOLOGY

## 2020-01-09 PROCEDURE — 99214 PR OFFICE/OUTPT VISIT, EST, LEVL IV, 30-39 MIN: ICD-10-PCS | Mod: S$GLB,,, | Performed by: FAMILY MEDICINE

## 2020-01-09 PROCEDURE — 3008F PR BODY MASS INDEX (BMI) DOCUMENTED: ICD-10-PCS | Mod: CPTII,S$GLB,, | Performed by: FAMILY MEDICINE

## 2020-01-09 PROCEDURE — 3008F BODY MASS INDEX DOCD: CPT | Mod: CPTII,S$GLB,, | Performed by: FAMILY MEDICINE

## 2020-01-09 PROCEDURE — 99999 PR PBB SHADOW E&M-EST. PATIENT-LVL III: CPT | Mod: PBBFAC,,, | Performed by: FAMILY MEDICINE

## 2020-01-09 PROCEDURE — 73521 XR HIPS BILATERAL 2 VIEW INCL AP PELVIS: ICD-10-PCS | Mod: 26,,, | Performed by: RADIOLOGY

## 2020-02-01 ENCOUNTER — PATIENT MESSAGE (OUTPATIENT)
Dept: SPORTS MEDICINE | Facility: CLINIC | Age: 55
End: 2020-02-01

## 2020-02-01 DIAGNOSIS — M16.0 PRIMARY OSTEOARTHRITIS OF BOTH HIPS: Primary | ICD-10-CM

## 2020-02-01 DIAGNOSIS — M25.551 CHRONIC HIP PAIN, BILATERAL: ICD-10-CM

## 2020-02-01 DIAGNOSIS — G89.29 CHRONIC HIP PAIN, BILATERAL: ICD-10-CM

## 2020-02-01 DIAGNOSIS — M25.552 CHRONIC HIP PAIN, BILATERAL: ICD-10-CM

## 2020-02-10 ENCOUNTER — CLINICAL SUPPORT (OUTPATIENT)
Dept: REHABILITATION | Facility: HOSPITAL | Age: 55
End: 2020-02-10
Attending: FAMILY MEDICINE
Payer: COMMERCIAL

## 2020-02-10 DIAGNOSIS — M25.659 STIFFNESS OF HIP JOINT, UNSPECIFIED LATERALITY: Primary | ICD-10-CM

## 2020-02-10 DIAGNOSIS — R29.898 WEAKNESS OF BOTH HIPS: ICD-10-CM

## 2020-02-10 PROBLEM — M25.552 PAIN OF BOTH HIP JOINTS: Status: RESOLVED | Noted: 2018-08-15 | Resolved: 2020-02-10

## 2020-02-10 PROBLEM — M25.551 PAIN OF BOTH HIP JOINTS: Status: RESOLVED | Noted: 2018-08-15 | Resolved: 2020-02-10

## 2020-02-10 PROCEDURE — 97110 THERAPEUTIC EXERCISES: CPT

## 2020-02-10 PROCEDURE — 97162 PT EVAL MOD COMPLEX 30 MIN: CPT

## 2020-02-10 NOTE — PLAN OF CARE
[facility]  Physical Therapy Initial Evaluation    Name: Edi Marie  Clinic Number: [id number]    Therapy Diagnosis:   Encounter Diagnoses   Name Primary?    Stiffness of hip joint, unspecified laterality Yes    Weakness of both hips      Physician: Mariano Ortiz, *    Physician Orders: PT Eval and Treat   Medical Diagnosis from Referral: M16.0 (ICD-10-CM) - Primary osteoarthritis of both hips  M25.551,M25.552,G89.29 (ICD-10-CM) - Chronic hip pain, bilateral  Evaluation Date: 2/10/2020  Authorization Period Expiration: 12/31/2020  Plan of Care Expiration: 5/8/2020  Visit # / Visits authorized: 1/ 30    Time In: 2:00 pm  Time Out: 3:00 pm  Total Appointment Time (timed & untimed codes): 20 minutes    Precautions: Standard and blood thinners    Subjective   Date of onset: dr dexter 1/9/2020  History of current condition - Edi reports: he has OA of both hips and needs them replaced at some point. He wants to build strength and mobility to push that off as much as possible. He tried the PRP injections with Dr. Ortiz, which didn't help. They may try the amnio injections next. He reports being really bad at doing stuff at home. He also travels a lot for work. This has been bothering him for a few years, and has stayed that bad since. He is on blood thinners, and thinks that may have changed the efficacy. He had multiple steroid injections in the past, which helped, but he had DVTs after each. He has no other reason for the DVTs to have occurred. Reports no injury history, low back problems, numbness/tingling.      Medical History:   Past Medical History:   Diagnosis Date    Arthritis     DVT (deep venous thrombosis)     Hemorrhoid     Histiocytosis     Kidney stones     Spermatocele        Surgical History:   Edi Marie  has a past surgical history that includes Appendectomy; Knee surgery; Lithotripsy; and Colonoscopy (N/A, 1/20/2017).    Medications:   Edi  has a current medication list which includes the following prescription(s): apixaban and meloxicam.    Allergies:   Review of patient's allergies indicates:  No Known Allergies     Imaging, x-ray: Right: There is moderate-severe DJD and impingement change.  Left: There is moderate-severe DJD and impingement change.  Two views bilateral.    Prior Therapy: previously with Alejandra; had been going well but ran out of visits and was traveling  Social History: lives with wife; no stairs  Occupation: owns supply company; travels a lot  Prior Level of Function: no difficulty with ADLs; sporadic gym use, walks a lot with traveling  Current Level of Function: putting on shoes, increased aches and pains down the legs    Pain:  Current 0/10, worst 6/10, best 0/10   Location: bilateral hips, quads, lower legs  Description: Aching and Dull  Aggravating Factors: sleeping on side, most activities  Easing Factors: CBD oil    Pts goals: better flexibility, strengthening to reduce aching    Objective     Observation: no acute distress    Posture: unremarkable    AROM Lumbar  Comment   Flexion: To ankles    Extension: 50% of normal    Lat Flex R: To distal thigh    Lat Flex L: To distal thigh    Rotation R: 75% of normal    Rotation L: 75% of normal      Hip Range of Motion:   Left Passive Right Passive   Flexion 90 90   Abduction 25 25   Extension 5 5   Ext. Rotation 35 30   Int. Rotation 5 5       Lower Extremity Strength  Right LE  Left LE    Knee extension: 5/5 Knee extension: 5/5   Knee flexion: 4+/5 Knee flexion: 4+/5   Hip flexion: 3/5 Hip flexion: 3+/5   Hip Internal Rotation:  4/5    Hip Internal Rotation: 4/5      Hip External Rotation: 4/5    Hip External Rotation: 4/5      Hip extension:  2/5 Hip extension: 2/5   Hip abduction: 3/5 Hip abduction: 3/5   Hip adduction: 2/5 Hip adduction 2/5   Ankle dorsiflexion: 5/5 Ankle dorsiflexion: 5/5   Ankle plantarflexion: 5/5 Ankle plantarflexion: 5/5         Special Tests:  Bridge  Test: -  WES: +  FADIR: +    Flexibility:    Popliteal Angle: R = 40 degrees ; L = 20 degrees    Joint Mobility: decreased joint mobility in all planes    Palpation: no tenderness to palpation    Sensation: no sensation deficits    Edema: none pesent      Limitation/Restriction for FOTO Hip Survey    Therapist reviewed FOTO scores for Edi Marie on 2/10/2020.   FOTO documents entered into [employer] - see Media section.    Limitation Score: 40%         TREATMENT   Treatment Time In: 2:40 pm  Treatment Time Out: 3:00 pm  Total Treatment time (time-based codes) separate from Evaluation: 20 minutes    Edi received therapeutic exercises to develop strength, endurance, ROM and flexibility for 15 minutes including:  LTR x10 each  DL bridge x10  SL clamshell orange theraband x10 each    Edi received the following manual therapy techniques: Joint mobilizations were applied to the: bilateral hips for 5 minutes, including:  Lateral distraction, posterior glide with strap     Home Exercises and Patient Education Provided    Education provided:   - pathophysiology, importance of HEP adherence    Written Home Exercises Provided: yes.  Exercises were reviewed and Edi was able to demonstrate them prior to the end of the session.  Edi demonstrated good  understanding of the education provided.     See EMR under Patient Instructions for exercises provided 2/10/2020.    Assessment   Edi is a 54 y.o. male referred to outpatient Physical Therapy with a medical diagnosis of primary osteoarthritis of both hips, chronic hip pain. Pt presents with decreased hip ROM, LE strength, gait abnormality, poor joint mobility, and decreased tolerance for functional mobility. He would benefit from skilled PT services to improve mobility and strength, and return patient to his prior level of function.     Pt prognosis is Good.   Pt will benefit from skilled outpatient Physical Therapy to address the deficits stated above and in the  chart below, provide pt/family education, and to maximize pt's level of independence.     Plan of care discussed with patient: Yes  Pt's spiritual, cultural and educational needs considered and patient is agreeable to the plan of care and goals as stated below:     Anticipated Barriers for therapy: prior poor adherence    Medical Necessity is demonstrated by the following  History  Co-morbidities and personal factors that may impact the plan of care Co-morbidities:   chronic DVT    Personal Factors:   no deficits     moderate   Examination  Body Structures and Functions, activity limitations and participation restrictions that may impact the plan of care Body Regions:   lower extremities    Body Systems:    gross symmetry  ROM  strength  gross coordinated movement  balance  gait  transfers  transitions  motor control  motor learning    Participation Restrictions:   Difficulty with ADLs, unable to exercise for recreation    Activity limitations:   Learning and applying knowledge  no deficits    General Tasks and Commands  no deficits    Communication  no deficits    Mobility  lifting and carrying objects  walking  using transportation (bus, train, plane, car)  driving (bike, car, motorcycle)    Self care  dressing    Domestic Life  shopping  cooking  doing house work (cleaning house, washing dishes, laundry)    Interactions/Relationships  family relationships    Life Areas  employment    Community and Social Life  community life  recreation and leisure         moderate   Clinical Presentation evolving clinical presentation with changing clinical characteristics moderate   Decision Making/ Complexity Score: moderate     Goals:  Short Term Goals: 6 weeks   - amb 1 mile on outdoors without pain provocation or need for rest  - do SLS >30 sec without LOB or pain  - do anterior step up of 6  without pain provocation  - pt will report decreased LE pain at night for improved sleep tolerance    Long Term Goals: 12 weeks   -  pt will demonstrate 10 degrees improved hip ROM for improved mobility  - pt will demonstrate at least 1/2 point improvement in strength via MMT for improved stability  - pt will be 75% adherent to HEP for improved independence  - pt will be able to return to gym activities for return to prior level of activity    Plan   Plan of care Certification: 2/10/2020 to 5/8/2020.    Outpatient Physical Therapy 2 times weekly for 12 weeks to include the following interventions: Aquatic Therapy, Gait Training, Manual Therapy, Moist Heat/ Ice, Neuromuscular Re-ed, Patient Education, Therapeutic Activites, Therapeutic Exercise and dry needling.     Sara Muir, PT

## 2020-02-17 ENCOUNTER — CLINICAL SUPPORT (OUTPATIENT)
Dept: REHABILITATION | Facility: HOSPITAL | Age: 55
End: 2020-02-17
Attending: FAMILY MEDICINE
Payer: COMMERCIAL

## 2020-02-17 DIAGNOSIS — M25.659 STIFFNESS OF HIP JOINT, UNSPECIFIED LATERALITY: ICD-10-CM

## 2020-02-17 DIAGNOSIS — I82.4Z3 ACUTE DEEP VEIN THROMBOSIS (DVT) OF DISTAL VEIN OF BOTH LOWER EXTREMITIES: ICD-10-CM

## 2020-02-17 DIAGNOSIS — R29.898 WEAKNESS OF BOTH HIPS: ICD-10-CM

## 2020-02-17 PROCEDURE — 97140 MANUAL THERAPY 1/> REGIONS: CPT

## 2020-02-17 PROCEDURE — 97110 THERAPEUTIC EXERCISES: CPT

## 2020-02-17 NOTE — PROGRESS NOTES
"  Physical Therapy Daily Treatment Note     Name: Edi Marie  Clinic Number: [id number]    Therapy Diagnosis:   Encounter Diagnoses   Name Primary?    Weakness of both hips     Stiffness of hip joint, unspecified laterality      Physician: Mariano Ortiz, *    Visit Date: 2/17/2020  Physician Orders: PT Eval and Treat   Medical Diagnosis from Referral: M16.0 (ICD-10-CM) - Primary osteoarthritis of both hips  M25.551,M25.552,G89.29 (ICD-10-CM) - Chronic hip pain, bilateral  Evaluation Date: 2/10/2020  Authorization Period Expiration: 12/31/2020  Plan of Care Expiration: 5/8/2020  Visit # / Visits authorized: 2/ 30    Time In: 5:00 pm  Time Out: 6:00 pm  Total Billable Time: 60 minutes    Precautions: Standard and blood thinners    Subjective     Pt reports: he was out of town during the weekend and did a lot of walking, which increased his soreness. He has no pain today, just stiffness.  He was somewhat compliant with home exercise program.  Response to previous treatment: no adverse effects  Functional change: none yet    Pain: 0/10  Location: bilateral hips      Objective        Edi received therapeutic exercises to develop strength, endurance, ROM and flexibility for 50 minutes including:  Upright bike x10 min for improved circulation and joint mobility  Supine IR stretch, ER stretch 3x30 sec each  LTR x10 each  DL bridge 2x10  SL clamshell at wall 10x10"  Reverse clamshell 10x5"  SL hip abduction at wall 10x10" each     Edi received the following manual therapy techniques: Joint mobilizations were applied to the: bilateral hips for 10 minutes, including:  Lateral distraction, posterior glide with strap     Home Exercises Provided and Patient Education Provided     Education provided:   - importance of HEP adherence, exercise presciption    Written Home Exercises Provided: Patient instructed to cont prior HEP.  Exercises were reviewed and Edi was able to demonstrate them prior to the end of " the session.  Edi demonstrated good  understanding of the education provided.     See EMR under Patient Instructions for exercises provided prior visit.    Assessment     Edi tolerated activities well with no increase in pain. Moderate stiffness noted with PROM and joint mobility. Reports cramping with most activities. Will continue to work to improve passive and active mobility.   Edi is progressing well towards his goals.   Pt prognosis is Good.     Pt will continue to benefit from skilled outpatient physical therapy to address the deficits listed in the problem list box on initial evaluation, provide pt/family education and to maximize pt's level of independence in the home and community environment.     Pt's spiritual, cultural and educational needs considered and pt agreeable to plan of care and goals.    Anticipated barriers to physical therapy: prior poor adherence    Goals:   Short Term Goals: 6 weeks   - amb 1 mile on outdoors without pain provocation or need for rest (progressing, not met)  - do SLS >30 sec without LOB or pain (progressing, not met)  - do anterior step up of 6  without pain provocation (progressing, not met)  - pt will report decreased LE pain at night for improved sleep tolerance (progressing, not met)     Long Term Goals: 12 weeks   - pt will demonstrate 10 degrees improved hip ROM for improved mobility (progressing, not met)  - pt will demonstrate at least 1/2 point improvement in strength via MMT for improved stability (progressing, not met)  - pt will be 75% adherent to HEP for improved independence (progressing, not met)  - pt will be able to return to gym activities for return to prior level of activity (progressing, not met)    Plan     Continue per POC, addressing mobility and strength deficits as tolerated.     Sara Muir, PT

## 2020-02-27 ENCOUNTER — CLINICAL SUPPORT (OUTPATIENT)
Dept: REHABILITATION | Facility: HOSPITAL | Age: 55
End: 2020-02-27
Attending: FAMILY MEDICINE
Payer: COMMERCIAL

## 2020-02-27 DIAGNOSIS — M25.659 STIFFNESS OF HIP JOINT, UNSPECIFIED LATERALITY: ICD-10-CM

## 2020-02-27 DIAGNOSIS — R29.898 WEAKNESS OF BOTH HIPS: ICD-10-CM

## 2020-02-27 PROCEDURE — 97110 THERAPEUTIC EXERCISES: CPT | Mod: CQ

## 2020-02-27 PROCEDURE — 97140 MANUAL THERAPY 1/> REGIONS: CPT | Mod: CQ

## 2020-03-02 ENCOUNTER — CLINICAL SUPPORT (OUTPATIENT)
Dept: REHABILITATION | Facility: HOSPITAL | Age: 55
End: 2020-03-02
Attending: FAMILY MEDICINE
Payer: COMMERCIAL

## 2020-03-02 DIAGNOSIS — R29.898 WEAKNESS OF BOTH HIPS: ICD-10-CM

## 2020-03-02 DIAGNOSIS — M25.659 STIFFNESS OF HIP JOINT, UNSPECIFIED LATERALITY: ICD-10-CM

## 2020-03-02 PROCEDURE — 97140 MANUAL THERAPY 1/> REGIONS: CPT

## 2020-03-02 PROCEDURE — 97110 THERAPEUTIC EXERCISES: CPT

## 2020-03-02 NOTE — PROGRESS NOTES
"  Physical Therapy Daily Treatment Note     Name: Edi Marie  Clinic Number: [id number]    Therapy Diagnosis:   Encounter Diagnoses   Name Primary?    Weakness of both hips     Stiffness of hip joint, unspecified laterality      Physician: Mariano Ortiz, *    Visit Date: 3/2/2020  Physician Orders: PT Eval and Treat   Medical Diagnosis from Referral: M16.0 (ICD-10-CM) - Primary osteoarthritis of both hips  M25.551,M25.552,G89.29 (ICD-10-CM) - Chronic hip pain, bilateral  Evaluation Date: 2/10/2020  Authorization Period Expiration: 12/31/2020  Plan of Care Expiration: 5/8/2020  Visit # / Visits authorized: 4/ 30    Time In: 1100  Time Out: 1200  Total Billable Time: 38 minutes    Precautions: Standard and blood thinners    Subjective     He has overall been feeling a lot better since starting therapy. He has no pain today, and has just been having mild stiffness over the weekend.     He was somewhat compliant with home exercise program.  Response to previous treatment: no adverse effects  Functional change: no change     Pain: 0/10  Location: bilateral hips      Objective        Edi received therapeutic exercises to develop strength, endurance, ROM and flexibility for 45 minutes including:  Upright bike x10 min for improved circulation and joint mobility  Supine IR stretch, ER stretch 3x30 sec each  LTR 3 min   Child's pose stretch 10x5"  DL bridge 3x10   Reverse clamshell 3x 10 x5"  SL clamshell at wall 20x10"  DL shuttle squat 75# 3x10   Lateral band walking green theraband at knees x2 laps     Edi received the following manual therapy techniques: Joint mobilizations were applied to the: bilateral hips for 10 minutes, including:  Lateral distraction, posterior glide with strap   Self lateral distraction green powerband x5 min    Home Exercises Provided and Patient Education Provided     Education provided:   - importance of HEP adherence, exercise presciption    Written Home Exercises Provided: " Patient instructed to cont prior HEP.  Exercises were reviewed and Edi was able to demonstrate them prior to the end of the session.  Edi demonstrated good  understanding of the education provided.     See EMR under Patient Instructions for exercises provided prior visit.    Assessment   Continues to have significant limitations in hip ROM, especially IR. Improved ROM noted after manual therapy joint mobs. Educated patient on self lateral distraction at home. Improved ROM noted with active clamshells and reverse clamshells today.      Edi is progressing well towards his goals.   Pt prognosis is Good.     Pt will continue to benefit from skilled outpatient physical therapy to address the deficits listed in the problem list box on initial evaluation, provide pt/family education and to maximize pt's level of independence in the home and community environment.     Pt's spiritual, cultural and educational needs considered and pt agreeable to plan of care and goals.    Anticipated barriers to physical therapy: prior poor adherence    Goals:   Short Term Goals: 6 weeks   - amb 1 mile on outdoors without pain provocation or need for rest (progressing, not met)  - do SLS >30 sec without LOB or pain (progressing, not met)  - do anterior step up of 6  without pain provocation (progressing, not met)  - pt will report decreased LE pain at night for improved sleep tolerance (progressing, not met)     Long Term Goals: 12 weeks   - pt will demonstrate 10 degrees improved hip ROM for improved mobility (progressing, not met)  - pt will demonstrate at least 1/2 point improvement in strength via MMT for improved stability (progressing, not met)  - pt will be 75% adherent to HEP for improved independence (progressing, not met)  - pt will be able to return to gym activities for return to prior level of activity (progressing, not met)    Plan     Cont to progress towards goals set by PT.  Work to increase ROM and hip strength  next visit.      Sara Muir, PT

## 2020-03-06 ENCOUNTER — CLINICAL SUPPORT (OUTPATIENT)
Dept: REHABILITATION | Facility: HOSPITAL | Age: 55
End: 2020-03-06
Attending: FAMILY MEDICINE
Payer: COMMERCIAL

## 2020-03-06 DIAGNOSIS — M25.659 STIFFNESS OF HIP JOINT, UNSPECIFIED LATERALITY: ICD-10-CM

## 2020-03-06 DIAGNOSIS — R29.898 WEAKNESS OF BOTH HIPS: ICD-10-CM

## 2020-03-06 PROCEDURE — 97140 MANUAL THERAPY 1/> REGIONS: CPT

## 2020-03-06 PROCEDURE — 97110 THERAPEUTIC EXERCISES: CPT

## 2020-03-06 NOTE — PROGRESS NOTES
"  Physical Therapy Daily Treatment Note     Name: Edi Marie  Clinic Number: [id number]    Therapy Diagnosis:   Encounter Diagnoses   Name Primary?    Weakness of both hips     Stiffness of hip joint, unspecified laterality      Physician: Mariano Ortiz, *    Visit Date: 3/6/2020  Physician Orders: PT Eval and Treat   Medical Diagnosis from Referral: M16.0 (ICD-10-CM) - Primary osteoarthritis of both hips  M25.551,M25.552,G89.29 (ICD-10-CM) - Chronic hip pain, bilateral  Evaluation Date: 2/10/2020  Authorization Period Expiration: 12/31/2020  Plan of Care Expiration: 5/8/2020  Visit # / Visits authorized: 5/ 30    Time In: 0900  Time Out: 1000  Total Billable Time: 30 minutes    Precautions: Standard and blood thinners    Subjective     He continues to feel a lot better since starting therapy. He was not able to go buy a powerband, but has been doing more at home. He has no complaints of pain today.   He was somewhat compliant with home exercise program.  Response to previous treatment: no adverse effects  Functional change: no change     Pain: 0/10  Location: bilateral hips      Objective        Edi received therapeutic exercises to develop strength, endurance, ROM and flexibility for 45 minutes including:  Upright bike x10 min for improved circulation and joint mobility  Dynamic mobility: high knee pull, quad stretch, hamstring stretch, hip ER, worlds greatest x 1 lap each  90/90 bridge 3x10   SL clamshell at wall 20x10"  SL hip abduction at wall 20x10"     Edi received the following manual therapy techniques: Joint mobilizations were applied to the: bilateral hips for 8 minutes, including:  Lateral distraction, posterior glide with strap -NP  Self lateral distraction green powerband x5 min  Child's pose stretch with powerband 10x10"    Home Exercises Provided and Patient Education Provided     Education provided:   - importance of HEP adherence, exercise presciption    Written Home Exercises " Provided: Patient instructed to cont prior HEP.  Exercises were reviewed and Edi was able to demonstrate them prior to the end of the session.  Edi demonstrated good  understanding of the education provided.     See EMR under Patient Instructions for exercises provided prior visit.    Assessment   Reported tightness and instability with dynamic warmup. Increased focus on mobility with subsequent strengthening. Will continue to progress with functional activities as tolerated.     Edi is progressing well towards his goals.   Pt prognosis is Good.     Pt will continue to benefit from skilled outpatient physical therapy to address the deficits listed in the problem list box on initial evaluation, provide pt/family education and to maximize pt's level of independence in the home and community environment.     Pt's spiritual, cultural and educational needs considered and pt agreeable to plan of care and goals.    Anticipated barriers to physical therapy: prior poor adherence    Goals:   Short Term Goals: 6 weeks   - amb 1 mile on outdoors without pain provocation or need for rest (progressing, not met)  - do SLS >30 sec without LOB or pain (progressing, not met)  - do anterior step up of 6  without pain provocation (progressing, not met)  - pt will report decreased LE pain at night for improved sleep tolerance (progressing, not met)     Long Term Goals: 12 weeks   - pt will demonstrate 10 degrees improved hip ROM for improved mobility (progressing, not met)  - pt will demonstrate at least 1/2 point improvement in strength via MMT for improved stability (progressing, not met)  - pt will be 75% adherent to HEP for improved independence (progressing, not met)  - pt will be able to return to gym activities for return to prior level of activity (progressing, not met)    Plan     Cont to progress towards goals set by PT.  Work to increase ROM and hip strength next visit.      Sara Muir, PT

## 2020-03-19 ENCOUNTER — TELEPHONE (OUTPATIENT)
Dept: REHABILITATION | Facility: HOSPITAL | Age: 55
End: 2020-03-19

## 2020-03-19 NOTE — TELEPHONE ENCOUNTER
Patient: Edi Marie  Date: 3/19/2020  Diagnosis: No diagnosis found.  MRN: [id number]    Spoke with patient due to therapy following updates regarding COVID-19 closely and taking every precaution to ensure the safety of our patients, staff and community.  In an abundance of caution and in an effort to help reduce risk and limit community spread, we have decided to temporarily postpone appointments for patients who may be at increased risk to attend in-person therapy or where therapy is not critically needed at this time. Plan of care and home exercise program were reviewed and patient has what they need to continue therapy at home. All patient questions were answered. Also stated to patient that we are exploring virtual methods of providing care and will be in touch over the next few weeks. Patient verbalized understanding to all.      Sara Muir, DPT  3/19/2020

## 2020-05-16 ENCOUNTER — TELEPHONE (OUTPATIENT)
Dept: INTERNAL MEDICINE | Facility: CLINIC | Age: 55
End: 2020-05-16

## 2020-05-16 ENCOUNTER — PATIENT MESSAGE (OUTPATIENT)
Dept: INTERNAL MEDICINE | Facility: CLINIC | Age: 55
End: 2020-05-16

## 2020-05-16 DIAGNOSIS — M79.606 PAIN OF LOWER EXTREMITY, UNSPECIFIED LATERALITY: ICD-10-CM

## 2020-05-16 DIAGNOSIS — I82.409 RECURRENT DEEP VEIN THROMBOSIS (DVT): Primary | ICD-10-CM

## 2020-05-16 NOTE — TELEPHONE ENCOUNTER
----- Message from Clarissa Godoy sent at 5/16/2020  3:23 PM CDT -----  Contact: [facility]  Appointment Request From: Edi Marie    With Provider: More Villanueva MD [[facility]]    Preferred Date Range: 5/19/2020 - 5/22/2020    Preferred Times: Any time    Reason for visit: DVT Check UP    Comments:  I am having some leg pain. I want to make sure its not a DVT.  Can you schedule an ultrasound for me. I tried hematology and got no response

## 2020-05-16 NOTE — TELEPHONE ENCOUNTER
Left detailed voice message for pt to call back to get his ultrasound and vv scheduled with Dr. Villanueva

## 2020-05-21 ENCOUNTER — HOSPITAL ENCOUNTER (OUTPATIENT)
Dept: RADIOLOGY | Facility: HOSPITAL | Age: 55
Discharge: HOME | End: 2020-05-21
Attending: INTERNAL MEDICINE
Payer: COMMERCIAL

## 2020-05-21 ENCOUNTER — OFFICE VISIT (OUTPATIENT)
Dept: INTERNAL MEDICINE | Facility: CLINIC | Age: 55
End: 2020-05-21
Payer: COMMERCIAL

## 2020-05-21 DIAGNOSIS — I82.409 RECURRENT DEEP VEIN THROMBOSIS (DVT): ICD-10-CM

## 2020-05-21 DIAGNOSIS — M79.606 PAIN OF LOWER EXTREMITY, UNSPECIFIED LATERALITY: ICD-10-CM

## 2020-05-21 DIAGNOSIS — M79.606 PAIN OF LOWER EXTREMITY, UNSPECIFIED LATERALITY: Primary | ICD-10-CM

## 2020-05-21 PROCEDURE — 93970 EXTREMITY STUDY: CPT | Mod: TC

## 2020-05-21 PROCEDURE — 93970 EXTREMITY STUDY: CPT | Mod: 26,,, | Performed by: RADIOLOGY

## 2020-05-21 PROCEDURE — 99213 OFFICE O/P EST LOW 20 MIN: CPT | Mod: 95,,, | Performed by: INTERNAL MEDICINE

## 2020-05-21 PROCEDURE — 99213 PR OFFICE/OUTPT VISIT, EST, LEVL III, 20-29 MIN: ICD-10-PCS | Mod: 95,,, | Performed by: INTERNAL MEDICINE

## 2020-05-21 PROCEDURE — 93970 US LOWER EXTREMITY VEINS BILATERAL: ICD-10-PCS | Mod: 26,,, | Performed by: RADIOLOGY

## 2020-05-23 NOTE — PROGRESS NOTES
Subjective:      Patient ID: Edi Marie is a 54 y.o. male.    Chief Complaint: Leg Pain    HPI:  HPI   The patient location is:  home  The chief complaint leading to consultation is:  Leg pain her    Visit type: audiovisual    Face to Face time with patient: 10 min  minutes of total time spent on the encounter, which includes face to face time and non-face to face time preparing to see the patient (eg, review of tests), Obtaining and/or reviewing separately obtained history, Documenting clinical information in the electronic or other health record, Independently interpreting results (not separately reported) and communicating results to the patient/family/caregiver, or Care coordination (not separately reported).         Each patient to whom he or she provides medical services by telemedicine is:  (1) informed of the relationship between the physician and patient and the respective role of any other health care provider with respect to management of the patient; and (2) notified that he or she may decline to receive medical services by telemedicine and may withdraw from such care at any time.    Notes:   Patient has a history of recurrent DVT in currently is on Eliquis.  He has noted some pain in his leg in uncertain as to whether it is related to another DVT or to the degenerative disease in his hip.  He asked to do an ultrasound which was ordered but at the time he was out of town and has only scheduled it for this afternoon.    Patient Active Problem List   Diagnosis    Spermatocele    Histiocytosis    H/O vasectomy (05/30/2014)    Chronic deep vein thrombosis (DVT) of left lower extremity    Leg injury    Post-thrombotic syndrome of left lower extremity    Greater trochanteric bursitis of both hips    Hip stiffness    Right calf pain    Acute deep vein thrombosis (DVT) of distal vein of both lower extremities    Recurrent deep vein thrombosis (DVT)    Weakness of both hips     Past Medical  History:   Diagnosis Date    Arthritis     DVT (deep venous thrombosis)     Hemorrhoid     Histiocytosis     Kidney stones     Spermatocele      Past Surgical History:   Procedure Laterality Date    APPENDECTOMY      COLONOSCOPY N/A 1/20/2017    Procedure: COLONOSCOPY;  Surgeon: Danis Frank MD;  Location: [address];  Service: Endoscopy;  Laterality: N/A;    KNEE SURGERY      right knee    LITHOTRIPSY       Family History   Problem Relation Age of Onset    Heart disease Father         MI 2004    Cancer Paternal Grandfather         colon    Melanoma Neg Hx     Lupus Neg Hx     Psoriasis Neg Hx     Eczema Neg Hx      Review of Systems   Constitutional: Negative for activity change and unexpected weight change.   HENT: Negative for hearing loss, rhinorrhea and trouble swallowing.    Eyes: Negative for discharge and visual disturbance.   Respiratory: Negative for chest tightness and wheezing.    Cardiovascular: Negative for chest pain and palpitations.   Gastrointestinal: Negative for blood in stool, constipation, diarrhea and vomiting.   Endocrine: Negative for polydipsia and polyuria.   Genitourinary: Negative for difficulty urinating, hematuria and urgency.   Musculoskeletal: Positive for arthralgias. Negative for joint swelling and neck pain.   Neurological: Negative for weakness and headaches.   Psychiatric/Behavioral: Negative for confusion and dysphoric mood.     Objective:   There were no vitals filed for this visit.  There is no height or weight on file to calculate BMI.  Physical Exam  Assessment:     1. Pain of lower extremity, unspecified laterality    2. Recurrent deep vein thrombosis (DVT)      Plan:   Edi was seen today for leg pain.    Diagnoses and all orders for this visit:    Pain of lower extremity, unspecified laterality  Comments:  Ultrasound ordered, if stable patient will follow-up with  Orthopedics    Recurrent deep vein thrombosis (DVT)  Comments:  Ultrasound  order        Problem List Items Addressed This Visit     Recurrent deep vein thrombosis (DVT)      Other Visit Diagnoses     Pain of lower extremity, unspecified laterality    -  Primary    Ultrasound ordered, if stable patient will follow-up with  Orthopedics        No orders of the defined types were placed in this encounter.    No follow-ups on file.     Medication List           Accurate as of May 21, 2020 11:59 PM. If you have any questions, ask your nurse or doctor.               CONTINUE taking these medications    apixaban 5 mg Tab  Commonly known as:  ELIQUIS  Take 1 tablet (5 mg total) by mouth 2 (two) times daily.     meloxicam 7.5 MG tablet  Commonly known as:  MOBIC  TAKE 1 TABLET BY MOUTH EVERY DAY

## 2020-05-26 ENCOUNTER — TELEPHONE (OUTPATIENT)
Dept: HEMATOLOGY/ONCOLOGY | Facility: CLINIC | Age: 55
End: 2020-05-26

## 2020-05-26 NOTE — TELEPHONE ENCOUNTER
Please see rescheduling request note sent from patient.  He is requesting an ultrasounds and has concerns for follow up of DVT .

## 2020-05-26 NOTE — TELEPHONE ENCOUNTER
Spoke with patient who states that this issue was handled by his primary care.  I apologized that it inadvertently was overlooked and the message did not come in our typical clinical box.   I informed him of Dr Ferrer transfer to [facility] in the event he needed any follow up or that he could contact [facility] any time .

## 2020-06-15 ENCOUNTER — PATIENT OUTREACH (OUTPATIENT)
Dept: ADMINISTRATIVE | Facility: OTHER | Age: 55
End: 2020-06-15

## 2020-06-15 NOTE — PROGRESS NOTES
Edi Marie, a 55 y.o. male, is here for evaluation of RIGHT and LEFT hip.    HISTORY OF PRESENT ILLNESS   Location: anterior thigh, bilateral, L > R   Onset: Chronic since 2017  Palliative:    Relative rest   Oral analgesics   CSI, GTB, 09/20/17, 70%   HgPT, noncompliant    fPT, Needs new pt ref   L/R CSI, GTB 11/06/17 80% Improvement    L/R CSI, GTB 18.07.30 60% improvement    L/R CSI, GTB 18.11.01 90% improvement for 2 months   CSI,L/R iaHip, 19.01.23,  100% for 2 months then 90% right now.   CSI, L/R iaHip, 04.16.2019, significant continual Improvement    fPT, @[facility] date: 06.14.2019, duration: 3 visits, minimal Improvement, d/c on 07.12.2019 due to no shows   PRP 1/1, L/R iaHip, 09.26.2019, minimal Improvement  Provocative:   ADLs   Prior:    PMH DVT 11/17/18 L. LE  Progression: Plateaud discomfort   Quality:    Ache, worse in evening    Sharp at times   Radiation: none  Severity: per nursing documentation  Timing: intermittent with use  Trauma: none      Review of systems (ROS):  A 10+ review of systems was performed with pertinent positives and negatives noted above in the history of present illness. Other systems were negative unless otherwise specified.    PHYSICAL EXAMINATION  General:  The patient is alert and oriented x 3.  Mood is pleasant.  Observation of ears, eyes and nose reveal no gross abnormalities.  HEENT: NCAT, sclera nonicteric  Lungs: Respirations are equal and unlabored.   Gait is coordinated. Patient can toe walk and heel walk without difficulty.    HIP/PELVIS EXAMINATION    Observation/Inspection  Gait:   Nonantalgic   Alignment:  Neutral   Scars:   None   Muscle atrophy: None   Effusion:  None   Warmth:  None   Discoloration:   None   Leg lengths:   Equal   Pelvis:    Level     Tenderness/Crepitus (T/C):      T / C  Trochanteric bursa   ++ / -  Piriformis    - / -  SI joint    - / -  Psoas tendon   - / -  Rectus insertion  ++ / -  Adductor insertion  - / -  Pubic symphysis  -  / -    ROM: (* = pain)    Flexion:      90 degrees*  External rotation:   40 degrees*  Internal rotation with axial load:  30 degrees*  Internal rotation without axial load:  40 degrees*  Abduction:    45 degrees  Adduction:     20 degrees    Special Tests:  Pain w/ forced internal rotation (FADIR):  +   Pain w/ forced external rotation (WES):  +  Circumduction test:     -  [facility] test:     +  Log roll:       +   Snapping hip (internal):    -   Sit-up pain:      -   Resisted sit-up pain:     -   Resisted sit-up with adductor contraction pain:  -   Step-down test:     +  Trendelenburg test:     -  Bridge test      +     Extremity Neuro-vascular Examination:   Sensation:  Grossly intact to light touch all dermatomal regions.   Motor Function:  Fully intact motor function at hip, knee, foot and ankle    DTRs;  quadriceps and  achilles 2+.  No clonus and downgoing Babinski.    Vascular status:  DP and PT pulses 2+, brisk capillary refill, symmetric.    Skin:  intact, compartments soft.    Other Findings:    ASSESSMENT & PLAN  Assessment:   #1 Tonnis Grade III osteoarthritis of hip, bilateral  #2 s/p DVT, lower extremity, left   -->nfCSI    No evidence of neurologic pathology  No evidence of vascular pathology    Imaging studies reviewed:   X-ray pelvis and hip, bilateral 20.01    Plan:    We discussed the importance of appropriate diet, weight, and regular exercise    We discussed options including    Watchful waiting / relative rest    Physical therapy x   Injection therapy nfCSI   Consultation    The patient chooses As above   x = prescribed  CSI = corticosteroid injection  VSI = viscosupplement injection  PRPI = platelet rich plasma injection  ia = intra articular  R = right  L = left  B = bilateral   nfSx = surgical consultation was recommended, but patient is not interested in consultation at this time    Physical Therapy        Formal (fPT), @ [facility] facility p   Formal (fPT), @ [facility] facility         Homegoing (hgPT), per concurrent fPT recommendations    Homegoing (hgPT), per prior fPT recommendations t   Homegoing (hgPT), handout provided        w/  (atPT)    [blank] = not prescribed  x = prescribed  b = prescribed, and begin as indicated  t = continue as indicated  r = prescribed, and restart as indicated  p = completed prior as indicated  hs = prescribed, and with high school   col = prescribed, and with [facility] or university   nfPT = physical therapy was recommended, but patient is not interested in PT at this time    Activity (e.g. sports, work) restrictions    [blank] = as tolerated  pt = per physical therapist  at = per     Bracing    [blank] = not prescribed  r = recommended, but not fit with at todays visit  f = prescribed and fit with at todays visit  t = continue as indicated  p = prn use on rare, as-needed basis; advised against chronic use    Pain management    [blank] = No prescription necessary. A handout detailing dosing of appropriate   over-the-counter musculoskeletal analgesics was made available to the patient.   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

## 2020-06-15 NOTE — PROGRESS NOTES
Care Everywhere: updated  Immunization: no profile in links  Health Maintenance: n/a  Media Review: n/a  Legacy Review: n/a  Order placed: n/a  Upcoming appts:n/a

## 2020-06-16 ENCOUNTER — OFFICE VISIT (OUTPATIENT)
Dept: SPORTS MEDICINE | Facility: CLINIC | Age: 55
End: 2020-06-16
Payer: COMMERCIAL

## 2020-06-16 VITALS — HEIGHT: 71 IN | BODY MASS INDEX: 26.74 KG/M2 | WEIGHT: 191 LBS | TEMPERATURE: 99 F

## 2020-06-16 DIAGNOSIS — G89.29 CHRONIC HIP PAIN, BILATERAL: Primary | ICD-10-CM

## 2020-06-16 DIAGNOSIS — M25.551 CHRONIC HIP PAIN, BILATERAL: Primary | ICD-10-CM

## 2020-06-16 DIAGNOSIS — M25.552 CHRONIC HIP PAIN, BILATERAL: Primary | ICD-10-CM

## 2020-06-16 DIAGNOSIS — M16.0 PRIMARY OSTEOARTHRITIS OF BOTH HIPS: ICD-10-CM

## 2020-06-16 PROCEDURE — 3008F PR BODY MASS INDEX (BMI) DOCUMENTED: ICD-10-PCS | Mod: CPTII,S$GLB,, | Performed by: FAMILY MEDICINE

## 2020-06-16 PROCEDURE — 99999 PR PBB SHADOW E&M-EST. PATIENT-LVL III: CPT | Mod: PBBFAC,,, | Performed by: FAMILY MEDICINE

## 2020-06-16 PROCEDURE — 3008F BODY MASS INDEX DOCD: CPT | Mod: CPTII,S$GLB,, | Performed by: FAMILY MEDICINE

## 2020-06-16 PROCEDURE — 99214 OFFICE O/P EST MOD 30 MIN: CPT | Mod: S$GLB,,, | Performed by: FAMILY MEDICINE

## 2020-06-16 PROCEDURE — 99999 PR PBB SHADOW E&M-EST. PATIENT-LVL III: ICD-10-PCS | Mod: PBBFAC,,, | Performed by: FAMILY MEDICINE

## 2020-06-16 PROCEDURE — 99214 PR OFFICE/OUTPT VISIT, EST, LEVL IV, 30-39 MIN: ICD-10-PCS | Mod: S$GLB,,, | Performed by: FAMILY MEDICINE

## 2020-06-25 ENCOUNTER — OFFICE VISIT (OUTPATIENT)
Dept: ORTHOPEDICS | Facility: CLINIC | Age: 55
End: 2020-06-25
Payer: COMMERCIAL

## 2020-06-25 VITALS — BODY MASS INDEX: 26.73 KG/M2 | HEIGHT: 71 IN | WEIGHT: 190.94 LBS

## 2020-06-25 DIAGNOSIS — M16.12 PRIMARY OSTEOARTHRITIS OF LEFT HIP: Primary | ICD-10-CM

## 2020-06-25 PROCEDURE — 3008F BODY MASS INDEX DOCD: CPT | Mod: CPTII,S$GLB,, | Performed by: ORTHOPAEDIC SURGERY

## 2020-06-25 PROCEDURE — 99999 PR PBB SHADOW E&M-EST. PATIENT-LVL III: CPT | Mod: PBBFAC,,, | Performed by: ORTHOPAEDIC SURGERY

## 2020-06-25 PROCEDURE — 99214 OFFICE O/P EST MOD 30 MIN: CPT | Mod: S$GLB,,, | Performed by: ORTHOPAEDIC SURGERY

## 2020-06-25 PROCEDURE — 99999 PR PBB SHADOW E&M-EST. PATIENT-LVL III: ICD-10-PCS | Mod: PBBFAC,,, | Performed by: ORTHOPAEDIC SURGERY

## 2020-06-25 PROCEDURE — 3008F PR BODY MASS INDEX (BMI) DOCUMENTED: ICD-10-PCS | Mod: CPTII,S$GLB,, | Performed by: ORTHOPAEDIC SURGERY

## 2020-06-25 PROCEDURE — 99214 PR OFFICE/OUTPT VISIT, EST, LEVL IV, 30-39 MIN: ICD-10-PCS | Mod: S$GLB,,, | Performed by: ORTHOPAEDIC SURGERY

## 2020-06-25 NOTE — LETTER
June 29, 2020      Mariano Ortiz MD  [address]  [address]  [facility] [address]           [facility] - Orthopedics  [address]  [facility] [id number]  Phone: [phone]          Patient: Edi Marie   MR Number: [id number]   YOB: 1965   Date of Visit: 6/25/2020       Dear Dr. Mariano Ortiz:    Thank you for referring Edi Marie to me for evaluation. Attached you will find relevant portions of my assessment and plan of care.    If you have questions, please do not hesitate to call me. I look forward to following Edi Marie along with you.    Sincerely,    Chin Heath III, MD    Enclosure  CC:  No Recipients    If you would like to receive this communication electronically, please contact [email or web address] or [phone] to request more information on [employer] access.    For providers and/or their staff who would like to refer a patient to [facility], please contact us through our one-stop-shop provider referral line, [facility], at [phone].    If you feel you have received this communication in error or would no longer like to receive these types of communications, please e-mail [email or web address]

## 2020-06-30 NOTE — PROGRESS NOTES
Subjective:     HPI:   Edi Marie is a 55 y.o. male who presents for evaluation of bilateral hip arthritis referred by Dr. Ortiz    Patient has been treated for quite some time non operatively for bilateral hip osteoarthritis.  Patient says he has developed some new pain he is trying to figure out if it is coming from his hips or his back.  He has had left lower back and buttock pain for the past few weeks with some radicular symptoms down the left leg.  He says this is different than his baseline hip pain.    He has been on Mobic in the past he is now off.  He has had multiple steroid injections in his hip pain is he has also had PRP last injections were a year ago.  He has done physical therapy.  No assistive devices.  He can walk a 0.5 mile.  Limitations include difficulty sleeping and pain at night issues with activities of daily living and putting on his socks and shoes.    He owns a construction supply company.  He has a wife at home       ROS:  The updated medical history is in the chart and has been reviewed. A review of systems is updated and there is no reported vision changes, ear/nose/mouth/throat complaints,  chest pain, shortness of breath, abdominal pain, urological complaints, fevers or chills, psychiatric complaints. Musculoskeletal and neurologcial symptoms are as documented. All other systems are negative.      Objective:   Exam:  There were no vitals filed for this visit.  Body mass index is 26.63 kg/m².    Physical examination included assessment of the patient's general appearance with particular attention to development, nutrition, body habitus, attention to grooming, and any evidence of distress.  Constitutional: The patient is a well-developed, well-nourished patient in no acute distress.   Cardiovascular: Vascular examination included warmth and capillary refill as well inspection for edema and assessment of pedal pulses. Pulses are palpable and regular.  Musculoskeletal: Gait  was assessed as to whether it was steady, non-antalgic, and/or required the use of an assist device. The patient was also asked to walk independently and get onto the examination table.  Skin: The skin was examined for any obvious rashes or lesions in the extremity.  Neurologic: Sensation is intact to light touch in the extremity. The patient has good coordination without hyperreflexia and is alert and oriented to person, place and time and has normal mood and affect.     All of the above were examined and found to be within normal limits except for the following pertinent clinical findings:    No limp nonantalgic gait negative Trendelenburg.  Can do a single leg stance easily previous nontender palpation over the greater trochanters.  Active straight leg raise he says this is stiff but no significant pain.  Both hips 0-90 hip flexion 15 abduction 15 abduction 25 external -5 internal rotation which does recreate some discomfort.  Right side about a 0.5 centimeter short supine patient does not notice a difference.  Skin is intact to the anterior hips.      Imaging:  Indication:  Left hip pain  Exam Ordered: Radiographs include an anteroposterior pelvis, an anteroposterior and lateral view of the proximal femur including the hip joint.  Details of Examination:  exam shows evidence of joint space narrowing, osteophyte formation, and subchondral sclerosis, all consistent with degenerative arthritis of the hip.  No other significant findings are noted.  Impression:  Degenerative Arthritis, Left Hip    Indication:  Right hip pain  Exam Ordered: Radiographs include an anteroposterior pelvis, an anteroposterior and lateral view of the proximal femur including the hip joint.  Details of Examination:exam shows evidence of joint space narrowing, osteophyte formation, and subchondral sclerosis, all consistent with degenerative arthritis of the hip.  No other significant findings are noted.  Impression:  Degenerative Arthritis,  Right Hip    X-rays from January show severe bilateral hip osteoarthritis grade 4    EXAMINATION:  US LOWER EXTREMITY VEINS BILATERAL     CLINICAL HISTORY:  Acute embolism and thrombosis of unspecified deep veins of unspecified lower extremity     TECHNIQUE:  Duplex and color flow Doppler and dynamic compression was performed of the bilateral lower extremity veins was performed.     COMPARISON:  Ultrasound lower extremity veins 04/29/2019.     FINDINGS:  Right thigh veins: Overall improved but persistent partial chronic thrombosis of the femoral and popliteal veins noted.  The common femoral, upper greater saphenous, and deep femoral veins are patent and free of thrombus. The veins are normally compressible and have normal phasic flow and augmentation response.     Right calf veins: The visualized ATV and PTV are patent.  There is partial thrombosis of the peroneal vein..     Left thigh veins: The common femoral, femoral, popliteal, upper greater saphenous, and deep femoral veins are patent and free of thrombus. The veins are normally compressible and have normal phasic flow and augmentation response.     Left calf veins: The visualized calf veins are patent.     Miscellaneous: None     Impression:     Improved now partially occlusive chronic thrombosis of the left femoral and popliteal veins.     Partial thrombosis of the left peroneal vein also appears chronic.     No acute deep vein thrombus.     Electronically signed by resident: Tico Robledo  Date:                                            05/21/2020  Time:                                           15:24     Electronically signed by: Antony Felder MD  Date:                                            05/21/2020  Time:                                           15:33      Assessment:     Primary osteoarthritis of left hip [M16.12]  Bilateral hip osteoarthritis  New symptoms seem consistent with radicular low back pain    History of DVT x2 in the left lower  extremity 1st was in 2017 spontaneous he was started on Eliquis when he was taken off the Eliquis and had a steroid injection he had another DVT.  He is back on Eliquis.  He says he has had a hematology workup which is negative.  Recent repeat ultrasound showed partial thrombosis of the left peroneal vein which appears chronic     Plan:       The above findings were discussed with patient length. At this point I do believe that the patients discomfort is mostly related to a lower back pain exacerbation.  The treatment of lower back pain was discussed with the patient and it includes a course of anti-inflammatory medications, rest, and physical therapy for lower back stretching and strengthening.  All of the patients questions were answered.    The above findings were discussed with patient length. We discussed the risks of conservative versus surgical management of the patients hip arthritis. Conservative management consisting of anti-inflammatory medications, weight loss, physical therapy, and activity modification was discussed at length.     The patient was given a handout with treatment strategies for hip and knee joint care prior to surgery from [employer], the [employer].   This included information regarding medications, injections, weight loss, exercise, braces, physical therapy, and alternative therapies.     We discussed that patient certainly has severe bilateral hip arthritis and would be a candidate for total hip replacement. He would need hematology evaluation and clearance prior to surgery and some disposition regarding his chronic partially occlusive thrombus.  Given his x-rays he would be a good candidate for bilateral simultaneous anterior total hip replacement.  However given given his clotting history I think it may be safer two-stage the surgeries    However his new symptoms certainly seem to localize to his spine.  We discussed that hip arthritis can exacerbate  spine issues.    We discussed options for his back.  He would like a spine home exercise conditioning program.  If symptoms persist would recommend pain clinic for further spine evaluation and management    We can see him back in 3 months with bilateral hip x-rays      No orders of the defined types were placed in this encounter.            Past Medical History:   Diagnosis Date    Arthritis     DVT (deep venous thrombosis)     Hemorrhoid     Histiocytosis     Kidney stones     Spermatocele        Past Surgical History:   Procedure Laterality Date    APPENDECTOMY      COLONOSCOPY N/A 1/20/2017    Procedure: COLONOSCOPY;  Surgeon: Danis Frank MD;  [facility] ([address] [place] [address]);  Service: Endoscopy;  Laterality: N/A;    KNEE SURGERY      right knee    LITHOTRIPSY         Family History   Problem Relation Age of Onset    Heart disease Father         MI 2004    Cancer Paternal Grandfather         colon    Melanoma Neg Hx     Lupus Neg Hx     Psoriasis Neg Hx     Eczema Neg Hx        Social History     Socioeconomic History    Marital status:      Spouse name: Not on file    Number of children: Not on file    Years of education: Not on file    Highest education level: Not on file   Occupational History    Occupation: self employed     Employer: industrial [employer]   Social Needs    Financial resource strain: Not very hard    Food insecurity     Worry: Never true     Inability: Never true    Transportation needs     Medical: No     Non-medical: No   Tobacco Use    Smoking status: Never Smoker    Smokeless tobacco: Never Used   Substance and Sexual Activity    Alcohol use: Yes     Alcohol/week: 7.0 standard drinks     Types: 7 Glasses of wine per week     Frequency: 2-4 times a month     Drinks per session: 3 or 4     Binge frequency: Less than monthly    Drug use: No    Sexual activity: Yes   Lifestyle    Physical activity     Days per week: 3 days     Minutes per session: 10 min     Stress: Only a little   Relationships    Social connections     Talks on phone: More than three times a week     Gets together: Twice a week     Attends Yarsani service: Not on file     Active member of club or organization: No     Attends meetings of clubs or organizations: Never     Relationship status:    Other Topics Concern    Not on file   Social History Narrative    Not on file

## 2020-09-02 DIAGNOSIS — M25.551 BILATERAL HIP PAIN: Primary | ICD-10-CM

## 2020-09-02 DIAGNOSIS — M25.552 BILATERAL HIP PAIN: Primary | ICD-10-CM

## 2020-09-02 DIAGNOSIS — I82.4Z3 ACUTE DEEP VEIN THROMBOSIS (DVT) OF DISTAL VEIN OF BOTH LOWER EXTREMITIES: ICD-10-CM

## 2020-09-03 ENCOUNTER — TELEPHONE (OUTPATIENT)
Dept: HEMATOLOGY/ONCOLOGY | Facility: CLINIC | Age: 55
End: 2020-09-03

## 2020-09-09 ENCOUNTER — TELEPHONE (OUTPATIENT)
Dept: HEMATOLOGY/ONCOLOGY | Facility: CLINIC | Age: 55
End: 2020-09-09

## 2020-09-10 ENCOUNTER — LAB VISIT (OUTPATIENT)
Dept: LAB | Facility: HOSPITAL | Age: 55
End: 2020-09-10
Attending: INTERNAL MEDICINE
Payer: COMMERCIAL

## 2020-09-10 ENCOUNTER — OFFICE VISIT (OUTPATIENT)
Dept: HEMATOLOGY/ONCOLOGY | Facility: CLINIC | Age: 55
End: 2020-09-10
Payer: COMMERCIAL

## 2020-09-10 VITALS
WEIGHT: 193.13 LBS | OXYGEN SATURATION: 97 % | HEART RATE: 80 BPM | TEMPERATURE: 99 F | RESPIRATION RATE: 18 BRPM | DIASTOLIC BLOOD PRESSURE: 71 MMHG | HEIGHT: 72 IN | SYSTOLIC BLOOD PRESSURE: 125 MMHG | BODY MASS INDEX: 26.16 KG/M2

## 2020-09-10 DIAGNOSIS — I82.502 CHRONIC DEEP VEIN THROMBOSIS (DVT) OF LEFT LOWER EXTREMITY, UNSPECIFIED VEIN: ICD-10-CM

## 2020-09-10 DIAGNOSIS — I82.502 CHRONIC DEEP VEIN THROMBOSIS (DVT) OF LEFT LOWER EXTREMITY, UNSPECIFIED VEIN: Primary | ICD-10-CM

## 2020-09-10 DIAGNOSIS — Z12.11 COLON CANCER SCREENING: ICD-10-CM

## 2020-09-10 DIAGNOSIS — M16.0 OSTEOARTHRITIS OF BOTH HIPS, UNSPECIFIED OSTEOARTHRITIS TYPE: ICD-10-CM

## 2020-09-10 PROCEDURE — 3008F BODY MASS INDEX DOCD: CPT | Mod: CPTII,S$GLB,, | Performed by: INTERNAL MEDICINE

## 2020-09-10 PROCEDURE — 3008F PR BODY MASS INDEX (BMI) DOCUMENTED: ICD-10-PCS | Mod: CPTII,S$GLB,, | Performed by: INTERNAL MEDICINE

## 2020-09-10 PROCEDURE — 85613 RUSSELL VIPER VENOM DILUTED: CPT

## 2020-09-10 PROCEDURE — 86147 CARDIOLIPIN ANTIBODY EA IG: CPT | Mod: 59

## 2020-09-10 PROCEDURE — 99999 PR PBB SHADOW E&M-EST. PATIENT-LVL III: ICD-10-PCS | Mod: PBBFAC,,, | Performed by: INTERNAL MEDICINE

## 2020-09-10 PROCEDURE — 85598 HEXAGNAL PHOSPH PLTLT NEUTRL: CPT

## 2020-09-10 PROCEDURE — 86146 BETA-2 GLYCOPROTEIN ANTIBODY: CPT

## 2020-09-10 PROCEDURE — 36415 COLL VENOUS BLD VENIPUNCTURE: CPT

## 2020-09-10 PROCEDURE — 99214 PR OFFICE/OUTPT VISIT, EST, LEVL IV, 30-39 MIN: ICD-10-PCS | Mod: S$GLB,,, | Performed by: INTERNAL MEDICINE

## 2020-09-10 PROCEDURE — 99999 PR PBB SHADOW E&M-EST. PATIENT-LVL III: CPT | Mod: PBBFAC,,, | Performed by: INTERNAL MEDICINE

## 2020-09-10 PROCEDURE — 99214 OFFICE O/P EST MOD 30 MIN: CPT | Mod: S$GLB,,, | Performed by: INTERNAL MEDICINE

## 2020-09-10 NOTE — Clinical Note
Please schedule lab test for DRVVT, anti-beta2 glycoprotein-I antibodies, and anti-cardiolipin antibodies in 3 months.

## 2020-09-10 NOTE — LETTER
September 15, 2020      More Villanueva MD  [address]  [facility] [id number]           [facility]  [address]  [facility] [address]  Phone: [phone]          Patient: Edi Marie   MR Number: [id number]   YOB: 1965   Date of Visit: 9/10/2020       Dear Dr. More Villanueva:    Thank you for referring Edi Marie to me for evaluation. Attached you will find relevant portions of my assessment and plan of care.    If you have questions, please do not hesitate to call me. I look forward to following Edi Marie along with you.    Sincerely,    Reji Sanders MD    Enclosure  CC:  No Recipients    If you would like to receive this communication electronically, please contact [email or web address] or [phone] to request more information on [facility] [employer] Link access.    For providers and/or their staff who would like to refer a patient to [facility], please contact us through our one-stop-shop provider referral line, [facility], at [phone].    If you feel you have received this communication in error or would no longer like to receive these types of communications, please e-mail [email or web address]

## 2020-09-10 NOTE — PROGRESS NOTES
HEMATOLOGY PROGRESS NOTE    IDENTIFYING STATEMENT   Edi Marie (Edi) is a 55 y.o. male with a  of 1965 from [place] with the diagnosis of history of recurrent VTE.      HEMATOLOGY HISTORY:    From Dr. Ferrer's last note    1. Mr. Marie is a 53 y.o. who presents for follow up on an acute on chronic DVT.  Since the last clinic visit the patient contacted our office on 19 complaining of pressure in his right calf.  US performed on 19 showed an occlusive thrombus in the left femoral and left popliteal vein as well as the right popliteal, right posterior tibial, and right peroneal vein.  The patient was started back on Eliquis 10mg BID for 7 days followed by 5 mg BID. Currently the patient states he feels well.  He states he had one other episode of right calf pain after restarting Eliquis but has not had any other episodes.  He denies any swelling in his lower extremities. He denies CP, SOB, fever, chills, night sweats, weight loss, new lumps or bumps, easy bruising or bleeding.  His last colonoscopy was on 17 with 3 polyps seen and recommendation for repeat colonoscopy in 3 years.     Clot History:  The patient initially presented to [facility] on 17 with complaint of left leg pain.  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  The patient then began having worsening swelling of the left leg over the subsequent month.  When in the hospital the patient underwent an US on 17 showing partially occlusive thrombus involving the left common femoral vein and left iliac vein with completely occlusive thrombus of the left superficial femoral vein, left popliteal vein, and left peroneal vein. The patient then underwent catheter assisted thrombolysis on 17 with the use of a EKOS device.  The patient was then continued on continuous tPA and angiomax for 30 hours and then  discharged home on Eliquis 10mg PO for 7 days and then 5mg BID thereafter.  The patient then had a follow up US performed on 11/29/17 which showed  PCP on  reidentification of largely occlusive thrombus within the left external iliac, common femoral, femoral, popliteal, and peroneal veins and new thrombosis of the posterior tibial vein.  The patient saw his PCP on 12/14/17 and was then sent to the Hematology clinic given the new thrombus in the posterior tibial vein seen.  The patient denied any personal history of clotting.  He had undergone prior surgeries with appendectomy and right knee surgery without developing clots.  The patient stated his mother developed a clot in an unknown location after a groin injury when she was 60.  She was on coumadin for 2 years and then taken off.  The patient denied any other family history of clotting.  The patient denied any FH of early pregnancy loss.  He stated he has a daughter with suspected Behçet disease.  US of the left lower extremity on 2/26/18 showed deep venous occlusive thrombosis of the left superficial femoral vein and partially occlusive thrombosis of the left popliteal vein mildly improved from prior US.  Repeat US on 6/04/18 showed partially occlusive deep venous thrombosis of the left superficial femoral and popliteal vein, mildly improved from prior exam. US done on 12/13/18 showed patency of the popliteal vein and partial thrombus of the left superficial femoral vein.  The patient completed 12 months of anticoagulation on 12/13/18 and was taken off of Eliquis.    INTERVAL HISTORY:      Mr. Marie returns to clinic for follow-up of recurrent VTE and for pre-operative planning for management of anticoagulants.     He was previously seen by Dr. Ferrer and Dr. Edgar here in 2018 and was recommended for extended anticoagulant therapy with apixaban. He has taken it faithfully since that time and tolerates it well.     He has bilateral osteoarthritis of the hips  and desires total hip arthroplasty (of both joints).     Repeat imaging of his lower extremities in 5/2020 showed evidence of chronic thrombus in the left femoral and popliteal veins.     Past Medical History, Past Social History and Past Family History have been reviewed and are unchanged except as noted in the interval history.    MEDICATIONS:     Current Outpatient Medications on File Prior to Visit   Medication Sig Dispense Refill    apixaban (ELIQUIS) 5 mg Tab Take 1 tablet (5 mg total) by mouth 2 (two) times daily. 60 tablet 0    meloxicam (MOBIC) 7.5 MG tablet TAKE 1 TABLET BY MOUTH EVERY DAY 15 tablet 0     No current facility-administered medications on file prior to visit.        ALLERGIES: Review of patient's allergies indicates:  No Known Allergies     ROS:       Review of Systems   Constitutional: Negative for diaphoresis, fatigue, fever and unexpected weight change.   HENT:   Negative for lump/mass and sore throat.    Eyes: Negative for icterus.   Respiratory: Negative for cough and shortness of breath.    Cardiovascular: Negative for chest pain and palpitations.   Gastrointestinal: Negative for abdominal distention, constipation, diarrhea, nausea and vomiting.   Genitourinary: Negative for dysuria and frequency.    Musculoskeletal: Positive for arthralgias (bilateral hip osteoarthritis) and back pain. Negative for gait problem and myalgias.        Occasional cramping in bilateral legs   Skin: Negative for rash.   Neurological: Negative for dizziness, gait problem and headaches.   Hematological: Negative for adenopathy. Does not bruise/bleed easily.   Psychiatric/Behavioral: The patient is not nervous/anxious.        PHYSICAL EXAM:  Vitals:    09/10/20 0759   BP: 125/71   Pulse: 80   Resp: 18   Temp: 98.9 °F (37.2 °C)   TempSrc: Oral   SpO2: 97%   Weight: 87.6 kg (193 lb 2 oz)   Height: 6' (1.829 m)   PainSc: 0-No pain       KARNOFSKY PERFORMANCE STATUS 80%  ECOG 1    Physical Exam  Constitutional:        General: He is not in acute distress.     Appearance: He is well-developed.   HENT:      Head: Normocephalic and atraumatic.      Mouth/Throat:      Mouth: No oral lesions.   Eyes:      Conjunctiva/sclera: Conjunctivae normal.   Neck:      Thyroid: No thyromegaly.   Cardiovascular:      Rate and Rhythm: Normal rate and regular rhythm.      Heart sounds: Normal heart sounds. No murmur.   Pulmonary:      Breath sounds: Normal breath sounds. No wheezing or rales.   Abdominal:      General: There is no distension.      Palpations: Abdomen is soft. There is no hepatomegaly, splenomegaly or mass.      Tenderness: There is no abdominal tenderness.   Lymphadenopathy:      Cervical: No cervical adenopathy.      Right cervical: No deep cervical adenopathy.     Left cervical: No deep cervical adenopathy.   Skin:     Findings: No rash.   Neurological:      Mental Status: He is alert and oriented to person, place, and time.      Cranial Nerves: No cranial nerve deficit.      Coordination: Coordination normal.      Deep Tendon Reflexes: Reflexes are normal and symmetric.         LAB:   Results for orders placed or performed in visit on 07/30/19   Lipid panel   Result Value Ref Range    Cholesterol 226 (H) 120 - 199 mg/dL    Triglycerides 92 30 - 150 mg/dL    HDL 63 40 - 75 mg/dL    LDL Cholesterol 144.6 63.0 - 159.0 mg/dL    Hdl/Cholesterol Ratio 27.9 20.0 - 50.0 %    Total Cholesterol/HDL Ratio 3.6 2.0 - 5.0    Non-HDL Cholesterol 163 mg/dL   PSA, Screening   Result Value Ref Range    PSA, SCREEN 0.74 0.00 - 4.00 ng/mL       PROBLEMS ASSESSED THIS VISIT:    1. Chronic deep vein thrombosis (DVT) of left lower extremity, unspecified vein    2. Colon cancer screening        PLAN:       DVT of left lower extremity    Mr. Marie has history of recurrent VTE to the left lower extremity and approriately is on extended anticoagulant therapy. We discussed that the presence of chronic thrombus does not have any implications on the  duration or recommendation for extended therapy, and it is not expected to embolize.     He is at high risk for recurrent VTE off anticoagulation, and should continue apixaban unless there is a contraindication.    Hip arthroplasty is a procedure with high periprocedural thrombotic risk. For hip arthroplasty, I recommend anticoagulation as follows:    Surgical plan:  - Last dose apixaban 3 days before surgery (hold for 48 hours prior to surgery)  - begin apixaban 2.5 mg PO BID at least 12 hours after surgery  - after 24-48 hours of apixaban at 2.5 mg PO BID and no bleeding, increase to 5 mg PO BID    Additionally, I am testing for antiphospholipid syndrome given history of recurrent, unprovoked DVT. HE will need repeat testing in 12 weeks given high beta-2 glycoprotein-I IgM antibody.     I have also referred for colonoscopy to bring him up to date on age appropriate cancer screening.     Follow-up  Pending repeat APS tests    Reji Sanders MD  Hematology and Stem Cell Transplant

## 2020-09-12 LAB
B2 GLYCOPROT1 IGA SER QL: <9 SAU
B2 GLYCOPROT1 IGG SER QL: <9 SGU
B2 GLYCOPROT1 IGM SER QL: >150 SMU

## 2020-09-14 LAB
APTT HEX PL PPP: NEGATIVE S
CARDIOLIPIN IGG SER IA-ACNC: <9.4 GPL (ref 0–14.99)
CARDIOLIPIN IGM SER IA-ACNC: 21.9 MPL (ref 0–12.49)

## 2020-09-15 LAB — LA PPP-IMP: NEGATIVE

## 2020-09-21 ENCOUNTER — PATIENT OUTREACH (OUTPATIENT)
Dept: ADMINISTRATIVE | Facility: OTHER | Age: 55
End: 2020-09-21

## 2020-09-22 ENCOUNTER — OFFICE VISIT (OUTPATIENT)
Dept: ORTHOPEDICS | Facility: CLINIC | Age: 55
End: 2020-09-22
Payer: COMMERCIAL

## 2020-09-22 ENCOUNTER — HOSPITAL ENCOUNTER (OUTPATIENT)
Dept: RADIOLOGY | Facility: HOSPITAL | Age: 55
Discharge: HOME | End: 2020-09-22
Attending: ORTHOPAEDIC SURGERY
Payer: COMMERCIAL

## 2020-09-22 VITALS — HEIGHT: 72 IN | BODY MASS INDEX: 26.16 KG/M2 | WEIGHT: 193.13 LBS

## 2020-09-22 DIAGNOSIS — M25.552 BILATERAL HIP PAIN: ICD-10-CM

## 2020-09-22 DIAGNOSIS — M25.551 BILATERAL HIP PAIN: ICD-10-CM

## 2020-09-22 DIAGNOSIS — M16.0 PRIMARY OSTEOARTHRITIS OF BOTH HIPS: Primary | ICD-10-CM

## 2020-09-22 PROCEDURE — 3008F PR BODY MASS INDEX (BMI) DOCUMENTED: ICD-10-PCS | Mod: CPTII,S$GLB,, | Performed by: ORTHOPAEDIC SURGERY

## 2020-09-22 PROCEDURE — 99213 OFFICE O/P EST LOW 20 MIN: CPT | Mod: S$GLB,,, | Performed by: ORTHOPAEDIC SURGERY

## 2020-09-22 PROCEDURE — 99999 PR PBB SHADOW E&M-EST. PATIENT-LVL III: ICD-10-PCS | Mod: PBBFAC,,, | Performed by: ORTHOPAEDIC SURGERY

## 2020-09-22 PROCEDURE — 73521 X-RAY EXAM HIPS BI 2 VIEWS: CPT | Mod: 26,,, | Performed by: RADIOLOGY

## 2020-09-22 PROCEDURE — 99213 PR OFFICE/OUTPT VISIT, EST, LEVL III, 20-29 MIN: ICD-10-PCS | Mod: S$GLB,,, | Performed by: ORTHOPAEDIC SURGERY

## 2020-09-22 PROCEDURE — 3008F BODY MASS INDEX DOCD: CPT | Mod: CPTII,S$GLB,, | Performed by: ORTHOPAEDIC SURGERY

## 2020-09-22 PROCEDURE — 73521 X-RAY EXAM HIPS BI 2 VIEWS: CPT | Mod: TC

## 2020-09-22 PROCEDURE — 73521 XR HIPS BILATERAL 2 VIEW INCL AP PELVIS: ICD-10-PCS | Mod: 26,,, | Performed by: RADIOLOGY

## 2020-09-22 PROCEDURE — 99999 PR PBB SHADOW E&M-EST. PATIENT-LVL III: CPT | Mod: PBBFAC,,, | Performed by: ORTHOPAEDIC SURGERY

## 2020-09-22 RX ORDER — DEXTROMETHORPHAN HYDROBROMIDE, GUAIFENESIN 5; 100 MG/5ML; MG/5ML
650 LIQUID ORAL EVERY 8 HOURS
COMMUNITY
End: 2021-08-17

## 2020-09-22 NOTE — PROGRESS NOTES
Subjective:     HPI:   Edi Marie is a 55 y.o. male who presents for repeat evaluation of bilateral hip arthritis.  He says both sides her the most the right side is a little more symptomatic in terms of limited range of motion.  He has moderate to severe it is getting worse he is having worsening dull aching pain in his anterior hips and decreasing range of motion.  His back pain has gotten much better his radicular pain has resolved and he only has some mild intermittent symptoms for his back now.  He is taking Tylenol 650 milligrams as needed for pain    To review:   Edi Marie is a 55 y.o. male who presents for evaluation of bilateral hip arthritis referred by Dr. Ortiz     Patient has been treated for quite some time non operatively for bilateral hip osteoarthritis.  Patient says he has developed some new pain he is trying to figure out if it is coming from his hips or his back.  He has had left lower back and buttock pain for the past few weeks with some radicular symptoms down the left leg.  He says this is different than his baseline hip pain.     He has been on Mobic in the past he is now off.  He has had multiple steroid injections in his hip pain is he has also had PRP last injections were a year ago.  He has done physical therapy.  No assistive devices.  He can walk a 0.5 mile.  Limitations include difficulty sleeping and pain at night issues with activities of daily living and putting on his socks and shoes.     He owns a construction supply company.  He has a wife at home     Objective:   Body mass index is 26.19 kg/m².  Exam:  No limp nonantalgic gait negative Trendelenburg.  Can do a single leg stance easily previous nontender palpation over the greater trochanters.  Active straight leg raise he says this is stiff but no significant pain.  Both hips 0-90 hip flexion 15 abduction 15 abduction 25 external -5 internal rotation which does recreate some discomfort.  Right side about a 0.5  centimeter short supine patient does not notice a difference.  Skin is intact to the anterior hips.      Imaging:  Indication:  Left hip pain  Exam Ordered: Radiographs include an anteroposterior pelvis, an anteroposterior and lateral view of the proximal femur including the hip joint.  Details of Examination:  exam shows evidence of joint space narrowing, osteophyte formation, and subchondral sclerosis, all consistent with degenerative arthritis of the hip.  No other significant findings are noted.  Impression:  Degenerative Arthritis, Left Hip     Indication:  Right hip pain  Exam Ordered: Radiographs include an anteroposterior pelvis, an anteroposterior and lateral view of the proximal femur including the hip joint.  Details of Examination:exam shows evidence of joint space narrowing, osteophyte formation, and subchondral sclerosis, all consistent with degenerative arthritis of the hip.  No other significant findings are noted.  Impression:  Degenerative Arthritis, Right Hip     bilateral hip osteoarthritis grade 4      Assessment:       ICD-10-CM ICD-9-CM   1. Primary osteoarthritis of both hips  M16.0 715.15      Bilateral hip osteoarthritis  New symptoms seem consistent with radicular low back pain     History of DVT x2 in the left lower extremity 1st was in 2017 spontaneous he was started on Eliquis when he was taken off the Eliquis and had a steroid injection he had another DVT.  He is back on Eliquis.  He says he has had a hematology workup which is negative.  Recent repeat ultrasound showed partial thrombosis of the left peroneal vein which appears chronic     Plan:       This patient has significant symptoms in their hips that are affecting their quality of life and daily activities.  They have tried non-operative treatment including analgesics, an exercise program, and activity modification, but the symptoms have persisted. I believe they make a good candidate for hip arthroplasty.     The risks and  benefits of hip arthroplasty have been discussed with the patient which include, but are not limited to infections (including severe sequelae), potential component failure, fracture, DVT, pulmonary embolus, nerve palsy, dislocation, leg length inequality, persistent pain, wound healing complications, transfusions, medical complications and death.     Pre-operative planning will include the following:  A pre-surgical evaluation will be arranged.  Pre-op orders will be placed.  We will make arrangements with the operating room for proper time and staffing.  We will make Social Service arrangements for the patient.    Approach: Anterior    Implants:   Company: [employer]  Cup: [place]  Stem: [employer]    DVT prophylaxis: per hematology, eliquis 2.5mg BID starting 12hrs post op, increase to 5mg BID 48hrs post op  Dispo: home health PT    Admission status:      Inpatient       Location: [facility]                                 Patient would like to proceed with bilateral anterior approach total hip replacements in the 1st week or 2 of December.  The right side would be 1st.     We will need 2 units of blood reserved and brought to [facility] on hold for surgery.     We discussed that he has an increased risk of DVT given his history and bilateral surgery.  He has repeat hematology labs pending on December 12th I am going to message his hematologist to try to get disposition about those labs to try to determine timing for surgery      No orders of the defined types were placed in this encounter.            Past Medical History:   Diagnosis Date    Arthritis     DVT (deep venous thrombosis)     Hemorrhoid     Histiocytosis     Kidney stones     Spermatocele        Past Surgical History:   Procedure Laterality Date    APPENDECTOMY      COLONOSCOPY N/A 1/20/2017    Procedure: COLONOSCOPY;  Surgeon: Danis Frank MD;  Location: [facility] ([address] [place] [address];  Service: Endoscopy;  Laterality: N/A;    KNEE SURGERY      right knee     LITHOTRIPSY         Family History   Problem Relation Age of Onset    Heart disease Father         MI 2004    Cancer Paternal Grandfather         colon    Melanoma Neg Hx     Lupus Neg Hx     Psoriasis Neg Hx     Eczema Neg Hx        Social History     Socioeconomic History    Marital status:      Spouse name: Not on file    Number of children: Not on file    Years of education: Not on file    Highest education level: Not on file   Occupational History    Occupation: self employed     Employer: [employer]   Social Needs    Financial resource strain: Not very hard    Food insecurity     Worry: Never true     Inability: Never true    Transportation needs     Medical: No     Non-medical: No   Tobacco Use    Smoking status: Never Smoker    Smokeless tobacco: Never Used   Substance and Sexual Activity    Alcohol use: Yes     Alcohol/week: 7.0 standard drinks     Types: 7 Glasses of wine per week     Frequency: 2-4 times a month     Drinks per session: 3 or 4     Binge frequency: Less than monthly    Drug use: No    Sexual activity: Yes   Lifestyle    Physical activity     Days per week: 3 days     Minutes per session: 10 min    Stress: Only a little   Relationships    Social connections     Talks on phone: More than three times a week     Gets together: Twice a week     Attends Hoahaoism service: Not on file     Active member of club or organization: No     Attends meetings of clubs or organizations: Never     Relationship status:    Other Topics Concern    Not on file   Social History Narrative    Not on file

## 2020-09-23 ENCOUNTER — PATIENT MESSAGE (OUTPATIENT)
Dept: ORTHOPEDICS | Facility: CLINIC | Age: 55
End: 2020-09-23

## 2020-09-24 ENCOUNTER — TELEPHONE (OUTPATIENT)
Dept: ORTHOPEDICS | Facility: CLINIC | Age: 55
End: 2020-09-24

## 2020-09-24 DIAGNOSIS — Z01.818 PRE-OP TESTING: ICD-10-CM

## 2020-09-24 DIAGNOSIS — M16.0 PRIMARY OSTEOARTHRITIS OF BOTH HIPS: Primary | ICD-10-CM

## 2020-09-25 ENCOUNTER — TELEPHONE (OUTPATIENT)
Dept: PREADMISSION TESTING | Facility: HOSPITAL | Age: 55
End: 2020-09-25

## 2020-09-25 NOTE — TELEPHONE ENCOUNTER
===View-only below this line===  ----- Message -----  From: Reji Sanders MD  Sent: 9/24/2020   2:36 PM CDT  To: Chin Heath III, MD, Blake Keith  Subject: RE: BRIGIDO and hypercoag workup                     No he does not need to bridge.   ----- Message -----  From: Chin Heath III, MD  Sent: 9/22/2020   8:59 PM CDT  To: Reji Sanders MD, Blake Keith  Subject: RE: BRIGIDO and hypercoag workup                     Great. Thank you.   Does he need bridging with lovenox pre-op?     Quan:   Please touch base with patient to see if he wants Dec 2 or 9 (I want to do on a Wednesday as Monday gets complicated with the blood bank for bilaterals).     Thank you,  Will    ----- Message -----  From: Reji Sanders MD  Sent: 9/22/2020   9:03 AM CDT  To: Chin Heath III, MD  Subject: RE: BRIGIDO and hypercoag workup                     There needs to be at least a 12 week interval from the prior labs, so I think it will have to be done after surgery. It will not affect the therapeutic plan at this time.     - Reji  ----- Message -----  From: Chin Heath III, MD  Sent: 9/22/2020   8:46 AM CDT  To: Reji Sanders MD  Subject: BRIGIDO and hypercoag workup                         Hi,     Mr Marie would like to proceed with Bilateral anterior approach total hip replacement in the 1st week for 2 of December.     Thank you so much for the recs in your recent note for surgery.     It looks like you have repeat labs scheduled for Dec 12th.   How critical is the timing of those labs?   Do they need to be done prior to surgery?   Can they be moved up into November?     Thank you,

## 2020-09-25 NOTE — TELEPHONE ENCOUNTER
----- Message from Anne La, RN sent at 9/25/2020 10:38 AM CDT -----  ORTHO BILATERAL HIPS  possibly 12/2 or 12/9 -not sure if booked yet  DR FELDMAN    Please schedule DR MENDEZ and POC    (Pt has appt with Ana Maria Maradiaga in ORTHO on 11/20/20; maybe schedule above on the same day)  Thanks,  Apoorva

## 2020-10-06 DIAGNOSIS — I82.4Z3 ACUTE DEEP VEIN THROMBOSIS (DVT) OF DISTAL VEIN OF BOTH LOWER EXTREMITIES: ICD-10-CM

## 2020-10-08 ENCOUNTER — PATIENT MESSAGE (OUTPATIENT)
Dept: HEMATOLOGY/ONCOLOGY | Facility: CLINIC | Age: 55
End: 2020-10-08

## 2020-11-11 NOTE — ANESTHESIA PAT ROS NOTE
11/11/2020  Edi Marie is a 55 y.o., male.      Pre-op Assessment          Review of Systems         Anesthesia Assessment: Preoperative EQUATION    Planned Procedure: Procedure(s) (LRB):  ARTHROPLASTY, HIP, BILATERAL: ANTERIOR: DEPUY-ACTIS+PINNACLE (Bilateral)  Requested Anesthesia Type:Spinal/Epidural  Surgeon: Chin Heath III, MD  Service: Orthopedics  Known or anticipated Date of Surgery:12/7/2020    Surgeon notes: reviewed    Previous anesthesia records:colonoscopy 1/20/17    Last PCP note: within [facility] , Dr Leary 5/21/20  Subspecialty notes: Hematology/Oncology Dr Reji Sanders 9/10/20 with anticoagulation plan for bilat BRIGIDO:  Hip arthroplasty is a procedure with high periprocedural thrombotic risk. For hip arthroplasty, I recommend anticoagulation as follows:     Surgical plan:  - Last dose apixaban 3 days before surgery (hold for 48 hours prior to surgery)  - begin apixaban 2.5 mg PO BID at least 12 hours after surgery  - after 24-48 hours of apixaban at 2.5 mg PO BID and no bleeding, increase to 5 mg PO BID    Other important co-morbidities: spontaneous DVT x 2 2017, chronic L DVT (on Eliquis)      Tests already available:  US lower extremity veins bilateral 5/21/20             Plan:    Testing:  BMP, Hematology Profile, PT/INR and T&S   Pre-anesthesia  visit       Visit focus: possible regional anesthesia and/or nerve block      Consultation:IM Perioperative Hospitalist; will discuss anticoagulation plan with anesthesia.        Navigation: Tests to be scheduled.              Consults scheduled-appt with Dr Ardon 11/20/20             Results will be tracked by Preop Clinic.     Addendum 11/12/20: discussed plan for stopping Eliquis with Dr Rangel according to Dr Sanders' recommendation. Pt will be instructed when he comes to POC on 11/20/20/Eduardo    Addendum 12/1/20: pt optimized per  Dr Cruz-please refer to his notes; lab results reviewed from 11/30/20; Dr Ardon notified via in-basket message of elevated platelet ct of [phone] ./BGintherRN

## 2020-11-11 NOTE — PRE-PROCEDURE INSTRUCTIONS
Chart review; triage plan initiated.  Phone contact 11/12/20: pt aware of appointment in POC and with Dr Ardon on 11/20/20. Medication reconciliation completed; pt informed he will stop Eliquis prior to surgery according to hematologist, Dr Sanders', instructions, which I am currently discussing with anesthesiologist, Dr Rangel. The exact instructions with the date for stopping Eliquis will be provided when he comes to the POC on Friday, 11/20/20 (along with other medication instructions). Pt agreed to have his lab drawn on a day closer to surgery (not on 11/20/20 with other appts) since he needs a T&S; lab will be scheduled on 11/30/20. Pt verbalized understanding of information provided.

## 2020-11-12 ENCOUNTER — ANESTHESIA EVENT (OUTPATIENT)
Dept: SURGERY | Facility: HOSPITAL | Age: 55
DRG: 462 | End: 2020-11-12
Payer: COMMERCIAL

## 2020-11-12 DIAGNOSIS — M79.605 PAIN IN BOTH LOWER EXTREMITIES: ICD-10-CM

## 2020-11-12 DIAGNOSIS — M79.604 PAIN IN BOTH LOWER EXTREMITIES: ICD-10-CM

## 2020-11-12 DIAGNOSIS — Z01.818 PRE-OP TESTING: Primary | ICD-10-CM

## 2020-11-18 ENCOUNTER — PATIENT OUTREACH (OUTPATIENT)
Dept: ADMINISTRATIVE | Facility: OTHER | Age: 55
End: 2020-11-18

## 2020-11-19 NOTE — PROGRESS NOTES
Requested updates within Care Everywhere.  Patient's chart was reviewed for overdue REBECCA topics.  Immunizations reconciled.

## 2020-11-19 NOTE — DISCHARGE INSTRUCTIONS
Your surgery has been scheduled for:__________________________________________    You should report to: [facility]  ____[facility], located on the [place] side of the first floor of the           [facility] ([phone])  ____[facility], located on the [facility] side of the            Second floor of the [facility] ([phone])  ____3rd Floor [facility] located on the [facility] side of the [facility] [phone]  Please Note   - Tell your doctor if you take Aspirin, products containing Aspirin, herbal medications  or blood thinners, such as Coumadin, Ticlid, or Plavix.  (Consult your provider regarding holding or stopping before surgery).  - Arrange for someone to drive you home following surgery.  You will not be allowed to leave the surgical facility alone or drive yourself home following sedation and anesthesia.  Before Surgery  - Stop taking all vitamins/ herbal medications 14days prior to surgery  - No Motrin/Advil (Ibuprofen) 1 day before surgery  - No Aleve (Naproxen) 7 days before surgery  - Stop Taking Asprin, products containing Asprin _____days before surgery  - Stop taking blood thinners_______days before surgery  - No Goody's/BC  Powder 7 days before surgery  - Refrain from drinking alcoholic beverages for 24hours before and after surgery  - Stop or limit smoking _________days before surgery  - You may take Tylenol for pain  Night before Surgery  NOTHING TO EAT OR DRINK AFTER MIDNIGHT  - Take a shower or bath (shower is recommended).  Bathe with Hibiclens soap or an antibacterial soap from the neck down.  If not supplied by your surgeon, hibiclens soap will need to be purchased over the counter in pharmacy.  Rinse soap off thoroughly.  - Shampoo your hair with your regular shampoo  The Day of Surgery  · If you are told to take medication on the morning of surgery, it may be taken with a sip of water.    - Take another bath or shower with hibiclens or any antibacterial soap, to reduce the chance of infection.  - Take heart and blood pressure medications with a small sip of water, as advised by the perioperative team.  - Do not take fluid pills  - You may brush your teeth and rinse your mouth, but do not swall any additional water.   - Do not apply perfumes, powder, body lotions or deodorant on the day of surgery.  - Nail polish should be removed.  - Do not wear makeup or moisturizer  - Wear comfortable clothes, such as a button front shirt and loose fitting pants.  - Leave all jewelry, including body piercings, and valuables at home.    - Bring any devices you will neeed after surgery such as crutches or canes.  - If you have sleep apnea, please bring your CPAP machine  In the event that your physical condition changes including the onset of a cold or respiratory illness, or if you have to delay or cancel your surgery, please notify your surgeon.    Anesthesia: Regional Anesthesia    Youre scheduled for surgery. During surgery, youll receive medicine called anesthesia to keep you comfortable and pain-free. Your surgeon has decided that youll receive regional anesthesia. This sheet tells you what to expect with this type of anesthesia.  What is regional anesthesia?  Regional anesthesia numbs one region of your body. The anesthesia may be given around nerves or into veins in your arms, neck, or legs (nerve block or Miguel Angel block). Or it may be sent into the spinal fluid (spinal anesthesia) or into the space just outside the spinal fluid (epidural anesthesia). You may also be given sedatives to help you relax.  Nerve block or Miguel Angel block  A small area of the body, such as an arm or leg, can be numbed using a nerve block or Miguel Angel block.  · Nerve block. During a nerve block, your skin is numbed. A needle is then inserted near nerves that serve the area to be numbed. Anesthetic is sent through the needle.  · IV regional  or [place] block. For this type of block, an IV line is put into a vein. The blood flow to the area to be numbed is blocked for a short time. Anesthetic is sent through the IV.  Spinal anesthesia  Spinal anesthesia numbs your body from about the waist down.  · Anesthetic is injected into the spinal fluid. This is a substance that surrounds the spinal cord in your spinal column. The anesthetic blocks pain traveling from the body to the brain.  · To receive the anesthetic, your skin is numbed at the injection site on your back.  · A needle is then inserted into the spinal space. Anesthetic is sent into the spinal fluid through the needle.  Epidural anesthesia  Epidural anesthesia is most commonly used during childbirth and may also be used after surgical procedures of the chest, belly, and legs.  · Anesthetic is injected into the epidural space. This is just outside the dural sac which contains the spinal fluid.  · To receive the anesthetic, your skin is numbed at the injection site on your back.  · A needle is then inserted into the epidural space. Anesthetic is sent into the epidural space through the needle.  · A small flexible catheter may be attached to the needle and left in place. This allows for continuous injections or infusions of anesthetic.  Anesthesia tools and medicines that might be near you during your procedure  · Local anesthetic. This medicine is given through a needle numbs one region of your body.  · Electrocardiography leads (electrodes). These are used to record your heart rate and rhythm.  · Blood pressure cuff. A cuff is placed on your arm to keep track of your blood pressure.  · Pulse oximeter. This small clip is placed on the end of the finger. It measures your blood oxygen level.  · Sedatives. These medicines may be given through an IV. They help to relax you and keep you comfortable. You may stay awake or sleep lightly.  · Oxygen. You may be given oxygen through a facemask.  Risks and  possible complications  Regional anesthesia carries some risks. These include:  · Nausea and vomiting  · Headache  · Backache  · Decreased blood pressure  · Allergic reaction to the anesthetic  · Ongoing numbness (rare)  · Irregular heartbeat (rare)  · Cardiac arrest (rare)   Date Last Reviewed: 12/1/2016  © 8725-4571 [employer]. [address] [place] [id number]. All rights reserved. This information is not intended as a substitute for professional medical care. Always follow your healthcare professional's instructions.

## 2020-11-20 ENCOUNTER — HOSPITAL ENCOUNTER (OUTPATIENT)
Dept: PREADMISSION TESTING | Facility: HOSPITAL | Age: 55
Discharge: HOME | End: 2020-11-20
Attending: NURSE PRACTITIONER
Payer: COMMERCIAL

## 2020-11-20 ENCOUNTER — HOSPITAL ENCOUNTER (OUTPATIENT)
Dept: RADIOLOGY | Facility: HOSPITAL | Age: 55
Discharge: HOME | End: 2020-11-20
Attending: NURSE PRACTITIONER
Payer: COMMERCIAL

## 2020-11-20 ENCOUNTER — INITIAL CONSULT (OUTPATIENT)
Dept: INTERNAL MEDICINE | Facility: CLINIC | Age: 55
End: 2020-11-20
Payer: COMMERCIAL

## 2020-11-20 ENCOUNTER — OFFICE VISIT (OUTPATIENT)
Dept: ORTHOPEDICS | Facility: CLINIC | Age: 55
End: 2020-11-20
Payer: COMMERCIAL

## 2020-11-20 VITALS
OXYGEN SATURATION: 97 % | TEMPERATURE: 98 F | RESPIRATION RATE: 16 BRPM | SYSTOLIC BLOOD PRESSURE: 134 MMHG | BODY MASS INDEX: 26.88 KG/M2 | WEIGHT: 192 LBS | HEART RATE: 66 BPM | HEIGHT: 71 IN | DIASTOLIC BLOOD PRESSURE: 70 MMHG

## 2020-11-20 DIAGNOSIS — Z01.818 PREOP EXAMINATION: Primary | ICD-10-CM

## 2020-11-20 DIAGNOSIS — Z86.718 HISTORY OF DVT (DEEP VEIN THROMBOSIS): ICD-10-CM

## 2020-11-20 DIAGNOSIS — R82.90 ABNORMAL URINALYSIS: ICD-10-CM

## 2020-11-20 DIAGNOSIS — Z87.442 HISTORY OF KIDNEY STONES: ICD-10-CM

## 2020-11-20 DIAGNOSIS — M25.559 HIP PAIN: Primary | ICD-10-CM

## 2020-11-20 DIAGNOSIS — Z87.39 H/O OSGOOD-SCHLATTER DISEASE: ICD-10-CM

## 2020-11-20 DIAGNOSIS — R06.83 SNORING: ICD-10-CM

## 2020-11-20 DIAGNOSIS — I82.502 CHRONIC DEEP VEIN THROMBOSIS (DVT) OF LEFT LOWER EXTREMITY, UNSPECIFIED VEIN: ICD-10-CM

## 2020-11-20 DIAGNOSIS — M25.559 HIP PAIN: ICD-10-CM

## 2020-11-20 DIAGNOSIS — D76.3 HISTIOCYTOSIS: ICD-10-CM

## 2020-11-20 PROBLEM — S89.90XA LEG INJURY: Status: RESOLVED | Noted: 2017-11-19 | Resolved: 2020-11-20

## 2020-11-20 PROBLEM — M79.661 RIGHT CALF PAIN: Status: RESOLVED | Noted: 2019-04-29 | Resolved: 2020-11-20

## 2020-11-20 PROCEDURE — 99244 PR OFFICE CONSULTATION,LEVEL IV: ICD-10-PCS | Mod: S$GLB,,, | Performed by: HOSPITALIST

## 2020-11-20 PROCEDURE — 99999 PR PBB SHADOW E&M-EST. PATIENT-LVL III: CPT | Mod: PBBFAC,,, | Performed by: NURSE PRACTITIONER

## 2020-11-20 PROCEDURE — 99499 UNLISTED E&M SERVICE: CPT | Mod: S$GLB,,, | Performed by: NURSE PRACTITIONER

## 2020-11-20 PROCEDURE — 99999 PR PBB SHADOW E&M-EST. PATIENT-LVL II: ICD-10-PCS | Mod: PBBFAC,,, | Performed by: HOSPITALIST

## 2020-11-20 PROCEDURE — 99499 NO LOS: ICD-10-PCS | Mod: S$GLB,,, | Performed by: NURSE PRACTITIONER

## 2020-11-20 PROCEDURE — 99999 PR PBB SHADOW E&M-EST. PATIENT-LVL II: CPT | Mod: PBBFAC,,, | Performed by: HOSPITALIST

## 2020-11-20 PROCEDURE — 72170 X-RAY EXAM OF PELVIS: CPT | Mod: TC

## 2020-11-20 PROCEDURE — 99244 OFF/OP CNSLTJ NEW/EST MOD 40: CPT | Mod: S$GLB,,, | Performed by: HOSPITALIST

## 2020-11-20 PROCEDURE — 99999 PR PBB SHADOW E&M-EST. PATIENT-LVL III: ICD-10-PCS | Mod: PBBFAC,,, | Performed by: NURSE PRACTITIONER

## 2020-11-20 PROCEDURE — 72170 X-RAY EXAM OF PELVIS: CPT | Mod: 26,,, | Performed by: RADIOLOGY

## 2020-11-20 PROCEDURE — 1125F PR PAIN SEVERITY QUANTIFIED, PAIN PRESENT: ICD-10-PCS | Mod: S$GLB,,, | Performed by: NURSE PRACTITIONER

## 2020-11-20 PROCEDURE — 72170 XR PELVIS ROUTINE AP: ICD-10-PCS | Mod: 26,,, | Performed by: RADIOLOGY

## 2020-11-20 PROCEDURE — 1125F AMNT PAIN NOTED PAIN PRSNT: CPT | Mod: S$GLB,,, | Performed by: NURSE PRACTITIONER

## 2020-11-20 NOTE — ANESTHESIA PREPROCEDURE EVALUATION
[facility]  Anesthesia Pre-Operative Evaluation             Patient Name: Edi Marie  YOB: 1965  MRN: [id number]    SUBJECTIVE:     Extensive pre-op workup reviewed.  Last dose of Eliquis Thursday evening (12/3) - he is therefore a candidate for neuraxial anesthesia.     Pre-operative evaluation for Procedure(s) (LRB):  ARTHROPLASTY, HIP, BILATERAL: ANTERIOR: DEPUY-ACTIS+PINNACLE (Bilateral)     11/20/2020    Edi Marie is a 55 y.o. male w/ a significant PMHx of recurrent DVT (on apixiban) and bilateral hip arthritis who now presents for the above procedure(s).    History of DVT x2 in the left lower extremity 1st was in 2017 spontaneous he was started on Eliquis when he was taken off the Eliquis and had a steroid injection he had another DVT. He is back on Eliquis. He says he has had a hematology workup which is negative. Recent repeat ultrasound showed partial thrombosis of the left peroneal vein which appears chronic.    Per hematology recs:  - Last dose apixaban 3 days before surgery (hold for 48 hours prior to surgery)  - begin apixaban 2.5 mg PO BID at least 12 hours after surgery  - after 24-48 hours of apixaban at 2.5 mg PO BID and no bleeding, increase to 5 mg PO BID    BL Lower Extremity US (05/21/20):  Impression:     Improved now partially occlusive chronic thrombosis of the left femoral and popliteal veins.     Partial thrombosis of the left peroneal vein also appears chronic.     No acute deep vein thrombus.      LDA: None documented.    Prev airway: None documented.    Drips: None documented.      Patient Active Problem List   Diagnosis    Spermatocele    Histiocytosis    H/O vasectomy (05/30/2014)    Chronic deep vein thrombosis (DVT) of left lower extremity    Leg injury    Post-thrombotic syndrome of left lower extremity    Greater trochanteric bursitis of both hips    Hip stiffness    Right calf pain    Acute deep vein thrombosis (DVT) of distal  vein of both lower extremities    Recurrent deep vein thrombosis (DVT)    Weakness of both hips       Review of patient's allergies indicates:  No Known Allergies    Current Inpatient Medications:      Current Outpatient Medications on File Prior to Encounter   Medication Sig Dispense Refill    acetaminophen (TYLENOL) 650 MG TbSR Take 650 mg by mouth every 8 (eight) hours.      apixaban (ELIQUIS) 5 mg Tab Take 1 tablet (5 mg total) by mouth 2 (two) times daily. 60 tablet 11     No current facility-administered medications on file prior to encounter.        Past Surgical History:   Procedure Laterality Date    APPENDECTOMY      COLONOSCOPY N/A 1/20/2017    Procedure: COLONOSCOPY;  Surgeon: Danis Frank MD;  Location: [facility] ([address] [place] [address]);  Service: Endoscopy;  Laterality: N/A;    KNEE SURGERY      right knee    LITHOTRIPSY         Social History     Socioeconomic History    Marital status:      Spouse name: Not on file    Number of children: Not on file    Years of education: Not on file    Highest education level: Not on file   Occupational History    Occupation: self employed     Employer: [employer]   Social Needs    Financial resource strain: Not very hard    Food insecurity     Worry: Never true     Inability: Never true    Transportation needs     Medical: No     Non-medical: No   Tobacco Use    Smoking status: Never Smoker    Smokeless tobacco: Never Used   Substance and Sexual Activity    Alcohol use: Yes     Alcohol/week: 7.0 standard drinks     Types: 7 Glasses of wine per week     Frequency: 2-4 times a month     Drinks per session: 3 or 4     Binge frequency: Less than monthly    Drug use: No    Sexual activity: Yes   Lifestyle    Physical activity     Days per week: 3 days     Minutes per session: 10 min    Stress: Only a little   Relationships    Social connections     Talks on phone: More than three times a week     Gets together: Twice a week     Attends  Hoahaoism service: Not on file     Active member of club or organization: No     Attends meetings of clubs or organizations: Never     Relationship status:    Other Topics Concern    Not on file   Social History Narrative    Not on file       OBJECTIVE:     Vital Signs Range (Last 24H):         Significant Labs:  Lab Results   Component Value Date    WBC 6.65 06/06/2019    HGB 16.0 06/06/2019    HCT 47.7 06/06/2019     (H) 06/06/2019    CHOL 226 (H) 07/30/2019    TRIG 92 07/30/2019    HDL 63 07/30/2019    ALT 20 06/06/2019    AST 17 06/06/2019     06/06/2019    K 4.7 06/06/2019     06/06/2019    CREATININE 0.9 06/06/2019    BUN 23 (H) 06/06/2019    CO2 25 06/06/2019    PSA 0.74 07/30/2019    INR 1.0 11/16/2017       Diagnostic Studies: No relevant studies.    EKG:   No results found for this or any previous visit.    2D ECHO:  TTE:  No results found for this or any previous visit.    OPAL:  No results found for this or any previous visit.    ASSESSMENT/PLAN:         Anesthesia Evaluation    I have reviewed the Patient Summary Reports.    I have reviewed the Nursing Notes.    I have reviewed the Medications.     Review of Systems  Anesthesia Hx:  No problems with previous Anesthesia    Social:  Non-Smoker, Alcohol Use    Hematology/Oncology:  Hematology Normal   Oncology Normal     EENT/Dental:EENT/Dental Normal   Cardiovascular:  Deep Venous Thrombosis (DVT), Hx of DVT, left lower extremity, right lower extremity    Pulmonary:  Pulmonary Normal    Renal/:   Chronic Renal Disease renal calculi    Hepatic/GI:  Hepatic/GI Normal    Musculoskeletal:   Arthritis     Neurological:  Neurology Normal    Endocrine:  Endocrine Normal    Dermatological:  Skin Normal    Psych:  Psychiatric Normal           Physical Exam  General:  Well nourished    Airway/Jaw/Neck:  Airway Findings: Mouth Opening: Normal Tongue: Normal  General Airway Assessment: Adult, Good  Mallampati: II  Improves to II with  phonation.  TM Distance: Normal, at least 6 cm  Jaw/Neck Findings:  Neck ROM: Normal ROM      Dental:  Dental Findings: In tact   Chest/Lungs:  Chest/Lungs Findings: Clear to auscultation, Normal Respiratory Rate     Heart/Vascular:  Heart Findings: Rate: Normal  Rhythm: Regular Rhythm  Sounds: Normal     Abdomen:  Abdomen Findings: Normal    Musculoskeletal:  Musculoskeletal Findings: Normal   Skin:  Skin Findings: Normal    Mental Status:  Mental Status Findings:  Cooperative, Alert and Oriented         Anesthesia Plan  Type of Anesthesia, risks & benefits discussed:  Anesthesia Type:  regional, general, CSE  Patient's Preference:   Intra-op Monitoring Plan: standard ASA monitors  Intra-op Monitoring Plan Comments:   Post Op Pain Control Plan: multimodal analgesia, per primary service following discharge from PACU, IV/PO Opioids PRN and peripheral nerve block  Post Op Pain Control Plan Comments:   Induction:   IV  Beta Blocker:  Patient is not currently on a Beta-Blocker (No further documentation required).       Informed Consent: Patient understands risks and agrees with Anesthesia plan.  Questions answered. Anesthesia consent signed with patient.  ASA Score: 3     Day of Surgery Review of History & Physical: I have interviewed and examined the patient. I have reviewed the patient's H&P dated:  There are no significant changes.  H&P update referred to the surgeon.         Ready For Surgery From Anesthesia Perspective.       Pre-op Clinic Comments:  - Detailed discussion of GETA and regional (Epidural, CSE) anesthetic techniques as well as potential placement of additional IV access and arterial line.  - Patient expresses understanding and all questions were answered satisfactorily    Per Hematology Recs:    Mr. Marie has history of recurrent VTE to the left lower extremity and approriately is on extended anticoagulant therapy. We discussed that the presence of chronic thrombus does not have any implications  on the duration or recommendation for extended therapy, and it is not expected to embolize.      He is at high risk for recurrent VTE off anticoagulation, and should continue apixaban unless there is a contraindication.     Hip arthroplasty is a procedure with high periprocedural thrombotic risk. For hip arthroplasty, I recommend anticoagulation as follows:     Surgical plan for anticoagulation:  - Last dose apixaban 3 days before surgery (hold for 48 hours prior to surgery)  - begin apixaban 2.5 mg PO BID at least 12 hours after surgery  - after 24-48 hours of apixaban at 2.5 mg PO BID and no bleeding, increase to 5 mg PO BID

## 2020-11-20 NOTE — ASSESSMENT & PLAN NOTE
Had a Left ankle injury -Oct 2017  [place] [employer] [place]   Had ankle swelling over time   Later had Left leg pain , increased over time  He drove himself to [facility] ER  Was diagnosed with DVT    US 11/16/2017 showed - Deep venous thrombosis extending from the left iliac vein to the peroneal vein, noting thrombosis is partial involving the left iliac vein and common femoral vein, with complete occlusion involving the superficial femoral vein, popliteal vein, peroneal vein.    As per chart -  The patient then underwent catheter assisted thrombolysis on 11/17/17 .  The patient was then continued on continuous tPA and angiomax for 30 hours and then discharged home on Eliquis 10mg PO for 7 days and then 5mg BID thereafter.     June 2018   Partially occlusive deep venous thrombosis of the left superficial femoral and popliteal vein, mildly improved from prior exam  Dec 2018-  Partial thrombus of the left superficial femoral vein, again noted.  The previously partially occluded popliteal vein appears patent    Took Apixaban for about an year    April 2019- Occlusive thrombus in the left femoral and left popliteal vein as well as the right popliteal, right posterior tibial, and right peroneal vein  Was recommenced on apixaban     Sept 2019   Right Leg:  Color flow evaluation of the lower extremity demonstrates fully compressible and patent veins. There is no evidence of venous thrombosis in the deep or superficial veins.     Left Leg:  Color flow evaluation of the lower extremity demonstrates non-occlusive, chronic thrombus in the Femoral and Popliteal veins. There is no evidence of venous thrombosis in the remaining deep or superficial veins    MAY 2020     Improved now partially occlusive chronic thrombosis of the left femoral and popliteal veins.     Partial thrombosis of the left peroneal vein also appears chronic.     No acute deep vein thrombus    1 st DVT    No PE  Associated with no reduced mobility, had  long journeys, no cancer,no  prior surgery,no  Hospital stay,no  HRT  IVC filter - None     The first DVT seems to have been provoked - Trauma, Long journeys    2 nd DVT    Associated with no reduced mobility, long journeys, no cancer, no prior surgery, no Hospital stay,no  HRT    IVC filter - none     The first DVT seems to have been provoked -  Long journeys- domestic , international     Note plan for indefinite anticoagulation     Family history of thrombosis  - Mother had provoked DVT around 60 years    No other family history of thrombosis     Increased risk of thrombosis in the randy operative period     Date of surgery - Dec 7 th 2020   Planning on taking the last dose of Apixaban on 12/4/2020 AM      Encouraged mobility     Messaged Hematologist for 3 day anticoagulation hold time   --  11/22- 12 23    Correspondence from Hematologist on 11/20/2020  regarding 3 day anticoagulation hold time  Plan is to hold anticoagulation for 3 days pre op    Date of surgery - Dec 7 th 2020   Last dose of Apixaban use before surgery is on Dec 3 rd 2020 PM  Messaged him through portal

## 2020-11-20 NOTE — PROGRESS NOTES
"[facility]  Progress Note    Patient Name: Edi Marie  MRN: [id number]  Date of Evaluation- 12/04/2020  PCP- More Villanueva MD    Future cases for Edi Marie [[id number]]     Case ID Status Date Time Rene Procedure Provider Location    [id number] [facility] 12/7/2020  7:00  ARTHROPLASTY, HIP, BILATERAL: ANTERIOR: DEPUY-ACTIS+PINNACLE Chin Heath III, MD [[id number]] [facility] OR          HPI:  History of present illness- I had the pleasure of meeting this pleasant 55 y.o. gentleman in the pre op clinic prior to his elective Orthopedic surgery.   Goes by " Archie"   I have obtained the history by speaking to the patient and by reviewing the electronic health records.    Events leading up to surgery / History of presenting illness -    He has been troubled with moderate-severe  both hip pains for  About 3 years  .   Pain increases with sleeping  and activity and decreases with resting.    He played competitive soccer until age 34 Years  No previous hip surgeries    Relevant health conditions of significance for the perioperative period/ History of presenting illness -    Subjectively describes health as good      Works at a desk    Lives with wife in a single level house  Wife can help post op    Health conditions of significance for the perioperative period     Thrombosis     Not known to have heart disease , Diabetes Mellitus, Lung disease , HTN        Subjective/ Objective:     Chief complaint-Preoperative evaluation, Perioperative Medical management, complication reduction plan     Active cardiac conditions- none    Revised cardiac risk index predictors- none    Functional capacity -Examples of physical activity, walks at the [employer], does pilates at home,yard work   can take 1 flight of stairs and cutting the grass with a mower----- He can undertake all the above activities without  chest pain,chest tightness, Shortness of breath ,dizziness,lightheadedness making his exercise tolerance more,   " "than 4 Mets.        Review of Systems   Constitutional: Negative for chills and fever.        No unusual weight changes     HENT:        STOPBANG score  4/ 8    Snoring   Age over 50 years  Neck size over 40 CM  Male gender   Eyes:        No unusual vision changes   Respiratory:        No cough , phlegm    No Hemoptysis   Cardiovascular:        As noted   Gastrointestinal:        Bowels- Regular / daily  No overt GI/ blood losses   Endocrine:        Prednisone use > 20 mg daily for 3 weeks- none   Genitourinary: Negative for dysuria.        No urinary hesitancy    Musculoskeletal:        As above      Skin: Negative for rash.   Neurological: Negative for syncope.        No unilateral weakness   Hematological:        Current use of Anticoagulants  yes   Psychiatric/Behavioral:        No Depression,Anxiety     no vascular stentinmg             No anesthesia, bleeding, cardiac problems, PONV with previous surgeries/procedures.  Medications and Allergies reviewed in epic.   Gets cancer screening     Physical Exam    Vitals - 1 value per visit 11/20/2020   SYSTOLIC 134   DIASTOLIC 70   PULSE 66   TEMPERATURE 98.4   RESPIRATIONS 16   SPO2 97   Weight (lb) 192   Weight (kg) 87.091   HEIGHT 5' 11"   BODY MASS INDEX 26.78     Abnormal UA - Dec 2018 in the setting of kidney stone    Physical Exam  Constitutional-General appearance-Conscious,Coherent  Eyes- No conjunctival icterus,pupils  round  and reactive to light   ENT-Oral cavity- moist  , Hearing grossly normal   Neck- No thyromegaly ,Trachea -central, No jugular venous distension,   No Carotid Bruit   Cardiovascular -Heart Sounds- Normal  and  no murmur   , No gallop rhythm   Respiratory - Normal Respiratory Effort, Normal breath sounds, crepitations bases,  no wheeze  and  no forced expiratory wheeze    Peripheral pitting pedal edema-- none , no calf pain   Gastrointestinal -Soft abdomen, No palpable masses, Non Tender,Liver,Spleen not palpable. No-- free fluid and " shifting dullness  Musculoskeletal- No finger Clubbing. Strength grossly normal   Lymphatic-No Palpable cervical, axillary,Inguinal lymphadenopathy   Psychiatric - normal effect,Orientation  Rt Dorsalis pedis pulses-palpable    Lt Dorsalis pedis pulses- palpable   Rt Posterior tibial pulses -palpable   Left posterior tibial pulses -palpable   Miscellaneous -  no renal bruit  Investigations  Lab and Imaging have been reviewed in epic.      Review of old records- Was done and information gathered regards to events leading to surgery and health conditions of significance in the perioperative period.        Preoperative cardiac risk assessment-  The patient does not have any active cardiac conditions . Revised cardiac risk index predictors- 0---.Functional capacity is more than 4 Mets. He will be undergoing a Orthopedic procedure that carries a Moderate Risk risk     Risk of a major Cardiac event ( Defined as death, myocardial infarction, or cardiac arrest at 30 days after noncardiac surgery), based on RCRI score     -3.9%       No further cardiac work up is indicated prior to proceeding with the surgery          American Society of Anesthesiologists Physical status classification ( ASA ) class: 2     Postoperative pulmonary complication risk assessment:      ARISCAT ( Canet) risk index- risk class -  Low, if duration of surgery is under 3 hours, intermediate, if duration of surgery is over 3 hours          Assessment/Plan:     Chronic deep vein thrombosis (DVT) of left lower extremity          History of DVT (deep vein thrombosis)  Had a Left ankle injury -Oct 2017  [facility] an injury at [facility] [employer] [facility]   Had ankle swelling over time   Later had Left leg pain , increased over time  He drove himself to [facility]  Was diagnosed with DVT    US 11/16/2017 showed - Deep venous thrombosis extending from the left iliac vein to the peroneal vein, noting thrombosis is partial involving the left iliac vein and common femoral vein,  with complete occlusion involving the superficial femoral vein, popliteal vein, peroneal vein.    As per chart -  The patient then underwent catheter assisted thrombolysis on 11/17/17 .  The patient was then continued on continuous tPA and angiomax for 30 hours and then discharged home on Eliquis 10mg PO for 7 days and then 5mg BID thereafter.     June 2018   Partially occlusive deep venous thrombosis of the left superficial femoral and popliteal vein, mildly improved from prior exam  Dec 2018-  Partial thrombus of the left superficial femoral vein, again noted.  The previously partially occluded popliteal vein appears patent    Took Apixaban for about an year    April 2019- Occlusive thrombus in the left femoral and left popliteal vein as well as the right popliteal, right posterior tibial, and right peroneal vein  Was recommenced on apixaban     Sept 2019   Right Leg:  Color flow evaluation of the lower extremity demonstrates fully compressible and patent veins. There is no evidence of venous thrombosis in the deep or superficial veins.     Left Leg:  Color flow evaluation of the lower extremity demonstrates non-occlusive, chronic thrombus in the Femoral and Popliteal veins. There is no evidence of venous thrombosis in the remaining deep or superficial veins    MAY 2020     Improved now partially occlusive chronic thrombosis of the left femoral and popliteal veins.     Partial thrombosis of the left peroneal vein also appears chronic.     No acute deep vein thrombus    1 st DVT    No PE  Associated with no reduced mobility, had long journeys, no cancer,no  prior surgery,no  Hospital stay,no  HRT  IVC filter - None     The first DVT seems to have been provoked - Trauma, Long journeys    2 nd DVT    Associated with no reduced mobility, long journeys, no cancer, no prior surgery, no Hospital stay,no  HRT    IVC filter - none     The first DVT seems to have been provoked -  Long journeys- domestic , international      Note plan for indefinite anticoagulation     Family history of thrombosis  - Mother had provoked DVT around 60 years    No other family history of thrombosis     Increased risk of thrombosis in the randy operative period     Date of surgery - Dec 7 th 2020   Planning on taking the last dose of Apixaban on 12/4/2020 AM      Encouraged mobility     Messaged Hematologist for 3 day anticoagulation hold time   --  11/22- 12 23    Correspondence from Hematologist on 11/20/2020  regarding 3 day anticoagulation hold time  Plan is to hold anticoagulation for 3 days pre op    Date of surgery - Dec 7 th 2020   Last dose of Apixaban use before surgery is on Dec 3 rd 2020 PM  Messaged him through portal     Histiocytosis  In the first 10 years of life   Skin   No problem since     Snoring  Has a deviated nasal septum       Possible sleep apnea- I suggested follow up  and suggest caution with usage of medication that can cause respiratory suppression in the perioperative period  potential ramifications of untreated sleep apnea, which could include daytime sleepiness, hypertension, heart disease and stroke were discussed    Avoidance of  supine sleep, weight gain , alcoholic beverages , care with , sedative , CNS depressant use indicated  since all of these can worsen ANAY     Care with sedating Medication discussed     He deferred sleep evaluation at thus time         History of kidney stones  No recent problem  Had lithotripsy many years ago   Stays hydrated     H/O Osgood-Schlatter disease  Had Rt knee surgery as a teenager    Abnormal urinalysis  Abnormal UA - Dec 2018 in the setting of kidney stone        Preventive perioperative care    Thromboembolic prophylaxis:  His risk factors for thrombosis include surgical procedure, previous history of thrombosis and age.I suggest  thromboembolic prophylaxis ( mechanical/pharmacological, weighing the risk benefits of pharmacological agent use considering randy procedural bleeding )   during the perioperative period.I suggested being active in the post operative period.   The patient is a candidate for extended DVT prophylaxis     Postoperative pulmonary complication prophylaxis-- I suggest incentive spirometry use, early ambulation and end tidal carbon dioxide monitoring  , oral care , head end of bed elevation      Renal complication prophylaxis- I suggest keeping him well hydrated  in the perioperative period.  Suggested staying hydrated aiming for clear urine       Surgical site Infection Prophylaxis-I  suggest appropriate antibiotic for Prophylaxis against Surgical site infections  No reported Staph infection   Skin antibacterial discussed   Hibiclens- neck down discussed      This visit was focused on Preoperative evaluation, Perioperative Medical management, complication reduction plans. I suggest that the patient follows up with primary care or relevant sub specialists for ongoing health care.    I appreciate the opportunity to be involved in this patients care. Please feel free to contact me if there were any questions about this consultation.    Patient is optimized     Patient was instructed to call and update me about any changes to health,  medication, office visits ,testing out side of the [facility] operative care center , hospitalizations between now and surgery     Esperanza Ardon MD  Perioperative Medicine  [facility]   Pager [phone]    COVID screening     No fever   No cough   No SOB  No sore throat   No loss of taste or smell   No muscle aches   No nausea, vomiting , diarrhea  --  12/1/2020 - 16 21    Labs from 11/30/2020     INR-N  Hb, HCT-N  Mildly elevated PLT count that has been noted in the recent past   Creatinine stable     Called to discuss labs  Stays hydrated- about 64 OZ a day    PLT count discussed- No suggestions of infection, inflammation, blood loss , no splenectomy   Stay hydrated   Feels fine   -  12/4- 19 35     Called to follow up   Called  to follow up , spoke to him to address any concerns with the up coming surgery or any questions on Medication instructions -  Doing well ,No changes to Medication, Health -  Last took Apixaban use is 12/3 -

## 2020-11-20 NOTE — HPI
"History of present illness- I had the pleasure of meeting this pleasant 55 y.o. gentleman in the pre op clinic prior to his elective Orthopedic surgery.   Goes by " Archie"   I have obtained the history by speaking to the patient and by reviewing the electronic health records.    Events leading up to surgery / History of presenting illness -    He has been troubled with moderate-severe  both hip pains for  About 3 years  .   Pain increases with sleeping  and activity and decreases with resting.    He played competitive soccer until age 34 Years  No previous hip surgeries    Relevant health conditions of significance for the perioperative period/ History of presenting illness -    Subjectively describes health as good      Works at a desk    Lives with wife in a single level house  Wife can help post op    Health conditions of significance for the perioperative period     Thrombosis     Not known to have heart disease , Diabetes Mellitus, Lung disease , HTN    "

## 2020-11-20 NOTE — OUTPATIENT SUBJECTIVE & OBJECTIVE
"Outpatient Subjective & Objective     Chief complaint-Preoperative evaluation, Perioperative Medical management, complication reduction plan     Active cardiac conditions- none    Revised cardiac risk index predictors- none    Functional capacity -Examples of physical activity, walks at the ware house, does pilates at home,yard work   can take 1 flight of stairs and cutting the grass with a mower----- He can undertake all the above activities without  chest pain,chest tightness, Shortness of breath ,dizziness,lightheadedness making his exercise tolerance more,   than 4 Mets.        Review of Systems   Constitutional: Negative for chills and fever.        No unusual weight changes     HENT:        STOPBANG score  4/ 8    Snoring   Age over 50 years  Neck size over 40 CM  Male gender   Eyes:        No unusual vision changes   Respiratory:        No cough , phlegm    No Hemoptysis   Cardiovascular:        As noted   Gastrointestinal:        Bowels- Regular / daily  No overt GI/ blood losses   Endocrine:        Prednisone use > 20 mg daily for 3 weeks- none   Genitourinary: Negative for dysuria.        No urinary hesitancy    Musculoskeletal:        As above      Skin: Negative for rash.   Neurological: Negative for syncope.        No unilateral weakness   Hematological:        Current use of Anticoagulants  yes   Psychiatric/Behavioral:        No Depression,Anxiety     no vascular stentinmg             No anesthesia, bleeding, cardiac problems, PONV with previous surgeries/procedures.  Medications and Allergies reviewed in epic.   Gets cancer screening     Physical Exam    Vitals - 1 value per visit 11/20/2020   SYSTOLIC 134   DIASTOLIC 70   PULSE 66   TEMPERATURE 98.4   RESPIRATIONS 16   SPO2 97   Weight (lb) 192   Weight (kg) 87.091   HEIGHT 5' 11"   BODY MASS INDEX 26.78     Abnormal UA - Dec 2018 in the setting of kidney stone    Physical Exam  Constitutional-General appearance-Conscious,Coherent  Eyes- No " conjunctival icterus,pupils  round  and reactive to light   ENT-Oral cavity- moist  , Hearing grossly normal   Neck- No thyromegaly ,Trachea -central, No jugular venous distension,   No Carotid Bruit   Cardiovascular -Heart Sounds- Normal  and  no murmur   , No gallop rhythm   Respiratory - Normal Respiratory Effort, Normal breath sounds, crepitations bases,  no wheeze  and  no forced expiratory wheeze    Peripheral pitting pedal edema-- none , no calf pain   Gastrointestinal -Soft abdomen, No palpable masses, Non Tender,Liver,Spleen not palpable. No-- free fluid and shifting dullness  Musculoskeletal- No finger Clubbing. Strength grossly normal   Lymphatic-No Palpable cervical, axillary,Inguinal lymphadenopathy   Psychiatric - normal effect,Orientation  Rt Dorsalis pedis pulses-palpable    Lt Dorsalis pedis pulses- palpable   Rt Posterior tibial pulses -palpable   Left posterior tibial pulses -palpable   Miscellaneous -  no renal bruit  Investigations  Lab and Imaging have been reviewed in epic.      Review of old records- Was done and information gathered regards to events leading to surgery and health conditions of significance in the perioperative period.    Outpatient Subjective & Objective

## 2020-11-20 NOTE — LETTER
November 20, 2020      Chin Heath III, MD  [address] [facility] [address]           [facility]  [address] [facility] [address]  Phone: [phone]          Patient: Edi Marie   MR Number: [id number]   YOB: 1965   Date of Visit: 11/20/2020       Dear Dr. Chin Heath III:    Thank you for referring Edi Marie to me for evaluation. Attached you will find relevant portions of my assessment and plan of care.    If you have questions, please do not hesitate to call me. I look forward to following Edi Marie along with you.    Sincerely,    Esperanza Ardon MD    Enclosure  CC:  No Recipients    If you would like to receive this communication electronically, please contact [email or web address] or [phone] to request more information on [employer] Link access.    For providers and/or their staff who would like to refer a patient to [facility], please contact us through our one-stop-shop provider referral line, [facility], at [phone].    If you feel you have received this communication in error or would no longer like to receive these types of communications, please e-mail [email or web address]

## 2020-11-20 NOTE — ASSESSMENT & PLAN NOTE
Has a deviated nasal septum       Possible sleep apnea- I suggested follow up  and suggest caution with usage of medication that can cause respiratory suppression in the perioperative period  potential ramifications of untreated sleep apnea, which could include daytime sleepiness, hypertension, heart disease and stroke were discussed    Avoidance of  supine sleep, weight gain , alcoholic beverages , care with , sedative , CNS depressant use indicated  since all of these can worsen ANAY     Care with sedating Medication discussed     He deferred sleep evaluation at thus time

## 2020-11-22 ENCOUNTER — PATIENT MESSAGE (OUTPATIENT)
Dept: INTERNAL MEDICINE | Facility: CLINIC | Age: 55
End: 2020-11-22

## 2020-11-22 RX ORDER — LIDOCAINE HYDROCHLORIDE 10 MG/ML
1 INJECTION, SOLUTION EPIDURAL; INFILTRATION; INTRACAUDAL; PERINEURAL
Status: CANCELLED | OUTPATIENT
Start: 2020-11-22

## 2020-11-22 RX ORDER — ACETAMINOPHEN 500 MG
1000 TABLET ORAL EVERY 6 HOURS
Status: CANCELLED | OUTPATIENT
Start: 2020-11-23

## 2020-11-22 RX ORDER — PREGABALIN 25 MG/1
75 CAPSULE ORAL
Status: CANCELLED | OUTPATIENT
Start: 2020-11-22

## 2020-11-22 RX ORDER — CELECOXIB 100 MG/1
400 CAPSULE ORAL
Status: CANCELLED | OUTPATIENT
Start: 2020-11-22

## 2020-11-22 RX ORDER — MUPIROCIN 20 MG/G
1 OINTMENT TOPICAL 2 TIMES DAILY
Status: CANCELLED | OUTPATIENT
Start: 2020-11-22 | End: 2020-11-27

## 2020-11-22 RX ORDER — CELECOXIB 100 MG/1
200 CAPSULE ORAL DAILY
Status: CANCELLED | OUTPATIENT
Start: 2020-11-23

## 2020-11-22 RX ORDER — SODIUM CHLORIDE 9 MG/ML
INJECTION, SOLUTION INTRAVENOUS CONTINUOUS
Status: CANCELLED | OUTPATIENT
Start: 2020-11-22 | End: 2020-11-23

## 2020-11-22 RX ORDER — OXYCODONE HYDROCHLORIDE 5 MG/1
5 TABLET ORAL
Status: CANCELLED | OUTPATIENT
Start: 2020-11-22

## 2020-11-22 RX ORDER — FAMOTIDINE 20 MG/1
20 TABLET, FILM COATED ORAL 2 TIMES DAILY
Status: CANCELLED | OUTPATIENT
Start: 2020-11-22

## 2020-11-22 RX ORDER — ASPIRIN 81 MG/1
81 TABLET ORAL 2 TIMES DAILY
Status: CANCELLED | OUTPATIENT
Start: 2020-11-22

## 2020-11-22 RX ORDER — SODIUM CHLORIDE 9 MG/ML
INJECTION, SOLUTION INTRAVENOUS
Status: CANCELLED | OUTPATIENT
Start: 2020-11-22

## 2020-11-22 RX ORDER — OXYCODONE HYDROCHLORIDE 5 MG/1
10 TABLET ORAL
Status: CANCELLED | OUTPATIENT
Start: 2020-11-22

## 2020-11-22 RX ORDER — MUPIROCIN 20 MG/G
1 OINTMENT TOPICAL
Status: CANCELLED | OUTPATIENT
Start: 2020-11-22

## 2020-11-22 RX ORDER — NALOXONE HCL 0.4 MG/ML
0.02 VIAL (ML) INJECTION
Status: CANCELLED | OUTPATIENT
Start: 2020-11-22

## 2020-11-22 RX ORDER — ONDANSETRON 2 MG/ML
4 INJECTION INTRAMUSCULAR; INTRAVENOUS EVERY 8 HOURS PRN
Status: CANCELLED | OUTPATIENT
Start: 2020-11-22

## 2020-11-22 RX ORDER — FENTANYL CITRATE 50 UG/ML
25 INJECTION, SOLUTION INTRAMUSCULAR; INTRAVENOUS EVERY 5 MIN PRN
Status: CANCELLED | OUTPATIENT
Start: 2020-11-22

## 2020-11-22 RX ORDER — MORPHINE SULFATE 10 MG/ML
2 INJECTION, SOLUTION INTRAMUSCULAR; INTRAVENOUS
Status: CANCELLED | OUTPATIENT
Start: 2020-11-22

## 2020-11-22 RX ORDER — POLYETHYLENE GLYCOL 3350 17 G/17G
17 POWDER, FOR SOLUTION ORAL DAILY
Status: CANCELLED | OUTPATIENT
Start: 2020-11-23

## 2020-11-22 RX ORDER — POLYETHYLENE GLYCOL 3350 17 G/17G
17 POWDER, FOR SOLUTION ORAL DAILY PRN
Status: CANCELLED | OUTPATIENT
Start: 2020-11-22

## 2020-11-22 RX ORDER — AMOXICILLIN 250 MG
1 CAPSULE ORAL 2 TIMES DAILY
Status: CANCELLED | OUTPATIENT
Start: 2020-11-22

## 2020-11-22 RX ORDER — PREGABALIN 25 MG/1
75 CAPSULE ORAL NIGHTLY
Status: CANCELLED | OUTPATIENT
Start: 2020-11-22

## 2020-11-23 NOTE — H&P
CC: Bilateral hip pain    Edi Marie is a 55 y.o. male with Bilateral hip pain.  Pain is worse with activity and weight bearing.  Patient has experienced interference of activities of daily living due to increased pain and decreased range of motion. Patient has failed non-operative treatment including NSAIDs, as well as greater than 3 months of activity modification. Edi Marie ambulates independently.     Relevant medical conditions of significance in perioperative period:  History of dvt- will use eliquis postoperatively    Given documented medical comorbidities including those listed above and based off of CMS criteria patient would meet inpatient admission status guidelines. This case has been approved as inpatient.     Past Medical History:   Diagnosis Date    Arthritis     Deep vein thrombosis     DVT (deep venous thrombosis)     Hemorrhoid     Histiocytosis     Kidney stones     Spermatocele        Past Surgical History:   Procedure Laterality Date    APPENDECTOMY      in his 20's    COLONOSCOPY N/A 1/20/2017    Procedure: COLONOSCOPY;  Surgeon: Danis Frank MD;  Location: [facility] ([address] [place] [address]);  Service: Endoscopy;  Laterality: N/A;    KNEE SURGERY      right knee- teenager -     LITHOTRIPSY         Family History   Problem Relation Age of Onset    Heart disease Father 79        MI 2004    Cancer Paternal Grandfather         colon    Melanoma Neg Hx     Lupus Neg Hx     Psoriasis Neg Hx     Eczema Neg Hx        Review of patient's allergies indicates:  No Known Allergies      Current Outpatient Medications:     acetaminophen (TYLENOL) 650 MG TbSR, Take 650 mg by mouth every 8 (eight) hours., Disp: , Rfl:     apixaban (ELIQUIS) 5 mg Tab, Take 1 tablet (5 mg total) by mouth 2 (two) times daily., Disp: 60 tablet, Rfl: 11    Review of Systems:   Constitutional: no fever or chills  Eyes: no visual changes  ENT: no nasal congestion or sore throat  Respiratory: no cough or shortness  of breath  Cardiovascular: no chest pain or palpitations  Gastrointestinal: no nausea or vomiting, tolerating diet  Genitourinary: no hematuria or dysuria  Integument/Breast: no rash or pruritis  Hematologic/Lymphatic: no easy bruising or lymphadenopathy  Musculoskeletal: positive for hip pain  Neurological: no seizures or tremors  Behavioral/Psych: no auditory or visual hallucinations  Endocrine: no heat or cold intolerance    PE:  There were no vitals taken for this visit.  General: Pleasant, cooperative, NAD   Gait: antalgic  HEENT: NCAT, sclera nonicteric   Lungs: Respirations are clear, equal and unlabored.   CV: S1S2; 2+ bilateral upper and lower extremity pulses.   Skin: Intact throughout LE with no rashes, erythema, or lesions  Extremities: No LE edema, NVI lower extremities    Bilateral Hip Exam:  90 degrees flexion  0 degrees extension   -5 degrees internal rotation  25 degrees external rotation  15 degrees abduction  15 degrees adduction  There is pain with passive range of motion.     Radiographs: Radiographs reveal advanced degenerative changes including subchondral cyst formation, subchondral sclerosis, osteophyte formation, joint space narrowing.     Diagnosis: osteoarthritis Bilateral hip    Plan: Bilateral total hip arthroplasty     Due to the serious nature of total joint infection and the high prevalence of community acquired MRSA, vancomycin will be used perioperatively.

## 2020-11-23 NOTE — H&P (VIEW-ONLY)
CC: Bilateral hip pain    Edi Marie is a 55 y.o. male with Bilateral hip pain.  Pain is worse with activity and weight bearing.  Patient has experienced interference of activities of daily living due to increased pain and decreased range of motion. Patient has failed non-operative treatment including NSAIDs, as well as greater than 3 months of activity modification. Edi Marie ambulates independently.     Relevant medical conditions of significance in perioperative period:  History of dvt- will use eliquis postoperatively    Given documented medical comorbidities including those listed above and based off of CMS criteria patient would meet inpatient admission status guidelines. This case has been approved as inpatient.     Past Medical History:   Diagnosis Date    Arthritis     Deep vein thrombosis     DVT (deep venous thrombosis)     Hemorrhoid     Histiocytosis     Kidney stones     Spermatocele        Past Surgical History:   Procedure Laterality Date    APPENDECTOMY      in his 20's    COLONOSCOPY N/A 1/20/2017    Procedure: COLONOSCOPY;  Surgeon: Danis Frank MD;  Location: [facility] ([address] [place] [address]);  Service: Endoscopy;  Laterality: N/A;    KNEE SURGERY      right knee- teenager -     LITHOTRIPSY         Family History   Problem Relation Age of Onset    Heart disease Father 79        MI 2004    Cancer Paternal Grandfather         colon    Melanoma Neg Hx     Lupus Neg Hx     Psoriasis Neg Hx     Eczema Neg Hx        Review of patient's allergies indicates:  No Known Allergies      Current Outpatient Medications:     acetaminophen (TYLENOL) 650 MG TbSR, Take 650 mg by mouth every 8 (eight) hours., Disp: , Rfl:     apixaban (ELIQUIS) 5 mg Tab, Take 1 tablet (5 mg total) by mouth 2 (two) times daily., Disp: 60 tablet, Rfl: 11    Review of Systems:   Constitutional: no fever or chills  Eyes: no visual changes  ENT: no nasal congestion or sore throat  Respiratory: no cough or shortness  of breath  Cardiovascular: no chest pain or palpitations  Gastrointestinal: no nausea or vomiting, tolerating diet  Genitourinary: no hematuria or dysuria  Integument/Breast: no rash or pruritis  Hematologic/Lymphatic: no easy bruising or lymphadenopathy  Musculoskeletal: positive for hip pain  Neurological: no seizures or tremors  Behavioral/Psych: no auditory or visual hallucinations  Endocrine: no heat or cold intolerance    PE:  There were no vitals taken for this visit.  General: Pleasant, cooperative, NAD   Gait: antalgic  HEENT: NCAT, sclera nonicteric   Lungs: Respirations are clear, equal and unlabored.   CV: S1S2; 2+ bilateral upper and lower extremity pulses.   Skin: Intact throughout LE with no rashes, erythema, or lesions  Extremities: No LE edema, NVI lower extremities    Bilateral Hip Exam:  90 degrees flexion  0 degrees extension   -5 degrees internal rotation  25 degrees external rotation  15 degrees abduction  15 degrees adduction  There is pain with passive range of motion.     Radiographs: Radiographs reveal advanced degenerative changes including subchondral cyst formation, subchondral sclerosis, osteophyte formation, joint space narrowing.     Diagnosis: osteoarthritis Bilateral hip    Plan: Bilateral total hip arthroplasty     Due to the serious nature of total joint infection and the high prevalence of community acquired MRSA, vancomycin will be used perioperatively.

## 2020-11-24 ENCOUNTER — TELEPHONE (OUTPATIENT)
Dept: PREADMISSION TESTING | Facility: HOSPITAL | Age: 55
End: 2020-11-24

## 2020-11-24 NOTE — TELEPHONE ENCOUNTER
"----- Message from Peggy Rangel MD sent at 11/12/2020 10:16 AM CST -----  Good Morning,      Thank you Dr. Sanders for your apaxiban instructions as seen below:  "Surgical plan:  - Last dose apixaban 3 days before surgery (hold for 48 hours prior to surgery)  - begin apixaban 2.5 mg PO BID at least 12 hours after surgery  - after 24-48 hours of apixaban at 2.5 mg PO BID and no bleeding, increase to 5 mg PO BID"      We will give Mr. Garner the following instructions:  Last dose apaxiban  12/4/2020  7am  Surgery date:            12/7/2020  This will allow the appropriate time to safely proceed with a neuraxial on the day of surgery.    Thanks again for your input.    VALERIE Mcneil MD        "

## 2020-11-30 ENCOUNTER — PATIENT MESSAGE (OUTPATIENT)
Dept: SURGERY | Facility: HOSPITAL | Age: 55
End: 2020-11-30

## 2020-11-30 ENCOUNTER — LAB VISIT (OUTPATIENT)
Dept: LAB | Facility: HOSPITAL | Age: 55
End: 2020-11-30
Attending: ANESTHESIOLOGY
Payer: COMMERCIAL

## 2020-11-30 DIAGNOSIS — M79.604 PAIN IN BOTH LOWER EXTREMITIES: ICD-10-CM

## 2020-11-30 DIAGNOSIS — M79.605 PAIN IN BOTH LOWER EXTREMITIES: ICD-10-CM

## 2020-11-30 DIAGNOSIS — Z01.818 PRE-OP TESTING: ICD-10-CM

## 2020-11-30 LAB
ABO + RH BLD: NORMAL
ANION GAP SERPL CALC-SCNC: 11 MMOL/L (ref 8–16)
BLD GP AB SCN CELLS X3 SERPL QL: NORMAL
BUN SERPL-MCNC: 17 MG/DL (ref 6–20)
CALCIUM SERPL-MCNC: 9.7 MG/DL (ref 8.7–10.5)
CHLORIDE SERPL-SCNC: 103 MMOL/L (ref 95–110)
CO2 SERPL-SCNC: 26 MMOL/L (ref 23–29)
CREAT SERPL-MCNC: 1.1 MG/DL (ref 0.5–1.4)
ERYTHROCYTE [DISTWIDTH] IN BLOOD BY AUTOMATED COUNT: 13 % (ref 11.5–14.5)
EST. GFR  (AFRICAN AMERICAN): >60 ML/MIN/1.73 M^2
EST. GFR  (NON AFRICAN AMERICAN): >60 ML/MIN/1.73 M^2
GLUCOSE SERPL-MCNC: 88 MG/DL (ref 70–110)
HCT VFR BLD AUTO: 51.3 % (ref 40–54)
HGB BLD-MCNC: 16.9 G/DL (ref 14–18)
INR PPP: 1 (ref 0.8–1.2)
MCH RBC QN AUTO: 30.6 PG (ref 27–31)
MCHC RBC AUTO-ENTMCNC: 32.9 G/DL (ref 32–36)
MCV RBC AUTO: 93 FL (ref 82–98)
PLATELET # BLD AUTO: 454 K/UL (ref 150–350)
PMV BLD AUTO: 10.2 FL (ref 9.2–12.9)
POTASSIUM SERPL-SCNC: 5.1 MMOL/L (ref 3.5–5.1)
PROTHROMBIN TIME: 11 SEC (ref 9–12.5)
RBC # BLD AUTO: 5.52 M/UL (ref 4.6–6.2)
SODIUM SERPL-SCNC: 140 MMOL/L (ref 136–145)
WBC # BLD AUTO: 8.29 K/UL (ref 3.9–12.7)

## 2020-11-30 PROCEDURE — 85610 PROTHROMBIN TIME: CPT

## 2020-11-30 PROCEDURE — 80048 BASIC METABOLIC PNL TOTAL CA: CPT

## 2020-11-30 PROCEDURE — 86850 RBC ANTIBODY SCREEN: CPT

## 2020-11-30 PROCEDURE — 85027 COMPLETE CBC AUTOMATED: CPT

## 2020-12-03 DIAGNOSIS — Z96.643 STATUS POST BILATERAL TOTAL HIP REPLACEMENT: Primary | ICD-10-CM

## 2020-12-04 ENCOUNTER — LAB VISIT (OUTPATIENT)
Dept: URGENT CARE | Facility: CLINIC | Age: 55
DRG: 462 | End: 2020-12-04
Payer: COMMERCIAL

## 2020-12-04 ENCOUNTER — TELEPHONE (OUTPATIENT)
Dept: ORTHOPEDICS | Facility: CLINIC | Age: 55
End: 2020-12-04

## 2020-12-04 DIAGNOSIS — Z01.818 PRE-OP TESTING: ICD-10-CM

## 2020-12-04 PROCEDURE — U0003 INFECTIOUS AGENT DETECTION BY NUCLEIC ACID (DNA OR RNA); SEVERE ACUTE RESPIRATORY SYNDROME CORONAVIRUS 2 (SARS-COV-2) (CORONAVIRUS DISEASE [COVID-19]), AMPLIFIED PROBE TECHNIQUE, MAKING USE OF HIGH THROUGHPUT TECHNOLOGIES AS DESCRIBED BY CMS-2020-01-R: HCPCS

## 2020-12-04 PROCEDURE — 86920 COMPATIBILITY TEST SPIN: CPT

## 2020-12-04 RX ORDER — CELECOXIB 200 MG/1
200 CAPSULE ORAL DAILY
Qty: 30 CAPSULE | Refills: 0 | Status: SHIPPED | OUTPATIENT
Start: 2020-12-04 | End: 2021-01-10

## 2020-12-04 RX ORDER — DOCUSATE SODIUM 100 MG/1
100 CAPSULE, LIQUID FILLED ORAL 2 TIMES DAILY PRN
Qty: 60 CAPSULE | Refills: 0 | Status: SHIPPED | OUTPATIENT
Start: 2020-12-04 | End: 2021-08-17

## 2020-12-04 RX ORDER — OXYCODONE HYDROCHLORIDE 5 MG/1
TABLET ORAL
Qty: 30 TABLET | Refills: 0 | Status: SHIPPED | OUTPATIENT
Start: 2020-12-04 | End: 2020-12-17 | Stop reason: SDUPTHER

## 2020-12-04 NOTE — TELEPHONE ENCOUNTER
I called the patient today regarding surgery on 12/7/2020 with Dr. Chin Heath. I informed the patient that his surgery will be at  [facility]. I informed the patient they must arrive at 7:30am and their surgery will start at 9:30am.     I informed the patient about the current visitor policy. Currently only 1 visitor may assist them in pre-op, post-op holding area, and where they will be spending the night. I told the patient to check with the  the day of surgery to find out what the visiting hours are. The patient is aware that it is a fluid situation due to the COVID-19 pandemic and our visitor policies and procedures may change between now and when they have surgery.     I reminded the patient about his COVID-19 swab test. The patient completed his COVID-19 pre-procedural test today.    Informed the patient that they cannot eat or drink after midnight, the night before surgery.     The patient verbalized understanding and has no further questions.

## 2020-12-05 LAB — SARS-COV-2 RNA RESP QL NAA+PROBE: NOT DETECTED

## 2020-12-07 ENCOUNTER — HOSPITAL ENCOUNTER (INPATIENT)
Facility: HOSPITAL | Age: 55
LOS: 4 days | Discharge: HOME HEALTH | DRG: 462 | End: 2020-12-11
Attending: ORTHOPAEDIC SURGERY | Admitting: ORTHOPAEDIC SURGERY
Payer: COMMERCIAL

## 2020-12-07 ENCOUNTER — ANESTHESIA (OUTPATIENT)
Dept: SURGERY | Facility: HOSPITAL | Age: 55
DRG: 462 | End: 2020-12-07
Payer: COMMERCIAL

## 2020-12-07 DIAGNOSIS — M25.559 HIP PAIN: ICD-10-CM

## 2020-12-07 DIAGNOSIS — Z96.643 STATUS POST BILATERAL TOTAL HIP REPLACEMENT: Primary | ICD-10-CM

## 2020-12-07 DIAGNOSIS — R55 SYNCOPE, CARDIOGENIC: ICD-10-CM

## 2020-12-07 DIAGNOSIS — I82.4Z3 ACUTE DEEP VEIN THROMBOSIS (DVT) OF DISTAL VEIN OF BOTH LOWER EXTREMITIES: ICD-10-CM

## 2020-12-07 LAB
BASOPHILS # BLD AUTO: 0.03 K/UL (ref 0–0.2)
BASOPHILS NFR BLD: 0.3 % (ref 0–1.9)
DIFFERENTIAL METHOD: ABNORMAL
EOSINOPHIL # BLD AUTO: 0 K/UL (ref 0–0.5)
EOSINOPHIL NFR BLD: 0.2 % (ref 0–8)
ERYTHROCYTE [DISTWIDTH] IN BLOOD BY AUTOMATED COUNT: 13.3 % (ref 11.5–14.5)
HCT VFR BLD AUTO: 35.3 % (ref 40–54)
HGB BLD-MCNC: 11.9 G/DL (ref 14–18)
IMM GRANULOCYTES # BLD AUTO: 0.05 K/UL (ref 0–0.04)
IMM GRANULOCYTES NFR BLD AUTO: 0.4 % (ref 0–0.5)
LYMPHOCYTES # BLD AUTO: 0.8 K/UL (ref 1–4.8)
LYMPHOCYTES NFR BLD: 6.6 % (ref 18–48)
MCH RBC QN AUTO: 30.8 PG (ref 27–31)
MCHC RBC AUTO-ENTMCNC: 33.7 G/DL (ref 32–36)
MCV RBC AUTO: 92 FL (ref 82–98)
MONOCYTES # BLD AUTO: 0.4 K/UL (ref 0.3–1)
MONOCYTES NFR BLD: 3.7 % (ref 4–15)
NEUTROPHILS # BLD AUTO: 10.2 K/UL (ref 1.8–7.7)
NEUTROPHILS NFR BLD: 88.8 % (ref 38–73)
NRBC BLD-RTO: 0 /100 WBC
PLATELET # BLD AUTO: 394 K/UL (ref 150–350)
PMV BLD AUTO: 10.2 FL (ref 9.2–12.9)
RBC # BLD AUTO: 3.86 M/UL (ref 4.6–6.2)
WBC # BLD AUTO: 11.47 K/UL (ref 3.9–12.7)

## 2020-12-07 PROCEDURE — D9220A PRA ANESTHESIA: ICD-10-PCS | Mod: CRNA,,, | Performed by: NURSE ANESTHETIST, CERTIFIED REGISTERED

## 2020-12-07 PROCEDURE — 63600175 PHARM REV CODE 636 W HCPCS: Performed by: ANESTHESIOLOGY

## 2020-12-07 PROCEDURE — 27130 TOTAL HIP ARTHROPLASTY: CPT | Mod: 50,,, | Performed by: ORTHOPAEDIC SURGERY

## 2020-12-07 PROCEDURE — 63600175 PHARM REV CODE 636 W HCPCS: Performed by: NURSE ANESTHETIST, CERTIFIED REGISTERED

## 2020-12-07 PROCEDURE — 94761 N-INVAS EAR/PLS OXIMETRY MLT: CPT

## 2020-12-07 PROCEDURE — 63600175 PHARM REV CODE 636 W HCPCS: Performed by: ORTHOPAEDIC SURGERY

## 2020-12-07 PROCEDURE — 63600175 PHARM REV CODE 636 W HCPCS: Performed by: NURSE PRACTITIONER

## 2020-12-07 PROCEDURE — 99900035 HC TECH TIME PER 15 MIN (STAT)

## 2020-12-07 PROCEDURE — P9021 RED BLOOD CELLS UNIT: HCPCS

## 2020-12-07 PROCEDURE — 27201423 OPTIME MED/SURG SUP & DEVICES STERILE SUPPLY: Performed by: ORTHOPAEDIC SURGERY

## 2020-12-07 PROCEDURE — 86920 COMPATIBILITY TEST SPIN: CPT

## 2020-12-07 PROCEDURE — D9220A PRA ANESTHESIA: Mod: ANES,,, | Performed by: ANESTHESIOLOGY

## 2020-12-07 PROCEDURE — 63600175 PHARM REV CODE 636 W HCPCS: Mod: JG | Performed by: ANESTHESIOLOGY

## 2020-12-07 PROCEDURE — C1776 JOINT DEVICE (IMPLANTABLE): HCPCS | Performed by: ORTHOPAEDIC SURGERY

## 2020-12-07 PROCEDURE — 25000003 PHARM REV CODE 250: Performed by: ANESTHESIOLOGY

## 2020-12-07 PROCEDURE — 71000033 HC RECOVERY, INTIAL HOUR: Performed by: ORTHOPAEDIC SURGERY

## 2020-12-07 PROCEDURE — 25000003 PHARM REV CODE 250: Performed by: ORTHOPAEDIC SURGERY

## 2020-12-07 PROCEDURE — 25000003 PHARM REV CODE 250: Performed by: NURSE PRACTITIONER

## 2020-12-07 PROCEDURE — 27130 PR TOTAL HIP ARTHROPLASTY: ICD-10-PCS | Mod: 50,,, | Performed by: ORTHOPAEDIC SURGERY

## 2020-12-07 PROCEDURE — 36415 COLL VENOUS BLD VENIPUNCTURE: CPT

## 2020-12-07 PROCEDURE — D9220A PRA ANESTHESIA: ICD-10-PCS | Mod: ANES,,, | Performed by: ANESTHESIOLOGY

## 2020-12-07 PROCEDURE — 25000003 PHARM REV CODE 250: Performed by: NURSE ANESTHETIST, CERTIFIED REGISTERED

## 2020-12-07 PROCEDURE — 85025 COMPLETE CBC W/AUTO DIFF WBC: CPT

## 2020-12-07 PROCEDURE — 37000008 HC ANESTHESIA 1ST 15 MINUTES: Performed by: ORTHOPAEDIC SURGERY

## 2020-12-07 PROCEDURE — P9045 ALBUMIN (HUMAN), 5%, 250 ML: HCPCS | Mod: JG | Performed by: ANESTHESIOLOGY

## 2020-12-07 PROCEDURE — 36000711: Performed by: ORTHOPAEDIC SURGERY

## 2020-12-07 PROCEDURE — D9220A PRA ANESTHESIA: Mod: CRNA,,, | Performed by: NURSE ANESTHETIST, CERTIFIED REGISTERED

## 2020-12-07 PROCEDURE — C1713 ANCHOR/SCREW BN/BN,TIS/BN: HCPCS | Performed by: ORTHOPAEDIC SURGERY

## 2020-12-07 PROCEDURE — 37000009 HC ANESTHESIA EA ADD 15 MINS: Performed by: ORTHOPAEDIC SURGERY

## 2020-12-07 PROCEDURE — 71000039 HC RECOVERY, EACH ADD'L HOUR: Performed by: ORTHOPAEDIC SURGERY

## 2020-12-07 PROCEDURE — 11000001 HC ACUTE MED/SURG PRIVATE ROOM

## 2020-12-07 PROCEDURE — 36000710: Performed by: ORTHOPAEDIC SURGERY

## 2020-12-07 DEVICE — IMPLANTABLE DEVICE: Type: IMPLANTABLE DEVICE | Site: HIP | Status: FUNCTIONAL

## 2020-12-07 DEVICE — LINER PINNACLE ALTRX 36X56MM: Type: IMPLANTABLE DEVICE | Site: HIP | Status: FUNCTIONAL

## 2020-12-07 DEVICE — SCREW PINNACLE 6.5 X 20: Type: IMPLANTABLE DEVICE | Site: HIP | Status: FUNCTIONAL

## 2020-12-07 DEVICE — HEAD BIOLOX CERAMC FEM +5 36MM: Type: IMPLANTABLE DEVICE | Site: HIP | Status: FUNCTIONAL

## 2020-12-07 DEVICE — SCREW PINACLE 6.5MM X 30MM: Type: IMPLANTABLE DEVICE | Site: HIP | Status: FUNCTIONAL

## 2020-12-07 DEVICE — CUP ACT PINNACLE SECTOR 56 TIT: Type: IMPLANTABLE DEVICE | Site: HIP | Status: FUNCTIONAL

## 2020-12-07 RX ORDER — TRANEXAMIC ACID 100 MG/ML
1000 INJECTION, SOLUTION INTRAVENOUS
Status: DISCONTINUED | OUTPATIENT
Start: 2020-12-07 | End: 2020-12-07 | Stop reason: HOSPADM

## 2020-12-07 RX ORDER — DEXAMETHASONE SODIUM PHOSPHATE 4 MG/ML
INJECTION, SOLUTION INTRA-ARTICULAR; INTRALESIONAL; INTRAMUSCULAR; INTRAVENOUS; SOFT TISSUE
Status: DISCONTINUED | OUTPATIENT
Start: 2020-12-07 | End: 2020-12-07

## 2020-12-07 RX ORDER — AMOXICILLIN 250 MG
1 CAPSULE ORAL 2 TIMES DAILY
Status: DISCONTINUED | OUTPATIENT
Start: 2020-12-07 | End: 2020-12-11 | Stop reason: HOSPADM

## 2020-12-07 RX ORDER — CELECOXIB 200 MG/1
400 CAPSULE ORAL
Status: COMPLETED | OUTPATIENT
Start: 2020-12-07 | End: 2020-12-07

## 2020-12-07 RX ORDER — HYDROMORPHONE HYDROCHLORIDE 1 MG/ML
0.2 INJECTION, SOLUTION INTRAMUSCULAR; INTRAVENOUS; SUBCUTANEOUS EVERY 5 MIN PRN
Status: DISCONTINUED | OUTPATIENT
Start: 2020-12-07 | End: 2020-12-07 | Stop reason: HOSPADM

## 2020-12-07 RX ORDER — CEFAZOLIN SODIUM 1 G/3ML
2 INJECTION, POWDER, FOR SOLUTION INTRAMUSCULAR; INTRAVENOUS
Status: COMPLETED | OUTPATIENT
Start: 2020-12-07 | End: 2020-12-08

## 2020-12-07 RX ORDER — VANCOMYCIN HYDROCHLORIDE 1 G/20ML
INJECTION, POWDER, LYOPHILIZED, FOR SOLUTION INTRAVENOUS
Status: DISCONTINUED | OUTPATIENT
Start: 2020-12-07 | End: 2020-12-07 | Stop reason: HOSPADM

## 2020-12-07 RX ORDER — SODIUM CHLORIDE 9 MG/ML
INJECTION, SOLUTION INTRAVENOUS
Status: COMPLETED | OUTPATIENT
Start: 2020-12-07 | End: 2020-12-07

## 2020-12-07 RX ORDER — ROPIVACAINE/EPI/CLONIDINE/KET 2.46-0.005
SYRINGE (ML) INJECTION
Status: DISCONTINUED | OUTPATIENT
Start: 2020-12-07 | End: 2020-12-07 | Stop reason: HOSPADM

## 2020-12-07 RX ORDER — FENTANYL CITRATE 50 UG/ML
INJECTION, SOLUTION INTRAMUSCULAR; INTRAVENOUS
Status: DISCONTINUED | OUTPATIENT
Start: 2020-12-07 | End: 2020-12-07

## 2020-12-07 RX ORDER — FAMOTIDINE 20 MG/1
20 TABLET, FILM COATED ORAL 2 TIMES DAILY
Status: DISCONTINUED | OUTPATIENT
Start: 2020-12-07 | End: 2020-12-11 | Stop reason: HOSPADM

## 2020-12-07 RX ORDER — FENTANYL CITRATE 50 UG/ML
25 INJECTION, SOLUTION INTRAMUSCULAR; INTRAVENOUS EVERY 5 MIN PRN
Status: DISCONTINUED | OUTPATIENT
Start: 2020-12-07 | End: 2020-12-07 | Stop reason: HOSPADM

## 2020-12-07 RX ORDER — LIDOCAINE HYDROCHLORIDE 20 MG/ML
INJECTION INTRAVENOUS
Status: DISCONTINUED | OUTPATIENT
Start: 2020-12-07 | End: 2020-12-07

## 2020-12-07 RX ORDER — OXYCODONE HYDROCHLORIDE 5 MG/1
5 TABLET ORAL
Status: DISCONTINUED | OUTPATIENT
Start: 2020-12-07 | End: 2020-12-09

## 2020-12-07 RX ORDER — KETAMINE HCL IN 0.9 % NACL 50 MG/5 ML
SYRINGE (ML) INTRAVENOUS
Status: DISCONTINUED | OUTPATIENT
Start: 2020-12-07 | End: 2020-12-07

## 2020-12-07 RX ORDER — ASPIRIN 81 MG/1
81 TABLET ORAL 2 TIMES DAILY
Status: DISCONTINUED | OUTPATIENT
Start: 2020-12-07 | End: 2020-12-07

## 2020-12-07 RX ORDER — CELECOXIB 200 MG/1
200 CAPSULE ORAL DAILY
Status: DISCONTINUED | OUTPATIENT
Start: 2020-12-08 | End: 2020-12-11 | Stop reason: HOSPADM

## 2020-12-07 RX ORDER — CEFAZOLIN SODIUM 1 G/3ML
2 INJECTION, POWDER, FOR SOLUTION INTRAMUSCULAR; INTRAVENOUS
Status: COMPLETED | OUTPATIENT
Start: 2020-12-07 | End: 2020-12-07

## 2020-12-07 RX ORDER — ACETAMINOPHEN 500 MG
1000 TABLET ORAL EVERY 6 HOURS
Status: DISCONTINUED | OUTPATIENT
Start: 2020-12-07 | End: 2020-12-11 | Stop reason: HOSPADM

## 2020-12-07 RX ORDER — MUPIROCIN 20 MG/G
1 OINTMENT TOPICAL 2 TIMES DAILY
Status: DISCONTINUED | OUTPATIENT
Start: 2020-12-07 | End: 2020-12-11 | Stop reason: HOSPADM

## 2020-12-07 RX ORDER — MORPHINE SULFATE 2 MG/ML
2 INJECTION, SOLUTION INTRAMUSCULAR; INTRAVENOUS
Status: DISCONTINUED | OUTPATIENT
Start: 2020-12-07 | End: 2020-12-11 | Stop reason: HOSPADM

## 2020-12-07 RX ORDER — OXYCODONE HYDROCHLORIDE 10 MG/1
10 TABLET ORAL
Status: DISCONTINUED | OUTPATIENT
Start: 2020-12-07 | End: 2020-12-09

## 2020-12-07 RX ORDER — ONDANSETRON 2 MG/ML
INJECTION INTRAMUSCULAR; INTRAVENOUS
Status: DISCONTINUED | OUTPATIENT
Start: 2020-12-07 | End: 2020-12-07

## 2020-12-07 RX ORDER — EPHEDRINE SULFATE 50 MG/ML
10 INJECTION, SOLUTION INTRAVENOUS
Status: DISCONTINUED | OUTPATIENT
Start: 2020-12-07 | End: 2020-12-11 | Stop reason: HOSPADM

## 2020-12-07 RX ORDER — PROPOFOL 10 MG/ML
VIAL (ML) INTRAVENOUS CONTINUOUS PRN
Status: DISCONTINUED | OUTPATIENT
Start: 2020-12-07 | End: 2020-12-07

## 2020-12-07 RX ORDER — NALOXONE HCL 0.4 MG/ML
0.02 VIAL (ML) INJECTION
Status: DISCONTINUED | OUTPATIENT
Start: 2020-12-07 | End: 2020-12-11 | Stop reason: HOSPADM

## 2020-12-07 RX ORDER — TRANEXAMIC ACID 100 MG/ML
INJECTION, SOLUTION INTRAVENOUS
Status: DISCONTINUED | OUTPATIENT
Start: 2020-12-07 | End: 2020-12-07 | Stop reason: HOSPADM

## 2020-12-07 RX ORDER — SODIUM CHLORIDE 0.9 % (FLUSH) 0.9 %
10 SYRINGE (ML) INJECTION
Status: DISCONTINUED | OUTPATIENT
Start: 2020-12-07 | End: 2020-12-07 | Stop reason: HOSPADM

## 2020-12-07 RX ORDER — ONDANSETRON 2 MG/ML
4 INJECTION INTRAMUSCULAR; INTRAVENOUS EVERY 8 HOURS PRN
Status: DISCONTINUED | OUTPATIENT
Start: 2020-12-07 | End: 2020-12-11 | Stop reason: HOSPADM

## 2020-12-07 RX ORDER — PHENYLEPHRINE HYDROCHLORIDE 10 MG/ML
INJECTION INTRAVENOUS
Status: DISCONTINUED | OUTPATIENT
Start: 2020-12-07 | End: 2020-12-07

## 2020-12-07 RX ORDER — POLYETHYLENE GLYCOL 3350 17 G/17G
17 POWDER, FOR SOLUTION ORAL DAILY
Status: DISCONTINUED | OUTPATIENT
Start: 2020-12-08 | End: 2020-12-11 | Stop reason: HOSPADM

## 2020-12-07 RX ORDER — HYDROCODONE BITARTRATE AND ACETAMINOPHEN 500; 5 MG/1; MG/1
TABLET ORAL
Status: DISCONTINUED | OUTPATIENT
Start: 2020-12-07 | End: 2020-12-07 | Stop reason: HOSPADM

## 2020-12-07 RX ORDER — OXYCODONE HYDROCHLORIDE 5 MG/1
5 TABLET ORAL
Status: DISCONTINUED | OUTPATIENT
Start: 2020-12-07 | End: 2020-12-07 | Stop reason: HOSPADM

## 2020-12-07 RX ORDER — SODIUM CHLORIDE 9 MG/ML
INJECTION, SOLUTION INTRAVENOUS CONTINUOUS PRN
Status: DISCONTINUED | OUTPATIENT
Start: 2020-12-07 | End: 2020-12-07

## 2020-12-07 RX ORDER — PREGABALIN 75 MG/1
75 CAPSULE ORAL
Status: COMPLETED | OUTPATIENT
Start: 2020-12-07 | End: 2020-12-07

## 2020-12-07 RX ORDER — TRANEXAMIC ACID 100 MG/ML
1000 INJECTION, SOLUTION INTRAVENOUS
Status: COMPLETED | OUTPATIENT
Start: 2020-12-07 | End: 2020-12-07

## 2020-12-07 RX ORDER — FENTANYL CITRATE 50 UG/ML
25 INJECTION, SOLUTION INTRAMUSCULAR; INTRAVENOUS EVERY 5 MIN PRN
Status: DISCONTINUED | OUTPATIENT
Start: 2020-12-07 | End: 2020-12-07

## 2020-12-07 RX ORDER — MUPIROCIN 20 MG/G
1 OINTMENT TOPICAL
Status: COMPLETED | OUTPATIENT
Start: 2020-12-07 | End: 2020-12-07

## 2020-12-07 RX ORDER — PROPOFOL 10 MG/ML
VIAL (ML) INTRAVENOUS
Status: DISCONTINUED | OUTPATIENT
Start: 2020-12-07 | End: 2020-12-07

## 2020-12-07 RX ORDER — POLYETHYLENE GLYCOL 3350 17 G/17G
17 POWDER, FOR SOLUTION ORAL DAILY PRN
Status: DISCONTINUED | OUTPATIENT
Start: 2020-12-07 | End: 2020-12-11 | Stop reason: HOSPADM

## 2020-12-07 RX ORDER — MIDAZOLAM HYDROCHLORIDE 1 MG/ML
INJECTION, SOLUTION INTRAMUSCULAR; INTRAVENOUS
Status: DISCONTINUED | OUTPATIENT
Start: 2020-12-07 | End: 2020-12-07

## 2020-12-07 RX ORDER — ALBUMIN HUMAN 50 G/1000ML
25 SOLUTION INTRAVENOUS ONCE
Status: COMPLETED | OUTPATIENT
Start: 2020-12-07 | End: 2020-12-07

## 2020-12-07 RX ORDER — PREGABALIN 75 MG/1
75 CAPSULE ORAL NIGHTLY
Status: DISCONTINUED | OUTPATIENT
Start: 2020-12-07 | End: 2020-12-11 | Stop reason: HOSPADM

## 2020-12-07 RX ORDER — HALOPERIDOL 5 MG/ML
0.5 INJECTION INTRAMUSCULAR EVERY 10 MIN PRN
Status: DISCONTINUED | OUTPATIENT
Start: 2020-12-07 | End: 2020-12-07 | Stop reason: HOSPADM

## 2020-12-07 RX ORDER — LIDOCAINE HYDROCHLORIDE 10 MG/ML
1 INJECTION, SOLUTION EPIDURAL; INFILTRATION; INTRACAUDAL; PERINEURAL
Status: DISCONTINUED | OUTPATIENT
Start: 2020-12-07 | End: 2020-12-07 | Stop reason: HOSPADM

## 2020-12-07 RX ORDER — NICARDIPINE HYDROCHLORIDE 2.5 MG/ML
INJECTION INTRAVENOUS
Status: DISCONTINUED | OUTPATIENT
Start: 2020-12-07 | End: 2020-12-07

## 2020-12-07 RX ORDER — SODIUM CHLORIDE 9 MG/ML
INJECTION, SOLUTION INTRAVENOUS CONTINUOUS
Status: ACTIVE | OUTPATIENT
Start: 2020-12-07 | End: 2020-12-08

## 2020-12-07 RX ADMIN — SODIUM CHLORIDE 100 ML/HR: 0.9 INJECTION, SOLUTION INTRAVENOUS at 08:12

## 2020-12-07 RX ADMIN — ONDANSETRON 4 MG: 2 INJECTION, SOLUTION INTRAMUSCULAR; INTRAVENOUS at 01:12

## 2020-12-07 RX ADMIN — PHENYLEPHRINE HYDROCHLORIDE 200 MCG: 10 INJECTION INTRAVENOUS at 02:12

## 2020-12-07 RX ADMIN — SODIUM CHLORIDE, SODIUM GLUCONATE, SODIUM ACETATE, POTASSIUM CHLORIDE, MAGNESIUM CHLORIDE, SODIUM PHOSPHATE, DIBASIC, AND POTASSIUM PHOSPHATE: .53; .5; .37; .037; .03; .012; .00082 INJECTION, SOLUTION INTRAVENOUS at 12:12

## 2020-12-07 RX ADMIN — PHENYLEPHRINE HYDROCHLORIDE 100 MCG: 10 INJECTION INTRAVENOUS at 01:12

## 2020-12-07 RX ADMIN — MIDAZOLAM HYDROCHLORIDE 2 MG: 1 INJECTION, SOLUTION INTRAMUSCULAR; INTRAVENOUS at 10:12

## 2020-12-07 RX ADMIN — MEPIVACAINE HYDROCHLORIDE 6 ML: 15 INJECTION, SOLUTION EPIDURAL; INFILTRATION at 12:12

## 2020-12-07 RX ADMIN — TRANEXAMIC ACID 1000 MG: 100 INJECTION, SOLUTION INTRAVENOUS at 11:12

## 2020-12-07 RX ADMIN — CEFAZOLIN 2 G: 330 INJECTION, POWDER, FOR SOLUTION INTRAMUSCULAR; INTRAVENOUS at 10:12

## 2020-12-07 RX ADMIN — HYDROMORPHONE HYDROCHLORIDE 0.2 MG: 1 INJECTION, SOLUTION INTRAMUSCULAR; INTRAVENOUS; SUBCUTANEOUS at 04:12

## 2020-12-07 RX ADMIN — TRANEXAMIC ACID 1000 MG: 100 INJECTION, SOLUTION INTRAVENOUS at 10:12

## 2020-12-07 RX ADMIN — PROPOFOL 75 MCG/KG/MIN: 10 INJECTION, EMULSION INTRAVENOUS at 10:12

## 2020-12-07 RX ADMIN — Medication 10 MG: at 12:12

## 2020-12-07 RX ADMIN — CEFAZOLIN 2 G: 330 INJECTION, POWDER, FOR SOLUTION INTRAMUSCULAR; INTRAVENOUS at 06:12

## 2020-12-07 RX ADMIN — SODIUM CHLORIDE: 0.9 INJECTION, SOLUTION INTRAVENOUS at 09:12

## 2020-12-07 RX ADMIN — ACETAMINOPHEN 1000 MG: 325 TABLET ORAL at 11:12

## 2020-12-07 RX ADMIN — DEXAMETHASONE SODIUM PHOSPHATE 8 MG: 4 INJECTION, SOLUTION INTRAMUSCULAR; INTRAVENOUS at 10:12

## 2020-12-07 RX ADMIN — PREGABALIN 75 MG: 75 CAPSULE ORAL at 09:12

## 2020-12-07 RX ADMIN — HYDROMORPHONE HYDROCHLORIDE 0.2 MG: 1 INJECTION, SOLUTION INTRAMUSCULAR; INTRAVENOUS; SUBCUTANEOUS at 03:12

## 2020-12-07 RX ADMIN — PHENYLEPHRINE HYDROCHLORIDE 200 MCG: 10 INJECTION INTRAVENOUS at 01:12

## 2020-12-07 RX ADMIN — TRANEXAMIC ACID 1000 MG: 100 INJECTION, SOLUTION INTRAVENOUS at 01:12

## 2020-12-07 RX ADMIN — MUPIROCIN 1 G: 20 OINTMENT TOPICAL at 08:12

## 2020-12-07 RX ADMIN — DOCUSATE SODIUM 50MG AND SENNOSIDES 8.6MG 1 TABLET: 8.6; 5 TABLET, FILM COATED ORAL at 09:12

## 2020-12-07 RX ADMIN — MUPIROCIN 1 G: 20 OINTMENT TOPICAL at 09:12

## 2020-12-07 RX ADMIN — FAMOTIDINE 20 MG: 20 TABLET, FILM COATED ORAL at 09:12

## 2020-12-07 RX ADMIN — SODIUM CHLORIDE 150 ML/HR: 0.9 INJECTION, SOLUTION INTRAVENOUS at 02:12

## 2020-12-07 RX ADMIN — Medication 30 MG: at 10:12

## 2020-12-07 RX ADMIN — NICARDIPINE HYDROCHLORIDE 0.2 MG: 2.5 INJECTION INTRAVENOUS at 12:12

## 2020-12-07 RX ADMIN — MEPIVACAINE HYDROCHLORIDE 4 ML: 15 INJECTION, SOLUTION EPIDURAL; INFILTRATION at 10:12

## 2020-12-07 RX ADMIN — PROPOFOL 40 MG: 10 INJECTION, EMULSION INTRAVENOUS at 10:12

## 2020-12-07 RX ADMIN — FENTANYL CITRATE 100 MCG: 50 INJECTION, SOLUTION INTRAMUSCULAR; INTRAVENOUS at 10:12

## 2020-12-07 RX ADMIN — CELECOXIB 400 MG: 200 CAPSULE ORAL at 08:12

## 2020-12-07 RX ADMIN — PREGABALIN 75 MG: 75 CAPSULE ORAL at 08:12

## 2020-12-07 RX ADMIN — Medication 10 MG: at 11:12

## 2020-12-07 RX ADMIN — VANCOMYCIN HYDROCHLORIDE 1500 MG: 1.5 INJECTION, POWDER, LYOPHILIZED, FOR SOLUTION INTRAVENOUS at 08:12

## 2020-12-07 RX ADMIN — OXYCODONE HYDROCHLORIDE 5 MG: 5 TABLET ORAL at 11:12

## 2020-12-07 RX ADMIN — NICARDIPINE HYDROCHLORIDE 0.4 MG: 2.5 INJECTION INTRAVENOUS at 12:12

## 2020-12-07 RX ADMIN — LIDOCAINE HYDROCHLORIDE 75 MG: 20 INJECTION, SOLUTION INTRAVENOUS at 10:12

## 2020-12-07 RX ADMIN — SODIUM CHLORIDE, SODIUM GLUCONATE, SODIUM ACETATE, POTASSIUM CHLORIDE, MAGNESIUM CHLORIDE, SODIUM PHOSPHATE, DIBASIC, AND POTASSIUM PHOSPHATE: .53; .5; .37; .037; .03; .012; .00082 INJECTION, SOLUTION INTRAVENOUS at 10:12

## 2020-12-07 RX ADMIN — ACETAMINOPHEN 1000 MG: 325 TABLET ORAL at 06:12

## 2020-12-07 RX ADMIN — MEPIVACAINE HYDROCHLORIDE 6 ML: 15 INJECTION, SOLUTION EPIDURAL; INFILTRATION at 02:12

## 2020-12-07 RX ADMIN — EPHEDRINE SULFATE 10 MG: 50 INJECTION INTRAVENOUS at 03:12

## 2020-12-07 RX ADMIN — ALBUMIN (HUMAN) 25 G: 12.5 SOLUTION INTRAVENOUS at 02:12

## 2020-12-07 RX ADMIN — SODIUM CHLORIDE, SODIUM GLUCONATE, SODIUM ACETATE, POTASSIUM CHLORIDE, MAGNESIUM CHLORIDE, SODIUM PHOSPHATE, DIBASIC, AND POTASSIUM PHOSPHATE: .53; .5; .37; .037; .03; .012; .00082 INJECTION, SOLUTION INTRAVENOUS at 01:12

## 2020-12-07 NOTE — INTERVAL H&P NOTE
The patient has been examined and the H&P has been reviewed:    I concur with the findings and no changes have occurred since H+P was written    Surgery risks, benefits and alternative options discussed and understood by patient/family.    In accordance with [facility] notifications, [facility] policy (tier 2 orthopedic condition causing severe pain), and guidance from my /section head, I believe this to be a time sensitive procedure that needs to be performed because delay will cause pain, distress, worsening of the underlying disease process, and/or further negative outcomes for the patient.    I discussed COVID-19 with the patient, they are aware of our current policies and procedures, were given the option of delaying surgery, and they elect to proceed

## 2020-12-07 NOTE — ANESTHESIA PROCEDURE NOTES
CSE    Patient location during procedure: OR  Start time: 12/7/2020 10:17 AM  Timeout: 12/7/2020 10:17 AM  End time: 12/7/2020 10:24 AM    Staffing  Authorizing Provider: Hermelindo Morales MD  Performing Provider: Hermelindo Morales MD    Preanesthetic Checklist  Completed: patient identified, site marked, surgical consent, pre-op evaluation, timeout performed, IV checked, risks and benefits discussed and monitors and equipment checked  CSE  Patient position: sitting  Prep: ChloraPrep  Patient monitoring: heart rate, cardiac monitor, continuous pulse ox, continuous capnometry and frequent blood pressure checks  Approach: midline  Spinal Needle  Needle type: pencil-tip   Needle gauge: 25 G  Needle length: 5 in  Epidural Needle  Injection technique: AKIRA air  Needle type: Tuohy   Needle gauge: 17 G  Needle length: 3.5 in  Needle insertion depth: 5 cm  Location: L4-5  Needle localization: anatomical landmarks  Catheter  Catheter type: springwound  Catheter size: 19 G  Catheter at skin depth: 12 cm  Additional Documentation: negative aspiration for CSF and negative aspiration for heme  Assessment  Sensory level: T5   Dermatomal levels determined by alcohol swab  Intrathecal Medications:  administered: primary anesthetic mcg of    Additional Notes  Mepivacaine 1.5% preservative free, 4 mL given intrathecally

## 2020-12-07 NOTE — TRANSFER OF CARE
"Anesthesia Transfer of Care Note    Patient: Edi Marie    Procedure(s) Performed: Procedure(s) (LRB):  ARTHROPLASTY, HIP, BILATERAL: ANTERIOR: DEPUY-ACTIS+PINNACLE (Bilateral)    Patient location: PACU    Anesthesia Type: general    Transport from OR: Transported from OR on 6-10 L/min O2 by face mask with adequate spontaneous ventilation    Post pain: adequate analgesia    Post assessment: no apparent anesthetic complications and tolerated procedure well    Post vital signs: stable    Level of consciousness: awake, alert and oriented    Nausea/Vomiting: no nausea/vomiting    Complications: none    Transfer of care protocol was followed      Last vitals:   Visit Vitals  /84 (BP Location: Left arm, Patient Position: Lying)   Pulse 79   Temp (P) 36.5 °C (97.7 °F) (Temporal)   Resp 16   Ht 5' 11" (1.803 m)   Wt 86.2 kg (190 lb)   SpO2 99%   BMI 26.50 kg/m²     "

## 2020-12-07 NOTE — PROGRESS NOTES
Upon PACU/Postop arrival, pt presented w/ 70/52 BP, slightly drowsy, moving bilat legs/feet, w/ no complaints of pain. Immed, pt was placed in reverse Trendelenburg & NS started. Dr Heath gave an order for 500 mL of Albumin & a CBC lab. After 3 failed attempts to get a CBC, A-line was initiated & pt given 1 unit of blood, which pt tolerated well. Pt's condition & BP stabilized enough to be transferred to the floor.

## 2020-12-07 NOTE — PT/OT/SLP PROGRESS
Occupational Therapy      Patient Name:  Edi Marie   MRN:  [id number]    Patient not seen today secondary to hypotension and late surgery. Will follow-up tomorrow.    DARLINE De Jesus  12/7/2020

## 2020-12-07 NOTE — PT/OT/SLP PROGRESS
Physical Therapy      Patient Name:  Edi Marie   MRN:  [id number]    Patient not seen today secondary to hold per Dr Walker (hypotensive after surgery). Will follow-up again in AM.    Winnie Louie, PT

## 2020-12-07 NOTE — OP NOTE
Kumar TENORIO bilateral hip  OPERATIVE NOTE      Date of Operation:   12/7/2020    Providers Performing:   Surgeon(s):  Chin Heath III, MD    Assistant: Siddhartha York MD    Preoperative Diagnosis:   Bilateral hip osteoarthritis, M16.0    Postoperative Diagnosis:   Same    Operative Procedure:   Bilateral Total Hip Arthroplasty, Anterior Approach, CPT 60024 - 50  Right side first    Anesthesia:   Spinal/Epidural  DERECK cocktail: FARHANA x2 (one each side)    Estimated Blood Loss:   R 250, L 350 = 600 cc     Specimens:   None    Complications:   None, stable to PACU.    Implants Utilized:   Depuy  Same sizes both sides:   [place] Gription; Size 56  [place] Altrx polyethylene liner; 56 OD, 36 ID, neutral  Actis size 7 STD  Biolox Delta Ceramic 12/14 taper 36mm + 5  Acetabular screw 20mm, 30mm    [place]  3gm IV TXA, beginning, middle, end    Surgiflow + 3gm topical TXA left hip for posterior capsule bleeding    Implant Name Type Inv. Item Serial No.  Lot No. LRB No. Used Action   CUP ACT PINNACLE SECTOR 56 TIT - [id number]  CUP ACT PINNACLE SECTOR 56 TIT  [employer] [id number] Right 1 Implanted   SCREW PINACLE 6.5MM X 30MM - [id number]  SCREW PINACLE 6.5MM X 30MM  [employer] [id number] Right 1 Implanted   SCREW PINNACLE 6.5 X 20 - [id number]  SCREW PINNACLE 6.5 X 20  [employer] [id number] Right 1 Implanted   LINER PINNACLE ALTRX [id number] - [id number]  LINER PINNACLE ALTRX [id number]  [employer]  Right 1 Implanted   HEAD BIOLOX CERAMC FEM +5 36MM - [id number]  HEAD BIOLOX CERAMC FEM +5 36MM  SYNTHES [id number] Right 1 Implanted   femoral stem    [employer] [id number] Right 1 Implanted   CUP ACT PINNACLE SECTOR 56 TIT - [id number]  CUP ACT PINNACLE SECTOR 56 TIT  [employer] [id number] Left 1 Implanted   SCREW PINACLE 6.5MM X 30MM - [id number]  SCREW PINACLE 6.5MM X 30MM  [employer] [id number] Left 1 Implanted   SCREW PINNACLE 6.5 X 20 - [id number]  SCREW PINNACLE 6.5 X 20  [employer] [id number] Left 1 Implanted    LINER PINNACLE ALTRX 30G43PM - [id number]  LINER PINNACLE ALTRX [id number]  [employer] [id number] Left 1 Implanted   HEAD BIOLOX CERAMC FEM +5 36MM - [id number]  HEAD BIOLOX CERAMC FEM +5 36MM  SYNTHES [id number] Left 1 Implanted   femoral stem 12/14 taper, actis duofix hip prothesis cementless, size 7 std collar    [employer] [id number] Left 1 Implanted       Indications:   The patient has longstanding bilateral hip pain secondary to primary osteoarthritis. They have failed conservative management which includes anti-inflammatory medications and activity modification. We reviewed the risks and benefits of the direct anterior hip replacement with the patient prior to surgery including risk of infection, lateral thigh numbness, blood clot, leg length inequality, dislocation, components loosening, components wearing out, need for revision surgery, continued pain, limping, and injury to nerves and vessels.  After discussing the risks and benefits of the procedure they would like to proceed with surgery.    Operative Notes:   The patient was met in the holding area. The operative site was marked. Anesthesia administered spinal anesthesia. The patient was placed supine on a [place] table and the operative site was identified. Bilateral hips were prepped and draped in the usual fashion. IV antibiotics were administered and a timeout was performed. A key was placed and removed in the PACU    I performed a direct anterior approach to the right hip. Skin incision was made and the fascia of the tensor fascia miryam was identified. I utilized the interval between the tensor fascia miryam and sartorious for direct dissection to the hip joint. I identified and coagulated the ascending branch of the lateral circumflex vessel. Standard retractors over the anterior hip capsule were placed and the capsulotomy was performed. The capsule was tagged for retraction. The femoral head was identified and the femoral neck osteotomy was made in accordance  with preoperative templating. The femoral head was then removed with a corkscrew.    The standard anterior, posterior, and superior retractors were placed around the acetabulum. The capsular labrum and foveal contents were removed. Sequential acetabular reamers were used to prepare the acetabulum. Once good good fit was achieved, the final acetabular cup was selected to be one millimeter larger in diameter than the last reamer used. The cup was checked for a good rim fit and a provisional impaction was performed to verify positioning on fluoroscopy. Once this was satisfactory, the impaction was completed. I checked to make sure there was no overhanging osteophytes anteriorly as well as making sure that there was not excessive acetabular cup exposed. Screws were placed in the cup to augment initial fixation. The final liner was impacted into place and I confirmed that it was well seated.    The hydraulic hook was placed around the femur. The femur was externally rotated and the standard calcar and greater trochanter retractors were placed. A provisional posterior capsulotomy was performed. Then, gross traction was released and the leg was extended and adducted. The appropriate release was performed to allow elevation of the femur for good visualization of the femoral canal.     A box osteotome was used to open the femoral canal and a canal finder was used to sound the canal. The starter broach and sequential broaches were used until stability was appropriate. A trial reduction was performed and intraoperative fluoroscopy was used to confirm appropriate leg lengths and offset.  Once the trial femoral components appeared appropriately positioned, the hip was dislocated, the femur re-exposed, and the trial femoral components were removed. The canal was irrigated and the final femoral stem was impacted to the level of the neck cut. The final ball was impacted onto a dry trunnion and the hip was reduced checking for  appropriate soft tissue tension. Final intra-operative fluoroscopy was obtained to confirm implant positioning, leg length, and offset.     While checking xray, a 0.35% betadine solution was left to soak in the wound. The wound was copiously irrigated. I checked for any residual bleeders and made sure that there was appropriate hemostasis. The local anesthetic cocktail was administered. 1 gram of vancomycin powder was placed in the wound. The wound was closed in layers using #1 vicryl at the fascia, then 2-0 vicryl, 3-0 monocryl, 4-0 monocryl and Prineo Mesh+Dermabond. A sterile dressing was placed.     While my assistant closed skin on the right hip, the left hip was exposed. We confirmed with anesthesia that the patient was stable and decided to proceed with the left hip. The same procedure was performed as above with the same size implants.     Of note, both hips required extensive release of the posterior capsule, a small amount of TFL at the iliac wing and exposure was diffucult due to narrow pelvis, tight posterior capsule, and large posterior trochanters.     On the left side, there was a large posterior capsule bleeder requiring extra time for hemostasis and the addition of surgiflow and topical TXA. Hemostasis was appropriate at time of closure.     There were no complications or evidence of intraoperative fracture. All counts were correct.    The first-assistant was critical to all steps of the operation, including retraction during exposure and bone preparation, as well as the deep and superficial wound closure.  Dr. Heath performed and/or supervised the key portions of this surgical procedure, including evaluation of the bone cuts, reshaping of the bony elements, and insertion of the provisional and final components.    Post Op Plan:   The patient will be weightbearing as tolerated with a walker with no extremes of motion  DVT proph: per hematology: Eliquis 2.5mg BID starting 12hrs after surgery, resume  5mg BID 48hrs post op  24 hours of IV antibiotics.        Signed by: Chin Heath III, MD

## 2020-12-08 LAB
BUN SERPL-MCNC: 16 MG/DL (ref 6–30)
CHLORIDE SERPL-SCNC: 104 MMOL/L (ref 95–110)
CREAT SERPL-MCNC: 0.7 MG/DL (ref 0.5–1.4)
GLUCOSE SERPL-MCNC: 104 MG/DL (ref 70–110)
HCT VFR BLD CALC: 34 %PCV (ref 36–54)
POC IONIZED CALCIUM: 1.06 MMOL/L (ref 1.06–1.42)
POC TCO2 (MEASURED): 20 MMOL/L (ref 23–29)
POTASSIUM BLD-SCNC: 4.4 MMOL/L (ref 3.5–5.1)
SAMPLE: ABNORMAL
SODIUM BLD-SCNC: 138 MMOL/L (ref 136–145)

## 2020-12-08 PROCEDURE — 11000001 HC ACUTE MED/SURG PRIVATE ROOM

## 2020-12-08 PROCEDURE — 82565 ASSAY OF CREATININE: CPT

## 2020-12-08 PROCEDURE — 99232 SBSQ HOSP IP/OBS MODERATE 35: CPT | Mod: ,,, | Performed by: PHYSICIAN ASSISTANT

## 2020-12-08 PROCEDURE — 99232 PR SUBSEQUENT HOSPITAL CARE,LEVL II: ICD-10-PCS | Mod: ,,, | Performed by: PHYSICIAN ASSISTANT

## 2020-12-08 PROCEDURE — 97162 PT EVAL MOD COMPLEX 30 MIN: CPT

## 2020-12-08 PROCEDURE — 97165 OT EVAL LOW COMPLEX 30 MIN: CPT

## 2020-12-08 PROCEDURE — 84295 ASSAY OF SERUM SODIUM: CPT

## 2020-12-08 PROCEDURE — 82330 ASSAY OF CALCIUM: CPT

## 2020-12-08 PROCEDURE — 97530 THERAPEUTIC ACTIVITIES: CPT

## 2020-12-08 PROCEDURE — 99900035 HC TECH TIME PER 15 MIN (STAT)

## 2020-12-08 PROCEDURE — 25000003 PHARM REV CODE 250: Performed by: STUDENT IN AN ORGANIZED HEALTH CARE EDUCATION/TRAINING PROGRAM

## 2020-12-08 PROCEDURE — 94761 N-INVAS EAR/PLS OXIMETRY MLT: CPT

## 2020-12-08 PROCEDURE — 97535 SELF CARE MNGMENT TRAINING: CPT

## 2020-12-08 PROCEDURE — 97110 THERAPEUTIC EXERCISES: CPT

## 2020-12-08 PROCEDURE — 63600175 PHARM REV CODE 636 W HCPCS: Performed by: NURSE PRACTITIONER

## 2020-12-08 PROCEDURE — 84132 ASSAY OF SERUM POTASSIUM: CPT

## 2020-12-08 PROCEDURE — 25000003 PHARM REV CODE 250: Performed by: NURSE PRACTITIONER

## 2020-12-08 PROCEDURE — 97116 GAIT TRAINING THERAPY: CPT

## 2020-12-08 PROCEDURE — 25000003 PHARM REV CODE 250: Performed by: PHYSICIAN ASSISTANT

## 2020-12-08 PROCEDURE — 85014 HEMATOCRIT: CPT

## 2020-12-08 RX ORDER — CIPROFLOXACIN HYDROCHLORIDE 3 MG/ML
2 SOLUTION/ DROPS OPHTHALMIC 3 TIMES DAILY
Status: DISCONTINUED | OUTPATIENT
Start: 2020-12-08 | End: 2020-12-11 | Stop reason: HOSPADM

## 2020-12-08 RX ORDER — CIPROFLOXACIN HYDROCHLORIDE 3 MG/ML
2 SOLUTION/ DROPS OPHTHALMIC 3 TIMES DAILY
Qty: 1 BOTTLE | Refills: 0 | Status: SHIPPED | OUTPATIENT
Start: 2020-12-08 | End: 2020-12-11

## 2020-12-08 RX ADMIN — ACETAMINOPHEN 1000 MG: 325 TABLET ORAL at 12:12

## 2020-12-08 RX ADMIN — SODIUM CHLORIDE 150 ML/HR: 0.9 INJECTION, SOLUTION INTRAVENOUS at 02:12

## 2020-12-08 RX ADMIN — APIXABAN 2.5 MG: 2.5 TABLET, FILM COATED ORAL at 08:12

## 2020-12-08 RX ADMIN — APIXABAN 2.5 MG: 2.5 TABLET, FILM COATED ORAL at 02:12

## 2020-12-08 RX ADMIN — FAMOTIDINE 20 MG: 20 TABLET, FILM COATED ORAL at 08:12

## 2020-12-08 RX ADMIN — PREGABALIN 75 MG: 75 CAPSULE ORAL at 08:12

## 2020-12-08 RX ADMIN — CIPROFLOXACIN 2 DROP: 3 SOLUTION OPHTHALMIC at 08:12

## 2020-12-08 RX ADMIN — MUPIROCIN 1 G: 20 OINTMENT TOPICAL at 08:12

## 2020-12-08 RX ADMIN — ACETAMINOPHEN 1000 MG: 325 TABLET ORAL at 06:12

## 2020-12-08 RX ADMIN — CIPROFLOXACIN 2 DROP: 3 SOLUTION OPHTHALMIC at 06:12

## 2020-12-08 RX ADMIN — SODIUM CHLORIDE 500 ML: 0.9 INJECTION, SOLUTION INTRAVENOUS at 06:12

## 2020-12-08 RX ADMIN — CIPROFLOXACIN 2 DROP: 3 SOLUTION OPHTHALMIC at 09:12

## 2020-12-08 RX ADMIN — OXYCODONE HYDROCHLORIDE 5 MG: 5 TABLET ORAL at 01:12

## 2020-12-08 RX ADMIN — SODIUM CHLORIDE 500 ML: 0.9 INJECTION, SOLUTION INTRAVENOUS at 01:12

## 2020-12-08 RX ADMIN — CIPROFLOXACIN 2 DROP: 3 SOLUTION OPHTHALMIC at 03:12

## 2020-12-08 RX ADMIN — CELECOXIB 200 MG: 200 CAPSULE ORAL at 08:12

## 2020-12-08 RX ADMIN — FLUORESCEIN SODIUM AND BENOXINATE HYDROCHLORIDE 1 DROP: 2.5; 4 SOLUTION OPHTHALMIC at 05:12

## 2020-12-08 RX ADMIN — POLYETHYLENE GLYCOL 3350 17 G: 17 POWDER, FOR SOLUTION ORAL at 08:12

## 2020-12-08 RX ADMIN — OXYCODONE HYDROCHLORIDE 5 MG: 5 TABLET ORAL at 06:12

## 2020-12-08 RX ADMIN — OXYCODONE HYDROCHLORIDE 10 MG: 10 TABLET ORAL at 09:12

## 2020-12-08 RX ADMIN — ACETAMINOPHEN 1000 MG: 325 TABLET ORAL at 05:12

## 2020-12-08 RX ADMIN — DOCUSATE SODIUM 50MG AND SENNOSIDES 8.6MG 1 TABLET: 8.6; 5 TABLET, FILM COATED ORAL at 08:12

## 2020-12-08 RX ADMIN — CEFAZOLIN 2 G: 330 INJECTION, POWDER, FOR SOLUTION INTRAMUSCULAR; INTRAVENOUS at 02:12

## 2020-12-08 NOTE — PLAN OF CARE
Patient tolerated PT session fairly. He ambulated 12ft with RW and CGA. He was limited in ambulation distance due to being lightheaded and requesting to sit in bedside chair. PT will see patient again this PM.     Problem: Physical Therapy Goal  Goal: Physical Therapy Goal  Description: Goals to be met by: 2020    Patient will increase functional independence with mobility by performin. Supine to sit with supervision   2. Sit to stand transfer with Supervision  3. Gait x100 feet with Supervision using Rolling Walker  4. Ascend/Descend 6 inch curb step with Stand-by Assistance using Rolling Walker  5. Lower extremity exercise program x30 reps per handout, with supervision    Outcome: Ongoing, Progressing

## 2020-12-08 NOTE — NURSING
Pt arrived to unit via hospital bed from PACU.  Pt aaox4, vss, no s/s of distress noted.  Pt c/o pain to qing hips 2/10.  Dressings to qing hips cdi, ice packs applied.  Safety precautions maintained, pt instructed to call for assistance when getting up, he verbalized understanding.  Spouse at bedside.  Call light placed within reach.

## 2020-12-08 NOTE — PROGRESS NOTES
Pt c/o of irritation to the R eye overnight.  Pt stated it felt like a lash was in there.  Flushed with NS and re evaluated to which pt stated it felt a little better.  Asked the pt again at approx 5 am how R eye was feeling and pt stated it still felt like something was in there.  DAMION aware, and at bedside.  Will continue to monitor.

## 2020-12-08 NOTE — PROGRESS NOTES
Progress Note    Patient experienced dizziness when working with OT per nursing. Had 10mg Oxy prior to working with OT. At bedside patient reports mild lightheadedness while sitting. Making urine, appears straw colored. Just received 500cc IV bolus.     PE  AO. Conversant and pleasant in no acute distress.   Compartments remain unchanged. No sign of hematoma formation.  Cap refill normal    Labs     Hct this AM 34 from 35 yestarday    56 yo M POD1 s/p qing hip requiring 2 units prbcs.  Symptomatic orthostatic hypotensive following 10mg of oxycodone. No signs of bleeding. Will encourage PO intake and re-assess. Will speak with ortho team regarding plan.     Arian Jackson MD

## 2020-12-08 NOTE — PROGRESS NOTES
[facility]  Orthopedics  Progress Note    Patient Name: Edi Marie  MRN: [id number]  Admission Date: 12/7/2020  Hospital Length of Stay: 1 days  Attending Provider: Chin Heath III, MD  Primary Care Provider: More Villanueva MD  Follow-up For: Procedure(s) (LRB):  ARTHROPLASTY, HIP, BILATERAL: ANTERIOR: DEPUY-ACTIS+PINNACLE (Bilateral)    Post-Operative Day: 1 Day Post-Op  Subjective:     Principal Problem:Hip pain    Principal Orthopedic Problem: Same    Interval History: Patient seen and examined at bedside.  Patient was hypotensive in PACU and was transfused 1 unit and given albumin.  Instructions given to pull key in PACU however because patient was hypotensive and could not work with PT so key was not pulled.  Hgb stable this morning.  Pain very well controlled.        Review of patient's allergies indicates:  No Known Allergies    Current Facility-Administered Medications   Medication    0.9%  NaCl infusion    acetaminophen tablet 1,000 mg    apixaban tablet 2.5 mg    celecoxib capsule 200 mg    ciprofloxacin HCl 0.3 % ophthalmic solution 2 drop    ePHEDrine sulfate 10 mg    famotidine tablet 20 mg    morphine injection 2 mg    mupirocin 2 % ointment 1 g    naloxone 0.4 mg/mL injection 0.02 mg    ondansetron injection 4 mg    oxyCODONE immediate release tablet 10 mg    oxyCODONE immediate release tablet 5 mg    polyethylene glycol packet 17 g    polyethylene glycol packet 17 g    pregabalin capsule 75 mg    promethazine (PHENERGAN) 6.25 mg in dextrose 5 % 50 mL IVPB    senna-docusate 8.6-50 mg per tablet 1 tablet     Objective:     Vital Signs (Most Recent):  Temp: 96.9 °F (36.1 °C) (12/08/20 0412)  Pulse: 76 (12/08/20 0412)  Resp: 16 (12/08/20 0412)  BP: (!) 105/56 (12/08/20 0412)  SpO2: 98 % (12/08/20 0412) Vital Signs (24h Range):  Temp:  [96.6 °F (35.9 °C)-98.4 °F (36.9 °C)] 96.9 °F (36.1 °C)  Pulse:  [52-85] 76  Resp:  [12-23] 16  SpO2:  [97 %-100 %] 98  "%  BP: ()/(30-84) 105/56  Arterial Line BP: ()/(58-78) 102/58     Weight: 86.2 kg (190 lb)  Height: 5' 11" (180.3 cm)  Body mass index is 26.5 kg/m².      Intake/Output Summary (Last 24 hours) at 12/8/2020 0645  Last data filed at 12/8/2020 0400  Gross per 24 hour   Intake 4000 ml   Output 3100 ml   Net 900 ml       Ortho/SPM Exam     AAOx4  NAD  RRR  No increased WOB  Dressings in place   SILT and motor intact T/SP/DP  WWP extremities  FCDs in place and functioning      Significant Labs: All pertinent labs within the past 24 hours have been reviewed.    Significant Imaging: I have reviewed and interpreted all pertinent imaging results/findings.    Assessment/Plan:     * Hip pain  55 y.o. male POD1 s/p Bilateral BRIGIDO    Pain control: per APS  PT/OT: WBAT BLE, anterior hip precautions  DVT PPx:Eliquis 2.5mg BID starting 12hrs after surgery, resume 5mg BID 48hrs post op , FCDs at all times when not ambulating  Abx: postop Ancef    Dispo: f/u PT today and will pull key after mobilization               Siddhartha York MD  Orthopedics  [facility]  "

## 2020-12-08 NOTE — PLAN OF CARE
Pt up in chair.  Dressing to qing hips remain cdi.  Ice packs in place.  Pt gets dizzy and light headed when ambulating.  B/P on low side, MD aware.  Fluids encouraged, pt verbalizes understanding.  Call light placed within reach.  Will monitor.

## 2020-12-08 NOTE — PT/OT/SLP EVAL
Occupational Therapy   Evaluation    Name: Edi Marie  MRN: [id number]  Admitting Diagnosis:  Hip pain 1 Day Post-Op    Recommendations:     Discharge Recommendations: home  Discharge Equipment Recommendations:  hip kit  Barriers to discharge:  None    Assessment:     Edi Marie is a 55 y.o. male with a medical diagnosis of Hip pain.  He was able to perform sit/stand T/F c CGA and RW.  Pt was able to walk approx. 10 feet but was unable to make it into bathroom d/t dizziness.  BP found to be 91/51.  Educated pt on anterior hip precautions.  RN notified about BP. Performance deficits affecting function: impaired self care skills, impaired functional mobilty, impaired cardiopulmonary response to activity, orthopedic precautions.      Rehab Prognosis: Good; patient would benefit from acute skilled OT services to address these deficits and reach maximum level of function.       Plan:     Patient to be seen daily to address the above listed problems via self-care/home management, therapeutic activities, therapeutic exercises  · Plan of Care Expires: 12/09/20  · Plan of Care Reviewed with: patient    Subjective     Chief Complaint: Pt is s/p B anterior BRIGIDO and is WBAT B LE.  0-90* flexion and no extremes of motion.  Patient/Family Comments/goals: To get better.    Occupational Profile:  Living Environment: Pt lives in a one story house c 1 ARPAN.  Has a tub/shower combo.  Previous level of function: I PTA  Roles and Routines: Construction equipment  Equipment Used at Home:  walker, rolling, bedside commode, shower chair  Assistance upon Discharge: Wife    Pain/Comfort:  · Pain Rating 1: 0/10    Patients cultural, spiritual, Orthodox conflicts given the current situation: no    Objective:     Communicated with: RN prior to session.  Patient found up in chair with cryotherapy, FCD upon OT entry to room.    General Precautions: Standard, fall   Orthopedic Precautions:LLE weight bearing as tolerated, RLE weight  bearing as tolerated, BLE anterior precautions(0-90* hip flexion and no extremes of motion)   Braces: N/A     Occupational Performance:    Functional Mobility/Transfers:  · Patient completed Sit <> Stand Transfer with contact guard assistance  with  rolling walker   · Functional Mobility: Pt was able to walk assisted to bathroom before having to sit down d/t hypotension and dizziness.  BP found to be 91/51.    Physical Exam:  Upper Extremity Range of Motion:     -       Right Upper Extremity: WFL  -       Left Upper Extremity: WFL  Upper Extremity Strength:    -       Right Upper Extremity: WFL  -       Left Upper Extremity: WFL    AMPAC 6 Click ADL:  AMPAC Total Score: 16  Education:    Patient left up in chair with all lines intact, call button in reach and RN notified    GOALS:   Multidisciplinary Problems     Occupational Therapy Goals        Problem: Occupational Therapy Goal    Goal Priority Disciplines Outcome Interventions   Occupational Therapy Goal     OT, PT/OT Ongoing, Progressing    Description: Goals to be met by: 12/9/20     Patient will increase functional independence with ADLs by performing:    UE Dressing with [place].  LE Dressing with Modified [place] and Assistive Devices as needed.  Grooming while standing at sink with Modified [place].  Toileting from bedside commode with Modified [place] for hygiene and clothing management.   Bathing from  shower chair/bench with Modified [place].  Toilet transfer to bedside commode with Modified [place].                     History:     Past Medical History:   Diagnosis Date    Arthritis     Deep vein thrombosis     DVT (deep venous thrombosis)     Hemorrhoid     Histiocytosis     Kidney stones     Spermatocele        Past Surgical History:   Procedure Laterality Date    APPENDECTOMY      in his 20's    ARTHROPLASTY OF BOTH HIPS Bilateral 12/7/2020    Procedure: ARTHROPLASTY, HIP, BILATERAL: ANTERIOR:  DEPUY-ACTIS+PINNACLE;  Surgeon: Chin Heath III, MD;  Location: [facility];  Service: Orthopedics;  Laterality: Bilateral;    COLONOSCOPY N/A 1/20/2017    Procedure: COLONOSCOPY;  Surgeon: Danis Frank MD;  Location: [address]);  Service: Endoscopy;  Laterality: N/A;    KNEE SURGERY      right knee- teenager -     LITHOTRIPSY         Time Tracking:     OT Date of Treatment: 12/08/20  OT Start Time: 1010  OT Stop Time: 1030  OT Total Time (min): 20 min    Billable Minutes:Evaluation 10  Self Care/Home Management 10    DARLINE De Jesus  12/8/2020

## 2020-12-08 NOTE — PLAN OF CARE
Problem: Occupational Therapy Goal  Goal: Occupational Therapy Goal  Description: Goals to be met by: 12/9/20     Patient will increase functional independence with ADLs by performing:    UE Dressing with [place].  LE Dressing with Modified [place] and Assistive Devices as needed.  Grooming while standing at sink with Modified [place].  Toileting from bedside commode with Modified [place] for hygiene and clothing management.   Bathing from  shower chair/bench with Modified [place].  Toilet transfer to bedside commode with Modified [place].    Outcome: Ongoing, Progressing

## 2020-12-08 NOTE — PT/OT/SLP EVAL
Physical Therapy Evaluation and Treatment     Patient Name:  Edi Marie   MRN:  [id number]    Recommendations:     Discharge Recommendations:  home health PT   Discharge Equipment Recommendations: walker, rolling, bedside commode   Barriers to discharge: lightheaded with ambulation     Assessment:     Edi Marie is a 55 y.o. male admitted with a medical diagnosis of Hip pain.  He presents with the following impairments/functional limitations:  weakness, impaired functional mobilty, gait instability, impaired balance, decreased lower extremity function, pain, decreased ROM, impaired skin, orthopedic precautions. Patient tolerated PT session fairly. He ambulated 12ft with RW and CGA. He was limited in ambulation distance due to being lightheaded and requesting to sit in bedside chair. PT will see patient again this PM.     Rehab Prognosis: Good; patient would benefit from acute skilled PT services to address these deficits and reach maximum level of function.    Recent Surgery: Procedure(s) (LRB):  ARTHROPLASTY, HIP, BILATERAL: ANTERIOR: DEPUY-ACTIS+PINNACLE (Bilateral) 1 Day Post-Op    Plan:     During this hospitalization, patient to be seen BID to address the identified rehab impairments via gait training, therapeutic activities, therapeutic exercises and progress toward the following goals:    · Plan of Care Expires:  12/11/20    Subjective     Chief Complaint: Pain in both hips.  Patient/Family Comments/goals: To get back to PLOF.   Pain/Comfort:  · Pain Rating 1: 7/10(with ambulation)  · Location - Side 1: Bilateral  · Location 1: hip  · Pain Addressed 1: Distraction, Cessation of Activity, Nurse notified    Living Environment:  Patient lives with his wife in a H with no steps to enter. Prior to admission, patients level of function was independent for functional mobility. Equipment used at home: none. 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 He still  drives. Upon discharge, patient will have assistance from wife.    Objective:     Communicated with RN prior to session.  Patient found supine with FCD, cryotherapy, peripheral IV  upon PT entry to room.    General Precautions: Standard, fall   Orthopedic Precautions:BLE anterior precautions, RLE weight bearing as tolerated, LLE weight bearing as tolerated(no extremes of motion, hip flexion 0-90 degrees)   Braces: N/A     Exams:  · Cognitive Exam:  Patient is oriented to Person, Place, Time and Situation  · Sensation:    · -       Intact  · Skin Integrity/Edema:      · -       Skin integrity: Visible skin intact  · RLE ROM: WFL within anterior hip precautions   · RLE Strength: appears WFL but unable to formally assess due to pain   · LLE ROM: WFL within anterior hip precautions   · LLE Strength: appears WFL but unable to formally assess due to pain     Functional Mobility:  · Bed Mobility:     · Scooting: contact guard assistance  · Supine to Sit: contact guard assistance  · Transfers:     · Sit to Stand:  contact guard assistance with rolling walker x1 from bed with verbal cues for hand placement   · Gait:  Patient ambulated 12ft with Rolling Walker and contact guard assistance  using 3-point gait. Patient demonstrated decreased mitesh and decreased step length during gait due to pain, decreased ROM and decreased strength. Verbal cues provided for gait sequence and RW management. He was limited in ambulation distance due to being lightheaded and requesting to sit in bedside chair.     Therapeutic Activities and Exercises:  Patient educated in and performed B LE exercises x10 reps for A/P, quad set, and glute set.     Patient educated in:  -PT role and POC  -safety with transfers including hand placement  -gait sequencing and RW management  -OOB activity to maximize recovery including ambulating with nursing staff assistance and RW while in the hospital   -anterior hip precautions       AM-PAC 6 CLICK  MOBILITY  Total Score:17     Patient left up in chair with all lines intact, call button in reach, and RN notified.    GOALS:   Multidisciplinary Problems     Physical Therapy Goals        Problem: Physical Therapy Goal    Goal Priority Disciplines Outcome Goal Variances Interventions   Physical Therapy Goal     PT, PT/OT Ongoing, Progressing     Description: Goals to be met by: 2020    Patient will increase functional independence with mobility by performin. Supine to sit with supervision   2. Sit to stand transfer with Supervision  3. Gait x100 feet with Supervision using Rolling Walker  4. Ascend/Descend 6 inch curb step with Stand-by Assistance using Rolling Walker  5. Lower extremity exercise program x30 reps per handout, with supervision                     History:     Past Medical History:   Diagnosis Date    Arthritis     Deep vein thrombosis     DVT (deep venous thrombosis)     Hemorrhoid     Histiocytosis     Kidney stones     Spermatocele        Past Surgical History:   Procedure Laterality Date    APPENDECTOMY      in his 20's    ARTHROPLASTY OF BOTH HIPS Bilateral 2020    Procedure: ARTHROPLASTY, HIP, BILATERAL: ANTERIOR: DEPUY-ACTIS+PINNACLE;  Surgeon: Chin Heath III, MD;  Location: [facility];  Service: Orthopedics;  Laterality: Bilateral;    COLONOSCOPY N/A 2017    Procedure: COLONOSCOPY;  Surgeon: Danis Frank MD;  Location: [facility] ([address] [place] [address]);  Service: Endoscopy;  Laterality: N/A;    KNEE SURGERY      right knee- teenager -     LITHOTRIPSY         Time Tracking:     PT Received On: 20  PT Start Time: 0850     PT Stop Time: 0922  PT Total Time (min): 32 min     Billable Minutes: Evaluation 8 Gait Training 14 and Therapeutic Exercise 10      Winnie Louie, PT  2020

## 2020-12-08 NOTE — ANESTHESIA POSTPROCEDURE EVALUATION
Anesthesia Post Evaluation    Patient: Edi Marie    Procedure(s) Performed: Procedure(s) (LRB):  ARTHROPLASTY, HIP, BILATERAL: ANTERIOR: DEPUY-ACTIS+PINNACLE (Bilateral)    Final Anesthesia Type: CSE    Patient location during evaluation: PACU  Patient participation: Yes- Able to Participate  Level of consciousness: awake and alert  Post-procedure vital signs: reviewed and stable  Pain management: adequate  Airway patency: patent    PONV status at discharge: No PONV  Anesthetic complications: yes  Perioperative Events: corneal abrasion        Cardiovascular status: blood pressure returned to baseline  Respiratory status: spontaneous ventilation  Hydration status: euvolemic  Follow-up not needed.  Comments: Corneal abrasion fact sheet given to patient. Cipro drops started last night to R eye and continued. Outpatient Rx written and instructions for follow up PRN discussed. Symptoms improved this AM.           Vitals Value Taken Time   /59 12/08/20 0704   Temp 36.5 °C (97.7 °F) 12/08/20 0704   Pulse 80 12/08/20 0705   Resp 18 12/08/20 0923   SpO2 98 % 12/08/20 0705         Event Time   Out of Recovery 17:43:00         Pain/Hiro Score: Pain Rating Prior to Med Admin: 7 (12/8/2020  9:23 AM)  Pain Rating Post Med Admin: 0 (12/7/2020  7:45 PM)  Hiro Score: 9 (12/7/2020  4:00 PM)

## 2020-12-08 NOTE — ASSESSMENT & PLAN NOTE
55 y.o. male POD1 s/p Bilateral BRIGIDO    Pain control: per APS  PT/OT: WBAT BLE, anterior hip precautions  DVT PPx:Eliquis 2.5mg BID starting 12hrs after surgery, resume 5mg BID 48hrs post op , FCDs at all times when not ambulating  Abx: postop Ancef    Dispo: f/u PT today and will pull key after mobilization

## 2020-12-08 NOTE — PT/OT/SLP PROGRESS
Physical Therapy Treatment    Patient Name:  Edi Marie   MRN:  [id number]    Recommendations:     Discharge Recommendations:  home health PT   Discharge Equipment Recommendations: walker, rolling, bedside commode   Barriers to discharge: decreased functional mobility due to pain     Assessment:     Edi Marie is a 55 y.o. male admitted with a medical diagnosis of Hip pain.  He presents with the following impairments/functional limitations:  weakness, impaired functional mobilty, gait instability, impaired balance, decreased lower extremity function, pain, decreased ROM, impaired skin, orthopedic precautions. Patient tolerated PT session much better this PM. He ambulated 70ft with RW and contact guard assistance. Patient with only minimal complaint of dizziness which he related to getting a pain pill prior to session. /59 after ambulation. He would continue to benefit from PT in order to get back to PLOF.     Rehab Prognosis: Good; patient would benefit from acute skilled PT services to address these deficits and reach maximum level of function.    Recent Surgery: Procedure(s) (LRB):  ARTHROPLASTY, HIP, BILATERAL: ANTERIOR: DEPUY-ACTIS+PINNACLE (Bilateral) 1 Day Post-Op    Plan:     During this hospitalization, patient to be seen daily to address the identified rehab impairments via gait training, therapeutic activities, therapeutic exercises and progress toward the following goals:    · Plan of Care Expires:  12/11/20    Subjective     Chief Complaint: Pain in bilateral hips. Mild dizziness with ambulation.   Patient/Family Comments/goals: To get back to PLOF.   Pain/Comfort:  · Pain Rating 1: 3/10(rest)  · Location - Side 1: Bilateral  · Location 1: hip  · Pain Addressed 1: Pre-medicate for activity, Distraction, Cessation of Activity, Nurse notified  · Pain Rating Post-Intervention 1: 8/10(with ambulation)      Objective:     Communicated with RN prior to session.  Patient found up in chair with  cryotherapy, FCD upon PT entry to room. Wife present in patient's room.     General Precautions: Standard, fall   Orthopedic Precautions:BLE anterior precautions, RLE weight bearing as tolerated, LLE weight bearing as tolerated (no extremes of motion, hip flexion 0-90)   Braces: N/A     Functional Mobility:  · Bed Mobility:  required several rest breaks due to pain  · Scooting: stand by assistance  · Sit to Supine: stand by assistance  · Transfers:     · Sit to Stand:  stand by assistance with rolling walker x2 from bedside chair with verbal cues for hand placement and technique   · Bedside chair to Bed: 6 steps with stand by assistance and rolling walker    · Gait: Patient ambulated 70ft with Rolling Walker and contact guard assistance using 3-point gait. Patient demonstrated decreased mitesh and decreased step length during gait due to pain, decreased ROM and decreased strength. Verbal cues provided for gait sequence and RW management.     AM-PAC 6 CLICK MOBILITY  Turning over in bed (including adjusting bedclothes, sheets and blankets)?: 3  Sitting down on and standing up from a chair with arms (e.g., wheelchair, bedside commode, etc.): 3  Moving from lying on back to sitting on the side of the bed?: 3  Moving to and from a bed to a chair (including a wheelchair)?: 3  Need to walk in hospital room?: 3  Climbing 3-5 steps with a railing?: 2  Basic Mobility Total Score: 17       Therapeutic Activities and Exercises:  Patient educated in:  -PT role and POC  -safety with transfers including hand placement  -gait sequencing and RW management  -OOB activity to maximize recovery including ambulating with nursing staff assistance and RW while in the hospital  -anterior hip precautions     Patient left supine with all lines intact, call button in reach, RN notified and wife present.    GOALS:   Multidisciplinary Problems     Physical Therapy Goals        Problem: Physical Therapy Goal    Goal Priority Disciplines Outcome  Goal Variances Interventions   Physical Therapy Goal     PT, PT/OT Ongoing, Progressing     Description: Goals to be met by: 2020    Patient will increase functional independence with mobility by performin. Supine to sit with supervision   2. Sit to stand transfer with Supervision  3. Gait x100 feet with Supervision using Rolling Walker  4. Ascend/Descend 6 inch curb step with Stand-by Assistance using Rolling Walker  5. Lower extremity exercise program x30 reps per handout, with supervision                     Time Tracking:     PT Received On: 20  PT Start Time: 1500     PT Stop Time: 1525  PT Total Time (min): 25 min     Billable Minutes: Gait Training  15 and Therapeutic Activity 10    Treatment Type: Treatment  PT/PTA: PT           Winnie Louie, PT  2020

## 2020-12-08 NOTE — PLAN OF CARE
[facility]    HOME HEALTH ORDERS  FACE TO FACE ENCOUNTER    Patient Name: Edi Marie  YOB: 1965    PCP: More Villanueva MD   PCP Address: [address] / [place] [address]  PCP Phone Number: [phone]  PCP Fax: [phone]    Encounter Date: 12/08/2020    Admit to Home Health    Diagnoses:  Active Hospital Problems    Diagnosis  POA    *Hip pain [M25.559]  Yes      Resolved Hospital Problems   No resolved problems to display.       Future Appointments   Date Time Provider Department Center   12/18/2020  1:40 PM [facility] LAB BMT [facility] LABBMT Baron Cance   12/18/2020  2:40 PM Reji Sanders MD [facility] HC BMT Tod Fontainece   12/22/2020  9:00 AM Ana Maria Maradiaga NP [facility] ORTHO [facility] [facility]           I have seen and examined this patient face to face today. My clinical findings that support the need for the home health skilled services and home bound status are the following:  Weakness/numbness causing balance and gait disturbance due to Joint Replacement making it taxing to leave home.    Allergies:Review of patient's allergies indicates:  No Known Allergies    Diet: regular diet    Activities: activity as tolerated    Home Health Admitting Clinician:   SN/PT to complete comprehensive assessment including routine vital signs. Instruct on disease process and s/s of complications to report to MD. Follow specific home health arthoplasty protocol. Review/verify medication list sent home with the patient at time of discharge  and instruct patient/caregiver as needed. If coumadin ordered, coumadin clinic to manage INR with INR draws 2x per week with a goal to maintain INR between 1.8 and 2.2. Frequency may be adjusted depending on start of care date.    Notify MD if SBP > 160 or < 90; DBP > 90 or < 50; HR > 120 or < 50; Temp > 101    Home Medical Equipment:  Walker, 3-1 bedside commode, transfer tub bench    CONSULTS:    Physical Therapy may admit if patient not on coumadin, PT  to perform comprehensive assessment if performing admit visit and generate therapy plan of care. Evaluate for home safety and equipment needs; Establish/upgrade home exercise program. Perform/instruct on therapeutic exercises, gait training, transfer training, and Range of Motion.      OTHER: (only select if patient needs other therapy disciplines)  Occupational Therapy to evaluate and treat. Evaluate home environment for safety and equipment needs. Perform/Instruct on transfers, ADL training, ROM, and therapeutic exercises.    MISCELLANEOUS CARE:  Routine Skin for Bedridden Patients: Instruct patient/caregiver to apply moisture barrier cream to all skin folds and wet areas in perineal area daily and after baths and all bowel movements.    WOUND CARE ORDERS:  Assess Surgical Incision/DSRG each TX  Aquacel AG drsg applied post-op leave on 14 days post op. Call MD if any drainage reaches border to border of drsg horizontally, s/s of infection, temp >101, induration, swelling or redness.  If dressing is removed per MD order, then apply island dressing, change/teach caregiver to perform daily dressing change if island dressing present.    Medications: Review discharge medications with patient and family and provide education.      Current Discharge Medication List      START taking these medications    Details   celecoxib (CELEBREX) 200 MG capsule Take 1 capsule (200 mg total) by mouth once daily.  Qty: 30 capsule, Refills: 0      docusate sodium (COLACE) 100 MG capsule Take 1 capsule (100 mg total) by mouth 2 (two) times daily as needed for Constipation.  Qty: 60 capsule, Refills: 0      oxyCODONE (ROXICODONE) 5 MG immediate release tablet Take 1-2 tablets by mouth every 4-6 hours as needed for pain  Qty: 30 tablet, Refills: 0    Comments: Greater than 7 day supply is medically necessary. Deliver to [facility] for surgery 12/7/2020.         CONTINUE these medications which have CHANGED    Details   apixaban (ELIQUIS) 5 mg  Tab Take 1/2 TAB (2.5mg) twice daily until 48 hours post op and then begin taking 1 tablet (5mg) twice daily  Qty: 60 tablet, Refills: 11    Comments: Take 2.5mg BID until 48 hours post op and then begin taking 5mg BID.  Associated Diagnoses: Acute deep vein thrombosis (DVT) of distal vein of both lower extremities         CONTINUE these medications which have NOT CHANGED    Details   acetaminophen (TYLENOL) 650 MG TbSR Take 650 mg by mouth every 8 (eight) hours.             I certify that this patient is confined to his home and needs intermittent skilled nursing care, physical therapy and occupational therapy.

## 2020-12-08 NOTE — PLAN OF CARE
Patient choice form signed for [facility].     12/08/20 1200   Discharge Reassessment   Assessment Type Discharge Planning Reassessment   Provided patient/caregiver education on the expected discharge date and the discharge plan Yes   Discharge Plan A Home with family;Home Health   Discharge Plan B Home with family   DME Needed Upon Discharge  bedside commode;walker, rolling   Patient choice form signed by patient/caregiver Yes  (signed by patient)   Anticipated Discharge Disposition Home-Health   Post-Acute Status   Post-Acute Authorization HME;Home Health   HME Status Set-up Complete   Home Health Status Set-up Complete   Discharge Delays None known at this time

## 2020-12-08 NOTE — PLAN OF CARE
12/08/20 1100   Discharge Assessment   Assessment Type Discharge Planning Assessment   Confirmed/corrected address and phone number on facesheet? Yes   Assessment information obtained from? Patient;Medical Record   Expected Length of Stay (days) 1   Communicated expected length of stay with patient/caregiver yes   Prior to hospitilization cognitive status: Alert/Oriented   Prior to hospitalization functional status: Independent   Current cognitive status: Alert/Oriented   Current Functional Status: Assistive Equipment;Needs Assistance   Facility Arrived From: N/A   Lives With spouse   Able to Return to Prior Arrangements yes   Is patient able to care for self after discharge? Unable to determine at this time (comments)   Who are your caregiver(s) and their phone number(s)? spouse - Promise [phone]   Patient's perception of discharge disposition home health   Readmission Within the Last 30 Days no previous admission in last 30 days   Patient currently being followed by outpatient case management? No   Patient currently receives any other outside agency services? No   Equipment Currently Used at Home none   Do you have any problems affording any of your prescribed medications? No   Is the patient taking medications as prescribed? yes   Does the patient have transportation home? Yes  (pt's spouse will pickup pt at d/c)   Transportation Anticipated car, drives self;family or friend will provide   Does the patient receive services at the Coumadin Clinic? No   Discharge Plan A Home with family;Home Health   Discharge Plan B Home with family   DME Needed Upon Discharge  bedside commode;jody, dom   Patient/Family in Agreement with Plan yes

## 2020-12-08 NOTE — PROGRESS NOTES
Dr. Chapman at beside talking to pt.  Per MD, continue to encourage fluids like gatorade / powerade.  (more requested from dietary)  Pt aware and verbalizes importance of staying hydrated.

## 2020-12-08 NOTE — NURSING TRANSFER
Nursing Transfer Note      12/7/2020     Transfer Rm 302 from PACU    Transfer via bed    Transfer with IV fluids/pole    Transported by Luiza OTT RN & Farnaz NICOLE RN    Medicines sent: No    Chart send with patient: YES     Notified: SINCERE Land    Patient reassessed at: 12/7/2020, 6504

## 2020-12-08 NOTE — PROGRESS NOTES
Acute Pain Service and Perioperative Surgical Home Progress Note    Interval history  Edi Marie is a 55 y.o., male with bilateral hip pain.    Surgery:  Procedure(s) (LRB):  ARTHROPLASTY, HIP, BILATERAL: ANTERIOR: DEPUY-ACTIS+PINNACLE (Bilateral)    Post Op Day #: 1    Mr. Marie is doing well with pain controlled. On day of surgery he was hypotensive post-op and feared to have acute hypovolemia 2/2 intraoperative blood loss. He was transfused 1 unit pRBC and given IV albumin and NS with stabilizing BP. Overnight his BP and HR remained stable, as well as his HCT, which had no significant change overnight (35 to 34). No evidence of hematoma development. He has key cath overnight with good output. He did start to complain of right eye irritation overnight. No foreign bodies, scleral injection, or vision changes.  He had resolution of symptoms with topical anesthetic. Suspect small corneal abrasion and will start on cipro drops.    Problem List:    Active Hospital Problems    Diagnosis  POA    *Hip pain [M25.559]  Yes      Resolved Hospital Problems   No resolved problems to display.       Subjective:       General appearance of alert, oriented, no complaints   Pain with rest: 1    Numbers   Pain with movement: 1    Numbers   Side Effects    1. Pruritis No    2. Nausea No    3. Motor Blockade No, 0=Ability to raise lower extremities off bed    4. Sedation No, 1=awake and alert    Schedule Medications:    acetaminophen  1,000 mg Oral Q6H    apixaban  2.5 mg Oral BID    celecoxib  200 mg Oral Daily    famotidine  20 mg Oral BID    mupirocin  1 g Nasal BID    polyethylene glycol  17 g Oral Daily    pregabalin  75 mg Oral QHS    senna-docusate 8.6-50 mg  1 tablet Oral BID        Continuous Infusions:   sodium chloride 0.9% 150 mL/hr (12/08/20 9771)        PRN Medications:  ePHEDrine sulfate, morphine, naloxone, ondansetron, oxyCODONE, oxyCODONE, polyethylene glycol, promethazine (PHENERGAN) IVPB        Antibiotics:  Antibiotics (From admission, onward)    Start     Stop Route Frequency Ordered    12/07/20 2100  mupirocin 2 % ointment 1 g      12/12 2059 Nasl 2 times daily 12/07/20 1803             Objective:         Vital Signs (Most Recent):  Temp: 36.1 °C (96.9 °F) (12/08/20 0412)  Pulse: 76 (12/08/20 0412)  Resp: 16 (12/08/20 0412)  BP: (!) 105/56 (12/08/20 0412)  SpO2: 98 % (12/08/20 0412) Vital Signs Range (Last 24H):  Temp:  [35.9 °C (96.6 °F)-36.9 °C (98.4 °F)]   Pulse:  [52-85]   Resp:  [12-23]   BP: ()/(30-84)   SpO2:  [97 %-100 %]   Arterial Line BP: ()/(58-78)          I & O (Last 24H):    Intake/Output Summary (Last 24 hours) at 12/8/2020 0547  Last data filed at 12/8/2020 0400  Gross per 24 hour   Intake 4000 ml   Output 3100 ml   Net 900 ml       Physical Exam:    GA: Alert, comfortable, no acute distress.   Pulmonary: Clear to auscultation A/P/L. No wheezing, crackles, or rhonchi.  Cardiac: RRR S1 & S2 w/o rubs/murmurs/gallops.   Abdominal:Bowel sounds present. No tenderness to palpation or distension. No appreciable hepatosplenomegaly.   Skin: No jaundice, rashes, or visible lesions.         Laboratory:  CBC:   Recent Labs     12/07/20  1533 12/08/20  0454   WBC 11.47  --    RBC 3.86*  --    HGB 11.9*  --    HCT 35.3* 34*   *  --    MCV 92  --    MCH 30.8  --    MCHC 33.7  --        BMP: No results for input(s): NA, K, CO2, CL, BUN, CREATININE, GLU, MG, PHOS, CALCIUM in the last 72 hours.    No results for input(s): PT, INR, PROTIME, APTT in the last 72 hours.      Anticoagulant dose apixaban at 2.5mg started overnight.    Assessment:         Pain control adequate    Plan:     1) Pain: Patient pain well-controlled with multi-modal regimen.   2) Hypotension: Suspected from hypovolemia. BP and HCT stable overnight. Continue to monitor.   3) DVT: Restart apixaban 12 hours post-op per Hem/Onc recs. No evidence of DVT or hematoma.   4) Right eye pain: suspect corneal abrasion.  Cipro drops tid, and f/u with ophthalmology outpatient.   5) FEN/GI: Tolerating liquids, advance diet as tolerated    6) Dispo:  Case management and SW for placement. Plan for D/C today.          Evaluator Morales Metzger PA-C

## 2020-12-08 NOTE — PROGRESS NOTES
Notified Dr. Chapman of pt's b/p while working with Occ Therapy - 91/51, HR 63.  After sitting in chair for about 15 minutes, b/p rechecked - 88/55, HR 76.  Pt states that he was dizzy when walking but feels much better when sitting - just slightly light-headed.  MD stated that he would come by to see pt.

## 2020-12-08 NOTE — PLAN OF CARE
Pt states pain 4/10 to qing hips.  Dressings cdi.  Safety precautions maintained, pt remains free of falls.  IVF infusing.  Ice packs in place.  Call light within reach.

## 2020-12-09 PROBLEM — R55 PRE-SYNCOPE: Status: ACTIVE | Noted: 2020-12-09

## 2020-12-09 LAB
ANION GAP SERPL CALC-SCNC: 7 MMOL/L (ref 8–16)
ASCENDING AORTA: 3.42 CM
AV INDEX (PROSTH): 0.95
AV MEAN GRADIENT: 5 MMHG
AV PEAK GRADIENT: 8 MMHG
AV VALVE AREA: 3.51 CM2
AV VELOCITY RATIO: 0.97
BASOPHILS # BLD AUTO: 0.04 K/UL (ref 0–0.2)
BASOPHILS NFR BLD: 0.5 % (ref 0–1.9)
BLD PROD TYP BPU: NORMAL
BLD PROD TYP BPU: NORMAL
BLOOD UNIT EXPIRATION DATE: NORMAL
BLOOD UNIT EXPIRATION DATE: NORMAL
BLOOD UNIT TYPE CODE: 6200
BLOOD UNIT TYPE CODE: 6200
BLOOD UNIT TYPE: NORMAL
BLOOD UNIT TYPE: NORMAL
BSA FOR ECHO PROCEDURE: 2.08 M2
BUN SERPL-MCNC: 9 MG/DL (ref 6–20)
BUN SERPL-MCNC: 9 MG/DL (ref 6–30)
CALCIUM SERPL-MCNC: 8.3 MG/DL (ref 8.7–10.5)
CHLORIDE SERPL-SCNC: 104 MMOL/L (ref 95–110)
CHLORIDE SERPL-SCNC: 109 MMOL/L (ref 95–110)
CO2 SERPL-SCNC: 24 MMOL/L (ref 23–29)
CODING SYSTEM: NORMAL
CODING SYSTEM: NORMAL
CREAT SERPL-MCNC: 0.8 MG/DL (ref 0.5–1.4)
CREAT SERPL-MCNC: 0.9 MG/DL (ref 0.5–1.4)
CV ECHO LV RWT: 0.33 CM
DIFFERENTIAL METHOD: ABNORMAL
DISPENSE STATUS: NORMAL
DISPENSE STATUS: NORMAL
DOP CALC AO PEAK VEL: 1.38 M/S
DOP CALC AO VTI: 25.72 CM
DOP CALC LVOT AREA: 3.7 CM2
DOP CALC LVOT DIAMETER: 2.17 CM
DOP CALC LVOT PEAK VEL: 1.34 M/S
DOP CALC LVOT STROKE VOLUME: 90.19 CM3
DOP CALCLVOT PEAK VEL VTI: 24.4 CM
E WAVE DECELERATION TIME: 174.6 MSEC
E/A RATIO: 1.37
E/E' RATIO: 6.62 M/S
ECHO LV POSTERIOR WALL: 0.76 CM (ref 0.6–1.1)
EOSINOPHIL # BLD AUTO: 0.1 K/UL (ref 0–0.5)
EOSINOPHIL NFR BLD: 0.8 % (ref 0–8)
ERYTHROCYTE [DISTWIDTH] IN BLOOD BY AUTOMATED COUNT: 14.3 % (ref 11.5–14.5)
EST. GFR  (AFRICAN AMERICAN): >60 ML/MIN/1.73 M^2
EST. GFR  (NON AFRICAN AMERICAN): >60 ML/MIN/1.73 M^2
FRACTIONAL SHORTENING: 41 % (ref 28–44)
GLUCOSE SERPL-MCNC: 102 MG/DL (ref 70–110)
GLUCOSE SERPL-MCNC: 125 MG/DL (ref 70–110)
HCT VFR BLD AUTO: 32.4 % (ref 40–54)
HCT VFR BLD CALC: 33 %PCV (ref 36–54)
HGB BLD-MCNC: 10.9 G/DL (ref 14–18)
IMM GRANULOCYTES # BLD AUTO: 0.03 K/UL (ref 0–0.04)
IMM GRANULOCYTES NFR BLD AUTO: 0.4 % (ref 0–0.5)
INTERVENTRICULAR SEPTUM: 0.86 CM (ref 0.6–1.1)
IVRT: 65.65 MSEC
LA MAJOR: 4.88 CM
LA MINOR: 4.87 CM
LA WIDTH: 3.6 CM
LEFT ATRIUM SIZE: 3.17 CM
LEFT ATRIUM VOLUME INDEX MOD: 19.4 ML/M2
LEFT ATRIUM VOLUME INDEX: 22.9 ML/M2
LEFT ATRIUM VOLUME MOD: 40.13 CM3
LEFT ATRIUM VOLUME: 47.29 CM3
LEFT INTERNAL DIMENSION IN SYSTOLE: 2.76 CM (ref 2.1–4)
LEFT VENTRICLE DIASTOLIC VOLUME INDEX: 48.64 ML/M2
LEFT VENTRICLE DIASTOLIC VOLUME: 100.37 ML
LEFT VENTRICLE MASS INDEX: 59 G/M2
LEFT VENTRICLE SYSTOLIC VOLUME INDEX: 13.8 ML/M2
LEFT VENTRICLE SYSTOLIC VOLUME: 28.56 ML
LEFT VENTRICULAR INTERNAL DIMENSION IN DIASTOLE: 4.66 CM (ref 3.5–6)
LEFT VENTRICULAR MASS: 122.47 G
LV LATERAL E/E' RATIO: 6.14 M/S
LV SEPTAL E/E' RATIO: 7.17 M/S
LYMPHOCYTES # BLD AUTO: 1 K/UL (ref 1–4.8)
LYMPHOCYTES NFR BLD: 11.9 % (ref 18–48)
MCH RBC QN AUTO: 31.2 PG (ref 27–31)
MCHC RBC AUTO-ENTMCNC: 33.6 G/DL (ref 32–36)
MCV RBC AUTO: 93 FL (ref 82–98)
MONOCYTES # BLD AUTO: 0.9 K/UL (ref 0.3–1)
MONOCYTES NFR BLD: 10.7 % (ref 4–15)
MV A" WAVE DURATION": 13.99 MSEC
MV PEAK A VEL: 0.63 M/S
MV PEAK E VEL: 0.86 M/S
NEUTROPHILS # BLD AUTO: 6.5 K/UL (ref 1.8–7.7)
NEUTROPHILS NFR BLD: 75.7 % (ref 38–73)
NRBC BLD-RTO: 0 /100 WBC
PISA TR MAX VEL: 2.24 M/S
PLATELET # BLD AUTO: 376 K/UL (ref 150–350)
PMV BLD AUTO: 10.1 FL (ref 9.2–12.9)
POC IONIZED CALCIUM: 1.28 MMOL/L (ref 1.06–1.42)
POC TCO2 (MEASURED): 26 MMOL/L (ref 23–29)
POTASSIUM BLD-SCNC: 4.2 MMOL/L (ref 3.5–5.1)
POTASSIUM SERPL-SCNC: 4 MMOL/L (ref 3.5–5.1)
PULM VEIN S/D RATIO: 1.32
PV PEAK D VEL: 0.5 M/S
PV PEAK S VEL: 0.66 M/S
RA MAJOR: 4.27 CM
RA PRESSURE: 3 MMHG
RA WIDTH: 3.44 CM
RBC # BLD AUTO: 3.49 M/UL (ref 4.6–6.2)
RIGHT VENTRICULAR END-DIASTOLIC DIMENSION: 3.42 CM
RV TISSUE DOPPLER FREE WALL SYSTOLIC VELOCITY 1 (APICAL 4 CHAMBER VIEW): 17.6 CM/S
SAMPLE: ABNORMAL
SINUS: 3.36 CM
SODIUM BLD-SCNC: 142 MMOL/L (ref 136–145)
SODIUM SERPL-SCNC: 140 MMOL/L (ref 136–145)
STJ: 3.28 CM
TDI LATERAL: 0.14 M/S
TDI SEPTAL: 0.12 M/S
TDI: 0.13 M/S
TR MAX PG: 20 MMHG
TRANS ERYTHROCYTES VOL PATIENT: NORMAL ML
TRANS ERYTHROCYTES VOL PATIENT: NORMAL ML
TRICUSPID ANNULAR PLANE SYSTOLIC EXCURSION: 2.4 CM
TROPONIN I SERPL DL<=0.01 NG/ML-MCNC: 0.01 NG/ML (ref 0–0.03)
TV REST PULMONARY ARTERY PRESSURE: 23 MMHG
WBC # BLD AUTO: 8.51 K/UL (ref 3.9–12.7)

## 2020-12-09 PROCEDURE — 97535 SELF CARE MNGMENT TRAINING: CPT

## 2020-12-09 PROCEDURE — 99222 PR INITIAL HOSPITAL CARE,LEVL II: ICD-10-PCS | Mod: 25,,, | Performed by: INTERNAL MEDICINE

## 2020-12-09 PROCEDURE — 99900035 HC TECH TIME PER 15 MIN (STAT)

## 2020-12-09 PROCEDURE — 93005 ELECTROCARDIOGRAM TRACING: CPT

## 2020-12-09 PROCEDURE — 85025 COMPLETE CBC W/AUTO DIFF WBC: CPT

## 2020-12-09 PROCEDURE — 25000003 PHARM REV CODE 250: Performed by: NURSE PRACTITIONER

## 2020-12-09 PROCEDURE — 25000242 PHARM REV CODE 250 ALT 637 W/ HCPCS: Performed by: ORTHOPAEDIC SURGERY

## 2020-12-09 PROCEDURE — 97530 THERAPEUTIC ACTIVITIES: CPT | Mod: CQ

## 2020-12-09 PROCEDURE — 25000003 PHARM REV CODE 250: Performed by: ANESTHESIOLOGY

## 2020-12-09 PROCEDURE — 80048 BASIC METABOLIC PNL TOTAL CA: CPT

## 2020-12-09 PROCEDURE — 25000003 PHARM REV CODE 250: Performed by: STUDENT IN AN ORGANIZED HEALTH CARE EDUCATION/TRAINING PROGRAM

## 2020-12-09 PROCEDURE — 84295 ASSAY OF SERUM SODIUM: CPT

## 2020-12-09 PROCEDURE — 25000003 PHARM REV CODE 250: Performed by: PHYSICIAN ASSISTANT

## 2020-12-09 PROCEDURE — 82565 ASSAY OF CREATININE: CPT

## 2020-12-09 PROCEDURE — 11000001 HC ACUTE MED/SURG PRIVATE ROOM

## 2020-12-09 PROCEDURE — 25500020 PHARM REV CODE 255: Performed by: ORTHOPAEDIC SURGERY

## 2020-12-09 PROCEDURE — 82330 ASSAY OF CALCIUM: CPT

## 2020-12-09 PROCEDURE — 63600175 PHARM REV CODE 636 W HCPCS: Performed by: ORTHOPAEDIC SURGERY

## 2020-12-09 PROCEDURE — 93010 EKG 12-LEAD: ICD-10-PCS | Mod: ,,, | Performed by: INTERNAL MEDICINE

## 2020-12-09 PROCEDURE — 94761 N-INVAS EAR/PLS OXIMETRY MLT: CPT

## 2020-12-09 PROCEDURE — 36415 COLL VENOUS BLD VENIPUNCTURE: CPT

## 2020-12-09 PROCEDURE — 25000003 PHARM REV CODE 250: Performed by: ORTHOPAEDIC SURGERY

## 2020-12-09 PROCEDURE — 97530 THERAPEUTIC ACTIVITIES: CPT

## 2020-12-09 PROCEDURE — 93010 ELECTROCARDIOGRAM REPORT: CPT | Mod: ,,, | Performed by: INTERNAL MEDICINE

## 2020-12-09 PROCEDURE — 85014 HEMATOCRIT: CPT

## 2020-12-09 PROCEDURE — 99232 SBSQ HOSP IP/OBS MODERATE 35: CPT | Mod: ,,, | Performed by: PHYSICIAN ASSISTANT

## 2020-12-09 PROCEDURE — 99222 1ST HOSP IP/OBS MODERATE 55: CPT | Mod: 25,,, | Performed by: INTERNAL MEDICINE

## 2020-12-09 PROCEDURE — 97116 GAIT TRAINING THERAPY: CPT | Mod: CQ

## 2020-12-09 PROCEDURE — 99232 PR SUBSEQUENT HOSPITAL CARE,LEVL II: ICD-10-PCS | Mod: ,,, | Performed by: PHYSICIAN ASSISTANT

## 2020-12-09 PROCEDURE — 84132 ASSAY OF SERUM POTASSIUM: CPT

## 2020-12-09 PROCEDURE — 84484 ASSAY OF TROPONIN QUANT: CPT

## 2020-12-09 RX ORDER — OXYCODONE HYDROCHLORIDE 5 MG/1
5 TABLET ORAL
Status: DISCONTINUED | OUTPATIENT
Start: 2020-12-09 | End: 2020-12-11 | Stop reason: HOSPADM

## 2020-12-09 RX ORDER — OXYCODONE HCL 5 MG/5 ML
2.5 SOLUTION, ORAL ORAL
Status: DISCONTINUED | OUTPATIENT
Start: 2020-12-09 | End: 2020-12-11 | Stop reason: HOSPADM

## 2020-12-09 RX ORDER — SODIUM CHLORIDE, SODIUM LACTATE, POTASSIUM CHLORIDE, CALCIUM CHLORIDE 600; 310; 30; 20 MG/100ML; MG/100ML; MG/100ML; MG/100ML
INJECTION, SOLUTION INTRAVENOUS CONTINUOUS
Status: ACTIVE | OUTPATIENT
Start: 2020-12-09 | End: 2020-12-10

## 2020-12-09 RX ORDER — SODIUM CHLORIDE 9 MG/ML
INJECTION, SOLUTION INTRAVENOUS CONTINUOUS
Status: DISCONTINUED | OUTPATIENT
Start: 2020-12-09 | End: 2020-12-11 | Stop reason: HOSPADM

## 2020-12-09 RX ADMIN — CIPROFLOXACIN 2 DROP: 3 SOLUTION OPHTHALMIC at 09:12

## 2020-12-09 RX ADMIN — APIXABAN 2.5 MG: 2.5 TABLET, FILM COATED ORAL at 10:12

## 2020-12-09 RX ADMIN — ACETAMINOPHEN 1000 MG: 325 TABLET ORAL at 06:12

## 2020-12-09 RX ADMIN — PREGABALIN 75 MG: 75 CAPSULE ORAL at 08:12

## 2020-12-09 RX ADMIN — OXYCODONE HYDROCHLORIDE 5 MG: 5 TABLET ORAL at 11:12

## 2020-12-09 RX ADMIN — APIXABAN 5 MG: 5 TABLET, FILM COATED ORAL at 08:12

## 2020-12-09 RX ADMIN — DOCUSATE SODIUM 50MG AND SENNOSIDES 8.6MG 1 TABLET: 8.6; 5 TABLET, FILM COATED ORAL at 08:12

## 2020-12-09 RX ADMIN — SODIUM CHLORIDE 100 ML/HR: 0.9 INJECTION, SOLUTION INTRAVENOUS at 09:12

## 2020-12-09 RX ADMIN — FAMOTIDINE 20 MG: 20 TABLET, FILM COATED ORAL at 09:12

## 2020-12-09 RX ADMIN — OXYCODONE HYDROCHLORIDE 5 MG: 5 TABLET ORAL at 08:12

## 2020-12-09 RX ADMIN — SODIUM CHLORIDE 500 ML: 0.9 INJECTION, SOLUTION INTRAVENOUS at 08:12

## 2020-12-09 RX ADMIN — SODIUM CHLORIDE 500 ML: 0.9 INJECTION, SOLUTION INTRAVENOUS at 09:12

## 2020-12-09 RX ADMIN — ACETAMINOPHEN 1000 MG: 325 TABLET ORAL at 12:12

## 2020-12-09 RX ADMIN — ACETAMINOPHEN 1000 MG: 325 TABLET ORAL at 11:12

## 2020-12-09 RX ADMIN — IOHEXOL 100 ML: 350 INJECTION, SOLUTION INTRAVENOUS at 05:12

## 2020-12-09 RX ADMIN — FAMOTIDINE 20 MG: 20 TABLET, FILM COATED ORAL at 08:12

## 2020-12-09 RX ADMIN — SODIUM CHLORIDE, SODIUM LACTATE, POTASSIUM CHLORIDE, AND CALCIUM CHLORIDE: 600; 310; 30; 20 INJECTION, SOLUTION INTRAVENOUS at 06:12

## 2020-12-09 RX ADMIN — OXYCODONE HYDROCHLORIDE 5 MG: 5 TABLET ORAL at 06:12

## 2020-12-09 RX ADMIN — OXYCODONE HYDROCHLORIDE 2.5 MG: 5 SOLUTION ORAL at 05:12

## 2020-12-09 RX ADMIN — MUPIROCIN 1 G: 20 OINTMENT TOPICAL at 09:12

## 2020-12-09 NOTE — ADDENDUM NOTE
Addendum  created 12/09/20 1048 by Noe Walker MD    Clinical Note Signed, Order Reconciliation Section accessed

## 2020-12-09 NOTE — PLAN OF CARE
Problem: Occupational Therapy Goal  Goal: Occupational Therapy Goal  Description: Goals to be met by: 12/9/20     Patient will increase functional independence with ADLs by performing:    UE Dressing with [place].  LE Dressing with Modified [place] and Assistive Devices as needed.  Grooming while standing at sink with Modified [place].  Toileting from bedside commode with Modified [place] for hygiene and clothing management.   Bathing from  shower chair/bench with Modified [place].  Toilet transfer to bedside commode with Modified [place].    Outcome: Ongoing, Progressing

## 2020-12-09 NOTE — PLAN OF CARE
Pt continued w/ hypotension - MD ordered transfer to [facility]. Pt is expected to go to [facility]. Pt will likely d/c w/ [facility] and has already received RW and BSC. 5th floor case mgmt team will assume care.    GUILLERMO BUCIO [id number]     12/09/20 1128   Discharge Reassessment   Assessment Type Discharge Planning Reassessment   Discharge Plan A Home with family;Home Health   Discharge Plan B Home with family   Anticipated Discharge Disposition Short Term  (transferring to [facility])   Post-Acute Status   Post-Acute Authorization Home Health;HME   HME Status Set-up Complete   Home Health Status Set-up Complete   Discharge Delays (!) Change in Medical Condition

## 2020-12-09 NOTE — NURSING
Call placed to Ortho resident Dr. York  No answer. Anesthesia stated they have a call out to Dr. Heath also.

## 2020-12-09 NOTE — PROGRESS NOTES
"Subjective:     Patient seen and examined at bedside upon transferred [facility].  Patient stated earlier this morning he felt fine.  He ate breakfast and drink 2 cups of coffee went to the bathroom had a bowel movement had no issues than when he was standing to brush his teeth and walked out of the bathroom he had a syncopal episode and hypotension.  Patient stated "I was out".  He denies any shortness of breath or chest pain.      Objective:     Gen:  No acute distress  CV:  Peripherally well-perfused.  Pulses 2+ bilaterally.  Lungs:  Normal respiratory effort.  Abdomen:  Soft, non-tender, non-distended  Head/Neck:  Normocephalic.  Atraumatic. No TTP, AROM and PROM intact without pain  Neuro:  CN intact without deficit, SILT throughout B/L Upper & Lower Extremities  MSK:    BLE:  - dressings are clean dry intact there is moderate swelling around the bilateral hips however no significant tenderness to palpation around the bilateral thighs or bilateral calf he  - TA/EHL/Gastroc/FHL assessed in isolation without deficit  - SILT throughout  - DP and PT palpated  2+  - Capillary Refill <3s    A/P:    Edi Marie is a 55 y.o. male who who is postop day 2 status post bilateral anterior total hip arthroplasty.      -patient has had persistent near syncopal and possibly 1 syncopal episode with ambulation of.  His hemoglobin has been stable at 11 currently however was 16 on November 30th.  He has gotten a significant amount of IV fluids and PO intake has been good.  He has good urine output and appears to be adequately resuscitated.      Planning for workup of dysautonomia versus cardiogenic syncope versus pulmonary thromboembolism (hx of DVT) versus postural hypotension.    DVT/PTE rule out:  -US BLE to r/o DVT  -CTA chest to r/o PTE     Cardiac rule out:  -12 lead EKG  -TTE Echo  -cardiac troponin lab  - New orthostatic vitals   - all orders have been placed and have discussed with cardiology fellow they " will come see the patient appreciate their assistance    · Please be aware that this note has been generated with the assistance of MModal voice-to-text.  Please excuse any spelling or grammatical errors.      Siddhartha York MD  Orthopaedic Surgery PGY-4

## 2020-12-09 NOTE — PROGRESS NOTES
I was present for a second attempt at ambulation after the 500cc bolus NaCl.  He was able to ambulate with significant assistance from PT and walker for 6-7 steps.  I took numerous blood pressures during this interval and he was stable and equivalent to sitting pressure with a  SBP near 100.  He was fine until the 6th or 7th step that he complained of light headedness and he blood pressure had dropped to upper 70's systolic.  I have ordered a CBC and BMP, additional 500cc IVF bolus, and maintenance fluids restarted at 100cc/hr.  Dr Heath has been notified and he agrees with the above plan.  We will follow up with labs and proceed from that point with additional therapies which include possible transfusion, electrolyte replacement, or transfer to [facility] for cardiac workup.

## 2020-12-09 NOTE — PLAN OF CARE
Patient resting in bed, AAOx4. Medications administered as ordered. Patient with minimal complaints of pain through the night. Asleep on and off. Dressings to BLE remain CDI with ice pack in place. Encouraged to call with needs or concerns. Will continue to monitor.

## 2020-12-09 NOTE — ASSESSMENT & PLAN NOTE
55 y.o. male POD2 s/p Bilateral BRIGIDO    Pain control: per APS  PT/OT: WBAT BLE, anterior hip precautions  DVT PPx:Eliquis 2.5mg BID starting 12hrs after surgery, resume 5mg BID 48hrs post op , FCDs at all times when not ambulating  Abx: postop Ancef    Dispo: likely discharge home today

## 2020-12-09 NOTE — SUBJECTIVE & OBJECTIVE
"Principal Problem:Hip pain    Principal Orthopedic Problem: Same    Interval History: Patient seen and examined at bedside.  Patient doing much better this morning and yesterday afternoon PT session went well.   Ambulated 70ft with PT yesterday late afternoon with BP at 108/59.  HCT stable.      Review of patient's allergies indicates:  No Known Allergies    Current Facility-Administered Medications   Medication    acetaminophen tablet 1,000 mg    apixaban tablet 2.5 mg    celecoxib capsule 200 mg    ciprofloxacin HCl 0.3 % ophthalmic solution 2 drop    ePHEDrine sulfate 10 mg    famotidine tablet 20 mg    morphine injection 2 mg    mupirocin 2 % ointment 1 g    naloxone 0.4 mg/mL injection 0.02 mg    ondansetron injection 4 mg    oxyCODONE immediate release tablet 10 mg    oxyCODONE immediate release tablet 5 mg    polyethylene glycol packet 17 g    polyethylene glycol packet 17 g    pregabalin capsule 75 mg    promethazine (PHENERGAN) 6.25 mg in dextrose 5 % 50 mL IVPB    senna-docusate 8.6-50 mg per tablet 1 tablet     Objective:     Vital Signs (Most Recent):  Temp: 97.7 °F (36.5 °C) (12/09/20 0401)  Pulse: 89 (12/09/20 0401)  Resp: 16 (12/09/20 0621)  BP: 119/67 (12/09/20 0401)  SpO2: 99 % (12/09/20 0401) Vital Signs (24h Range):  Temp:  [96.6 °F (35.9 °C)-99.1 °F (37.3 °C)] 97.7 °F (36.5 °C)  Pulse:  [63-91] 89  Resp:  [16-18] 16  SpO2:  [97 %-99 %] 99 %  BP: ()/(51-69) 119/67     Weight: 86.2 kg (190 lb)  Height: 5' 11" (180.3 cm)  Body mass index is 26.5 kg/m².      Intake/Output Summary (Last 24 hours) at 12/9/2020 0632  Last data filed at 12/9/2020 0621  Gross per 24 hour   Intake 2880 ml   Output 4250 ml   Net -1370 ml       Ortho/SPM Exam       AAOx4  NAD  RRR  No increased WOB  Dressings in place   SILT and motor intact T/SP/DP  WWP extremities  FCDs in place and functioning      Significant Labs: All pertinent labs within the past 24 hours have been reviewed.    Significant " Imaging: I have reviewed and interpreted all pertinent imaging results/findings.

## 2020-12-09 NOTE — PLAN OF CARE
Safety precautions maintained.  Fall risk band and socks on pt. Instructed pt to call for assistance as needed and pt voiced understanding.  Call bell within reach.  Afebrile.  Dressing to the bilat hips clean, dry and intact.  Ice packs in use.  Complains of intermittent pain and is being medicated as needed.  Will continue to assess pt's pain and also instructed pt to notify me of any pain, pt voiced understanding.

## 2020-12-09 NOTE — NURSING
Pt was working with PT when he became dizzy. Pt was helped to chair where he had brief LOC. Pt became AAO x 4. Anesthesia made aware of BP 80s/ 50s. Anesthesia came to bedside. NS bolus ordered.

## 2020-12-09 NOTE — PROGRESS NOTES
"[facility]  Orthopedics  Progress Note    Patient Name: Edi Marie  MRN: [id number]  Admission Date: 12/7/2020  Hospital Length of Stay: 2 days  Attending Provider: Chin Heath III, MD  Primary Care Provider: More Villanueva MD  Follow-up For: Procedure(s) (LRB):  ARTHROPLASTY, HIP, BILATERAL: ANTERIOR: DEPUY-ACTIS+PINNACLE (Bilateral)    Post-Operative Day: 2 Days Post-Op  Subjective:     Principal Problem:Hip pain    Principal Orthopedic Problem: Same    Interval History: Patient seen and examined at bedside.  Patient doing much better this morning and yesterday afternoon PT session went well.   Ambulated 70ft with PT yesterday late afternoon with BP at 108/59.  HCT stable.      Review of patient's allergies indicates:  No Known Allergies    Current Facility-Administered Medications   Medication    acetaminophen tablet 1,000 mg    apixaban tablet 2.5 mg    celecoxib capsule 200 mg    ciprofloxacin HCl 0.3 % ophthalmic solution 2 drop    ePHEDrine sulfate 10 mg    famotidine tablet 20 mg    morphine injection 2 mg    mupirocin 2 % ointment 1 g    naloxone 0.4 mg/mL injection 0.02 mg    ondansetron injection 4 mg    oxyCODONE immediate release tablet 10 mg    oxyCODONE immediate release tablet 5 mg    polyethylene glycol packet 17 g    polyethylene glycol packet 17 g    pregabalin capsule 75 mg    promethazine (PHENERGAN) 6.25 mg in dextrose 5 % 50 mL IVPB    senna-docusate 8.6-50 mg per tablet 1 tablet     Objective:     Vital Signs (Most Recent):  Temp: 97.7 °F (36.5 °C) (12/09/20 0401)  Pulse: 89 (12/09/20 0401)  Resp: 16 (12/09/20 0621)  BP: 119/67 (12/09/20 0401)  SpO2: 99 % (12/09/20 0401) Vital Signs (24h Range):  Temp:  [96.6 °F (35.9 °C)-99.1 °F (37.3 °C)] 97.7 °F (36.5 °C)  Pulse:  [63-91] 89  Resp:  [16-18] 16  SpO2:  [97 %-99 %] 99 %  BP: ()/(51-69) 119/67     Weight: 86.2 kg (190 lb)  Height: 5' 11" (180.3 cm)  Body mass index is 26.5 " kg/m².      Intake/Output Summary (Last 24 hours) at 12/9/2020 0632  Last data filed at 12/9/2020 0621  Gross per 24 hour   Intake 2880 ml   Output 4250 ml   Net -1370 ml       Ortho/SPM Exam       AAOx4  NAD  RRR  No increased WOB  Dressings in place   SILT and motor intact T/SP/DP  WWP extremities  FCDs in place and functioning      Significant Labs: All pertinent labs within the past 24 hours have been reviewed.    Significant Imaging: I have reviewed and interpreted all pertinent imaging results/findings.    Assessment/Plan:     * Hip pain  55 y.o. male POD2 s/p Bilateral BRIGIDO    Pain control: per APS  PT/OT: WBAT BLE, anterior hip precautions  DVT PPx:Eliquis 2.5mg BID starting 12hrs after surgery, resume 5mg BID 48hrs post op , FCDs at all times when not ambulating  Abx: postop Ancef    Dispo: likely discharge home today                Siddhartha York MD  Orthopedics  [facility]

## 2020-12-09 NOTE — PROGRESS NOTES
Acute Pain Service and Perioperative Surgical Home Progress Note  I have interviewed and evaluated this patient this morning.  I do agree with the below assessment.  But, pt was up with therapist this morning was able to ambulate to the bathroom, had a bowel movement, and stood to brush his teeth without a problem.  He then went to leave the bathroom and experienced light headedness and likely vasovagal.  Upon arrival he was conversant and in the supine position.  I order a 500 cc bolus of IVF and will reassess orthostatics.  Pain has been well controlled.  Alert and Oriented x 4.  Hips were grossly normal upon exam without signs of obvious hematoma, unusual swelling, or bruising.  Noe Walker MD    Interval history  Edi Marie is a 55 y.o., male with bilateral hip pain.     Surgery:  Procedure(s) (LRB):  ARTHROPLASTY, HIP, BILATERAL: ANTERIOR: DEPUY-ACTIS+PINNACLE (Bilateral)    Post Op Day #: 2    Mr. Marie is doing well with pain controlled. No acute events overnight.    Problem List:    Active Hospital Problems    Diagnosis  POA    *Hip pain [M25.559]  Yes      Resolved Hospital Problems   No resolved problems to display.       Subjective:       General appearance of alert, oriented, no complaints   Pain with rest: 4    Numbers   Pain with movement: 5    Numbers   Side Effects    1. Pruritis No    2. Nausea No    3. Motor Blockade No, 0=Ability to raise lower extremities off bed    4. Sedation No, 1=awake and alert    Schedule Medications:    acetaminophen  1,000 mg Oral Q6H    apixaban  2.5 mg Oral BID    celecoxib  200 mg Oral Daily    ciprofloxacin HCl  2 drop Right Eye TID    famotidine  20 mg Oral BID    mupirocin  1 g Nasal BID    polyethylene glycol  17 g Oral Daily    pregabalin  75 mg Oral QHS    senna-docusate 8.6-50 mg  1 tablet Oral BID        Continuous Infusions:       PRN Medications:  ePHEDrine sulfate, morphine, naloxone, ondansetron, oxyCODONE, oxyCODONE, polyethylene  glycol, promethazine (PHENERGAN) IVPB       Antibiotics:  Antibiotics (From admission, onward)    Start     Stop Route Frequency Ordered    12/08/20 0600  ciprofloxacin HCl 0.3 % ophthalmic solution 2 drop      -- RIGHT EYE 3 times daily 12/08/20 0600    12/07/20 2100  mupirocin 2 % ointment 1 g      12/12 2059 Nasl 2 times daily 12/07/20 1803             Objective:         Vital Signs (Most Recent):  Temp: 36.5 °C (97.7 °F) (12/09/20 0401)  Pulse: 89 (12/09/20 0401)  Resp: 16 (12/09/20 0401)  BP: 119/67 (12/09/20 0401)  SpO2: 99 % (12/09/20 0401) Vital Signs Range (Last 24H):  Temp:  [35.9 °C (96.6 °F)-37.3 °C (99.1 °F)]   Pulse:  [63-91]   Resp:  [16-18]   BP: ()/(51-69)   SpO2:  [97 %-99 %]          I & O (Last 24H):    Intake/Output Summary (Last 24 hours) at 12/9/2020 0507  Last data filed at 12/9/2020 0500  Gross per 24 hour   Intake 2880 ml   Output 3850 ml   Net -970 ml       Physical Exam:    GA: Alert, comfortable, no acute distress.   Pulmonary: Clear to auscultation A/P/L. No wheezing, crackles, or rhonchi.  Cardiac: RRR S1 & S2 w/o rubs/murmurs/gallops.   Abdominal:Bowel sounds present. No tenderness to palpation or distension. No appreciable hepatosplenomegaly.   Skin: No jaundice, rashes, or visible lesions.         Laboratory:  CBC:   Recent Labs     12/07/20  1533 12/08/20  0454 12/09/20  0443   WBC 11.47  --   --    RBC 3.86*  --   --    HGB 11.9*  --   --    HCT 35.3* 34* 33*   *  --   --    MCV 92  --   --    MCH 30.8  --   --    MCHC 33.7  --   --        BMP: No results for input(s): NA, K, CO2, CL, BUN, CREATININE, GLU, MG, PHOS, CALCIUM in the last 72 hours.    No results for input(s): PT, INR, PROTIME, APTT in the last 72 hours.      Anticoagulant dose apixaban at 2.5mg bid.    Assessment:         Pain control adequate    Plan:     1) Pain: Patient pain well-controlled with multi-modal regimen.   2) Hypotension: Resolved. HCT 33. No evidence of hematoma. Good urine output   3) Hx  of DVT: Continue apixaban   4) Corneal abrasion: Continue cipro drops   5) FEN/GI: Tolerating liquids, advance diet as tolerated    6) Dispo: Pt working well with PT/OT. Case management and SW for placement. Plan for D/C today.          Evaluator Morales Metzger PA-C

## 2020-12-09 NOTE — PROGRESS NOTES
Patient resting comfortably in bed  Denies chest pain, shortness of breath, headache, N/V  Did have hip pain when tried to ambulate this afternoon but pain meds have been held due to hypotension    Echo normal  EKG auto read nonspecific t wave abnormality no prior comparisons  Troponin pending  CTA chest done but images and read pending    US BLE read as acute v chronic L sup femoral vein thrombus   US 5/21/20 showed partially occlusive left femoral and popliteal vein thrombus  Difficult to tell if new read is new or acute on chronic    Increased Eliquis back to 5mg starting tonight    Restart IV fluids 150mL/hr for 12 hours  Encourage oral hydration    Appreciate pending cardiology consult    May need blood even though hgb stable so no signs of active bleeding my not tolerate delta hgb drop

## 2020-12-09 NOTE — ADDENDUM NOTE
Addendum  created 12/09/20 0905 by Noe Walker MD    Charge Capture section accepted, Clinical Note Signed

## 2020-12-09 NOTE — PT/OT/SLP PROGRESS
Occupational Therapy   Treatment    Name: Edi Marie  MRN: [id number]  Admitting Diagnosis:  Hip pain  2 Days Post-Op    Recommendations:     Discharge Recommendations: home health OT  Discharge Equipment Recommendations:  hip kit  Barriers to discharge:  None    Assessment:     Edi Marie is a 55 y.o. male with a medical diagnosis of Hip pain.  He was able to perform LB dressing c min A and AE and UB dressing c min A.  Pt was noted to have hypotensive episode earlier c PT and was receiving bolus.  Educated pt on bathing, car T/F's, and anterior hip precautions.  Was able to state 3/3 anterior hip precautions correctly. Performance deficits affecting function are impaired self care skills, impaired functional mobilty, impaired cardiopulmonary response to activity, orthopedic precautions.     Rehab Prognosis:  Good; patient would benefit from acute skilled OT services to address these deficits and reach maximum level of function.       Plan:     Patient to be seen daily to address the above listed problems via self-care/home management, therapeutic activities, therapeutic exercises  · Plan of Care Expires: 12/09/20  · Plan of Care Reviewed with: patient    Subjective     Pain/Comfort:  · Pain Rating 1: 0/10    Objective:     Communicated with: RN prior to session.  Patient found up in chair with cryotherapy upon OT entry to room.    General Precautions: Standard, fall   Orthopedic Precautions:BLE anterior precautions, LLE weight bearing as tolerated, RLE weight bearing as tolerated   Braces: N/A     Occupational Performance:         Functional Mobility/Transfers:    · Functional Mobility: Deferred at this time d/t hypotension.    Activities of Daily Living:  · Upper Body Dressing: minimum assistance to don/doff hospital gowns.  · Lower Body Dressing: minimum assistance to don/doff socks and pants to knee height c reacher, sock-aid, and long handled shoe horn while sitting [facility].      [facility] 6 Click ADL:  17    Treatment & Education:  Educated pt on bathing, car T/F's, and anterior hip precautions.    Patient left up in chair with all lines intact, call button in reach and RN notifiedEducation:      GOALS:   Multidisciplinary Problems     Occupational Therapy Goals        Problem: Occupational Therapy Goal    Goal Priority Disciplines Outcome Interventions   Occupational Therapy Goal     OT, PT/OT Ongoing, Progressing    Description: Goals to be met by: 12/9/20     Patient will increase functional independence with ADLs by performing:    UE Dressing with [place].  LE Dressing with Modified [place] and Assistive Devices as needed.  Grooming while standing at sink with Modified [place].  Toileting from bedside commode with Modified [place] for hygiene and clothing management.   Bathing from  shower chair/bench with Modified [place].  Toilet transfer to bedside commode with Modified [place].                     Time Tracking:     OT Date of Treatment: 12/09/20  OT Start Time: 0946  OT Stop Time: 1027  OT Total Time (min): 41 min    Billable Minutes:Self Care/Home Management 30  Therapeutic Activity 11    DARLINE De Jesus  12/9/2020

## 2020-12-09 NOTE — HPI
"55 y.o. 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   Patient stated that he had pain in surgical site, episode of dizziness on D1 while getting out of bed with PT help, that resolved and patient was able to move around his room with no trouble, and rested well last night. On D2 pt was up with therapist was able to ambulate to the bathroom, had a bowel movement, and stood to brush his teeth without a problem.  He then went to leave the bathroom and experienced light headedness and pt stated "I was out". BP initially was 90/54 in the supine position, received 500 cc bolus of IVF was hypotensive 73/46 another 500 cc bolus given.He denied any shortness of breath or chest pain, palpitation, diaphoresis, pain or numbness in his arms. Pt pain was controlled and neuro exam grossly intact, his hips were grossly normal upon exam without signs of obvious hematoma, unusual swelling, or bruising.   On labs Blood sugar 102 with significant for drop in his Hb preop 16 to 10.9 this am  Cardiology consulted for presyncope.     Off note:He says he has had a hematology workup which is negative. 05/20 ultrasound showed partial thrombosis of the left peroneal vein which appears chronic.    "

## 2020-12-09 NOTE — PROGRESS NOTES
Discussed with Dr Walker:  Per his report, patient got up out of bed fine, went to bathroom and had bowel movement, was fine standing up and brushing his teeth and then walking out of bathroom had another episode of near syncope and hypotension. No shortness of breath or chest pain.     Patient with persistent near syncopal episodes with ambulation  hgb stable above transfusion range  Has gotten plenty of IV and PO fluids and has good UOP indicating adequate resuscitation    Differential of dysautonomia, cardiogenic cause, and PTE (significant PTE history)    Have exhausted workup potential at [place]    Will transfer to [facility] for further eval    DVT/PTE rule out:  -US BLE to r/o DVT  -CTA chest to r/o PTE    Cardiac rule out:  -12 lead EKG  -TTE Echo  -cardiac troponin lab  -Cardiology consult

## 2020-12-09 NOTE — PLAN OF CARE
Problem: Physical Therapy Goal  Goal: Physical Therapy Goal  Description: Goals to be met by: 2020    Patient will increase functional independence with mobility by performin. Supine to sit with supervision Not Met  2. Sit to stand transfer with Supervision Not Met  3. Gait x100 feet with Supervision using Rolling Walker Not Met  4. Ascend/Descend 6 inch curb step with Stand-by Assistance using Rolling Walker Not met  5. Lower extremity exercise program x30 reps per handout, with supervision Not Met    Outcome: Ongoing, Progressing   Philip Armijo,PTA

## 2020-12-09 NOTE — PT/OT/SLP PROGRESS
Physical Therapy Treatment    Patient Name:  Edi Marie   MRN:  [id number]    Recommendations:     Discharge Recommendations:  home health PT   Discharge Equipment Recommendations: hip kit   Barriers to discharge: Inaccessible home    Assessment:     Edi Marie is a 55 y.o. male admitted with a medical diagnosis of Hip pain.  He presents with the following impairments/functional limitations:  weakness, gait instability, decreased ROM, decreased lower extremity function, impaired balance, impaired cardiopulmonary response to activity, orthopedic precautions, impaired functional mobilty, pain, impaired self care skills . OOB mobility limited secondary to dizziness and decrease BP. Mr. aMrie demonstrated fair balance and B LE stability with gait.  Noted increase pain with mobility.  Mr. Marie not safe to be discharged home today and would benefit from further IP therapy.     Rehab Prognosis: Good; patient would benefit from acute skilled PT services to address these deficits and reach maximum level of function.    Recent Surgery: Procedure(s) (LRB):  ARTHROPLASTY, HIP, BILATERAL: ANTERIOR: DEPUY-ACTIS+PINNACLE (Bilateral) 2 Days Post-Op    Plan:     During this hospitalization, patient to be seen daily to address the identified rehab impairments via gait training, therapeutic activities, therapeutic exercises and progress toward the following goals:    · Plan of Care Expires:  12/11/20    Subjective     Chief Complaint: B LE pain/soreness with mobility  Patient/Family Comments/goals: I guess my legs are going to feel like water balloons for the next couple of days.  Pain/Comfort:  · Pain Rating 1: 6/10(with mobility)  · Location - Side 1: Bilateral  · Location 1: hip      Objective:     Communicated with NSG prior to session.  Patient found up in chair with cryotherapy, FCD upon PT entry to room.     General Precautions: Standard, fall   Orthopedic Precautions:BLE anterior precautions, RLE weight  bearing as tolerated, LLE weight bearing as tolerated   Braces:       Functional Mobility:  · Transfers:  Sit to stand from bedside chair and bedside commode with RW SBA  · Gait: ( See note below) Amb approx 25 feet, 5 feet and 15- 20 feet with RW CGA/SBA. No LOB, slow mitesh, step to gait pattern, decrease B heel strike and min trunk flexion. VC's for posture and increase B knee extension during stance phase.  · Balance: Fair with RW      AM-PAC 6 CLICK MOBILITY  Turning over in bed (including adjusting bedclothes, sheets and blankets)?: 3  Sitting down on and standing up from a chair with arms (e.g., wheelchair, bedside commode, etc.): 4  Moving from lying on back to sitting on the side of the bed?: 4  Moving to and from a bed to a chair (including a wheelchair)?: 4  Need to walk in hospital room?: 4  Climbing 3-5 steps with a railing?: 2  Basic Mobility Total Score: 21       Therapeutic Activities and Exercises:   Performed Ap and QS x5reps prior to gait training.  Patient educated on hip precautions  Mr. Marie initially stated he felt fine and requested to go to the bathroom prior to leaving the room.  He complained of feeling dizzy while walking out of the bathroom and was seated in the BS chair.  He had a brief episode of being unresponsive while seated in the chair but became responsive once relined further in the chair.  NSG was called and BP was 90/54. Patient was left [facility] and NSG assessed and called Anesthesia team.  Returned back to patients room and performed gait training with Anesthesia and NSG in room.  Patient complained of feeling dizzy after ambulating approx 15-20 feet and was seated in BS chair. BP taken while seated 73/46.  Patient was left [facility] ( reclined) with NSG in room.     Patient left up in chair (reclined) with NSG present.    GOALS:   Multidisciplinary Problems     Physical Therapy Goals        Problem: Physical Therapy Goal    Goal Priority Disciplines Outcome Goal Variances  Interventions   Physical Therapy Goal     PT, PT/OT Ongoing, Progressing     Description: Goals to be met by: 2020    Patient will increase functional independence with mobility by performin. Supine to sit with supervision   2. Sit to stand transfer with Supervision  3. Gait x100 feet with Supervision using Rolling Walker  4. Ascend/Descend 6 inch curb step with Stand-by Assistance using Rolling Walker  5. Lower extremity exercise program x30 reps per handout, with supervision                     Time Tracking:     PT Received On: 20  PT Start Time: 740   8:55  PT Stop Time: 810    9:10  PT Total Time (min): 45min     Billable Minutes: Gait Training 20 and Therapeutic Activity 25    Treatment Type: Treatment  PT/PTA: PTA     PTA Visit Number: 1     Philip Armijo II, PTA  2020

## 2020-12-09 NOTE — NURSING TRANSFER
Nursing Transfer Note      12/9/2020     Transfer to  [place] [address] from 302   Transfer via stretcher    Transported by [facility]    Medicines sent: Cipro,Bactroban    Chart send with patient: Yes  Notified: Dr. York notified      Report given to [facility] - ext [phone]

## 2020-12-09 NOTE — SUBJECTIVE & OBJECTIVE
Past Medical History:   Diagnosis Date    Arthritis     Deep vein thrombosis     DVT (deep venous thrombosis)     Hemorrhoid     Histiocytosis     Kidney stones     Spermatocele        Past Surgical History:   Procedure Laterality Date    APPENDECTOMY      in his 20's    ARTHROPLASTY OF BOTH HIPS Bilateral 12/7/2020    Procedure: ARTHROPLASTY, HIP, BILATERAL: ANTERIOR: DEPUY-ACTIS+PINNACLE;  Surgeon: Chin Heath III, MD;  Location: [facility] OR;  Service: Orthopedics;  Laterality: Bilateral;    COLONOSCOPY N/A 1/20/2017    Procedure: COLONOSCOPY;  Surgeon: Danis Frank MD;  Location: [address]);  Service: Endoscopy;  Laterality: N/A;    KNEE SURGERY      right knee- teenager -     LITHOTRIPSY         Review of patient's allergies indicates:  No Known Allergies    No current facility-administered medications on file prior to encounter.      Current Outpatient Medications on File Prior to Encounter   Medication Sig    acetaminophen (TYLENOL) 650 MG TbSR Take 650 mg by mouth every 8 (eight) hours.     Family History     Problem Relation (Age of Onset)    Cancer Paternal Grandfather    Heart disease Father (79)        Tobacco Use    Smoking status: Never Smoker    Smokeless tobacco: Never Used   Substance and Sexual Activity    Alcohol use: Yes     Alcohol/week: 7.0 standard drinks     Types: 7 Glasses of wine per week     Frequency: 2-4 times a month     Drinks per session: 3 or 4     Binge frequency: Less than monthly     Comment: 2 times a week- 2 drinks each time    Drug use: No    Sexual activity: Yes     Review of Systems   Constitution: Negative for chills, decreased appetite, diaphoresis, fever and malaise/fatigue.   HENT: Negative for congestion and sore throat.    Eyes: Negative for double vision, photophobia and visual disturbance.   Cardiovascular: Negative for chest pain, dyspnea on exertion, leg swelling, orthopnea, palpitations, paroxysmal nocturnal dyspnea and syncope.    Respiratory: Negative for cough, shortness of breath, sputum production and wheezing.    Endocrine: Negative.    Hematologic/Lymphatic: Does not bruise/bleed easily.   Skin: Negative.    Gastrointestinal: Negative for bloating, abdominal pain, constipation, diarrhea, nausea and vomiting.   Genitourinary: Negative for dysuria, frequency and hematuria.   Neurological: Positive for dizziness and light-headedness. Negative for disturbances in coordination, focal weakness, headaches, numbness, seizures, vertigo and weakness.   Psychiatric/Behavioral: Negative for altered mental status. The patient does not have insomnia.    Allergic/Immunologic: Negative.      Objective:     Vital Signs (Most Recent):  Temp: 98.2 °F (36.8 °C) (12/09/20 1415)  Pulse: 90 (12/09/20 1420)  Resp: 18 (12/09/20 1749)  BP: (!) 137/93 (12/09/20 1420)  SpO2: 98 % (12/09/20 1420) Vital Signs (24h Range):  Temp:  [96.6 °F (35.9 °C)-99.1 °F (37.3 °C)] 98.2 °F (36.8 °C)  Pulse:  [] 90  Resp:  [12-20] 18  SpO2:  [96 %-99 %] 98 %  BP: ()/(46-93) 137/93     Weight: 86.2 kg (190 lb)  Body mass index is 26.5 kg/m².    SpO2: 98 %  O2 Device (Oxygen Therapy): room air      Intake/Output Summary (Last 24 hours) at 12/9/2020 1750  Last data filed at 12/9/2020 1324  Gross per 24 hour   Intake 1155 ml   Output 3900 ml   Net -2745 ml       Lines/Drains/Airways     Epidural Line                 Neuraxial Analgesia/Anesthesia Assessment (using dermatomes) Epidural 12/07/20 1017 2 days          Peripheral Intravenous Line                 Peripheral IV - Single Lumen 12/07/20 0809 18 G Left Hand 2 days         Peripheral IV - Single Lumen 12/09/20 0946 20 G Right Antecubital less than 1 day                Physical Exam   Constitutional: He is oriented to person, place, and time. He appears well-developed and well-nourished. He is cooperative.  Non-toxic appearance. He does not have a sickly appearance. No distress.   HENT:   Head: Normocephalic and  atraumatic.   Eyes: Pupils are equal, round, and reactive to light. EOM are normal. No scleral icterus.   Neck: Normal range of motion. Neck supple.   Cardiovascular: Normal rate, regular rhythm, normal heart sounds and intact distal pulses.   No murmur heard.  Pulmonary/Chest: Breath sounds normal. No respiratory distress. He has no wheezes. He has no rales. He exhibits no tenderness.   Abdominal: Soft. Bowel sounds are normal. He exhibits no distension. There is no abdominal tenderness.   Musculoskeletal: Normal range of motion.         General: Tenderness (Bilatral hip at surgical site ) present. No deformity or edema.   Neurological: He is alert and oriented to person, place, and time.   Skin: Skin is dry. He is not diaphoretic.   Psychiatric: He has a normal mood and affect. His behavior is normal. Judgment and thought content normal.   Nursing note and vitals reviewed.      Significant Labs:   BMP:   Recent Labs   Lab 12/09/20  0932   *      K 4.0      CO2 24   BUN 9   CREATININE 0.9   CALCIUM 8.3*   , CMP   Recent Labs   Lab 12/09/20  0932      K 4.0      CO2 24   *   BUN 9   CREATININE 0.9   CALCIUM 8.3*   ANIONGAP 7*   ESTGFRAFRICA >60.0   EGFRNONAA >60.0       Significant Imaging:     CTA Chest Non Coronary  Narrative: EXAMINATION:  CTA CHEST NON CORONARY    CLINICAL HISTORY:  PE suspected, intermediate prob, neg D-dimer;rule out PE.  Post op bilateral hip replacement;    TECHNIQUE:  Low dose axial images, sagittal and coronal reformations were obtained from the thoracic inlet to the lung bases following the IV administration of 100 mL of Omnipaque 350.  Contrast timing was optimized to evaluate the pulmonary arteries.  MIP images were performed.    COMPARISON:  None    FINDINGS:  The structures at the base of the neck are unremarkable.  No significant mediastinal lymphadenopathy.  The heart is not enlarged.  No pericardial effusion.  The thoracic aorta tapers  normally.  The visualized portions of the spleen, pancreas, adrenal glands and gallbladder are unremarkable.  There may be bilateral parapelvic cysts.  There are several low attenuating lesions throughout the hepatic parenchyma, the majority are too small for characterization.  Largest within the left hepatic lobe measures 2.7 cm with attenuation suggesting cyst.  The esophagus is grossly unremarkable.    The airways are patent.  There is bilateral basilar dependent atelectasis.  No large focal consolidation.  No pneumothorax.  No pleural effusion.    Bolus timing is adequate for evaluation of pulmonary thromboembolism.  No pulmonary arterial filling defect to the level of the segmental arteries bilaterally to suggest pulmonary thromboembolism.    Degenerative changes are noted of the spine.  No significant axillary lymphadenopathy.  There is bilateral gynecomastia.  Impression: 1. No pulmonary thromboembolism.  2. Mild bilateral basilar dependent atelectasis, no large focal consolidation.  3. Suspected bilateral parapelvic renal cysts.  4. Several low attenuating lesions within the hepatic parenchyma, largest within the left hepatic lobe measures attenuation suggestive of cyst, the remainder are too small for characterization.  5. Several additional findings above.    Electronically signed by: Porfirio Macedo MD  Date:    12/09/2020  Time:    17:40  US Lower Extremity Veins Bilateral  Narrative: EXAMINATION:  US LOWER EXTREMITY VEINS BILATERAL    CLINICAL HISTORY:  rule out DVT;    TECHNIQUE:  Duplex and color flow Doppler and dynamic compression was performed of the bilateral lower extremity veins was performed.    COMPARISON:  05/21/2020    FINDINGS:  There is thrombus within the left superficial femoral vein, noting echogenic appearance may reflect sequela of chronic thrombus.  Otherwise, Duplex and color flow Doppler evaluation does not reveal any evidence of acute venous thrombosis in the common femoral, right  superficial femoral, greater saphenous, popliteal, peroneal, anterior tibial and posterior tibial veins bilaterally.  There is no reflux to suggest valvular incompetence.  Impression: This report was flagged in Epic as abnormal.    Partial thrombosis of the left superficial femoral vein noting portions of the thrombus appear somewhat echogenic suggesting this may reflect chronic thrombus although new since examination 05/21/2020.  Correlation is advised.  No thrombosis seen elsewhere.    Electronically signed by: Porfirio Macedo MD  Date:    12/09/2020  Time:    16:41  Echo Color Flow Doppler? Yes  · The left ventricle is normal in size with normal systolic function. The   estimated ejection fraction is 63%.  · Normal left ventricular diastolic function.  · Normal central venous pressure (3 mmHg).  · The estimated PA systolic pressure is 23 mmHg.

## 2020-12-09 NOTE — NURSING
Additional 500 cc NS bolus administered. Dr. Walker - anesthesia ordered maintanence fluids at rate of 100cc per hr post bolus. Also ordered CBC and Chem.     CBC able to be completed here. [facility]

## 2020-12-10 LAB
ABO + RH BLD: NORMAL
BLD GP AB SCN CELLS X3 SERPL QL: NORMAL
BLD PROD TYP BPU: NORMAL
BLOOD UNIT EXPIRATION DATE: NORMAL
BLOOD UNIT TYPE CODE: 6200
BLOOD UNIT TYPE: NORMAL
CODING SYSTEM: NORMAL
DISPENSE STATUS: NORMAL
HGB BLD-MCNC: 11.7 G/DL (ref 14–18)
TRANS ERYTHROCYTES VOL PATIENT: NORMAL ML

## 2020-12-10 PROCEDURE — 25000242 PHARM REV CODE 250 ALT 637 W/ HCPCS: Performed by: ORTHOPAEDIC SURGERY

## 2020-12-10 PROCEDURE — 85018 HEMOGLOBIN: CPT

## 2020-12-10 PROCEDURE — 97110 THERAPEUTIC EXERCISES: CPT

## 2020-12-10 PROCEDURE — 86920 COMPATIBILITY TEST SPIN: CPT

## 2020-12-10 PROCEDURE — 97116 GAIT TRAINING THERAPY: CPT

## 2020-12-10 PROCEDURE — 25000003 PHARM REV CODE 250: Performed by: ORTHOPAEDIC SURGERY

## 2020-12-10 PROCEDURE — 97530 THERAPEUTIC ACTIVITIES: CPT

## 2020-12-10 PROCEDURE — 36430 TRANSFUSION BLD/BLD COMPNT: CPT

## 2020-12-10 PROCEDURE — P9021 RED BLOOD CELLS UNIT: HCPCS

## 2020-12-10 PROCEDURE — 11000001 HC ACUTE MED/SURG PRIVATE ROOM

## 2020-12-10 PROCEDURE — 86850 RBC ANTIBODY SCREEN: CPT

## 2020-12-10 PROCEDURE — 36415 COLL VENOUS BLD VENIPUNCTURE: CPT

## 2020-12-10 PROCEDURE — 25000003 PHARM REV CODE 250: Performed by: NURSE PRACTITIONER

## 2020-12-10 RX ORDER — HYDROCODONE BITARTRATE AND ACETAMINOPHEN 500; 5 MG/1; MG/1
TABLET ORAL
Status: DISCONTINUED | OUTPATIENT
Start: 2020-12-10 | End: 2020-12-11 | Stop reason: HOSPADM

## 2020-12-10 RX ORDER — CELECOXIB 200 MG/1
200 CAPSULE ORAL DAILY
COMMUNITY
End: 2021-08-17

## 2020-12-10 RX ADMIN — ACETAMINOPHEN 1000 MG: 325 TABLET ORAL at 12:12

## 2020-12-10 RX ADMIN — OXYCODONE HYDROCHLORIDE 5 MG: 5 TABLET ORAL at 01:12

## 2020-12-10 RX ADMIN — FAMOTIDINE 20 MG: 20 TABLET, FILM COATED ORAL at 09:12

## 2020-12-10 RX ADMIN — APIXABAN 5 MG: 5 TABLET, FILM COATED ORAL at 09:12

## 2020-12-10 RX ADMIN — OXYCODONE HYDROCHLORIDE 2.5 MG: 5 SOLUTION ORAL at 05:12

## 2020-12-10 RX ADMIN — PREGABALIN 75 MG: 75 CAPSULE ORAL at 09:12

## 2020-12-10 RX ADMIN — OXYCODONE HYDROCHLORIDE 5 MG: 5 TABLET ORAL at 08:12

## 2020-12-10 RX ADMIN — CIPROFLOXACIN 2 DROP: 3 SOLUTION OPHTHALMIC at 04:12

## 2020-12-10 RX ADMIN — CIPROFLOXACIN 2 DROP: 3 SOLUTION OPHTHALMIC at 09:12

## 2020-12-10 RX ADMIN — CIPROFLOXACIN 2 DROP: 3 SOLUTION OPHTHALMIC at 08:12

## 2020-12-10 RX ADMIN — APIXABAN 5 MG: 5 TABLET, FILM COATED ORAL at 08:12

## 2020-12-10 RX ADMIN — POLYETHYLENE GLYCOL 3350 17 G: 17 POWDER, FOR SOLUTION ORAL at 08:12

## 2020-12-10 RX ADMIN — FAMOTIDINE 20 MG: 20 TABLET, FILM COATED ORAL at 08:12

## 2020-12-10 RX ADMIN — MUPIROCIN 1 G: 20 OINTMENT TOPICAL at 08:12

## 2020-12-10 RX ADMIN — DOCUSATE SODIUM 50MG AND SENNOSIDES 8.6MG 1 TABLET: 8.6; 5 TABLET, FILM COATED ORAL at 08:12

## 2020-12-10 RX ADMIN — MUPIROCIN 1 G: 20 OINTMENT TOPICAL at 09:12

## 2020-12-10 RX ADMIN — ACETAMINOPHEN 1000 MG: 325 TABLET ORAL at 05:12

## 2020-12-10 RX ADMIN — DOCUSATE SODIUM 50MG AND SENNOSIDES 8.6MG 1 TABLET: 8.6; 5 TABLET, FILM COATED ORAL at 09:12

## 2020-12-10 RX ADMIN — CELECOXIB 200 MG: 200 CAPSULE ORAL at 08:12

## 2020-12-10 RX ADMIN — OXYCODONE HYDROCHLORIDE 5 MG: 5 TABLET ORAL at 04:12

## 2020-12-10 NOTE — PT/OT/SLP PROGRESS
Occupational Therapy   Treatment    Name: dEi Marie  MRN: [id number]  Admitting Diagnosis:  Hip pain  3 Days Post-Op    Recommendations:     Discharge Recommendations: home health PT  Discharge Equipment Recommendations:  hip kit  Barriers to discharge:  None    Assessment:     Edi Marie is a 55 y.o. male with a medical diagnosis of Hip pain.  He presents with the following. Performance deficits affecting function are weakness, impaired endurance, impaired self care skills, impaired functional mobilty, gait instability, impaired balance, decreased lower extremity function, decreased safety awareness, pain, orthopedic precautions.     Patient tolerated OT/PT session well. Seated BP prior to ambulation 125/71. Patient ambulated 100ft with RW and supervision. No LOB, dizziness, or SOB noted. BP after ambulation 118/70.  Patient educated in anterior hip precautions. Continue OT POC    Rehab Prognosis:  Good; patient would benefit from acute skilled OT services to address these deficits and reach maximum level of function.       Plan:     Patient to be seen daily to address the above listed problems via self-care/home management, therapeutic activities, therapeutic exercises  · Plan of Care Expires: 12/21/20  · Plan of Care Reviewed with: patient    Subjective     Pain/Comfort:  · Pain Rating 1: 6/10  · Location - Side 1: Bilateral  · Location 1: hip  · Pain Addressed 1: Pre-medicate for activity, Reposition, Distraction    Objective:     Communicated with: Nursing prior to session.  Patient found supine with cryotherapy upon OT entry to room.    General Precautions: Standard, fall   Orthopedic Precautions:BLE anterior precautions, LLE weight bearing as tolerated, RLE weight bearing as tolerated   Braces:       Occupational Performance:     Bed Mobility:    · Patient completed Scooting/Bridging with stand by assistance  · Patient completed Supine to Sit with stand by assistance     Functional  Mobility/Transfers:  · Patient completed Sit <> Stand Transfer with stand by assistance  with  rolling walker   · Patient completed Bed <> Chair Transfer using Step Transfer technique with stand by assistance with rolling walker  · Functional Mobility: Patient tolerated OT/PT session well. Seated BP prior to ambulation 125/71. Patient ambulated 100ft with RW and supervision. No LOB, dizziness, or SOB noted. BP after ambulation 118/70.  Patient educated in anterior hip precautions. Continue OT POC    Activities of Daily Living:  · Upper Body Dressing: contact guard assistance stevie fatima      [facility] 6 Click ADL: 18    Treatment & Education:  - OT POC   - Importance of mobility to maximize recovery.  - safety w/ functional mobility; hand placement to ensure safe transfers to various surfaces in prep for ADLs  - Reviewed gait sequence and RW management via verbalization and demonstration   - encouraged to ambulate within household environment at least every hour to hour 1/2  -Educated on weight bearing status  -Educated on anterior hip precautions    Patient left up in chair with all lines intact, call button in reach and RN notified    GOALS:   Multidisciplinary Problems     Occupational Therapy Goals        Problem: Occupational Therapy Goal    Goal Priority Disciplines Outcome Interventions   Occupational Therapy Goal     OT, PT/OT Ongoing, Progressing    Description: Goals to be met by: 12/9/20     Patient will increase functional independence with ADLs by performing:    UE Dressing with [place].  LE Dressing with Modified [place] and Assistive Devices as needed.  Grooming while standing at sink with Modified [place].  Toileting from bedside commode with Modified [place] for hygiene and clothing management.   Bathing from  shower chair/bench with Modified [place].  Toilet transfer to bedside commode with Modified [place].                     Time Tracking:     OT Date of Treatment:  12/10/20  OT Start Time: 1405  OT Stop Time: 1435  OT Total Time (min): 30 min    Billable Minutes:Therapeutic Activity 30    Jaymie Lopez OT  12/10/2020    Co-tx performed for this visit due to patient need for two skilled therapists to ensure patient and staff safety and to accommodate for patient activity tolerance

## 2020-12-10 NOTE — ANESTHESIA POST-OP PAIN MANAGEMENT
Acute Pain Service Progress Note    Edi Marie is a 55 y.o., male, [phone] with bilateral hip pain s/p bilateral anterior total hip replacement at [facility]. Patient experienced light headedness associated with hypotension while working with physical therapy at [facility]. [place] [facility] [place].    Surgery:  Bilateral hip arthroplasty    Post Op Day #: 3    Catheter type: None      Problem List:    Active Hospital Problems    Diagnosis  POA    *Hip pain [M25.559]  Yes    Pre-syncope [R55]  Unknown    Status post bilateral total hip replacement 12/7/2020 [Z96.643]  Not Applicable      Resolved Hospital Problems   No resolved problems to display.       Subjective:    Patient doing well this morning. He notes having zero pain while at rest, mild-moderate pain while moving/working with PT. Cardiac workup largely negative - normal echo, ultrasound shows known clot (on eliquis). Does note some very mild light headedness, though he states it is better compared to yesterday. Blood pressure in 130s today.     General appearance of alert, oriented, no complaints   Pain with rest: 1    Numbers   Pain with movement: 5    Numbers   Side Effects    1. Pruritis No    2. Nausea No    3. Motor Blockade No,    4. Sedation No, 1=awake and alert    Objective:      Vitals   Vitals:    12/10/20 0847   BP: 133/84   Pulse: 90   Resp: 18   Temp: 37.4 °C (99.4 °F)        Labs    Admission on 12/07/2020   Component Date Value Ref Range Status    WBC 12/07/2020 11.47  3.90 - 12.70 K/uL Final    RBC 12/07/2020 3.86* 4.60 - 6.20 M/uL Final    Hemoglobin 12/07/2020 11.9* 14.0 - 18.0 g/dL Final    Hematocrit 12/07/2020 35.3* 40.0 - 54.0 % Final    MCV 12/07/2020 92  82 - 98 fL Final    MCH 12/07/2020 30.8  27.0 - 31.0 pg Final    MCHC 12/07/2020 33.7  32.0 - 36.0 g/dL Final    RDW 12/07/2020 13.3  11.5 - 14.5 % Final    Platelets 12/07/2020 394* 150 - 350 K/uL Final    MPV 12/07/2020 10.2  9.2 - 12.9 fL Final     Immature Granulocytes 12/07/2020 0.4  0.0 - 0.5 % Final    Gran # (ANC) 12/07/2020 10.2* 1.8 - 7.7 K/uL Final    Immature Grans (Abs) 12/07/2020 0.05* 0.00 - 0.04 K/uL Final    Lymph # 12/07/2020 0.8* 1.0 - 4.8 K/uL Final    Mono # 12/07/2020 0.4  0.3 - 1.0 K/uL Final    Eos # 12/07/2020 0.0  0.0 - 0.5 K/uL Final    Baso # 12/07/2020 0.03  0.00 - 0.20 K/uL Final    nRBC 12/07/2020 0  0 /100 WBC Final    Gran % 12/07/2020 88.8* 38.0 - 73.0 % Final    Lymph % 12/07/2020 6.6* 18.0 - 48.0 % Final    Mono % 12/07/2020 3.7* 4.0 - 15.0 % Final    Eosinophil % 12/07/2020 0.2  0.0 - 8.0 % Final    Basophil % 12/07/2020 0.3  0.0 - 1.9 % Final    Differential Method 12/07/2020 Automated   Final    UNIT NUMBER 12/07/2020 [id number]   Final    Product Code 12/07/2020 [id number]   Final    DISPENSE STATUS 12/07/2020 RETURNED   Final    CODING SYSTEM 12/07/2020 [id number]   Final    Unit Blood Type Code 12/07/2020 6200   Final    Unit Blood Type 12/07/2020 A POS   Final    Unit Expiration 12/07/2020 [id number]   Final    UNIT NUMBER 12/07/2020 [id number]   Final    Product Code 12/07/2020 [id number]   Final    DISPENSE STATUS 12/07/2020 RETURNED   Final    CODING SYSTEM 12/07/2020 [id number]   Final    Unit Blood Type Code 12/07/2020 6200   Final    Unit Blood Type 12/07/2020 A POS   Final    Unit Expiration 12/07/2020 [id number]   Final    POC Glucose 12/08/2020 104  70 - 110 mg/dL Final    POC BUN 12/08/2020 16  6 - 30 mg/dL Final    POC Creatinine 12/08/2020 0.7  0.5 - 1.4 mg/dL Final    POC Sodium 12/08/2020 138  136 - 145 mmol/L Final    POC Potassium 12/08/2020 4.4  3.5 - 5.1 mmol/L Final    POC Chloride 12/08/2020 104  95 - 110 mmol/L Final    POC TCO2 (MEASURED) 12/08/2020 20* 23 - 29 mmol/L Final    POC Ionized Calcium 12/08/2020 1.06  1.06 - 1.42 mmol/L Final    POC Hematocrit 12/08/2020 34* 36 - 54 %PCV Final    Sample 12/08/2020 VENOUS   Final    POC Glucose 12/09/2020 102  70  - 110 mg/dL Final    POC BUN 12/09/2020 9  6 - 30 mg/dL Final    POC Creatinine 12/09/2020 0.8  0.5 - 1.4 mg/dL Final    POC Sodium 12/09/2020 142  136 - 145 mmol/L Final    POC Potassium 12/09/2020 4.2  3.5 - 5.1 mmol/L Final    POC Chloride 12/09/2020 104  95 - 110 mmol/L Final    POC TCO2 (MEASURED) 12/09/2020 26  23 - 29 mmol/L Final    POC Ionized Calcium 12/09/2020 1.28  1.06 - 1.42 mmol/L Final    POC Hematocrit 12/09/2020 33* 36 - 54 %PCV Final    Sample 12/09/2020 VENOUS   Final    WBC 12/09/2020 8.51  3.90 - 12.70 K/uL Final    RBC 12/09/2020 3.49* 4.60 - 6.20 M/uL Final    Hemoglobin 12/09/2020 10.9* 14.0 - 18.0 g/dL Final    Hematocrit 12/09/2020 32.4* 40.0 - 54.0 % Final    MCV 12/09/2020 93  82 - 98 fL Final    MCH 12/09/2020 31.2* 27.0 - 31.0 pg Final    MCHC 12/09/2020 33.6  32.0 - 36.0 g/dL Final    RDW 12/09/2020 14.3  11.5 - 14.5 % Final    Platelets 12/09/2020 376* 150 - 350 K/uL Final    MPV 12/09/2020 10.1  9.2 - 12.9 fL Final    Immature Granulocytes 12/09/2020 0.4  0.0 - 0.5 % Final    Gran # (ANC) 12/09/2020 6.5  1.8 - 7.7 K/uL Final    Immature Grans (Abs) 12/09/2020 0.03  0.00 - 0.04 K/uL Final    Lymph # 12/09/2020 1.0  1.0 - 4.8 K/uL Final    Mono # 12/09/2020 0.9  0.3 - 1.0 K/uL Final    Eos # 12/09/2020 0.1  0.0 - 0.5 K/uL Final    Baso # 12/09/2020 0.04  0.00 - 0.20 K/uL Final    nRBC 12/09/2020 0  0 /100 WBC Final    Gran % 12/09/2020 75.7* 38.0 - 73.0 % Final    Lymph % 12/09/2020 11.9* 18.0 - 48.0 % Final    Mono % 12/09/2020 10.7  4.0 - 15.0 % Final    Eosinophil % 12/09/2020 0.8  0.0 - 8.0 % Final    Basophil % 12/09/2020 0.5  0.0 - 1.9 % Final    Differential Method 12/09/2020 Automated   Final    Sodium 12/09/2020 140  136 - 145 mmol/L Final    Potassium 12/09/2020 4.0  3.5 - 5.1 mmol/L Final    Chloride 12/09/2020 109  95 - 110 mmol/L Final    CO2 12/09/2020 24  23 - 29 mmol/L Final    Glucose 12/09/2020 125* 70 - 110 mg/dL Final    BUN  "12/09/2020 9  6 - 20 mg/dL Final    Creatinine 12/09/2020 0.9  0.5 - 1.4 mg/dL Final    Calcium 12/09/2020 8.3* 8.7 - 10.5 mg/dL Final    Anion Gap 12/09/2020 7* 8 - 16 mmol/L Final    eGFR if African American 12/09/2020 >60.0  >60 mL/min/1.73 m^2 Final    eGFR if non African American 12/09/2020 >60.0  >60 mL/min/1.73 m^2 Final    BSA 12/09/2020 2.08  m2 Final    TDI SEPTAL 12/09/2020 0.12  m/s Final    LV LATERAL E/E' RATIO 12/09/2020 6.14  m/s Final    LV SEPTAL E/E' RATIO 12/09/2020 7.17  m/s Final    LA WIDTH 12/09/2020 3.60  cm Final    TDI LATERAL 12/09/2020 0.14  m/s Final    LVIDd 12/09/2020 4.66  3.5 - 6.0 cm Final    IVS 12/09/2020 0.86  0.6 - 1.1 cm Final    Posterior Wall 12/09/2020 0.76  0.6 - 1.1 cm Final    LVIDs 12/09/2020 2.76  2.1 - 4.0 cm Final    FS 12/09/2020 41  28 - 44 % Final    LA volume 12/09/2020 47.29  cm3 Final    Sinus 12/09/2020 3.36  cm Final    STJ 12/09/2020 3.28  cm Final    Ascending aorta 12/09/2020 3.42  cm Final    LV mass 12/09/2020 122.47  g Final    LA size 12/09/2020 3.17  cm Final    RVDD 12/09/2020 3.42  cm Final    TAPSE 12/09/2020 2.40  cm Final    RV S' 12/09/2020 17.60  cm/s Final    Left Ventricle Relative Wall Thick* 12/09/2020 0.33  cm Final    AV mean gradient 12/09/2020 5  mmHg Final    AV valve area 12/09/2020 3.51  cm2 Final    AV Velocity Ratio 12/09/2020 0.97   Final    AV index (prosthetic) 12/09/2020 0.95   Final    E/A ratio 12/09/2020 1.37   Final    Mean e' 12/09/2020 0.13  m/s Final    E wave decelartion time 12/09/2020 174.60  msec Final    IVRT 12/09/2020 65.65  msec Final    MV "A" wave duration 12/09/2020 13.99  msec Final    Pulm vein S/D ratio 12/09/2020 1.32   Final    LVOT diameter 12/09/2020 2.17  cm Final    LVOT area 12/09/2020 3.7  cm2 Final    LVOT peak kasia 12/09/2020 1.34  m/s Final    LVOT peak VTI 12/09/2020 24.40  cm Final    Ao peak kasia 12/09/2020 1.38  m/s Final    Ao VTI 12/09/2020 25.72  cm " Final    LVOT stroke volume 12/09/2020 90.19  cm3 Final    AV peak gradient 12/09/2020 8  mmHg Final    E/E' ratio 12/09/2020 6.62  m/s Final    MV Peak E [place] 12/09/2020 0.86  m/s Final    TR Max [place] 12/09/2020 2.24  m/s Final    MV Peak A [place] 12/09/2020 0.63  m/s Final    PV Peak S [place] 12/09/2020 0.66  m/s Final    PV Peak D [place] 12/09/2020 0.50  m/s Final    LV Systolic Volume 12/09/2020 28.56  mL Final    LV Systolic Volume Index 12/09/2020 13.8  mL/m2 Final    LV Diastolic Volume 12/09/2020 100.37  mL Final    LV Diastolic Volume Index 12/09/2020 48.64  mL/m2 Final    LA Volume Index 12/09/2020 22.9  mL/m2 Final    LV Mass Index 12/09/2020 59  g/m2 Final    RA Major Axis 12/09/2020 4.27  cm Final    Left Atrium Minor Axis 12/09/2020 4.87  cm Final    Left Atrium Major Axis 12/09/2020 4.88  cm Final    Triscuspid Valve Regurgitation Pea* 12/09/2020 20  mmHg Final    LA Volume Index (Mod) 12/09/2020 19.4  mL/m2 Final    LA volume (mod) 12/09/2020 40.13  cm3 Final    RA Width 12/09/2020 3.44  cm Final    Right Atrial Pressure (from IVC) 12/09/2020 3  mmHg Final    TV rest pulmonary artery pressure 12/09/2020 23  mmHg Final    Troponin I 12/09/2020 0.012  0.000 - 0.026 ng/mL Final    UNIT NUMBER 12/10/2020 [id number]   Preliminary    Product Code 12/10/2020 [id number]   Preliminary    DISPENSE STATUS 12/10/2020 CROSSMATCHED   Preliminary    CODING SYSTEM 12/10/2020 [id number]   Preliminary    Unit Blood Type Code 12/10/2020 6200   Preliminary    Unit Blood Type 12/10/2020 A POS   Preliminary    Unit Expiration 12/10/2020 [id number]   Preliminary    Group & Rh 12/10/2020 A POS   Final    Indirect Manuel 12/10/2020 NEG   Final        Meds   Current Facility-Administered Medications   Medication Dose Route Frequency Provider Last Rate Last Dose    0.9%  NaCl infusion (for blood administration)   Intravenous Q24H PRN Siddhartha York MD        0.9%  NaCl infusion   Intravenous  Continuous Noe Walker MD   Stopped at 12/10/20 0803    acetaminophen tablet 1,000 mg  1,000 mg Oral Q6H Ana Maria Maradiaga NP   1,000 mg at 12/10/20 0540    apixaban tablet 5 mg  5 mg Oral BID Chin Heath III, MD   5 mg at 12/10/20 0855    celecoxib capsule 200 mg  200 mg Oral Daily Ana Maria Arbovale, NP   200 mg at 12/10/20 0855    ciprofloxacin HCl 0.3 % ophthalmic solution 2 drop  2 drop Right Eye TID Morales Metzger PA-C   2 drop at 12/10/20 0855    ePHEDrine sulfate 10 mg  10 mg Intravenous PRN Noe Walker MD   10 mg at 12/07/20 1500    famotidine tablet 20 mg  20 mg Oral BID Ana Maria Maradiaga NP   20 mg at 12/10/20 0855    morphine injection 2 mg  2 mg Intravenous Q3H PRN Ana Maria Maradiaga NP        mupirocin 2 % ointment 1 g  1 g Nasal BID Ana Maria Maradiaga NP   1 g at 12/10/20 0855    naloxone 0.4 mg/mL injection 0.02 mg  0.02 mg Intravenous PRN Ana Maria Maradiaga NP        ondansetron injection 4 mg  4 mg Intravenous Q8H PRN Ana Maria Maradiaga NP        oxyCODONE 5 mg/5 mL solution 2.5 mg  2.5 mg Oral Q3H PRN Chin Heath III, MD   2.5 mg at 12/09/20 1749    oxyCODONE immediate release tablet 5 mg  5 mg Oral Q3H PRN Chin Heath III, MD   5 mg at 12/10/20 0431    polyethylene glycol packet 17 g  17 g Oral Daily PRN Ana Maria Maradiaga NP        polyethylene glycol packet 17 g  17 g Oral Daily Ana Maria Mardaiaga NP   17 g at 12/10/20 0855    pregabalin capsule 75 mg  75 mg Oral QHS Ana Maria Maradiaga NP   75 mg at 12/09/20 2044    promethazine (PHENERGAN) 6.25 mg in dextrose 5 % 50 mL IVPB  6.25 mg Intravenous Q6H PRN Ana Maria Maradiaga NP        senna-docusate 8.6-50 mg per tablet 1 tablet  1 tablet Oral BID Ana Maria Maradiaga NP   1 tablet at 12/10/20 0855        Anticoagulant dose apixaban 5 mg    Assessment:    55 year old male s/p bilateral anterior total hip 12/7 at [place] transferred [place] [facility] [place]9 for cardiac workup due to episodes of lightheadedness and hypotension  while working with physical therapy. Cardiac workup largely negative.      Plan:                1) Pain: Patient pain well-controlled with multi-modal regimen. Minimize opioid use as this may cause light headedness.               2) Hypotension: Resolved. Patient's blood pressure has been in the 130s since [place] [facility] Hematocrit 32.4. Will monitor closely when he works with physical therapy today. Will transfuse 1u prbc and reassess after transfusion and physical therapy today.              3) Hx of DVT: Continue apixaban              4) Corneal abrasion: Continue cipro drops              5) FEN/GI: Tolerating diet              6) Dispo: Pending PT/OT, resolution of hypotension / light headedness.     Harris Leon MD  PGY-4, Anesthesiology

## 2020-12-10 NOTE — PROGRESS NOTES
[facility]  Orthopedics  Progress Note    Patient Name: Edi Marie  MRN: [id number]  Admission Date: 12/7/2020  Hospital Length of Stay: 3 days  Attending Provider: Chin Heath III, MD  Primary Care Provider: More Villanueva MD  Follow-up For: Procedure(s) (LRB):  ARTHROPLASTY, HIP, BILATERAL: ANTERIOR: DEPUY-ACTIS+PINNACLE (Bilateral)    Post-Operative Day: 3 Days Post-Op  Subjective:     Principal Problem:Hip pain    Principal Orthopedic Problem: Same    Interval History: Patient seen and examined at bedside. Doing well this morning no complaints.  Cardiac workup completed yesterday and workup mostly normal aside from ultrasound which Partial thrombosis of the left superficial femoral vein noting portions of the thrombus appear somewhat echogenic suggesting this may reflect chronic thrombus although new since examination 05/21/2020.  He is ready for another attempt with PT today.  Voiding well.       Review of patient's allergies indicates:  No Known Allergies    Current Facility-Administered Medications   Medication    0.9%  NaCl infusion    acetaminophen tablet 1,000 mg    apixaban tablet 5 mg    celecoxib capsule 200 mg    ciprofloxacin HCl 0.3 % ophthalmic solution 2 drop    ePHEDrine sulfate 10 mg    famotidine tablet 20 mg    morphine injection 2 mg    mupirocin 2 % ointment 1 g    naloxone 0.4 mg/mL injection 0.02 mg    ondansetron injection 4 mg    oxyCODONE 5 mg/5 mL solution 2.5 mg    oxyCODONE immediate release tablet 5 mg    polyethylene glycol packet 17 g    polyethylene glycol packet 17 g    pregabalin capsule 75 mg    promethazine (PHENERGAN) 6.25 mg in dextrose 5 % 50 mL IVPB    senna-docusate 8.6-50 mg per tablet 1 tablet     Objective:     Vital Signs (Most Recent):  Temp: 99.2 °F (37.3 °C) (12/10/20 0419)  Pulse: 97 (12/10/20 0419)  Resp: 16 (12/10/20 0431)  BP: 137/88 (12/10/20 0419)  SpO2: 98 % (12/10/20 0419) Vital Signs (24h Range):  Temp:  [98.2  "°F (36.8 °C)-99.8 °F (37.7 °C)] 99.2 °F (37.3 °C)  Pulse:  [] 97  Resp:  [12-20] 16  SpO2:  [98 %-99 %] 98 %  BP: ()/(46-93) 137/88     Weight: 86.2 kg (190 lb)  Height: 5' 11" (180.3 cm)  Body mass index is 26.5 kg/m².      Intake/Output Summary (Last 24 hours) at 12/10/2020 0709  Last data filed at 12/10/2020 0600  Gross per 24 hour   Intake 2545 ml   Output 2200 ml   Net 345 ml       Ortho/SPM Exam       AAOx4  NAD  RRR  No increased WOB  Dressings in place   SILT and motor intact T/SP/DP  WWP extremities  FCDs in place and functioning      Significant Labs: All pertinent labs within the past 24 hours have been reviewed.    Significant Imaging: I have reviewed and interpreted all pertinent imaging results/findings.    Assessment/Plan:     * Hip pain  55 y.o. male POD3 s/p Bilateral BRIGIDO    Pain control: per APS  PT/OT: WBAT BLE, anterior hip precautions  DVT PPx:Eliquis 5mg BID , FCDs at all times when not ambulating  Abx: postop Ancef    Echo normal  EKG auto read nonspecific t wave abnormality no prior comparisons  Troponin normal  CTA chest normal      US 5/21/20 showed partially occlusive left femoral and popliteal vein thrombus    US 12/9/20- Partial thrombosis of the left superficial femoral vein noting portions of the thrombus appear somewhat echogenic suggesting this may reflect chronic thrombus although new since examination 05/21/2020.       IV fluids 150mL/hr   Encourage oral hydration     Cardiology recommending IVF and blood transfusion     Dispo: continue resuscitation and likely blood transfusion today and follow up PT              Siddhartha York MD  Orthopedics  [facility]  "

## 2020-12-10 NOTE — PLAN OF CARE
Problem: Adult Inpatient Plan of Care  Goal: Plan of Care Review  Outcome: Ongoing, Progressing  Flowsheets (Taken 12/10/2020 1535)  Plan of Care Reviewed With: patient  Goal: Absence of Hospital-Acquired Illness or Injury  Outcome: Ongoing, Progressing  Intervention: Identify and Manage Fall Risk  Flowsheets (Taken 12/10/2020 1535)  Safety Promotion/Fall Prevention: assistive device/personal item within reach  Intervention: Prevent VTE (venous thromboembolism)  Flowsheets (Taken 12/10/2020 1535)  VTE Prevention/Management: remove, assess skin and reapply sequential compression device     Problem: Pain Acute  Goal: Optimal Pain Control  Outcome: Ongoing, Progressing  Intervention: Develop Pain Management Plan  Flowsheets (Taken 12/10/2020 1535)  Pain Management Interventions: pain management plan reviewed with patient/caregiver   AAOx4, x1 unit of blood received this shift, worked w/ PT/OT, prn meds given for c/o pain, no falls or injuries

## 2020-12-10 NOTE — PLAN OF CARE
Patient tolerated PT session well. Seated BP prior to ambulation 125/71. Patient ambulated 100ft with RW and supervision. No LOB, dizziness, or SOB noted. BP after ambulation 118/70. Patient performed B LE therex x10 reps. Patient educated in anterior hip precautions. Patient will have HHPT at discharge.       Problem: Physical Therapy Goal  Goal: Physical Therapy Goal  Description: Goals to be met by: 2020    Patient will increase functional independence with mobility by performin. Supine to sit with supervision   2. Sit to stand transfer with Supervision  3. Gait x100 feet with Supervision using Rolling Walker  4. Ascend/Descend 6 inch curb step with Stand-by Assistance using Rolling Walker  5. Lower extremity exercise program x30 reps per handout, with supervision    Outcome: Ongoing, Progressing

## 2020-12-10 NOTE — SUBJECTIVE & OBJECTIVE
"Principal Problem:Hip pain    Principal Orthopedic Problem: Same    Interval History: Patient seen and examined at bedside. Doing well this morning no complaints.  Cardiac workup completed yesterday and workup mostly normal aside from ultrasound which showed partially occlusive left femoral and popliteal vein thrombus unsure if acute or chronic.  He is ready for another attempt with PT today.  Voiding well.       Review of patient's allergies indicates:  No Known Allergies    Current Facility-Administered Medications   Medication    0.9%  NaCl infusion    acetaminophen tablet 1,000 mg    apixaban tablet 5 mg    celecoxib capsule 200 mg    ciprofloxacin HCl 0.3 % ophthalmic solution 2 drop    ePHEDrine sulfate 10 mg    famotidine tablet 20 mg    morphine injection 2 mg    mupirocin 2 % ointment 1 g    naloxone 0.4 mg/mL injection 0.02 mg    ondansetron injection 4 mg    oxyCODONE 5 mg/5 mL solution 2.5 mg    oxyCODONE immediate release tablet 5 mg    polyethylene glycol packet 17 g    polyethylene glycol packet 17 g    pregabalin capsule 75 mg    promethazine (PHENERGAN) 6.25 mg in dextrose 5 % 50 mL IVPB    senna-docusate 8.6-50 mg per tablet 1 tablet     Objective:     Vital Signs (Most Recent):  Temp: 99.2 °F (37.3 °C) (12/10/20 0419)  Pulse: 97 (12/10/20 0419)  Resp: 16 (12/10/20 0431)  BP: 137/88 (12/10/20 0419)  SpO2: 98 % (12/10/20 0419) Vital Signs (24h Range):  Temp:  [98.2 °F (36.8 °C)-99.8 °F (37.7 °C)] 99.2 °F (37.3 °C)  Pulse:  [] 97  Resp:  [12-20] 16  SpO2:  [98 %-99 %] 98 %  BP: ()/(46-93) 137/88     Weight: 86.2 kg (190 lb)  Height: 5' 11" (180.3 cm)  Body mass index is 26.5 kg/m².      Intake/Output Summary (Last 24 hours) at 12/10/2020 0709  Last data filed at 12/10/2020 0600  Gross per 24 hour   Intake 2545 ml   Output 2200 ml   Net 345 ml       Ortho/SPM Exam       AAOx4  NAD  RRR  No increased WOB  Dressings in place   SILT and motor intact T/SP/DP  WWP " extremities  FCDs in place and functioning      Significant Labs: All pertinent labs within the past 24 hours have been reviewed.    Significant Imaging: I have reviewed and interpreted all pertinent imaging results/findings.

## 2020-12-10 NOTE — PLAN OF CARE
Patient tolerated OT/PT session well. Seated BP prior to ambulation 125/71. Patient ambulated 100ft with RW and supervision. No LOB, dizziness, or SOB noted. BP after ambulation 118/70.  Patient educated in anterior hip precautions. Continue OT POC      Problem: Occupational Therapy Goal  Goal: Occupational Therapy Goal  Description: Goals to be met by: 12/16/2020    Patient will increase functional independence with ADLs by performing:    UE Dressing with [place].  LE Dressing with Modified [place] and Assistive Devices as needed.  Grooming while standing at sink with Modified [place].  Toileting from bedside commode with Modified [place] for hygiene and clothing management.   Bathing from  shower chair/bench with Modified [place].  Toilet transfer to bedside commode with Modified [place].    Outcome: Ongoing, Progressing

## 2020-12-10 NOTE — PT/OT/SLP PROGRESS
Physical Therapy Treatment    Patient Name:  Edi Marie   MRN:  [id number]    Recommendations:     Discharge Recommendations:  home health PT   Discharge Equipment Recommendations: hip kit   Barriers to discharge: None    Assessment:     Edi Marie is a 55 y.o. male admitted with a medical diagnosis of Hip pain.  He presents with the following impairments/functional limitations:  weakness, impaired functional mobilty, gait instability, impaired balance, decreased lower extremity function, pain, decreased ROM, impaired skin, orthopedic precautions. Patient tolerated PT session well. Seated BP prior to ambulation 125/71. Patient ambulated 100ft with RW and supervision. No LOB, dizziness, or SOB noted. BP after ambulation 118/70. Patient performed B LE therex x10 reps. Patient educated in anterior hip precautions. Patient will have HHPT at discharge.     Rehab Prognosis: Good; patient would benefit from acute skilled PT services to address these deficits and reach maximum level of function.    Recent Surgery: Procedure(s) (LRB):  ARTHROPLASTY, HIP, BILATERAL: ANTERIOR: DEPUY-ACTIS+PINNACLE (Bilateral) 3 Days Post-Op    Plan:     During this hospitalization, patient to be seen daily to address the identified rehab impairments via gait training, therapeutic activities, therapeutic exercises and progress toward the following goals:    · Plan of Care Expires:  12/11/20    Subjective     Chief Complaint: Stiffness in bilateral hips.   Patient/Family Comments/goals: To get back to PLOF.   Pain/Comfort:  · Pain Rating 1: 6/10(stiffness)  · Location - Side 1: Bilateral  · Location 1: hip  · Pain Addressed 1: Pre-medicate for activity, Distraction, Reposition      Objective:     Communicated with RN prior to session.  Patient found supine with cryotherapy(Visi) upon PT entry to room.     General Precautions: Standard, fall   Orthopedic Precautions:BLE anterior precautions, LLE weight bearing as tolerated, RLE weight  bearing as tolerated   Braces: N/A     Functional Mobility:  Seated /71  BP after ambulation 118/70  Patient with no complaint of dizziness today.     · Bed Mobility:   Patient required increased time for all functional mobility due to stiffness in bilateral hips.   · Scooting: supervision  · Supine to Sit: supervision  · Transfers:     · Sit to Stand:  supervision with rolling walker x1 from bed with verbal cues for hand placement   · Gait: Patient ambulated 100ft with Rolling Walker and supervision  using 3-point gait. Patient demonstrated decreased mitesh and decreased step length during gait due to pain, decreased ROM and decreased strength. Verbal cues provided for gait sequence and RW management.     AM-PAC 6 CLICK MOBILITY  Turning over in bed (including adjusting bedclothes, sheets and blankets)?: 4  Sitting down on and standing up from a chair with arms (e.g., wheelchair, bedside commode, etc.): 4  Moving from lying on back to sitting on the side of the bed?: 4  Moving to and from a bed to a chair (including a wheelchair)?: 4  Need to walk in hospital room?: 4  Climbing 3-5 steps with a railing?: 3  Basic Mobility Total Score: 23     Therapeutic Activities and Exercises:  Patient has been performing B A/P, quad sets, glute sets, and heel slides while supine in bed and PT not present. Patient performed B SAQ over pillow x20 reps.     Patient educated in:  -PT role and POC  -safety with transfers including hand placement  -gait sequencing and RW management  -OOB activity to maximize recovery including with nursing staff assistance and RW while in the hospital   -HEP for therex at home with handout provided   -anterior hip precautions   -SUV transfer with running board     Patient left up in chair with all lines intact, call button in reach and RN notified.    GOALS:   Multidisciplinary Problems     Physical Therapy Goals        Problem: Physical Therapy Goal    Goal Priority Disciplines Outcome Goal  Variances Interventions   Physical Therapy Goal     PT, PT/OT Ongoing, Progressing     Description: Goals to be met by: 2020    Patient will increase functional independence with mobility by performin. Supine to sit with supervision   2. Sit to stand transfer with Supervision  3. Gait x100 feet with Supervision using Rolling Walker  4. Ascend/Descend 6 inch curb step with Stand-by Assistance using Rolling Walker  5. Lower extremity exercise program x30 reps per handout, with supervision                     Time Tracking:     PT Received On: 12/10/20  PT Start Time: 1401     PT Stop Time: 1439  PT Total Time (min): 38 min     Billable Minutes: Gait Training  15 Therapeutic Activity 8 and Therapeutic Exercise 15    Treatment Type: Treatment  PT/PTA: PT     PTA Visit Number: 0     Winnie Louie, PT  12/10/2020

## 2020-12-10 NOTE — ASSESSMENT & PLAN NOTE
55 y.o. male POD3 s/p Bilateral BRIGIDO    Pain control: per APS  PT/OT: WBAT BLE, anterior hip precautions  DVT PPx:Eliquis 5mg BID , FCDs at all times when not ambulating  Abx: postop Ancef    Echo normal  EKG auto read nonspecific t wave abnormality no prior comparisons  Troponin normal  CTA chest normal      US BLE read as acute v chronic L sup femoral vein thrombus   US 5/21/20 showed partially occlusive left femoral and popliteal vein thrombus  Difficult to tell if new read is new or acute on chronic   IV fluids 150mL/hr   Encourage oral hydration     Cardiology recommending IVF and blood transfusion     Dispo: continue resuscitation and likely blood transfusion today and follow up PT

## 2020-12-10 NOTE — ASSESSMENT & PLAN NOTE
55 y.o. M with pmhx of recurrent DVT on apixiban and bilateral hip arthritis s/p bilateral anterior total hip arthroplasty on 12/07 complicated by pre-syncope on post op D2.  BP 90/54 received 1 L bolus on IVF.   Pt denied SOB, CP, palpitation, diaphoresis.  Exam unremarkable  Labs significant for Hb 10.9 (baseline 16.9  9 days ago)  EKG: NSR, non specific T wave abnormality   TTE: normal with EF 63%   CTA Chest: No PE   US BLE:  Partial thrombosis of the left superficial femoral vein noting portions of the thrombus appear somewhat echogenic suggesting this may reflect chronic thrombus although new since examination 05/21/2020.     Likely presyncope due to orthostatic in setting of acute blood loss anemia   - Telemetry + Pulse Ox monitoring  - Will follow up on troponin  - Orthostatic Vital Signs  - Agree with IVF and blood transfusion   - We went to his room several times but could not find him for round, will be staffed in the am

## 2020-12-10 NOTE — PLAN OF CARE
"Mr. Marie is a 55 y.o. M with pmhx of of recurrent DVT since 2017 on apixiban and bilateral hip arthritis s/p bilateral anterior total hip arthroplasty on 12/07/2020 who was transferred from [facility] to [facility] for post op pre-syncope work up.   Post op in PACU pt had hypotension of 70/52 received PRBC and albumin. On D1 post OP pt also had hypotension(88/51) on walking with OT responded well to IVF bolus of 500cc and the patient was able to walk 70f with mild dizziness. On D2 pt was up with therapist was able to ambulate to the bathroom, had a bowel movement, and stood to brush his teeth without a problem.  He then went to leave the bathroom and experienced light headedness and pt stated "I was out", BP initially was 90/54 in the supine position, received 500cc bolus of IVF and continued to be hypotensive 73/46 another 500 cc bolus given.He denied any shortness of breath or chest pain, palpitation, diaphoresis, pain or numbness in his arms. Pt pain was controlled and neuro exam grossly intact, his hips were grossly normal upon exam without signs of obvious hematoma, unusual swelling, or bruising.   On labs Blood sugar 102 with significant for drop in his Hb preop 16 to>10.9.       Cardiology consulted for evaluation of presyncope.      Off note:He says he has had a hematology workup which is negative. 05/20 ultrasound showed partial thrombosis of the left peroneal vein which appears chronic.    Pre-syncope  55 y.o. M with pmhx of recurrent DVT on apixiban and bilateral hip arthritis s/p bilateral anterior total hip arthroplasty on 12/07 complicated by pre-syncope on post op and hypotension and anemia.  BP 90/54 received 1 L bolus on IVF. Today pt denies dizziness or feeling lightheaded,SOB, CP, palpitation, diaphoresis.  /84 with .    Exam unremarkable  Labs significant for Hb 10.9 (baseline 16.9  9 days ago)      Trop 0.012   EKG: NSR, non specific T wave abnormality   TTE: " normal with EF 63%   CTA Chest: No PE   US BLE:  Partial thrombosis of the left superficial femoral vein noting portions of the thrombus appear somewhat echogenic suggesting this may reflect chronic thrombus although new since examination 05/21/2020.      Cardiac causes of pre-syncope rolled out likely due to orthostatic hypotension in setting of acute blood loss anemia   - Recommend IVF and blood transfusion.    Thank you for your consult, I will sign off. Please contact us if you have any questions or concerns.     Darby Contreras MD  Internal Medicine Intern  [facility]

## 2020-12-10 NOTE — CONSULTS
"[facility]  Cardiology  Consult Note    Patient Name: Edi Marie  MRN: [id number]  Admission Date: 12/7/2020  Hospital Length of Stay: 2 days  Code Status: Prior   Attending Provider: Chin Heath III, MD   Consulting Provider: Darby Contreras MD  Primary Care Physician: More Villanueva MD  Principal Problem:Hip pain    Patient information was obtained from patient and past medical records.     Consults  Subjective:     Chief Complaint:  Pre-syncope     HPI:   55 y.o. M with pmhx of of recurrent DVT since 2017 on apixiban and bilateral hip arthritis s/p bilateral anterior total hip arthroplasty on 12/07/2020 who was transferred from [facility] to [facility] for post op near syncope work up.   Patient stated that he had pain in surgical site, episode of dizziness on D1 while getting out of bed with PT help, that resolved and patient was able to move around his room with no trouble, and rested well last night. On D2 pt was up with therapist was able to ambulate to the bathroom, had a bowel movement, and stood to brush his teeth without a problem.  He then went to leave the bathroom and experienced light headedness and pt stated "I was out". BP initially was 90/54 in the supine position, received 500 cc bolus of IVF was hypotensive 73/46 another 500 cc bolus given.He denied any shortness of breath or chest pain, palpitation, diaphoresis, pain or numbness in his arms. Pt pain was controlled and neuro exam grossly intact, his hips were grossly normal upon exam without signs of obvious hematoma, unusual swelling, or bruising.   On labs Blood sugar 102 with significant for drop in his Hb preop 16 to 10.9 this am  Cardiology consulted for presyncope.     Off note:He says he has had a hematology workup which is negative. 05/20 ultrasound showed partial thrombosis of the left peroneal vein which appears chronic.      Past Medical History:   Diagnosis Date    Arthritis     Deep vein " thrombosis     DVT (deep venous thrombosis)     Hemorrhoid     Histiocytosis     Kidney stones     Spermatocele        Past Surgical History:   Procedure Laterality Date    APPENDECTOMY      in his 20's    ARTHROPLASTY OF BOTH HIPS Bilateral 12/7/2020    Procedure: ARTHROPLASTY, HIP, BILATERAL: ANTERIOR: DEPUY-ACTIS+PINNACLE;  Surgeon: Chin Heath III, MD;  Location: [facility] OR;  Service: Orthopedics;  Laterality: Bilateral;    COLONOSCOPY N/A 1/20/2017    Procedure: COLONOSCOPY;  Surgeon: Danis Frank MD;  Location: [facility] ENDO ([address] [place] [address]);  Service: Endoscopy;  Laterality: N/A;    KNEE SURGERY      right knee- teenager -     LITHOTRIPSY         Review of patient's allergies indicates:  No Known Allergies    No current facility-administered medications on file prior to encounter.      Current Outpatient Medications on File Prior to Encounter   Medication Sig    acetaminophen (TYLENOL) 650 MG TbSR Take 650 mg by mouth every 8 (eight) hours.     Family History     Problem Relation (Age of Onset)    Cancer Paternal Grandfather    Heart disease Father (79)        Tobacco Use    Smoking status: Never Smoker    Smokeless tobacco: Never Used   Substance and Sexual Activity    Alcohol use: Yes     Alcohol/week: 7.0 standard drinks     Types: 7 Glasses of wine per week     Frequency: 2-4 times a month     Drinks per session: 3 or 4     Binge frequency: Less than monthly     Comment: 2 times a week- 2 drinks each time    Drug use: No    Sexual activity: Yes     Review of Systems   Constitution: Negative for chills, decreased appetite, diaphoresis, fever and malaise/fatigue.   HENT: Negative for congestion and sore throat.    Eyes: Negative for double vision, photophobia and visual disturbance.   Cardiovascular: Negative for chest pain, dyspnea on exertion, leg swelling, orthopnea, palpitations, paroxysmal nocturnal dyspnea and syncope.   Respiratory: Negative for cough, shortness of breath, sputum production  and wheezing.    Endocrine: Negative.    Hematologic/Lymphatic: Does not bruise/bleed easily.   Skin: Negative.    Gastrointestinal: Negative for bloating, abdominal pain, constipation, diarrhea, nausea and vomiting.   Genitourinary: Negative for dysuria, frequency and hematuria.   Neurological: Positive for dizziness and light-headedness. Negative for disturbances in coordination, focal weakness, headaches, numbness, seizures, vertigo and weakness.   Psychiatric/Behavioral: Negative for altered mental status. The patient does not have insomnia.    Allergic/Immunologic: Negative.      Objective:     Vital Signs (Most Recent):  Temp: 98.2 °F (36.8 °C) (12/09/20 1415)  Pulse: 90 (12/09/20 1420)  Resp: 18 (12/09/20 1749)  BP: (!) 137/93 (12/09/20 1420)  SpO2: 98 % (12/09/20 1420) Vital Signs (24h Range):  Temp:  [96.6 °F (35.9 °C)-99.1 °F (37.3 °C)] 98.2 °F (36.8 °C)  Pulse:  [] 90  Resp:  [12-20] 18  SpO2:  [96 %-99 %] 98 %  BP: ()/(46-93) 137/93     Weight: 86.2 kg (190 lb)  Body mass index is 26.5 kg/m².    SpO2: 98 %  O2 Device (Oxygen Therapy): room air      Intake/Output Summary (Last 24 hours) at 12/9/2020 1750  Last data filed at 12/9/2020 1324  Gross per 24 hour   Intake 1155 ml   Output 3900 ml   Net -2745 ml       Lines/Drains/Airways     Epidural Line                 Neuraxial Analgesia/Anesthesia Assessment (using dermatomes) Epidural 12/07/20 1017 2 days          Peripheral Intravenous Line                 Peripheral IV - Single Lumen 12/07/20 0809 18 G Left Hand 2 days         Peripheral IV - Single Lumen 12/09/20 0946 20 G Right Antecubital less than 1 day                Physical Exam   Constitutional: He is oriented to person, place, and time. He appears well-developed and well-nourished. He is cooperative.  Non-toxic appearance. He does not have a sickly appearance. No distress.   HENT:   Head: Normocephalic and atraumatic.   Eyes: Pupils are equal, round, and reactive to light. EOM are  normal. No scleral icterus.   Neck: Normal range of motion. Neck supple.   Cardiovascular: Normal rate, regular rhythm, normal heart sounds and intact distal pulses.   No murmur heard.  Pulmonary/Chest: Breath sounds normal. No respiratory distress. He has no wheezes. He has no rales. He exhibits no tenderness.   Abdominal: Soft. Bowel sounds are normal. He exhibits no distension. There is no abdominal tenderness.   Musculoskeletal: Normal range of motion.         General: Tenderness (Bilatral hip at surgical site ) present. No deformity or edema.   Neurological: He is alert and oriented to person, place, and time.   Skin: Skin is dry. He is not diaphoretic.   Psychiatric: He has a normal mood and affect. His behavior is normal. Judgment and thought content normal.   Nursing note and vitals reviewed.      Significant Labs:   BMP:   Recent Labs   Lab 12/09/20  0932   *      K 4.0      CO2 24   BUN 9   CREATININE 0.9   CALCIUM 8.3*   , CMP   Recent Labs   Lab 12/09/20  0932      K 4.0      CO2 24   *   BUN 9   CREATININE 0.9   CALCIUM 8.3*   ANIONGAP 7*   ESTGFRAFRICA >60.0   EGFRNONAA >60.0       Significant Imaging:     CTA Chest Non Coronary  Narrative: EXAMINATION:  CTA CHEST NON CORONARY    CLINICAL HISTORY:  PE suspected, intermediate prob, neg D-dimer;rule out PE.  Post op bilateral hip replacement;    TECHNIQUE:  Low dose axial images, sagittal and coronal reformations were obtained from the thoracic inlet to the lung bases following the IV administration of 100 mL of Omnipaque 350.  Contrast timing was optimized to evaluate the pulmonary arteries.  MIP images were performed.    COMPARISON:  None    FINDINGS:  The structures at the base of the neck are unremarkable.  No significant mediastinal lymphadenopathy.  The heart is not enlarged.  No pericardial effusion.  The thoracic aorta tapers normally.  The visualized portions of the spleen, pancreas, adrenal glands and  gallbladder are unremarkable.  There may be bilateral parapelvic cysts.  There are several low attenuating lesions throughout the hepatic parenchyma, the majority are too small for characterization.  Largest within the left hepatic lobe measures 2.7 cm with attenuation suggesting cyst.  The esophagus is grossly unremarkable.    The airways are patent.  There is bilateral basilar dependent atelectasis.  No large focal consolidation.  No pneumothorax.  No pleural effusion.    Bolus timing is adequate for evaluation of pulmonary thromboembolism.  No pulmonary arterial filling defect to the level of the segmental arteries bilaterally to suggest pulmonary thromboembolism.    Degenerative changes are noted of the spine.  No significant axillary lymphadenopathy.  There is bilateral gynecomastia.  Impression: 1. No pulmonary thromboembolism.  2. Mild bilateral basilar dependent atelectasis, no large focal consolidation.  3. Suspected bilateral parapelvic renal cysts.  4. Several low attenuating lesions within the hepatic parenchyma, largest within the left hepatic lobe measures attenuation suggestive of cyst, the remainder are too small for characterization.  5. Several additional findings above.    Electronically signed by: Porfirio Macedo MD  Date:    12/09/2020  Time:    17:40  US Lower Extremity Veins Bilateral  Narrative: EXAMINATION:  US LOWER EXTREMITY VEINS BILATERAL    CLINICAL HISTORY:  rule out DVT;    TECHNIQUE:  Duplex and color flow Doppler and dynamic compression was performed of the bilateral lower extremity veins was performed.    COMPARISON:  05/21/2020    FINDINGS:  There is thrombus within the left superficial femoral vein, noting echogenic appearance may reflect sequela of chronic thrombus.  Otherwise, Duplex and color flow Doppler evaluation does not reveal any evidence of acute venous thrombosis in the common femoral, right superficial femoral, greater saphenous, popliteal, peroneal, anterior tibial  and posterior tibial veins bilaterally.  There is no reflux to suggest valvular incompetence.  Impression: This report was flagged in Epic as abnormal.    Partial thrombosis of the left superficial femoral vein noting portions of the thrombus appear somewhat echogenic suggesting this may reflect chronic thrombus although new since examination 05/21/2020.  Correlation is advised.  No thrombosis seen elsewhere.    Electronically signed by: Porfirio Macedo MD  Date:    12/09/2020  Time:    16:41  Echo Color Flow Doppler? Yes  · The left ventricle is normal in size with normal systolic function. The   estimated ejection fraction is 63%.  · Normal left ventricular diastolic function.  · Normal central venous pressure (3 mmHg).  · The estimated PA systolic pressure is 23 mmHg.           Assessment and Plan:     Pre-syncope  55 y.o. M with pmhx of recurrent DVT on apixiban and bilateral hip arthritis s/p bilateral anterior total hip arthroplasty on 12/07 complicated by pre-syncope on post op D2.  BP 90/54 received 1 L bolus on IVF.   Pt denied SOB, CP, palpitation, diaphoresis.  Exam unremarkable  Labs significant for Hb 10.9 (baseline 16.9  9 days ago)  EKG: NSR, non specific T wave abnormality   TTE: normal with EF 63%   CTA Chest: No PE   US BLE:  Partial thrombosis of the left superficial femoral vein noting portions of the thrombus appear somewhat echogenic suggesting this may reflect chronic thrombus although new since examination 05/21/2020.     Likely presyncope due to orthostatic in setting of acute blood loss anemia   - Telemetry + Pulse Ox monitoring  - Will follow up on troponin  - Orthostatic Vital Signs  - Agree with IVF and blood transfusion   - We went to his room several times but could not find him for round, will be staffed in the am             VTE Risk Mitigation (From admission, onward)         Ordered     apixaban tablet 5 mg  2 times daily      12/09/20 0904                Thank you for your  consult. I will follow-up with patient. Please contact us if you have any additional questions.    Darby Contreras MD  Cardiology   [facility]

## 2020-12-11 VITALS
SYSTOLIC BLOOD PRESSURE: 136 MMHG | DIASTOLIC BLOOD PRESSURE: 87 MMHG | RESPIRATION RATE: 16 BRPM | TEMPERATURE: 99 F | HEIGHT: 71 IN | OXYGEN SATURATION: 96 % | HEART RATE: 89 BPM | BODY MASS INDEX: 26.6 KG/M2 | WEIGHT: 190 LBS

## 2020-12-11 PROCEDURE — 25000003 PHARM REV CODE 250: Performed by: NURSE PRACTITIONER

## 2020-12-11 PROCEDURE — 97535 SELF CARE MNGMENT TRAINING: CPT

## 2020-12-11 PROCEDURE — 97110 THERAPEUTIC EXERCISES: CPT | Mod: CQ

## 2020-12-11 PROCEDURE — 25000003 PHARM REV CODE 250: Performed by: ORTHOPAEDIC SURGERY

## 2020-12-11 PROCEDURE — 97116 GAIT TRAINING THERAPY: CPT | Mod: CQ

## 2020-12-11 RX ADMIN — ACETAMINOPHEN 1000 MG: 325 TABLET ORAL at 12:12

## 2020-12-11 RX ADMIN — CIPROFLOXACIN 2 DROP: 3 SOLUTION OPHTHALMIC at 08:12

## 2020-12-11 RX ADMIN — CELECOXIB 200 MG: 200 CAPSULE ORAL at 08:12

## 2020-12-11 RX ADMIN — APIXABAN 5 MG: 5 TABLET, FILM COATED ORAL at 08:12

## 2020-12-11 RX ADMIN — FAMOTIDINE 20 MG: 20 TABLET, FILM COATED ORAL at 08:12

## 2020-12-11 RX ADMIN — OXYCODONE HYDROCHLORIDE 5 MG: 5 TABLET ORAL at 05:12

## 2020-12-11 RX ADMIN — ACETAMINOPHEN 1000 MG: 325 TABLET ORAL at 05:12

## 2020-12-11 RX ADMIN — DOCUSATE SODIUM 50MG AND SENNOSIDES 8.6MG 1 TABLET: 8.6; 5 TABLET, FILM COATED ORAL at 08:12

## 2020-12-11 NOTE — NURSING TRANSFER
Discharge Note: Pt ready for discharge. Discharge instructions given and explained to pt. Pt verbalized understanding. Prescriptions delivered to pt's bedside. IVs removed. No further questions from pt at this time. Pt to be discharged via wheelchair. Will cont to monitor until discharge.

## 2020-12-11 NOTE — SUBJECTIVE & OBJECTIVE
"Principal Problem:Hip pain    Principal Orthopedic Problem: Same    Interval History: Patient seen and examined at bedside. Doing well this morning no complaints. AFVSS.  Has been doing well with PT and feels much better since receiving blood yesterday.       Review of patient's allergies indicates:  No Known Allergies    Current Facility-Administered Medications   Medication    0.9%  NaCl infusion (for blood administration)    0.9%  NaCl infusion    acetaminophen tablet 1,000 mg    apixaban tablet 5 mg    celecoxib capsule 200 mg    ciprofloxacin HCl 0.3 % ophthalmic solution 2 drop    ePHEDrine sulfate 10 mg    famotidine tablet 20 mg    morphine injection 2 mg    mupirocin 2 % ointment 1 g    naloxone 0.4 mg/mL injection 0.02 mg    ondansetron injection 4 mg    oxyCODONE 5 mg/5 mL solution 2.5 mg    oxyCODONE immediate release tablet 5 mg    polyethylene glycol packet 17 g    polyethylene glycol packet 17 g    pregabalin capsule 75 mg    promethazine (PHENERGAN) 6.25 mg in dextrose 5 % 50 mL IVPB    senna-docusate 8.6-50 mg per tablet 1 tablet     Objective:     Vital Signs (Most Recent):  Temp: 98.8 °F (37.1 °C) (12/11/20 0300)  Pulse: 98 (12/10/20 2000)  Resp: 16 (12/11/20 0526)  BP: (!) 140/80 (12/11/20 0300)  SpO2: 98 % (12/10/20 2000) Vital Signs (24h Range):  Temp:  [97.6 °F (36.4 °C)-99.4 °F (37.4 °C)] 98.8 °F (37.1 °C)  Pulse:  [90-98] 98  Resp:  [15-20] 16  SpO2:  [96 %-98 %] 98 %  BP: (124-140)/(76-86) 140/80     Weight: 86.2 kg (190 lb)  Height: 5' 11" (180.3 cm)  Body mass index is 26.5 kg/m².      Intake/Output Summary (Last 24 hours) at 12/11/2020 0724  Last data filed at 12/10/2020 1400  Gross per 24 hour   Intake 254 ml   Output --   Net 254 ml       Ortho/SPM Exam       AAOx4  NAD  RRR  No increased WOB  Dressings in place   SILT and motor intact T/SP/DP  WWP extremities  FCDs in place and functioning      Significant Labs: All pertinent labs within the past 24 hours have been " reviewed.    Significant Imaging: I have reviewed and interpreted all pertinent imaging results/findings.

## 2020-12-11 NOTE — PT/OT/SLP PROGRESS
Occupational Therapy   Treatment    Name: Edi Marie  MRN: [id number]  Admitting Diagnosis:  Hip pain  4 Days Post-Op    Recommendations:     Discharge Recommendations: home health PT  Discharge Equipment Recommendations:  hip kit  Barriers to discharge:  None    Assessment:     Edi Marie is a 55 y.o. male with a medical diagnosis of Hip pain.  He presents with the following. Performance deficits affecting function are weakness, impaired endurance, impaired self care skills, impaired functional mobilty, gait instability, impaired balance, decreased lower extremity function, decreased safety awareness, pain, orthopedic precautions.     PT motivated for therapy and tolerated session well. Pt required SBA in supine<>sit with seated /74. Pt ambulated ~100ft SBA using RW with no LOB or dizziness. Pt bp 138/85 after ambulation seated in chair. Pt required CGA to t/f to commode. Pt stood at sink to complete grooming tasks SBA. Pt with bp WNL throughout session. Continue OT POC    Rehab Prognosis:  Good; patient would benefit from acute skilled OT services to address these deficits and reach maximum level of function.       Plan:     Patient to be seen daily to address the above listed problems via self-care/home management, therapeutic activities, therapeutic exercises  · Plan of Care Expires: 12/21/20  · Plan of Care Reviewed with: patient    Subjective     Pain/Comfort:  · Pain Rating 1: 3/10  · Location - Side 1: Bilateral  · Location 1: hip  · Pain Addressed 1: Pre-medicate for activity, Reposition    Objective:     Communicated with: Nursing prior to session.  Patient found supine with cryotherapy upon OT entry to room.    General Precautions: Standard, fall   Orthopedic Precautions:BLE anterior precautions, RLE weight bearing as tolerated, LLE weight bearing as tolerated   Braces: N/A     Occupational Performance:     Bed Mobility:    · Patient completed Scooting/Bridging with stand by  assistance  · Patient completed Supine to Sit with stand by assistance     Functional Mobility/Transfers:  · Patient completed Sit <> Stand Transfer with stand by assistance  with  rolling walker   · Patient completed Bed <> Chair Transfer using Step Transfer technique with stand by assistance with rolling walker  · Patient completed Toilet Transfer Step Transfer technique with contact guard assistance with  rolling walker  · Functional Mobility: PT motivated for therapy and tolerated session well. Pt required SBA in supine<>sit with seated /74. Pt ambulated ~100ft SBA using RW with no LOB or dizziness. Pt bp 138/85 after ambulation seated in chair. Pt required CGA to t/f to commode. Pt stood at sink to complete grooming tasks SBA. Pt with bp WNL throughout session. Continue OT POC    Activities of Daily Living:  · Grooming: stand by assistance standing at sink  · Toileting: stand by assistance for gown management      [facility] 6 Click ADL: 18    Treatment & Education:  - OT POC   - Importance of mobility to maximize recovery.  - safety w/ functional mobility; hand placement to ensure safe transfers to various surfaces in prep for ADLs  - Reviewed gait sequence and RW management via verbalization and demonstration   - encouraged to ambulate within household environment at least every hour to hour 1/2  -Educated on weight bearing status  -Educated on anterior WB precautions    Patient left up in chair with all lines intact, call button in reach and RN notified    GOALS:   Multidisciplinary Problems     Occupational Therapy Goals        Problem: Occupational Therapy Goal    Goal Priority Disciplines Outcome Interventions   Occupational Therapy Goal     OT, PT/OT Ongoing, Progressing    Description: Goals to be met by: 12/9/20     Patient will increase functional independence with ADLs by performing:    UE Dressing with [place].  LE Dressing with Modified [place] and Assistive Devices as needed.  Grooming  while standing at sink with Modified [place].  Toileting from bedside commode with Modified [place] for hygiene and clothing management.   Bathing from  shower chair/bench with Modified [place].  Toilet transfer to bedside commode with Modified [place].                     Time Tracking:     OT Date of Treatment: 12/11/20  OT Start Time: 0850  OT Stop Time: 0917  OT Total Time (min): 27 min    Billable Minutes:Self Care/Home Management 27    Jaymie Lopez OT  12/11/2020    Co-tx performed for this visit due to patient need for two skilled therapists to ensure patient and staff safety and to accommodate for patient activity tolerance

## 2020-12-11 NOTE — ANESTHESIA POST-OP PAIN MANAGEMENT
Acute Pain Service Progress Note    Edi Marie is a 55 y.o., male, [id number] with bilateral hip pain s/p bilateral anterior total hip replacement at [place]. Patient experienced light headedness associated with hypotension while working with physical therapy at [place]. Transferred to [facility] on 12/9 for cardiac workup.    Surgery:  Bilateral hip arthroplasty    Post Op Day #: 4    Catheter type: None      Problem List:    There are no hospital problems to display for this patient.      Subjective:    Patient doing well this morning. S/p 1u prbc yesterday (2u total), worked well with PT. Light headedness resolved. Feels ready to go home.     General appearance of alert, oriented, no complaints   Pain with rest: 1    Numbers   Pain with movement: 5    Numbers   Side Effects    1. Pruritis No    2. Nausea No    3. Motor Blockade No,    4. Sedation No, 1=awake and alert    Objective:      Vitals   There were no vitals filed for this visit.     Labs    No visits with results within 2 Day(s) from this visit.   Latest known visit with results is:   Lab Visit on 09/10/2020   Component Date Value Ref Range Status    DRVVT, Lupus Anticoagulant 09/10/2020 Negative  Negative Final    APA Isotype IgG 09/10/2020 <9.40  0.00 - 14.99 GPL Final    APA Isotype IgM 09/10/2020 21.90* 0.00 - 12.49 MPL Final    Beta-2 Glyco 1 IgG 09/10/2020 <9  <=20 SGU Final    Beta-2 Glyco 1 IgM 09/10/2020 >150* <=20 SMU Final    Beta-2 Glyco 1 IgA 09/10/2020 <9  <=20 RUBEN Final    Hex Phosph Neut Test 09/11/2020 Negative  Negative Final        Meds   No current facility-administered medications for this visit.      Current Outpatient Medications   Medication Sig Dispense Refill    apixaban (ELIQUIS) 5 mg Tab Take 1/2 TAB (2.5mg) twice daily until 48 hours post op and then begin taking 1 tablet (5mg) twice daily 60 tablet 11    celecoxib (CELEBREX) 200 MG capsule Take 1 capsule (200 mg total) by mouth once daily. 30 capsule 0    ciprofloxacin  HCl (CILOXAN) 0.3 % ophthalmic solution Place 2 drops into the right eye 3 (three) times daily. If symptoms unresolved following 3 days schedule follow up with Ophthalmology Clinic at [phone] for 3 days 1 Bottle 0    docusate sodium (COLACE) 100 MG capsule Take 1 capsule (100 mg total) by mouth 2 (two) times daily as needed for Constipation. 60 capsule 0    oxyCODONE (ROXICODONE) 5 MG immediate release tablet Take 1-2 tablets by mouth every 4-6 hours as needed for pain 30 tablet 0     Facility-Administered Medications Ordered in Other Visits   Medication Dose Route Frequency Provider Last Rate Last Dose    0.9%  NaCl infusion (for blood administration)   Intravenous Q24H PRN Siddhartha York MD        0.9%  NaCl infusion   Intravenous Continuous Noe Walker MD   Stopped at 12/10/20 0803    acetaminophen tablet 1,000 mg  1,000 mg Oral Q6H Ana Maria Maradiaga NP   1,000 mg at 12/11/20 0525    apixaban tablet 5 mg  5 mg Oral BID Chin Heath III, MD   5 mg at 12/11/20 0804    celecoxib capsule 200 mg  200 mg Oral Daily Ana Maria Maradiaga NP   200 mg at 12/11/20 0804    ciprofloxacin HCl 0.3 % ophthalmic solution 2 drop  2 drop Right Eye TID Morales Metzger PA-C   2 drop at 12/11/20 0805    ePHEDrine sulfate 10 mg  10 mg Intravenous PRN Noe Walker MD   10 mg at 12/07/20 1500    famotidine tablet 20 mg  20 mg Oral BID Ana Maria Maradiaga NP   20 mg at 12/11/20 0805    morphine injection 2 mg  2 mg Intravenous Q3H PRN Ana Maria Maradiaga NP        mupirocin 2 % ointment 1 g  1 g Nasal BID Ana Maria Maradiaga NP   1 g at 12/10/20 2100    naloxone 0.4 mg/mL injection 0.02 mg  0.02 mg Intravenous PRN Ana Maria Maradiaga NP        ondansetron injection 4 mg  4 mg Intravenous Q8H PRN Ana Maria Maradiaga NP        oxyCODONE 5 mg/5 mL solution 2.5 mg  2.5 mg Oral Q3H PRN Chin Heath III, MD   2.5 mg at 12/10/20 1748    oxyCODONE immediate release tablet 5 mg  5 mg Oral Q3H PRN Chin Heath III,  MD   5 mg at 12/11/20 0526    polyethylene glycol packet 17 g  17 g Oral Daily PRN Ana Maria Maradiaga NP        polyethylene glycol packet 17 g  17 g Oral Daily Ana Maria Maradiaga NP   Stopped at 12/11/20 0900    pregabalin capsule 75 mg  75 mg Oral QHS Ana Maria Maradiaga NP   75 mg at 12/10/20 2100    promethazine (PHENERGAN) 6.25 mg in dextrose 5 % 50 mL IVPB  6.25 mg Intravenous Q6H PRN Ana Maria Maradiaga NP        senna-docusate 8.6-50 mg per tablet 1 tablet  1 tablet Oral BID Ana Maria Maradiaga NP   1 tablet at 12/11/20 0804        Anticoagulant dose apixaban 5 mg    Assessment:    55 year old male s/p bilateral anterior total hip 12/7 at [place] transferred to [facility] on 12/9 for cardiac workup due to episodes of lightheadedness and hypotension while working with physical therapy. Cardiac workup largely negative.      Plan:                1) Pain: Patient pain well-controlled with multi-modal regimen.               2) Hypotension: Resolved, cardiac workup negative              3) Hx of DVT: Continue apixaban              4) Corneal abrasion: Continue cipro drops              5) FEN/GI: Tolerating diet              6) Dispo: d/c today      Harris Leon MD  PGY-4, Anesthesiology

## 2020-12-11 NOTE — PT/OT/SLP PROGRESS
"Physical Therapy Treatment    Patient Name:  Edi Marie   MRN:  [id number]    Recommendations:     Discharge Recommendations:  home health PT   Discharge Equipment Recommendations: hip kit   Barriers to discharge: None    Assessment:     Edi Marie is a 55 y.o. male admitted with a medical diagnosis of Hip pain.  He presents with the following impairments/functional limitations:  gait instability, orthopedic precautions, pain.  Patient has made good gains with therapy. His BP WNL throughout therapy, no c/o dizziness or lightheadedness. He was able to ambulate in [place] with no assistance with RW. He is independent with  HEP. Will benefit from HHPT upon discharge.     Rehab Prognosis: Good; patient would benefit from acute skilled PT services to address these deficits and reach maximum level of function.    Recent Surgery: Procedure(s) (LRB):  ARTHROPLASTY, HIP, BILATERAL: ANTERIOR: DEPUY-ACTIS+PINNACLE (Bilateral) 4 Days Post-Op    Plan:     During this hospitalization, patient to be seen daily to address the identified rehab impairments via gait training, therapeutic activities, therapeutic exercises and progress toward the following goals:    · Plan of Care Expires:  12/11/20    Subjective     Chief Complaint: discomfort  Patient/Family Comments/goals: "I feel better every day".   Pain/Comfort:  · Pain Rating 1: 3/10  · Location - Side 1: Bilateral  · Location 1: hip  · Pain Addressed 1: Reposition, Pre-medicate for activity      Objective:     Communicated with nurse prior to session.  Patient found supine with cryotherapy upon PT entry to room.     General Precautions: Standard, fall   Orthopedic Precautions:BLE anterior precautions   Braces: N/A     Functional Mobility:  · Bed Mobility:     · Supine to Sit: independence  · Transfers:     · Sit to Stand:  modified independence with rolling walker  · Gait: 120 ft with RW with Mod I in [place] with mask donned.       AM-PAC 6 CLICK MOBILITY  Turning over in " bed (including adjusting bedclothes, sheets and blankets)?: 4  Sitting down on and standing up from a chair with arms (e.g., wheelchair, bedside commode, etc.): 4  Moving from lying on back to sitting on the side of the bed?: 4  Moving to and from a bed to a chair (including a wheelchair)?: 4  Need to walk in hospital room?: 4  Climbing 3-5 steps with a railing?: 4  Basic Mobility Total Score: 24       Therapeutic Activities and Exercises:   -white board updated  -reviewed all exercises, good understanding for all  -educ patient with walking program once home    Patient left up in chair with all lines intact, call button in reach and nurse notified..    GOALS:   Multidisciplinary Problems     Physical Therapy Goals        Problem: Physical Therapy Goal    Goal Priority Disciplines Outcome Goal Variances Interventions   Physical Therapy Goal     PT, PT/OT Ongoing, Progressing     Description: Goals to be met by: 2020    Patient will increase functional independence with mobility by performin. Supine to sit with supervision   2. Sit to stand transfer with Supervision  3. Gait x100 feet with Supervision using Rolling Walker  4. Ascend/Descend 6 inch curb step with Stand-by Assistance using Rolling Walker  5. Lower extremity exercise program x30 reps per handout, with supervision                     Time Tracking:     PT Received On: 20  PT Start Time: 851     PT Stop Time: 917  PT Total Time (min): 26 min     Billable Minutes: Gait Training 16 and Therapeutic Exercise 10         Treatment Type: Treatment  PT/PTA: PTA     PTA Visit Number: Tam     Indu Haywood, PTA  2020

## 2020-12-11 NOTE — PLAN OF CARE
PT motivated for therapy and tolerated session well. Pt required SBA in supine<>sit with seated /74. Pt ambulated ~100ft SBA using RW with no LOB or dizziness. Pt bp 138/85 after ambulation seated in chair. Pt required CGA to t/f to commode. Pt stood at sink to complete grooming tasks SBA. Pt with bp WNL throughout session. Continue OT POC      Problem: Occupational Therapy Goal  Goal: Occupational Therapy Goal  Description: Goals to be met by: 12/9/20     Patient will increase functional independence with ADLs by performing:    UE Dressing with [place].  LE Dressing with Modified [place] and Assistive Devices as needed.  Grooming while standing at sink with Modified [place].  Toileting from bedside commode with Modified [place] for hygiene and clothing management.   Bathing from  shower chair/bench with Modified [place].  Toilet transfer to bedside commode with Modified [place].    Outcome: Ongoing, Progressing

## 2020-12-11 NOTE — PROGRESS NOTES
"[facility]  Orthopedics  Progress Note    Patient Name: Edi Marie  MRN: [id number]  Admission Date: 12/7/2020  Hospital Length of Stay: 4 days  Attending Provider: Chin Heath III, MD  Primary Care Provider: More Villanueva MD  Follow-up For: Procedure(s) (LRB):  ARTHROPLASTY, HIP, BILATERAL: ANTERIOR: DEPUY-ACTIS+PINNACLE (Bilateral)    Post-Operative Day: 4 Days Post-Op  Subjective:     Principal Problem:Hip pain    Principal Orthopedic Problem: Same    Interval History: Patient seen and examined at bedside. Doing well this morning no complaints. AFVSS.  Has been doing well with PT and feels much better since receiving blood yesterday.       Review of patient's allergies indicates:  No Known Allergies    Current Facility-Administered Medications   Medication    0.9%  NaCl infusion (for blood administration)    0.9%  NaCl infusion    acetaminophen tablet 1,000 mg    apixaban tablet 5 mg    celecoxib capsule 200 mg    ciprofloxacin HCl 0.3 % ophthalmic solution 2 drop    ePHEDrine sulfate 10 mg    famotidine tablet 20 mg    morphine injection 2 mg    mupirocin 2 % ointment 1 g    naloxone 0.4 mg/mL injection 0.02 mg    ondansetron injection 4 mg    oxyCODONE 5 mg/5 mL solution 2.5 mg    oxyCODONE immediate release tablet 5 mg    polyethylene glycol packet 17 g    polyethylene glycol packet 17 g    pregabalin capsule 75 mg    promethazine (PHENERGAN) 6.25 mg in dextrose 5 % 50 mL IVPB    senna-docusate 8.6-50 mg per tablet 1 tablet     Objective:     Vital Signs (Most Recent):  Temp: 98.8 °F (37.1 °C) (12/11/20 0300)  Pulse: 98 (12/10/20 2000)  Resp: 16 (12/11/20 0526)  BP: (!) 140/80 (12/11/20 0300)  SpO2: 98 % (12/10/20 2000) Vital Signs (24h Range):  Temp:  [97.6 °F (36.4 °C)-99.4 °F (37.4 °C)] 98.8 °F (37.1 °C)  Pulse:  [90-98] 98  Resp:  [15-20] 16  SpO2:  [96 %-98 %] 98 %  BP: (124-140)/(76-86) 140/80     Weight: 86.2 kg (190 lb)  Height: 5' 11" (180.3 cm)  Body " mass index is 26.5 kg/m².      Intake/Output Summary (Last 24 hours) at 12/11/2020 0724  Last data filed at 12/10/2020 1400  Gross per 24 hour   Intake 254 ml   Output --   Net 254 ml       Ortho/SPM Exam       AAOx4  NAD  RRR  No increased WOB  Dressings in place   SILT and motor intact T/SP/DP  WWP extremities  FCDs in place and functioning      Significant Labs: All pertinent labs within the past 24 hours have been reviewed.    Significant Imaging: I have reviewed and interpreted all pertinent imaging results/findings.    Assessment/Plan:     * Hip pain  55 y.o. male POD4 s/p Bilateral BRIGIDO    Pain control: per APS  PT/OT: WBAT BLE, anterior hip precautions  DVT PPx:Eliquis 5mg BID , FCDs at all times when not ambulating  Abx: postop Ancef    Echo normal  EKG auto read nonspecific t wave abnormality no prior comparisons  Troponin normal  CTA chest normal      US BLE read as acute v chronic L sup femoral vein thrombus   US 5/21/20 showed partially occlusive left femoral and popliteal vein thrombus  Difficult to tell if new read is new or acute on chronic  Encourage oral hydration       Dispo: patient overall feeling much better and plan for dc home today               Siddhartha York MD  Orthopedics  [facility]

## 2020-12-11 NOTE — ASSESSMENT & PLAN NOTE
55 y.o. male POD4 s/p Bilateral BRIGIDO    Pain control: per APS  PT/OT: WBAT BLE, anterior hip precautions  DVT PPx:Eliquis 5mg BID , FCDs at all times when not ambulating  Abx: postop Ancef    Echo normal  EKG auto read nonspecific t wave abnormality no prior comparisons  Troponin normal  CTA chest normal      US BLE read as acute v chronic L sup femoral vein thrombus   US 5/21/20 showed partially occlusive left femoral and popliteal vein thrombus  Difficult to tell if new read is new or acute on chronic  Encourage oral hydration       Dispo: patient overall feeling much better and plan for dc home today        Procedure start right-sided chest tube placement

## 2020-12-11 NOTE — PLAN OF CARE
12/11/20 1043   Final Note   Assessment Type Final Discharge Note   Anticipated Discharge Disposition Home   What phone number can be called within the next 1-3 days to see how you are doing after discharge? [phone]   Hospital Follow Up  Appt(s) scheduled? Yes   Discharge plans and expectations educations in teach back method with documentation complete? Yes     Future Appointments   Date Time Provider Department Center   12/18/2020  1:40 PM [facility] LAB BMT [facility] LABBMT [place]   12/18/2020  2:40 PM Reji Sanders MD [facility] HC BMT [place]   12/22/2020  9:00 AM Ana Maria Maradiaga NP [facility] ORTHO [address]

## 2020-12-11 NOTE — PLAN OF CARE
Problem: Physical Therapy Goal  Goal: Physical Therapy Goal  Description: Goals to be met by: 2020    Patient will increase functional independence with mobility by performin. Supine to sit with supervision -met  2. Sit to stand transfer with Supervision-met  3. Gait x100 feet with Supervision using Rolling Walker-met  4. Ascend/Descend 6 inch curb step with Stand-by Assistance using Rolling Walker  5. Lower extremity exercise program x30 reps per handout, with supervision-met      Outcome: Ongoing, Progressing

## 2020-12-12 PROCEDURE — G0180 PR HOME HEALTH MD CERTIFICATION: ICD-10-PCS | Mod: ,,, | Performed by: ORTHOPAEDIC SURGERY

## 2020-12-12 PROCEDURE — G0180 MD CERTIFICATION HHA PATIENT: HCPCS | Mod: ,,, | Performed by: ORTHOPAEDIC SURGERY

## 2020-12-13 ENCOUNTER — PATIENT MESSAGE (OUTPATIENT)
Dept: SURGERY | Facility: HOSPITAL | Age: 55
End: 2020-12-13

## 2020-12-14 LAB
BLD PROD TYP BPU: NORMAL
BLD PROD TYP BPU: NORMAL
BLOOD UNIT EXPIRATION DATE: NORMAL
BLOOD UNIT EXPIRATION DATE: NORMAL
BLOOD UNIT TYPE CODE: 6200
BLOOD UNIT TYPE CODE: 6200
BLOOD UNIT TYPE: NORMAL
BLOOD UNIT TYPE: NORMAL
CODING SYSTEM: NORMAL
CODING SYSTEM: NORMAL
DISPENSE STATUS: NORMAL
DISPENSE STATUS: NORMAL
TRANS ERYTHROCYTES VOL PATIENT: NORMAL ML
TRANS ERYTHROCYTES VOL PATIENT: NORMAL ML

## 2020-12-17 ENCOUNTER — PATIENT MESSAGE (OUTPATIENT)
Dept: ORTHOPEDICS | Facility: CLINIC | Age: 55
End: 2020-12-17

## 2020-12-17 DIAGNOSIS — Z96.643 STATUS POST BILATERAL HIP REPLACEMENTS: Primary | ICD-10-CM

## 2020-12-17 RX ORDER — OXYCODONE HYDROCHLORIDE 5 MG/1
TABLET ORAL
Qty: 30 TABLET | Refills: 0 | Status: SHIPPED | OUTPATIENT
Start: 2020-12-17 | End: 2020-12-22 | Stop reason: SDUPTHER

## 2020-12-18 ENCOUNTER — LAB VISIT (OUTPATIENT)
Dept: LAB | Facility: HOSPITAL | Age: 55
End: 2020-12-18
Payer: COMMERCIAL

## 2020-12-18 ENCOUNTER — DOCUMENT SCAN (OUTPATIENT)
Dept: HOME HEALTH SERVICES | Facility: HOSPITAL | Age: 55
End: 2020-12-18
Payer: COMMERCIAL

## 2020-12-18 ENCOUNTER — OFFICE VISIT (OUTPATIENT)
Dept: HEMATOLOGY/ONCOLOGY | Facility: CLINIC | Age: 55
End: 2020-12-18
Payer: COMMERCIAL

## 2020-12-18 ENCOUNTER — EXTERNAL HOME HEALTH (OUTPATIENT)
Dept: HOME HEALTH SERVICES | Facility: HOSPITAL | Age: 55
End: 2020-12-18
Payer: COMMERCIAL

## 2020-12-18 VITALS
HEART RATE: 90 BPM | TEMPERATURE: 98 F | BODY MASS INDEX: 26.5 KG/M2 | HEIGHT: 71 IN | OXYGEN SATURATION: 99 % | DIASTOLIC BLOOD PRESSURE: 74 MMHG | RESPIRATION RATE: 20 BRPM | SYSTOLIC BLOOD PRESSURE: 113 MMHG

## 2020-12-18 DIAGNOSIS — R76.0 ANTIPHOSPHOLIPID ANTIBODY POSITIVE: ICD-10-CM

## 2020-12-18 DIAGNOSIS — Z86.718 HISTORY OF DVT (DEEP VEIN THROMBOSIS): Primary | ICD-10-CM

## 2020-12-18 DIAGNOSIS — I82.502 CHRONIC DEEP VEIN THROMBOSIS (DVT) OF LEFT LOWER EXTREMITY, UNSPECIFIED VEIN: ICD-10-CM

## 2020-12-18 PROCEDURE — 3008F PR BODY MASS INDEX (BMI) DOCUMENTED: ICD-10-PCS | Mod: CPTII,S$GLB,, | Performed by: INTERNAL MEDICINE

## 2020-12-18 PROCEDURE — 99213 OFFICE O/P EST LOW 20 MIN: CPT | Mod: S$GLB,,, | Performed by: INTERNAL MEDICINE

## 2020-12-18 PROCEDURE — 86146 BETA-2 GLYCOPROTEIN ANTIBODY: CPT

## 2020-12-18 PROCEDURE — 1125F AMNT PAIN NOTED PAIN PRSNT: CPT | Mod: S$GLB,,, | Performed by: INTERNAL MEDICINE

## 2020-12-18 PROCEDURE — 3008F BODY MASS INDEX DOCD: CPT | Mod: CPTII,S$GLB,, | Performed by: INTERNAL MEDICINE

## 2020-12-18 PROCEDURE — 86147 CARDIOLIPIN ANTIBODY EA IG: CPT | Mod: 59

## 2020-12-18 PROCEDURE — 99999 PR PBB SHADOW E&M-EST. PATIENT-LVL IV: CPT | Mod: PBBFAC,,, | Performed by: INTERNAL MEDICINE

## 2020-12-18 PROCEDURE — 85598 HEXAGNAL PHOSPH PLTLT NEUTRL: CPT

## 2020-12-18 PROCEDURE — 99213 PR OFFICE/OUTPT VISIT, EST, LEVL III, 20-29 MIN: ICD-10-PCS | Mod: S$GLB,,, | Performed by: INTERNAL MEDICINE

## 2020-12-18 PROCEDURE — 1125F PR PAIN SEVERITY QUANTIFIED, PAIN PRESENT: ICD-10-PCS | Mod: S$GLB,,, | Performed by: INTERNAL MEDICINE

## 2020-12-18 PROCEDURE — 99999 PR PBB SHADOW E&M-EST. PATIENT-LVL IV: ICD-10-PCS | Mod: PBBFAC,,, | Performed by: INTERNAL MEDICINE

## 2020-12-18 PROCEDURE — 85613 RUSSELL VIPER VENOM DILUTED: CPT

## 2020-12-20 ENCOUNTER — PATIENT MESSAGE (OUTPATIENT)
Dept: ADMINISTRATIVE | Facility: OTHER | Age: 55
End: 2020-12-20

## 2020-12-21 LAB
CARDIOLIPIN IGG SER IA-ACNC: <9.4 GPL (ref 0–14.99)
CARDIOLIPIN IGM SER IA-ACNC: 40.9 MPL (ref 0–12.49)

## 2020-12-21 NOTE — PROGRESS NOTES
HEMATOLOGY PROGRESS NOTE    IDENTIFYING STATEMENT   Edi Marie (Edi) is a 55 y.o. male with a  of 1965 from [place] with the diagnosis of history of recurrent VTE.      HEMATOLOGY HISTORY:    From Dr. Ferrer's last note    1. Mr. Marie is a 53 y.o. who presents for follow up on an acute on chronic DVT.  Since the last clinic visit the patient contacted our office on 19 complaining of pressure in his right calf.  US performed on 19 showed an occlusive thrombus in the left femoral and left popliteal vein as well as the right popliteal, right posterior tibial, and right peroneal vein.  The patient was started back on Eliquis 10mg BID for 7 days followed by 5 mg BID. Currently the patient states he feels well.  He states he had one other episode of right calf pain after restarting Eliquis but has not had any other episodes.  He denies any swelling in his lower extremities. He denies CP, SOB, fever, chills, night sweats, weight loss, new lumps or bumps, easy bruising or bleeding.  His last colonoscopy was on 17 with 3 polyps seen and recommendation for repeat colonoscopy in 3 years.     Clot History:  The patient initially presented to [facility] on 17 with complaint of left leg pain.  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  The patient then began having worsening swelling of the left leg over the subsequent month.  When in the hospital the patient underwent an US on 17 showing partially occlusive thrombus involving the left common femoral vein and left iliac vein with completely occlusive thrombus of the left superficial femoral vein, left popliteal vein, and left peroneal vein. The patient then underwent catheter assisted thrombolysis on 17 with the use of a EKOS device.  The patient was then continued on continuous tPA and angiomax for 30 hours and then  discharged home on Eliquis 10mg PO for 7 days and then 5mg BID thereafter.  The patient then had a follow up US performed on 11/29/17 which showed  PCP on  reidentification of largely occlusive thrombus within the left external iliac, common femoral, femoral, popliteal, and peroneal veins and new thrombosis of the posterior tibial vein.  The patient saw his PCP on 12/14/17 and was then sent to the Hematology clinic given the new thrombus in the posterior tibial vein seen.  The patient denied any personal history of clotting.  He had undergone prior surgeries with appendectomy and right knee surgery without developing clots.  The patient stated his mother developed a clot in an unknown location after a groin injury when she was 60.  She was on coumadin for 2 years and then taken off.  The patient denied any other family history of clotting.  The patient denied any FH of early pregnancy loss.  He stated he has a daughter with suspected Behçet disease.  US of the left lower extremity on 2/26/18 showed deep venous occlusive thrombosis of the left superficial femoral vein and partially occlusive thrombosis of the left popliteal vein mildly improved from prior US.  Repeat US on 6/04/18 showed partially occlusive deep venous thrombosis of the left superficial femoral and popliteal vein, mildly improved from prior exam. US done on 12/13/18 showed patency of the popliteal vein and partial thrombus of the left superficial femoral vein.  The patient completed 12 months of anticoagulation on 12/13/18 and was taken off of Eliquis.    INTERVAL HISTORY:      Mr. Marie returns to clinic for follow-up of recurrent VTE and now post-operatively after bilateral total hip arthroplasty. The procedure was uncomplicated, and blood losses were considered expected. He required red blood cell transfusion post-operatively, but he has otherwise recovered at this point. He has resumed anticoagulation. He is working on rehabbing and  ambulating with a walker.     Past Medical History, Past Social History and Past Family History have been reviewed and are unchanged except as noted in the interval history.    MEDICATIONS:     Current Outpatient Medications on File Prior to Visit   Medication Sig Dispense Refill    acetaminophen (TYLENOL) 650 MG TbSR Take 650 mg by mouth every 8 (eight) hours.      apixaban (ELIQUIS) 5 mg Tab Take 1/2 TAB (2.5mg) twice daily until 48 hours post op and then begin taking 1 tablet (5mg) twice daily 60 tablet 11    apixaban (ELIQUIS) 5 mg Tab Take 5 mg by mouth 2 (two) times daily.      celecoxib (CELEBREX) 200 MG capsule Take 1 capsule (200 mg total) by mouth once daily. 30 capsule 0    celecoxib (CELEBREX) 200 MG capsule Take 200 mg by mouth once daily.      oxyCODONE (ROXICODONE) 5 MG immediate release tablet Take 1-2 tablets by mouth every 4-6 hours as needed for pain 30 tablet 0    docusate sodium (COLACE) 100 MG capsule Take 1 capsule (100 mg total) by mouth 2 (two) times daily as needed for Constipation. (Patient not taking: Reported on 12/18/2020) 60 capsule 0     No current facility-administered medications on file prior to visit.        ALLERGIES: Review of patient's allergies indicates:  No Known Allergies     ROS:       Review of Systems   Constitutional: Negative for diaphoresis, fatigue, fever and unexpected weight change.   HENT:   Negative for lump/mass and sore throat.    Eyes: Negative for icterus.   Respiratory: Negative for cough and shortness of breath.    Cardiovascular: Negative for chest pain and palpitations.   Gastrointestinal: Negative for abdominal distention, constipation, diarrhea, nausea and vomiting.   Genitourinary: Negative for dysuria and frequency.    Musculoskeletal: Positive for back pain. Negative for arthralgias, gait problem and myalgias.        Occasional cramping in bilateral legs   Skin: Negative for rash.   Neurological: Negative for dizziness, gait problem and  "headaches.   Hematological: Negative for adenopathy. Does not bruise/bleed easily.   Psychiatric/Behavioral: The patient is not nervous/anxious.        PHYSICAL EXAM:  Vitals:    12/18/20 1434   BP: 113/74   Pulse: 90   Resp: 20   Temp: 98.4 °F (36.9 °C)   TempSrc: Oral   SpO2: 99%   Height: 5' 11" (1.803 m)   PainSc:   4   PainLoc: Hip       KARNOFSKY PERFORMANCE STATUS 80%  ECOG 1    Physical Exam  Constitutional:       General: He is not in acute distress.     Appearance: He is well-developed.   HENT:      Head: Normocephalic and atraumatic.      Mouth/Throat:      Mouth: No oral lesions.   Eyes:      Conjunctiva/sclera: Conjunctivae normal.   Neck:      Thyroid: No thyromegaly.   Cardiovascular:      Rate and Rhythm: Normal rate and regular rhythm.      Heart sounds: Normal heart sounds. No murmur.   Pulmonary:      Breath sounds: Normal breath sounds. No wheezing or rales.   Abdominal:      General: There is no distension.      Palpations: Abdomen is soft. There is no hepatomegaly, splenomegaly or mass.      Tenderness: There is no abdominal tenderness.   Lymphadenopathy:      Cervical: No cervical adenopathy.      Right cervical: No deep cervical adenopathy.     Left cervical: No deep cervical adenopathy.   Skin:     Findings: No rash.   Neurological:      Mental Status: He is alert and oriented to person, place, and time.      Cranial Nerves: No cranial nerve deficit.      Coordination: Coordination normal.      Deep Tendon Reflexes: Reflexes are normal and symmetric.         LAB:   Results for orders placed or performed in visit on 12/18/20   Cardiolipin antibody   Result Value Ref Range    APA Isotype IgG <9.40 0.00 - 14.99 GPL    APA Isotype IgM 40.90 (H) 0.00 - 12.49 MPL       PROBLEMS ASSESSED THIS VISIT:    1. History of DVT (deep vein thrombosis)    2. Antiphospholipid antibody positive        PLAN:       History of DVT of left lower extremity  Antiphospholipid antibody positive    Mr. Marie has " history of recurrent VTE to the left lower extremity and approriately is on extended anticoagulant therapy. He is at high risk for recurrent VTE off anticoagulation, and should continue apixaban unless there is a contraindication.    He has anti-beta-2 glycoprotein-I IgM antibody positive, and repeat labs were drawn today. He may have antiphospholipid syndrome. If this is the case, I will have him return to discuss anticoagulation with warfarin, which is considered the standard of care in APS.     Follow-up  Pending repeat APS tests    Reji Sanders MD  Hematology and Stem Cell Transplant

## 2020-12-22 ENCOUNTER — DOCUMENT SCAN (OUTPATIENT)
Dept: HOME HEALTH SERVICES | Facility: HOSPITAL | Age: 55
End: 2020-12-22
Payer: COMMERCIAL

## 2020-12-22 ENCOUNTER — OFFICE VISIT (OUTPATIENT)
Dept: ORTHOPEDICS | Facility: CLINIC | Age: 55
End: 2020-12-22
Payer: COMMERCIAL

## 2020-12-22 ENCOUNTER — TELEPHONE (OUTPATIENT)
Dept: HEMATOLOGY/ONCOLOGY | Facility: CLINIC | Age: 55
End: 2020-12-22

## 2020-12-22 DIAGNOSIS — D68.61 ANTIPHOSPHOLIPID SYNDROME: ICD-10-CM

## 2020-12-22 DIAGNOSIS — Z96.643 STATUS POST BILATERAL HIP REPLACEMENTS: ICD-10-CM

## 2020-12-22 LAB
B2 GLYCOPROT1 IGA SER QL: 16 SAU
B2 GLYCOPROT1 IGG SER QL: <9 SGU
B2 GLYCOPROT1 IGM SER QL: 117 SMU

## 2020-12-22 PROCEDURE — 99999 PR PBB SHADOW E&M-EST. PATIENT-LVL III: CPT | Mod: PBBFAC,,, | Performed by: NURSE PRACTITIONER

## 2020-12-22 PROCEDURE — 1125F AMNT PAIN NOTED PAIN PRSNT: CPT | Mod: S$GLB,,, | Performed by: NURSE PRACTITIONER

## 2020-12-22 PROCEDURE — 99024 PR POST-OP FOLLOW-UP VISIT: ICD-10-PCS | Mod: S$GLB,,, | Performed by: NURSE PRACTITIONER

## 2020-12-22 PROCEDURE — 99999 PR PBB SHADOW E&M-EST. PATIENT-LVL III: ICD-10-PCS | Mod: PBBFAC,,, | Performed by: NURSE PRACTITIONER

## 2020-12-22 PROCEDURE — 99024 POSTOP FOLLOW-UP VISIT: CPT | Mod: S$GLB,,, | Performed by: NURSE PRACTITIONER

## 2020-12-22 PROCEDURE — 1125F PR PAIN SEVERITY QUANTIFIED, PAIN PRESENT: ICD-10-PCS | Mod: S$GLB,,, | Performed by: NURSE PRACTITIONER

## 2020-12-22 RX ORDER — OXYCODONE HYDROCHLORIDE 5 MG/1
TABLET ORAL
Qty: 30 TABLET | Refills: 0 | Status: SHIPPED | OUTPATIENT
Start: 2020-12-22 | End: 2021-08-17

## 2020-12-22 NOTE — PROGRESS NOTES
Edi Marie presents for initial post-operative visit following a bilateral total hip arthroplasty performed by Dr. Heath on 12/7/2020.        Exam:  Patient is ambulating well with a walker for balance.   Incision is clean and dry without drainage or erythema.      Initial post-operative radiographs reviewed today revealing satisfactory position of the prosthesis.    A/P  2 weeks s/p bilateral total hip replacement  - Patient advised to keep the incision clean and dry for the next 24 hours after which he may wash the area with antibacterial soap in the shower, but is advised not to submerge until the incision is completely healed.  - Continue eliquis as ordered   - Reviewed antibiotic prophylaxis  - Pain medication refilled  - Follow up in 4 weeks with Dr. Heath. Pt will call clinic immediately with problems/concerns.

## 2020-12-22 NOTE — TELEPHONE ENCOUNTER
Called Mr. Marie regarding beta-2 glycoprotein anitbody studies. He has positive anti-beta-2 glycoprotein-I IgM antibody in high titer on two occasions at least three months apart. He has history of VTE. He therefore meets criteria for antiphospholipid syndrome (APS). I disclosed this diagnosis to him.    We discussed that individuals with APS have high risk of both venous and arterial thrombosis. Based on currently available evidence, warfarin is the preferred anticoagulant for secondary thrombosis prevention.    He is unwilling to transition to warfarin at this time. I cited randomized trials demonstrating inferiority of DOACs to warfarin in secondary thrombosis prevention, and he demonstrated good understanding of this concept. He would prefer to remain on apixaban.    Will plan follow-up in 6 months for surveillance and discuss long-term anticoagulation follow-up.    Reji Sanders MD

## 2020-12-28 LAB — APTT HEX PL PPP: NEGATIVE S

## 2020-12-29 LAB — LA PPP-IMP: NORMAL

## 2021-01-06 DIAGNOSIS — Z96.643 STATUS POST BILATERAL HIP REPLACEMENTS: Primary | ICD-10-CM

## 2021-01-21 ENCOUNTER — HOSPITAL ENCOUNTER (OUTPATIENT)
Dept: RADIOLOGY | Facility: HOSPITAL | Age: 56
Discharge: HOME | End: 2021-01-21
Attending: ORTHOPAEDIC SURGERY
Payer: COMMERCIAL

## 2021-01-21 DIAGNOSIS — Z96.643 STATUS POST BILATERAL HIP REPLACEMENTS: ICD-10-CM

## 2021-01-21 PROCEDURE — 73521 XR HIPS BILATERAL 2 VIEW INCL AP PELVIS: ICD-10-PCS | Mod: 26,,, | Performed by: RADIOLOGY

## 2021-01-21 PROCEDURE — 73521 X-RAY EXAM HIPS BI 2 VIEWS: CPT | Mod: TC

## 2021-01-21 PROCEDURE — 73521 X-RAY EXAM HIPS BI 2 VIEWS: CPT | Mod: 26,,, | Performed by: RADIOLOGY

## 2021-01-22 ENCOUNTER — OFFICE VISIT (OUTPATIENT)
Dept: ORTHOPEDICS | Facility: CLINIC | Age: 56
End: 2021-01-22
Payer: COMMERCIAL

## 2021-01-22 VITALS — HEIGHT: 71 IN | WEIGHT: 190.06 LBS | BODY MASS INDEX: 26.61 KG/M2

## 2021-01-22 DIAGNOSIS — Z96.643 STATUS POST BILATERAL TOTAL HIP REPLACEMENT: Primary | ICD-10-CM

## 2021-01-22 PROCEDURE — 1125F AMNT PAIN NOTED PAIN PRSNT: CPT | Mod: S$GLB,,, | Performed by: ORTHOPAEDIC SURGERY

## 2021-01-22 PROCEDURE — 3008F BODY MASS INDEX DOCD: CPT | Mod: CPTII,S$GLB,, | Performed by: ORTHOPAEDIC SURGERY

## 2021-01-22 PROCEDURE — 1125F PR PAIN SEVERITY QUANTIFIED, PAIN PRESENT: ICD-10-PCS | Mod: S$GLB,,, | Performed by: ORTHOPAEDIC SURGERY

## 2021-01-22 PROCEDURE — 99024 POSTOP FOLLOW-UP VISIT: CPT | Mod: S$GLB,,, | Performed by: ORTHOPAEDIC SURGERY

## 2021-01-22 PROCEDURE — 3008F PR BODY MASS INDEX (BMI) DOCUMENTED: ICD-10-PCS | Mod: CPTII,S$GLB,, | Performed by: ORTHOPAEDIC SURGERY

## 2021-01-22 PROCEDURE — 99024 PR POST-OP FOLLOW-UP VISIT: ICD-10-PCS | Mod: S$GLB,,, | Performed by: ORTHOPAEDIC SURGERY

## 2021-01-22 PROCEDURE — 99999 PR PBB SHADOW E&M-EST. PATIENT-LVL III: CPT | Mod: PBBFAC,,, | Performed by: ORTHOPAEDIC SURGERY

## 2021-01-22 PROCEDURE — 99999 PR PBB SHADOW E&M-EST. PATIENT-LVL III: ICD-10-PCS | Mod: PBBFAC,,, | Performed by: ORTHOPAEDIC SURGERY

## 2021-01-24 ENCOUNTER — PATIENT MESSAGE (OUTPATIENT)
Dept: HEMATOLOGY/ONCOLOGY | Facility: CLINIC | Age: 56
End: 2021-01-24

## 2021-01-24 DIAGNOSIS — I82.4Z3 ACUTE DEEP VEIN THROMBOSIS (DVT) OF DISTAL VEIN OF BOTH LOWER EXTREMITIES: ICD-10-CM

## 2021-02-15 ENCOUNTER — PATIENT MESSAGE (OUTPATIENT)
Dept: ORTHOPEDICS | Facility: CLINIC | Age: 56
End: 2021-02-15

## 2021-02-15 DIAGNOSIS — Z96.643 STATUS POST BILATERAL TOTAL HIP REPLACEMENT: Primary | ICD-10-CM

## 2021-03-05 ENCOUNTER — HOSPITAL ENCOUNTER (OUTPATIENT)
Dept: RADIOLOGY | Facility: HOSPITAL | Age: 56
Discharge: HOME | End: 2021-03-05
Attending: ORTHOPAEDIC SURGERY
Payer: COMMERCIAL

## 2021-03-05 ENCOUNTER — OFFICE VISIT (OUTPATIENT)
Dept: ORTHOPEDICS | Facility: CLINIC | Age: 56
End: 2021-03-05
Attending: ORTHOPAEDIC SURGERY
Payer: COMMERCIAL

## 2021-03-05 DIAGNOSIS — Z96.643 STATUS POST BILATERAL TOTAL HIP REPLACEMENT: Primary | ICD-10-CM

## 2021-03-05 DIAGNOSIS — Z96.643 STATUS POST BILATERAL TOTAL HIP REPLACEMENT: ICD-10-CM

## 2021-03-05 PROCEDURE — 72170 X-RAY EXAM OF PELVIS: CPT | Mod: 26,,, | Performed by: RADIOLOGY

## 2021-03-05 PROCEDURE — 72170 X-RAY EXAM OF PELVIS: CPT | Mod: TC

## 2021-03-05 PROCEDURE — 72170 XR PELVIS ROUTINE AP: ICD-10-PCS | Mod: 26,,, | Performed by: RADIOLOGY

## 2021-03-05 PROCEDURE — 99999 PR PBB SHADOW E&M-EST. PATIENT-LVL II: CPT | Mod: PBBFAC,,, | Performed by: ORTHOPAEDIC SURGERY

## 2021-03-05 PROCEDURE — 99024 PR POST-OP FOLLOW-UP VISIT: ICD-10-PCS | Mod: S$GLB,,, | Performed by: ORTHOPAEDIC SURGERY

## 2021-03-05 PROCEDURE — 99024 POSTOP FOLLOW-UP VISIT: CPT | Mod: S$GLB,,, | Performed by: ORTHOPAEDIC SURGERY

## 2021-03-05 PROCEDURE — 99999 PR PBB SHADOW E&M-EST. PATIENT-LVL II: ICD-10-PCS | Mod: PBBFAC,,, | Performed by: ORTHOPAEDIC SURGERY

## 2021-04-21 ENCOUNTER — PATIENT MESSAGE (OUTPATIENT)
Dept: HEMATOLOGY/ONCOLOGY | Facility: CLINIC | Age: 56
End: 2021-04-21

## 2021-04-21 DIAGNOSIS — I82.4Z3 ACUTE DEEP VEIN THROMBOSIS (DVT) OF DISTAL VEIN OF BOTH LOWER EXTREMITIES: ICD-10-CM

## 2021-05-15 NOTE — DISCHARGE SUMMARY
[facility]  Orthopedics  Discharge Summary      Patient Name: Edi Marie  MRN: [id number]  Admission Date: 12/7/2020  Hospital Length of Stay: 4 days  Discharge Date and Time: 12/11/2020 10:59 AM  Attending Physician: No att. providers found   Discharging Provider: Siddhartha York MD  Primary Care Provider: More Villanueva MD    HPI: See H&P    Procedure(s) (LRB):  ARTHROPLASTY, HIP, BILATERAL: ANTERIOR: DEPUY-ACTIS+PINNACLE (Bilateral)      Hospital Course: Patient presented for above procedure.  Tolerated it well and was discharged home POD1 after voiding, tolerating diet, ambulating, pain controlled.  Patient was informed about signs and symptoms of compartment syndrome and if any of these should present then he should immediately call us back and come to the ED.  Discharge instructions, follow-up appointment, and med rec are below.      Consults (From admission, onward)        Status Ordering Provider     Inpatient consult to Cardiology  Once     Provider:  (Not yet assigned)    SIDDHARTHA Webster          Significant Diagnostic Studies: Labs:   CBC   Recent Labs   Lab 12/10/20  1513   HGB 11.7*       Pending Diagnostic Studies:     None        Final Active Diagnoses:    Diagnosis Date Noted POA    PRINCIPAL PROBLEM:  Hip pain [M25.559] 12/07/2020 Yes    Pre-syncope [R55] 12/09/2020 Unknown    Status post bilateral total hip replacement 12/7/2020 [Z96.643] 12/03/2020 Not Applicable      Problems Resolved During this Admission:      Discharged Condition: good    Disposition: Home or Self Care    Follow Up:  Follow-up Information     Call More Villanueva MD.    Specialty: Internal Medicine  Why: As needed  Contact information:  [address]  [facility] [address]  [phone]             Call [facility].    Specialty: Home Health Services  Why: As needed  Contact information:  [address]  [address]  [facility] [id number] [phone]              Ana Maria Maradiaga NP On 12/22/2020.    Specialty: Orthopedic Surgery  Why: Post op Tuesday 12/22 @ 9am  Contact information:  [address]  [facility] [address]  [phone]                 Patient Instructions:      Activity as tolerated     Call MD for:  difficulty breathing, headache or visual disturbances     Call MD for:  extreme fatigue     Call MD for:  hives     Call MD for:  persistent dizziness or light-headedness     Call MD for:  persistent nausea and vomiting     Call MD for:  redness, tenderness, or signs of infection (pain, swelling, redness, odor or green/yellow discharge around incision site)     Call MD for:  severe uncontrolled pain     Call MD for:  temperature >100.4     Call MD for: fluid/liquid/drainage on dressing     Leave dressing on - Keep it clean, dry, and intact until clinic visit   Order Comments: Do not remove surgical dressing for 2 weeks post-op. This will be done only by MD at initial post-op visit. If dressing is completely saturated, replace with identical dressing - silver-impregnated hydrocolloid dressing.    Do not get dressings wet. Do not shower.    If dressing continues to be saturated or there are signs of infection, please call Ortho Clinic [phone] for further instructions and to make appt to be seen.     Lifting restrictions   Order Comments: No strenuous exercise or lifting of > 10 lbs     No driving, operating heavy equipment or signing legal documents while taking pain medication     Weight bearing as tolerated     Ok to shower at home, running water only, towel dry, use shower chair for safety, no soaking in a tub or pool for one month.     Medications:  Reconciled Home Medications:      Medication List      START taking these medications    ciprofloxacin HCl 0.3 % ophthalmic solution  Commonly known as: CILOXAN  Place 2 drops into the right eye 3 (three) times daily. If symptoms unresolved following 3 days schedule follow up with Ophthalmology Clinic at  General [phone] for 3 days     oxyCODONE 5 MG immediate release tablet  Commonly known as: ROXICODONE  Take 1-2 tablets by mouth every 4-6 hours as needed for pain     STOOL SOFTENER 100 MG capsule  Generic drug: docusate sodium  Take 1 capsule (100 mg total) by mouth 2 (two) times daily as needed for Constipation.        CHANGE how you take these medications    * celecoxib 200 MG capsule  Commonly known as: CeleBREX  Take 200 mg by mouth once daily.  What changed: Another medication with the same name was added. Make sure you understand how and when to take each.     * celecoxib 200 MG capsule  Commonly known as: CeleBREX  Take 1 capsule (200 mg total) by mouth once daily.  What changed: You were already taking a medication with the same name, and this prescription was added. Make sure you understand how and when to take each.         * This list has 2 medication(s) that are the same as other medications prescribed for you. Read the directions carefully, and ask your doctor or other care provider to review them with you.            CONTINUE taking these medications    acetaminophen 650 MG Tbsr  Commonly known as: TYLENOL  Take 650 mg by mouth every 8 (eight) hours.     * apixaban 5 mg Tab  Commonly known as: ELIQUIS  Take 5 mg by mouth 2 (two) times daily.     * ELIQUIS 5 mg Tab  Generic drug: apixaban  Take 1/2 TAB (2.5mg) twice daily until 48 hours post op and then begin taking 1 tablet (5mg) twice daily         * This list has 2 medication(s) that are the same as other medications prescribed for you. Read the directions carefully, and ask your doctor or other care provider to review them with you.                Siddhartha York MD  Orthopedics  [facility]

## 2021-07-07 ENCOUNTER — PATIENT MESSAGE (OUTPATIENT)
Dept: ADMINISTRATIVE | Facility: HOSPITAL | Age: 56
End: 2021-07-07

## 2021-08-17 ENCOUNTER — LAB VISIT (OUTPATIENT)
Dept: LAB | Facility: HOSPITAL | Age: 56
End: 2021-08-17
Attending: INTERNAL MEDICINE
Payer: COMMERCIAL

## 2021-08-17 ENCOUNTER — OFFICE VISIT (OUTPATIENT)
Dept: INTERNAL MEDICINE | Facility: CLINIC | Age: 56
End: 2021-08-17
Payer: COMMERCIAL

## 2021-08-17 VITALS
WEIGHT: 198.88 LBS | OXYGEN SATURATION: 97 % | BODY MASS INDEX: 27.84 KG/M2 | DIASTOLIC BLOOD PRESSURE: 76 MMHG | HEART RATE: 85 BPM | HEIGHT: 71 IN | SYSTOLIC BLOOD PRESSURE: 110 MMHG

## 2021-08-17 DIAGNOSIS — Z12.11 COLON CANCER SCREENING: ICD-10-CM

## 2021-08-17 DIAGNOSIS — Z00.00 WELLNESS EXAMINATION: Primary | ICD-10-CM

## 2021-08-17 DIAGNOSIS — Z00.00 WELLNESS EXAMINATION: ICD-10-CM

## 2021-08-17 LAB
ALBUMIN SERPL BCP-MCNC: 4.5 G/DL (ref 3.5–5.2)
ALP SERPL-CCNC: 73 U/L (ref 55–135)
ALT SERPL W/O P-5'-P-CCNC: 40 U/L (ref 10–44)
ANION GAP SERPL CALC-SCNC: 11 MMOL/L (ref 8–16)
AST SERPL-CCNC: 30 U/L (ref 10–40)
BASOPHILS # BLD AUTO: 0.07 K/UL (ref 0–0.2)
BASOPHILS NFR BLD: 1 % (ref 0–1.9)
BILIRUB SERPL-MCNC: 0.9 MG/DL (ref 0.1–1)
BUN SERPL-MCNC: 25 MG/DL (ref 6–20)
CALCIUM SERPL-MCNC: 9.7 MG/DL (ref 8.7–10.5)
CHLORIDE SERPL-SCNC: 105 MMOL/L (ref 95–110)
CHOLEST SERPL-MCNC: 234 MG/DL (ref 120–199)
CHOLEST/HDLC SERPL: 3.5 {RATIO} (ref 2–5)
CO2 SERPL-SCNC: 23 MMOL/L (ref 23–29)
COMPLEXED PSA SERPL-MCNC: 0.59 NG/ML (ref 0–4)
CREAT SERPL-MCNC: 1 MG/DL (ref 0.5–1.4)
DIFFERENTIAL METHOD: ABNORMAL
EOSINOPHIL # BLD AUTO: 0.2 K/UL (ref 0–0.5)
EOSINOPHIL NFR BLD: 2.3 % (ref 0–8)
ERYTHROCYTE [DISTWIDTH] IN BLOOD BY AUTOMATED COUNT: 13.7 % (ref 11.5–14.5)
EST. GFR  (AFRICAN AMERICAN): >60 ML/MIN/1.73 M^2
EST. GFR  (NON AFRICAN AMERICAN): >60 ML/MIN/1.73 M^2
GLUCOSE SERPL-MCNC: 86 MG/DL (ref 70–110)
HCT VFR BLD AUTO: 51.8 % (ref 40–54)
HDLC SERPL-MCNC: 66 MG/DL (ref 40–75)
HDLC SERPL: 28.2 % (ref 20–50)
HGB BLD-MCNC: 17.7 G/DL (ref 14–18)
IMM GRANULOCYTES # BLD AUTO: 0.02 K/UL (ref 0–0.04)
IMM GRANULOCYTES NFR BLD AUTO: 0.3 % (ref 0–0.5)
LDLC SERPL CALC-MCNC: 138.6 MG/DL (ref 63–159)
LYMPHOCYTES # BLD AUTO: 1.6 K/UL (ref 1–4.8)
LYMPHOCYTES NFR BLD: 22.2 % (ref 18–48)
MCH RBC QN AUTO: 31.9 PG (ref 27–31)
MCHC RBC AUTO-ENTMCNC: 34.2 G/DL (ref 32–36)
MCV RBC AUTO: 93 FL (ref 82–98)
MONOCYTES # BLD AUTO: 0.6 K/UL (ref 0.3–1)
MONOCYTES NFR BLD: 9.1 % (ref 4–15)
NEUTROPHILS # BLD AUTO: 4.6 K/UL (ref 1.8–7.7)
NEUTROPHILS NFR BLD: 65.1 % (ref 38–73)
NONHDLC SERPL-MCNC: 168 MG/DL
NRBC BLD-RTO: 0 /100 WBC
PLATELET # BLD AUTO: 397 K/UL (ref 150–450)
PMV BLD AUTO: 10.2 FL (ref 9.2–12.9)
POTASSIUM SERPL-SCNC: 4.6 MMOL/L (ref 3.5–5.1)
PROT SERPL-MCNC: 7.9 G/DL (ref 6–8.4)
RBC # BLD AUTO: 5.55 M/UL (ref 4.6–6.2)
SODIUM SERPL-SCNC: 139 MMOL/L (ref 136–145)
TRIGL SERPL-MCNC: 147 MG/DL (ref 30–150)
WBC # BLD AUTO: 7.04 K/UL (ref 3.9–12.7)

## 2021-08-17 PROCEDURE — 3078F DIAST BP <80 MM HG: CPT | Mod: CPTII,S$GLB,, | Performed by: INTERNAL MEDICINE

## 2021-08-17 PROCEDURE — 36415 COLL VENOUS BLD VENIPUNCTURE: CPT | Performed by: INTERNAL MEDICINE

## 2021-08-17 PROCEDURE — 99396 PR PREVENTIVE VISIT,EST,40-64: ICD-10-PCS | Mod: S$GLB,,, | Performed by: INTERNAL MEDICINE

## 2021-08-17 PROCEDURE — 99999 PR PBB SHADOW E&M-EST. PATIENT-LVL III: CPT | Mod: PBBFAC,,, | Performed by: INTERNAL MEDICINE

## 2021-08-17 PROCEDURE — 3074F PR MOST RECENT SYSTOLIC BLOOD PRESSURE < 130 MM HG: ICD-10-PCS | Mod: CPTII,S$GLB,, | Performed by: INTERNAL MEDICINE

## 2021-08-17 PROCEDURE — 84153 ASSAY OF PSA TOTAL: CPT | Performed by: INTERNAL MEDICINE

## 2021-08-17 PROCEDURE — 1126F AMNT PAIN NOTED NONE PRSNT: CPT | Mod: CPTII,S$GLB,, | Performed by: INTERNAL MEDICINE

## 2021-08-17 PROCEDURE — 1126F PR PAIN SEVERITY QUANTIFIED, NO PAIN PRESENT: ICD-10-PCS | Mod: CPTII,S$GLB,, | Performed by: INTERNAL MEDICINE

## 2021-08-17 PROCEDURE — 99396 PREV VISIT EST AGE 40-64: CPT | Mod: S$GLB,,, | Performed by: INTERNAL MEDICINE

## 2021-08-17 PROCEDURE — 80061 LIPID PANEL: CPT | Performed by: INTERNAL MEDICINE

## 2021-08-17 PROCEDURE — 1159F MED LIST DOCD IN RCRD: CPT | Mod: CPTII,S$GLB,, | Performed by: INTERNAL MEDICINE

## 2021-08-17 PROCEDURE — 3074F SYST BP LT 130 MM HG: CPT | Mod: CPTII,S$GLB,, | Performed by: INTERNAL MEDICINE

## 2021-08-17 PROCEDURE — 85025 COMPLETE CBC W/AUTO DIFF WBC: CPT | Performed by: INTERNAL MEDICINE

## 2021-08-17 PROCEDURE — 99999 PR PBB SHADOW E&M-EST. PATIENT-LVL III: ICD-10-PCS | Mod: PBBFAC,,, | Performed by: INTERNAL MEDICINE

## 2021-08-17 PROCEDURE — 3008F BODY MASS INDEX DOCD: CPT | Mod: CPTII,S$GLB,, | Performed by: INTERNAL MEDICINE

## 2021-08-17 PROCEDURE — 80053 COMPREHEN METABOLIC PANEL: CPT | Performed by: INTERNAL MEDICINE

## 2021-08-17 PROCEDURE — 3008F PR BODY MASS INDEX (BMI) DOCUMENTED: ICD-10-PCS | Mod: CPTII,S$GLB,, | Performed by: INTERNAL MEDICINE

## 2021-08-17 PROCEDURE — 1159F PR MEDICATION LIST DOCUMENTED IN MEDICAL RECORD: ICD-10-PCS | Mod: CPTII,S$GLB,, | Performed by: INTERNAL MEDICINE

## 2021-08-17 PROCEDURE — 3078F PR MOST RECENT DIASTOLIC BLOOD PRESSURE < 80 MM HG: ICD-10-PCS | Mod: CPTII,S$GLB,, | Performed by: INTERNAL MEDICINE

## 2021-08-18 ENCOUNTER — PATIENT MESSAGE (OUTPATIENT)
Dept: HEMATOLOGY/ONCOLOGY | Facility: CLINIC | Age: 56
End: 2021-08-18

## 2021-09-15 ENCOUNTER — PATIENT MESSAGE (OUTPATIENT)
Dept: HEMATOLOGY/ONCOLOGY | Facility: CLINIC | Age: 56
End: 2021-09-15

## 2021-10-05 ENCOUNTER — PATIENT MESSAGE (OUTPATIENT)
Dept: ORTHOPEDICS | Facility: CLINIC | Age: 56
End: 2021-10-05

## 2021-10-27 ENCOUNTER — PATIENT MESSAGE (OUTPATIENT)
Dept: HEMATOLOGY/ONCOLOGY | Facility: CLINIC | Age: 56
End: 2021-10-27
Payer: COMMERCIAL

## 2021-10-27 DIAGNOSIS — Z86.718 HISTORY OF DVT (DEEP VEIN THROMBOSIS): Primary | ICD-10-CM

## 2021-10-29 ENCOUNTER — HOSPITAL ENCOUNTER (OUTPATIENT)
Dept: RADIOLOGY | Facility: HOSPITAL | Age: 56
Discharge: HOME | End: 2021-10-29
Attending: INTERNAL MEDICINE
Payer: COMMERCIAL

## 2021-10-29 DIAGNOSIS — Z86.718 HISTORY OF DVT (DEEP VEIN THROMBOSIS): ICD-10-CM

## 2021-10-29 PROCEDURE — 93970 EXTREMITY STUDY: CPT | Mod: 26,,, | Performed by: STUDENT IN AN ORGANIZED HEALTH CARE EDUCATION/TRAINING PROGRAM

## 2021-10-29 PROCEDURE — 93970 EXTREMITY STUDY: CPT | Mod: TC

## 2021-10-29 PROCEDURE — 93970 US LOWER EXTREMITY VEINS BILATERAL: ICD-10-PCS | Mod: 26,,, | Performed by: STUDENT IN AN ORGANIZED HEALTH CARE EDUCATION/TRAINING PROGRAM

## 2021-11-09 ENCOUNTER — PATIENT MESSAGE (OUTPATIENT)
Dept: INTERNAL MEDICINE | Facility: CLINIC | Age: 56
End: 2021-11-09

## 2021-11-09 ENCOUNTER — OFFICE VISIT (OUTPATIENT)
Dept: INTERNAL MEDICINE | Facility: CLINIC | Age: 56
End: 2021-11-09
Payer: COMMERCIAL

## 2021-11-09 VITALS
HEART RATE: 81 BPM | SYSTOLIC BLOOD PRESSURE: 112 MMHG | DIASTOLIC BLOOD PRESSURE: 70 MMHG | HEIGHT: 71 IN | OXYGEN SATURATION: 97 % | BODY MASS INDEX: 27.47 KG/M2 | WEIGHT: 196.19 LBS

## 2021-11-09 DIAGNOSIS — I82.502 CHRONIC DEEP VEIN THROMBOSIS (DVT) OF LEFT LOWER EXTREMITY, UNSPECIFIED VEIN: ICD-10-CM

## 2021-11-09 DIAGNOSIS — R22.1 NECK FULLNESS: ICD-10-CM

## 2021-11-09 DIAGNOSIS — M15.9 PRIMARY OSTEOARTHRITIS INVOLVING MULTIPLE JOINTS: ICD-10-CM

## 2021-11-09 DIAGNOSIS — Z12.11 COLON CANCER SCREENING: ICD-10-CM

## 2021-11-09 DIAGNOSIS — E78.2 MIXED HYPERLIPIDEMIA: ICD-10-CM

## 2021-11-09 DIAGNOSIS — Z00.00 ROUTINE MEDICAL EXAM: Primary | ICD-10-CM

## 2021-11-09 PROCEDURE — 99999 PR PBB SHADOW E&M-EST. PATIENT-LVL III: CPT | Mod: PBBFAC,,, | Performed by: INTERNAL MEDICINE

## 2021-11-09 PROCEDURE — 99214 PR OFFICE/OUTPT VISIT, EST, LEVL IV, 30-39 MIN: ICD-10-PCS | Mod: S$GLB,,, | Performed by: INTERNAL MEDICINE

## 2021-11-09 PROCEDURE — 3078F PR MOST RECENT DIASTOLIC BLOOD PRESSURE < 80 MM HG: ICD-10-PCS | Mod: CPTII,S$GLB,, | Performed by: INTERNAL MEDICINE

## 2021-11-09 PROCEDURE — 3008F PR BODY MASS INDEX (BMI) DOCUMENTED: ICD-10-PCS | Mod: CPTII,S$GLB,, | Performed by: INTERNAL MEDICINE

## 2021-11-09 PROCEDURE — 99214 OFFICE O/P EST MOD 30 MIN: CPT | Mod: S$GLB,,, | Performed by: INTERNAL MEDICINE

## 2021-11-09 PROCEDURE — 3074F PR MOST RECENT SYSTOLIC BLOOD PRESSURE < 130 MM HG: ICD-10-PCS | Mod: CPTII,S$GLB,, | Performed by: INTERNAL MEDICINE

## 2021-11-09 PROCEDURE — 1159F MED LIST DOCD IN RCRD: CPT | Mod: CPTII,S$GLB,, | Performed by: INTERNAL MEDICINE

## 2021-11-09 PROCEDURE — 99999 PR PBB SHADOW E&M-EST. PATIENT-LVL III: ICD-10-PCS | Mod: PBBFAC,,, | Performed by: INTERNAL MEDICINE

## 2021-11-09 PROCEDURE — 3074F SYST BP LT 130 MM HG: CPT | Mod: CPTII,S$GLB,, | Performed by: INTERNAL MEDICINE

## 2021-11-09 PROCEDURE — 1159F PR MEDICATION LIST DOCUMENTED IN MEDICAL RECORD: ICD-10-PCS | Mod: CPTII,S$GLB,, | Performed by: INTERNAL MEDICINE

## 2021-11-09 PROCEDURE — 3078F DIAST BP <80 MM HG: CPT | Mod: CPTII,S$GLB,, | Performed by: INTERNAL MEDICINE

## 2021-11-09 PROCEDURE — 3008F BODY MASS INDEX DOCD: CPT | Mod: CPTII,S$GLB,, | Performed by: INTERNAL MEDICINE

## 2021-11-10 ENCOUNTER — HOSPITAL ENCOUNTER (OUTPATIENT)
Dept: RADIOLOGY | Facility: HOSPITAL | Age: 56
Discharge: HOME | End: 2021-11-10
Attending: INTERNAL MEDICINE
Payer: COMMERCIAL

## 2021-11-10 ENCOUNTER — PATIENT MESSAGE (OUTPATIENT)
Dept: PHARMACY | Facility: CLINIC | Age: 56
End: 2021-11-10
Payer: COMMERCIAL

## 2021-11-10 DIAGNOSIS — M15.9 PRIMARY OSTEOARTHRITIS INVOLVING MULTIPLE JOINTS: ICD-10-CM

## 2021-11-10 PROCEDURE — 73140 X-RAY EXAM OF FINGER(S): CPT | Mod: TC,RT

## 2021-11-10 PROCEDURE — 73140 X-RAY EXAM OF FINGER(S): CPT | Mod: 26,RT,, | Performed by: RADIOLOGY

## 2021-11-10 PROCEDURE — 73140 XR FINGER 2 OR MORE VIEWS RIGHT: ICD-10-PCS | Mod: 26,RT,, | Performed by: RADIOLOGY

## 2021-11-16 ENCOUNTER — TELEPHONE (OUTPATIENT)
Dept: INTERNAL MEDICINE | Facility: CLINIC | Age: 56
End: 2021-11-16
Payer: COMMERCIAL

## 2021-11-19 ENCOUNTER — HOSPITAL ENCOUNTER (OUTPATIENT)
Dept: RADIOLOGY | Facility: HOSPITAL | Age: 56
Discharge: HOME | End: 2021-11-19
Attending: INTERNAL MEDICINE
Payer: COMMERCIAL

## 2021-11-19 DIAGNOSIS — R22.1 NECK FULLNESS: ICD-10-CM

## 2021-11-19 PROCEDURE — 76536 US EXAM OF HEAD AND NECK: CPT | Mod: TC

## 2021-11-19 PROCEDURE — 76536 US SOFT TISSUE HEAD NECK THYROID: ICD-10-PCS | Mod: 26,,, | Performed by: INTERNAL MEDICINE

## 2021-11-19 PROCEDURE — 76536 US EXAM OF HEAD AND NECK: CPT | Mod: 26,,, | Performed by: INTERNAL MEDICINE

## 2021-11-21 ENCOUNTER — PATIENT MESSAGE (OUTPATIENT)
Dept: INTERNAL MEDICINE | Facility: CLINIC | Age: 56
End: 2021-11-21
Payer: COMMERCIAL

## 2021-11-21 DIAGNOSIS — E04.2 MULTINODULAR GOITER: Primary | ICD-10-CM

## 2021-11-21 DIAGNOSIS — E04.2 MULTIPLE THYROID NODULES: ICD-10-CM

## 2021-11-23 ENCOUNTER — TELEPHONE (OUTPATIENT)
Dept: INTERNAL MEDICINE | Facility: CLINIC | Age: 56
End: 2021-11-23
Payer: COMMERCIAL

## 2021-11-24 ENCOUNTER — PATIENT MESSAGE (OUTPATIENT)
Dept: INTERNAL MEDICINE | Facility: CLINIC | Age: 56
End: 2021-11-24
Payer: COMMERCIAL

## 2021-11-24 ENCOUNTER — PATIENT MESSAGE (OUTPATIENT)
Dept: ORTHOPEDICS | Facility: CLINIC | Age: 56
End: 2021-11-24
Payer: COMMERCIAL

## 2021-11-24 DIAGNOSIS — Z96.643 STATUS POST BILATERAL TOTAL HIP REPLACEMENT: Primary | ICD-10-CM

## 2021-12-07 ENCOUNTER — OFFICE VISIT (OUTPATIENT)
Dept: ORTHOPEDICS | Facility: CLINIC | Age: 56
End: 2021-12-07
Payer: COMMERCIAL

## 2021-12-07 ENCOUNTER — HOSPITAL ENCOUNTER (OUTPATIENT)
Dept: RADIOLOGY | Facility: HOSPITAL | Age: 56
Discharge: HOME | End: 2021-12-07
Attending: ORTHOPAEDIC SURGERY
Payer: COMMERCIAL

## 2021-12-07 VITALS — HEIGHT: 71 IN | BODY MASS INDEX: 27.58 KG/M2 | WEIGHT: 197 LBS

## 2021-12-07 DIAGNOSIS — Z96.643 STATUS POST BILATERAL TOTAL HIP REPLACEMENT: ICD-10-CM

## 2021-12-07 DIAGNOSIS — Z96.643 STATUS POST BILATERAL TOTAL HIP REPLACEMENT: Primary | ICD-10-CM

## 2021-12-07 PROCEDURE — 99999 PR PBB SHADOW E&M-EST. PATIENT-LVL II: ICD-10-PCS | Mod: PBBFAC,,, | Performed by: ORTHOPAEDIC SURGERY

## 2021-12-07 PROCEDURE — 99213 OFFICE O/P EST LOW 20 MIN: CPT | Mod: S$GLB,,, | Performed by: ORTHOPAEDIC SURGERY

## 2021-12-07 PROCEDURE — 99213 PR OFFICE/OUTPT VISIT, EST, LEVL III, 20-29 MIN: ICD-10-PCS | Mod: S$GLB,,, | Performed by: ORTHOPAEDIC SURGERY

## 2021-12-07 PROCEDURE — 73521 XR HIPS BILATERAL 2 VIEW INCL AP PELVIS: ICD-10-PCS | Mod: 26,,, | Performed by: RADIOLOGY

## 2021-12-07 PROCEDURE — 73521 X-RAY EXAM HIPS BI 2 VIEWS: CPT | Mod: 26,,, | Performed by: RADIOLOGY

## 2021-12-07 PROCEDURE — 73521 X-RAY EXAM HIPS BI 2 VIEWS: CPT | Mod: TC

## 2021-12-07 PROCEDURE — 99999 PR PBB SHADOW E&M-EST. PATIENT-LVL II: CPT | Mod: PBBFAC,,, | Performed by: ORTHOPAEDIC SURGERY

## 2022-01-10 ENCOUNTER — TELEPHONE (OUTPATIENT)
Dept: INTERNAL MEDICINE | Facility: CLINIC | Age: 57
End: 2022-01-10
Payer: COMMERCIAL

## 2022-01-10 ENCOUNTER — PATIENT MESSAGE (OUTPATIENT)
Dept: INTERNAL MEDICINE | Facility: CLINIC | Age: 57
End: 2022-01-10
Payer: COMMERCIAL

## 2022-01-10 DIAGNOSIS — J06.9 UPPER RESPIRATORY TRACT INFECTION, UNSPECIFIED TYPE: Primary | ICD-10-CM

## 2022-01-10 RX ORDER — AZITHROMYCIN 250 MG/1
TABLET, FILM COATED ORAL
Qty: 6 TABLET | Refills: 0 | Status: SHIPPED | OUTPATIENT
Start: 2022-01-10 | End: 2022-01-15

## 2022-01-10 RX ORDER — METHYLPREDNISOLONE 4 MG/1
TABLET ORAL
Qty: 21 EACH | Refills: 0 | Status: SHIPPED | OUTPATIENT
Start: 2022-01-10 | End: 2022-01-31

## 2022-01-11 ENCOUNTER — PATIENT MESSAGE (OUTPATIENT)
Dept: INTERNAL MEDICINE | Facility: CLINIC | Age: 57
End: 2022-01-11
Payer: COMMERCIAL

## 2022-01-11 NOTE — TELEPHONE ENCOUNTER
Discussed likely infectious etiology of symptoms. Needs to have repeat testing. Called in azithromycin and medrol dose pack.

## 2022-01-12 ENCOUNTER — TELEPHONE (OUTPATIENT)
Dept: ENDOSCOPY | Facility: HOSPITAL | Age: 57
End: 2022-01-12
Payer: COMMERCIAL

## 2022-01-12 DIAGNOSIS — Z01.812 PRE-PROCEDURE LAB EXAM: ICD-10-CM

## 2022-01-12 DIAGNOSIS — Z12.11 SPECIAL SCREENING FOR MALIGNANT NEOPLASMS, COLON: Primary | ICD-10-CM

## 2022-01-12 RX ORDER — POLYETHYLENE GLYCOL 3350, SODIUM SULFATE ANHYDROUS, SODIUM BICARBONATE, SODIUM CHLORIDE, POTASSIUM CHLORIDE 236; 22.74; 6.74; 5.86; 2.97 G/4L; G/4L; G/4L; G/4L; G/4L
4 POWDER, FOR SOLUTION ORAL ONCE
Qty: 4000 ML | Refills: 0 | Status: SHIPPED | OUTPATIENT
Start: 2022-01-12 | End: 2022-01-12

## 2022-01-12 NOTE — TELEPHONE ENCOUNTER
PC          Preferred Name:   Edi Marie    Male, 56 y.o., 1965  Phone:   [phone] (M)    Carlos Oglesby MD  PCP - General  Language:   English  Need Interp:   No      Allergies Last Reviewed:   01/11/22  Allergies:   No Known Allergies  Digital Medicine:   None    Health Maintenance:   Due          Primary Ins.:   [facility]  MRN:   [id number]  Pt Comm Pref:   Patient Portal, Mail, Push Notification  Last MyChart Login:   1/11/2022 4:49 PM  Next Appt:   With Lab  02/04/2022 at 8:00 AM    My Sticky Note:     Specialty Comments:   None  Last Encounter Center:   None  Last Enc in Dept:   None  Last Enc this Prov:   None  Last Contact Department:   [facility] INTERNAL MEDICINE                  Message  Received: Today  MD Katherine Leo RN  Caller: Unspecified (Yesterday,  3:06 PM)  Hey may hold Eliquis as needed for procedure.             Previous Messages       ----- Message -----   From: Katherine Bhatti RN   Sent: 1/11/2022   3:11 PM CST   To: Reji Sanders MD     Good Afternoon, pt. Called to schedule Colonoscopy for PMH Polyps. Pt. On Eliquis and hasnt seen you since 2020. Pt. Needs order to hold Eliquis for 2 days prior to procedure. I thought you may want to see him as he had some swelling in leg. Please advise if he needs a visit and /or if he can hold Eliquis. Thank you

## 2022-01-26 ENCOUNTER — PATIENT MESSAGE (OUTPATIENT)
Dept: PHARMACY | Facility: CLINIC | Age: 57
End: 2022-01-26
Payer: COMMERCIAL

## 2022-01-31 ENCOUNTER — PATIENT MESSAGE (OUTPATIENT)
Dept: INTERNAL MEDICINE | Facility: CLINIC | Age: 57
End: 2022-01-31
Payer: COMMERCIAL

## 2022-02-03 ENCOUNTER — LAB VISIT (OUTPATIENT)
Dept: LAB | Facility: HOSPITAL | Age: 57
End: 2022-02-03
Payer: COMMERCIAL

## 2022-02-03 DIAGNOSIS — M15.9 PRIMARY OSTEOARTHRITIS INVOLVING MULTIPLE JOINTS: ICD-10-CM

## 2022-02-03 DIAGNOSIS — R22.1 NECK FULLNESS: ICD-10-CM

## 2022-02-03 DIAGNOSIS — E78.2 MIXED HYPERLIPIDEMIA: ICD-10-CM

## 2022-02-03 LAB
CCP AB SER IA-ACNC: 0.6 U/ML
CHOLEST SERPL-MCNC: 233 MG/DL (ref 120–199)
CHOLEST/HDLC SERPL: 4.3 {RATIO} (ref 2–5)
CRP SERPL-MCNC: 1.8 MG/L (ref 0–8.2)
ERYTHROCYTE [SEDIMENTATION RATE] IN BLOOD BY WESTERGREN METHOD: 4 MM/HR (ref 0–23)
HDLC SERPL-MCNC: 54 MG/DL (ref 40–75)
HDLC SERPL: 23.2 % (ref 20–50)
LDLC SERPL CALC-MCNC: 158.6 MG/DL (ref 63–159)
NONHDLC SERPL-MCNC: 179 MG/DL
RHEUMATOID FACT SERPL-ACNC: <13 IU/ML (ref 0–15)
TRIGL SERPL-MCNC: 102 MG/DL (ref 30–150)
TSH SERPL DL<=0.005 MIU/L-ACNC: 0.83 UIU/ML (ref 0.4–4)

## 2022-02-03 PROCEDURE — 86200 CCP ANTIBODY: CPT | Performed by: INTERNAL MEDICINE

## 2022-02-03 PROCEDURE — 86140 C-REACTIVE PROTEIN: CPT | Performed by: INTERNAL MEDICINE

## 2022-02-03 PROCEDURE — 80061 LIPID PANEL: CPT | Performed by: INTERNAL MEDICINE

## 2022-02-03 PROCEDURE — 85652 RBC SED RATE AUTOMATED: CPT | Performed by: INTERNAL MEDICINE

## 2022-02-03 PROCEDURE — 36415 COLL VENOUS BLD VENIPUNCTURE: CPT | Performed by: INTERNAL MEDICINE

## 2022-02-03 PROCEDURE — 86431 RHEUMATOID FACTOR QUANT: CPT | Performed by: INTERNAL MEDICINE

## 2022-02-03 PROCEDURE — 84443 ASSAY THYROID STIM HORMONE: CPT | Performed by: INTERNAL MEDICINE

## 2022-02-07 ENCOUNTER — PATIENT OUTREACH (OUTPATIENT)
Dept: ADMINISTRATIVE | Facility: OTHER | Age: 57
End: 2022-02-07
Payer: COMMERCIAL

## 2022-02-09 ENCOUNTER — OFFICE VISIT (OUTPATIENT)
Dept: ORTHOPEDICS | Facility: CLINIC | Age: 57
End: 2022-02-09
Payer: COMMERCIAL

## 2022-02-09 ENCOUNTER — OFFICE VISIT (OUTPATIENT)
Dept: INTERNAL MEDICINE | Facility: CLINIC | Age: 57
End: 2022-02-09
Payer: COMMERCIAL

## 2022-02-09 VITALS — HEIGHT: 71 IN | BODY MASS INDEX: 28.33 KG/M2 | WEIGHT: 202.38 LBS

## 2022-02-09 VITALS
HEIGHT: 71 IN | HEART RATE: 86 BPM | SYSTOLIC BLOOD PRESSURE: 120 MMHG | BODY MASS INDEX: 28.15 KG/M2 | OXYGEN SATURATION: 98 % | DIASTOLIC BLOOD PRESSURE: 76 MMHG | WEIGHT: 201.06 LBS

## 2022-02-09 DIAGNOSIS — L60.8 DEFORMITY OF NAIL BED: ICD-10-CM

## 2022-02-09 DIAGNOSIS — E04.2 MULTIPLE THYROID NODULES: ICD-10-CM

## 2022-02-09 DIAGNOSIS — M19.041 PRIMARY OSTEOARTHRITIS OF RIGHT HAND: Primary | ICD-10-CM

## 2022-02-09 DIAGNOSIS — L60.8 NAIL DEFORMITY: ICD-10-CM

## 2022-02-09 DIAGNOSIS — I82.4Z3 ACUTE DEEP VEIN THROMBOSIS (DVT) OF DISTAL VEIN OF BOTH LOWER EXTREMITIES: ICD-10-CM

## 2022-02-09 DIAGNOSIS — Z01.818 PRE-OP TESTING: Primary | ICD-10-CM

## 2022-02-09 DIAGNOSIS — D68.61 ANTIPHOSPHOLIPID SYNDROME: ICD-10-CM

## 2022-02-09 DIAGNOSIS — L53.9 FACIAL ERYTHEMA: ICD-10-CM

## 2022-02-09 DIAGNOSIS — M67.441 MUCOUS CYST OF DIGIT OF RIGHT HAND: ICD-10-CM

## 2022-02-09 DIAGNOSIS — Z12.83 SKIN EXAM, SCREENING FOR CANCER: ICD-10-CM

## 2022-02-09 DIAGNOSIS — Z00.00 ROUTINE MEDICAL EXAM: Primary | ICD-10-CM

## 2022-02-09 DIAGNOSIS — I82.409 RECURRENT DEEP VEIN THROMBOSIS (DVT): ICD-10-CM

## 2022-02-09 DIAGNOSIS — M25.741 OSTEOPHYTE OF RIGHT HAND: ICD-10-CM

## 2022-02-09 PROCEDURE — 1159F MED LIST DOCD IN RCRD: CPT | Mod: CPTII,S$GLB,, | Performed by: ORTHOPAEDIC SURGERY

## 2022-02-09 PROCEDURE — 99213 PR OFFICE/OUTPT VISIT, EST, LEVL III, 20-29 MIN: ICD-10-PCS | Mod: S$GLB,,, | Performed by: INTERNAL MEDICINE

## 2022-02-09 PROCEDURE — 99999 PR PBB SHADOW E&M-EST. PATIENT-LVL IV: CPT | Mod: PBBFAC,,, | Performed by: INTERNAL MEDICINE

## 2022-02-09 PROCEDURE — 1159F PR MEDICATION LIST DOCUMENTED IN MEDICAL RECORD: ICD-10-PCS | Mod: CPTII,S$GLB,, | Performed by: INTERNAL MEDICINE

## 2022-02-09 PROCEDURE — 1159F MED LIST DOCD IN RCRD: CPT | Mod: CPTII,S$GLB,, | Performed by: INTERNAL MEDICINE

## 2022-02-09 PROCEDURE — 99213 OFFICE O/P EST LOW 20 MIN: CPT | Mod: S$GLB,,, | Performed by: INTERNAL MEDICINE

## 2022-02-09 PROCEDURE — 3074F SYST BP LT 130 MM HG: CPT | Mod: CPTII,S$GLB,, | Performed by: INTERNAL MEDICINE

## 2022-02-09 PROCEDURE — 1160F PR REVIEW ALL MEDS BY PRESCRIBER/CLIN PHARMACIST DOCUMENTED: ICD-10-PCS | Mod: CPTII,S$GLB,, | Performed by: ORTHOPAEDIC SURGERY

## 2022-02-09 PROCEDURE — 3008F BODY MASS INDEX DOCD: CPT | Mod: CPTII,S$GLB,, | Performed by: INTERNAL MEDICINE

## 2022-02-09 PROCEDURE — 3008F PR BODY MASS INDEX (BMI) DOCUMENTED: ICD-10-PCS | Mod: CPTII,S$GLB,, | Performed by: INTERNAL MEDICINE

## 2022-02-09 PROCEDURE — 3078F PR MOST RECENT DIASTOLIC BLOOD PRESSURE < 80 MM HG: ICD-10-PCS | Mod: CPTII,S$GLB,, | Performed by: INTERNAL MEDICINE

## 2022-02-09 PROCEDURE — 3074F PR MOST RECENT SYSTOLIC BLOOD PRESSURE < 130 MM HG: ICD-10-PCS | Mod: CPTII,S$GLB,, | Performed by: INTERNAL MEDICINE

## 2022-02-09 PROCEDURE — 99999 PR PBB SHADOW E&M-EST. PATIENT-LVL III: ICD-10-PCS | Mod: PBBFAC,,, | Performed by: ORTHOPAEDIC SURGERY

## 2022-02-09 PROCEDURE — 99204 OFFICE O/P NEW MOD 45 MIN: CPT | Mod: S$GLB,,, | Performed by: ORTHOPAEDIC SURGERY

## 2022-02-09 PROCEDURE — 1160F RVW MEDS BY RX/DR IN RCRD: CPT | Mod: CPTII,S$GLB,, | Performed by: ORTHOPAEDIC SURGERY

## 2022-02-09 PROCEDURE — 99999 PR PBB SHADOW E&M-EST. PATIENT-LVL IV: ICD-10-PCS | Mod: PBBFAC,,, | Performed by: INTERNAL MEDICINE

## 2022-02-09 PROCEDURE — 3008F BODY MASS INDEX DOCD: CPT | Mod: CPTII,S$GLB,, | Performed by: ORTHOPAEDIC SURGERY

## 2022-02-09 PROCEDURE — 1159F PR MEDICATION LIST DOCUMENTED IN MEDICAL RECORD: ICD-10-PCS | Mod: CPTII,S$GLB,, | Performed by: ORTHOPAEDIC SURGERY

## 2022-02-09 PROCEDURE — 3008F PR BODY MASS INDEX (BMI) DOCUMENTED: ICD-10-PCS | Mod: CPTII,S$GLB,, | Performed by: ORTHOPAEDIC SURGERY

## 2022-02-09 PROCEDURE — 99999 PR PBB SHADOW E&M-EST. PATIENT-LVL III: CPT | Mod: PBBFAC,,, | Performed by: ORTHOPAEDIC SURGERY

## 2022-02-09 PROCEDURE — 3078F DIAST BP <80 MM HG: CPT | Mod: CPTII,S$GLB,, | Performed by: INTERNAL MEDICINE

## 2022-02-09 PROCEDURE — 99204 PR OFFICE/OUTPT VISIT, NEW, LEVL IV, 45-59 MIN: ICD-10-PCS | Mod: S$GLB,,, | Performed by: ORTHOPAEDIC SURGERY

## 2022-02-09 NOTE — PATIENT INSTRUCTIONS
Schedule labwork on 3/10/2022  Schedule dermatology appointment  Return to clinic in 6 months or sooner if needed.

## 2022-02-09 NOTE — PROGRESS NOTES
Subjective:       Patient ID: Edi Marie is a 56 y.o. male.    Chief Complaint: Annual Exam      Edi Marie is a 56 y.o. year old male    HPI   Pt here for follow up. Is about to see hand ortho for cysts on 2nd and 3rd digit R hand nail bed. Thyroid ultrasound ordered last visit with multinodular thyroid with none meeting FNA criteria. Clinically euthyroid. Last TSH was normal    Patient concerned about slight redness on face under each eye; denies any itchiness. Does report gets a little more red with alcohol intake.     Would like refill on eliquis.     Review of Systems   Constitutional: Negative for activity change and unexpected weight change.   HENT: Negative for hearing loss, rhinorrhea and trouble swallowing.    Eyes: Negative for discharge and visual disturbance.   Respiratory: Negative for chest tightness and wheezing.    Cardiovascular: Negative for chest pain and palpitations.   Gastrointestinal: Negative for blood in stool, constipation, diarrhea and vomiting.   Endocrine: Negative for polydipsia and polyuria.   Genitourinary: Negative for difficulty urinating, hematuria and urgency.   Musculoskeletal: Negative for arthralgias, joint swelling and neck pain.   Neurological: Negative for weakness and headaches.   Psychiatric/Behavioral: Negative for confusion and dysphoric mood.         Past Medical History:   Diagnosis Date    Arthritis     Deep vein thrombosis     DVT (deep venous thrombosis)     Hemorrhoid     Histiocytosis     Kidney stones     Spermatocele         Prior to Admission medications    Medication Sig Start Date End Date Taking? Authorizing Provider   apixaban (ELIQUIS) 5 mg Tab Take 1/2 TAB (2.5mg) twice daily until 48 hours post op and then begin taking 1 tablet (5mg) twice daily 4/21/21  Yes Reji Sanders MD        Past medical history, surgical history, and family medical history reviewed and updated as appropriate.    Medications and allergies reviewed.  "    Objective:          Vitals:    02/09/22 0857   BP: 120/76   BP Location: Left arm   Patient Position: Sitting   BP Method: Large (Manual)   Pulse: 86   SpO2: 98%   Weight: 91.2 kg (201 lb 1 oz)   Height: 5' 11" (1.803 m)     Body mass index is 28.04 kg/m².  Physical Exam  Constitutional:       General: He is not in acute distress.     Appearance: He is well-developed and well-nourished.   HENT:      Head: Normocephalic and atraumatic.      Nose: Nose normal.      Mouth/Throat:      Mouth: Oropharynx is clear and moist.   Eyes:      General: No scleral icterus.     Extraocular Movements: EOM normal.      Pupils: Pupils are equal, round, and reactive to light.   Neck:      Thyroid: No thyromegaly.      Vascular: No JVD.      Trachea: No tracheal deviation.   Cardiovascular:      Rate and Rhythm: Normal rate and regular rhythm.      Heart sounds: Normal heart sounds. No murmur heard.  No friction rub. No gallop.    Pulmonary:      Effort: Pulmonary effort is normal. No respiratory distress.      Breath sounds: Normal breath sounds. No wheezing or rales.   Abdominal:      General: Bowel sounds are normal. There is no distension.      Palpations: Abdomen is soft. There is no mass.      Tenderness: There is no abdominal tenderness.   Musculoskeletal:         General: No tenderness or edema. Normal range of motion.      Cervical back: Normal range of motion and neck supple.   Lymphadenopathy:      Cervical: No cervical adenopathy.   Skin:     General: Skin is warm and dry.      Findings: No rash.   Neurological:      Mental Status: He is alert and oriented to person, place, and time.      Cranial Nerves: No cranial nerve deficit.      Deep Tendon Reflexes: Reflexes normal.   Psychiatric:         Mood and Affect: Mood and affect normal.         Behavior: Behavior normal.         Lab Results   Component Value Date    WBC 7.04 08/17/2021    HGB 17.7 08/17/2021    HCT 51.8 08/17/2021     08/17/2021    CHOL 233 (H) " 02/03/2022    TRIG 102 02/03/2022    HDL 54 02/03/2022    ALT 40 08/17/2021    AST 30 08/17/2021     08/17/2021    K 4.6 08/17/2021     08/17/2021    CREATININE 1.0 08/17/2021    BUN 25 (H) 08/17/2021    CO2 23 08/17/2021    TSH 0.834 02/03/2022    PSA 0.59 08/17/2021    INR 1.0 11/30/2020       Assessment:       1. Routine medical exam    2. Multiple thyroid nodules    3. Recurrent deep vein thrombosis (DVT)    4. Antiphospholipid syndrome    5. Deformity of nail bed    6. Facial erythema    7. Skin exam, screening for cancer    8. Acute deep vein thrombosis (DVT) of distal vein of both lower extremities          Plan:     Eid was seen today for annual exam.    Diagnoses and all orders for this visit:    Routine medical exam    Multiple thyroid nodules    Recurrent deep vein thrombosis (DVT)  -     apixaban (ELIQUIS) 5 mg Tab; Take 1 tablet (5 mg total) by mouth 2 (two) times daily.    Antiphospholipid syndrome  -     apixaban (ELIQUIS) 5 mg Tab; Take 1 tablet (5 mg total) by mouth 2 (two) times daily.    Deformity of nail bed  Comments:  2nd and 3rd digit of right hand; cysts    Facial erythema  -     Ambulatory referral/consult to Dermatology; Future  -     RIGOBERTO Screen w/Reflex; Future    Skin exam, screening for cancer  -     Ambulatory referral/consult to Dermatology; Future    Acute deep vein thrombosis (DVT) of distal vein of both lower extremities    Labwork reviewed, total cholesterol elevated; no indication to start statin therapy yet.      The 10-year ASCVD risk score ([place] LUTHER Jr., et al., 2013) is: 6.6%    Values used to calculate the score:      Age: 56 years      Sex: Male      Is Non- : No      Diabetic: No      Tobacco smoker: No      Systolic Blood Pressure: 120 mmHg      Is BP treated: No      HDL Cholesterol: 54 mg/dL      Total Cholesterol: 233 mg/dL      Health maintenance reviewed with patient.     Follow up in about 6 months (around 8/9/2022).    Carlos  MD Mendel  Internal Medicine / Primary Care  [facility]  2/9/2022

## 2022-02-09 NOTE — PROGRESS NOTES
[facility]  History & Physical  Orthopedics    SUBJECTIVE:      COVID-19 attestation:  This patient was treated during the COVID-19 pandemic.  This was discussed with the patient, they are aware of our current policies and procedures, were given the option of delaying their visit and or switching to a virtual visit, delaying their surgery when applicable, and they elect to proceed.    Chief Complaint:   Chief Complaint   Patient presents with    Right Hand - Pain       Referring Provider: Carlos Oglesby MD     History of Present Illness:  Patient is a 56 y.o. right hand dominant male who presents today with complaints of right index and long finger masses for a few years.  He states that he is noticing a nail deformity on the right long finger.  He has discomfort with flexion of the long finger DIP joint. He denies trauma to the fingers.  He types often on the computer for work.     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    Symptoms are aggravated by activity and movement.    Symptoms are alleviated by rest.    Symptoms consist of deformity.    The patient rates their pain as a 0/10.    Attempted treatment(s) and/or interventions include rest and activity modification.     The patient denies any fevers, chills, N/V, D/C and presents for evaluation.       Past Medical History:   Diagnosis Date    Arthritis     Deep vein thrombosis     DVT (deep venous thrombosis)     Hemorrhoid     Histiocytosis     Kidney stones     Spermatocele      Past Surgical History:   Procedure Laterality Date    APPENDECTOMY      in his 20's    ARTHROPLASTY OF BOTH HIPS Bilateral 12/7/2020    Procedure: ARTHROPLASTY, HIP, BILATERAL: ANTERIOR: DEPUY-ACTIS+PINNACLE;  Surgeon: Chin Heath III, MD;  Location: [facility];  Service: Orthopedics;  Laterality: Bilateral;    COLONOSCOPY N/A 1/20/2017    Procedure: COLONOSCOPY;  Surgeon: Danis Frank MD;  Location: [address]  "[facility]);  Service: Endoscopy;  Laterality: N/A;    KNEE SURGERY      right knee- teenager -     LITHOTRIPSY       Review of patient's allergies indicates:  No Known Allergies  Social History     Social History Narrative    Not on file     Family History   Problem Relation Age of Onset    Heart disease Father 79        MI 2004    Cancer Paternal Grandfather         colon    Melanoma Neg Hx     Lupus Neg Hx     Psoriasis Neg Hx     Eczema Neg Hx          Current Outpatient Medications:     apixaban (ELIQUIS) 5 mg Tab, Take 1 tablet (5 mg total) by mouth 2 (two) times daily., Disp: 180 tablet, Rfl: 3    ROS    Review of Systems:  Constitutional: no fever or chills  Eyes: no visual changes  ENT: no nasal congestion or sore throat  Respiratory: no cough or shortness of breath  Cardiovascular: no chest pain  Gastrointestinal: no nausea or vomiting, tolerating diet  Musculoskeletal: soreness    OBJECTIVE:      Vital Signs (Most Recent):  Vitals:    02/09/22 1400   Weight: 91.8 kg (202 lb 6.1 oz)   Height: 5' 11" (1.803 m)     Body mass index is 28.23 kg/m².    Physical Exam    Physical Exam:  Constitutional: The patient appears well-developed and well-nourished. No distress.   Head: Normocephalic and atraumatic.   Nose: Nose normal.   Eyes: Conjunctivae and EOM are normal.   Neck: No tracheal deviation present.   Cardiovascular: Normal rate and intact distal pulses.    Pulmonary/Chest: Effort normal. No respiratory distress.   Abdominal: There is no guarding.   Lymphatic: Negative for adenopathy   Neurological: The patient is alert.   Psychiatric: The patient has a normal mood and affect.       Bilateral Hand/Wrist Examination:    Observation/Inspection:  Swelling  none    Deformity  5 x 5 mm smooth compressible and well-circumscribed dorsal mucous cyst to the radial and ulnar aspects of the right long finger with associated nail deformity, right index DIP joint mucous cyst and dorsal osteophyte " radially  Discoloration  none     Scars   none    Atrophy  none    HAND/WRIST EXAMINATION:  Finkelstein's Test   Neg  WHAT Test    Neg  Snuff box tenderness   Neg  Mills's Test    Neg  Hook of Hamate Tenderness  Neg  CMC grind    Neg  Circumduction test   Neg  TFCC Compression Test  Neg    Decreased terminal flexion at right long finger DIP joint otherwise bilateral ROM hand/wrist/elbow full    Neurovascular Exam:  Digits WWP, brisk CR < 3s throughout  NVI motor/LTS to M/R/U nerves, radial pulse 2+    Diagnostic Results:     X-Ray Hips Bilateral 2 View Inc AP Pelvis  EXAMINATION:  XR HIPS BILATERAL 2 VIEW INCL AP PELVIS    CLINICAL HISTORY:  Presence of artificial hip joint, bilateral    FINDINGS:  Bilateral hips to include pelvis.    Right: There is a BRIGIDO in place good alignment no complication.    Left: There is a BRIGIDO in place good alignment no complication.    Electronically signed by: Maximiliano Parra MD  Date:    12/07/2021  Time:    09:24       X-rays AP, lateral and oblique right index and long fingers taken 11/10/2021 are independently reviewed by me and show joint space narrowing at the IP joint as well as dorsal osteophyte formation on the index and long the DIP joints.  Beaking and subchondral cyst at PIP joints of fingers.        ASSESSMENT/PLAN:      56 y.o. yo male with   Encounter Diagnoses   Name Primary?    Primary osteoarthritis of right hand Yes    Mucous cyst of digit of right hand     Osteophyte of right hand     Nail deformity       Plan:  We have discussed conservative vs. surgical treatment as well as risks, benefits and alternatives for right index and long finger osteophytes and mucous cysts.  Conservative measures have been exhausted and he would like to proceed with surgery. Surgery would include right index and long finger osteophyte excision as well as mucous cyst excision. Light use of the hand will be indicated for the first 4-6 weeks.     We have discussed risks of hand surgery  which include but are not limited to blood clots in the legs that can travel to the lungs (pulmonary embolism). Pulmonary embolism can cause shortness of breath, chest pain, and even shock. Other risks include urinary tract infection, nausea and vomiting (usually related to pain medication), chronic pain, bleeding, nerve damage, blood vessel injury, scarring and infection of the hand which can require re-operation. Furthermore, the risks of anesthesia include potential heart, lung, kidney, and liver damage.  Informed consent was obtained.  The patient understands and would like to proceed with surgery in the near future.    The patient's pathophysiology was explained in detail with reference to x-rays, models, other visual aids as appropriate.  Treatment options were discussed in detail.  Questions were invited and answered to the patient's satisfaction. I reviewed Primary care , and other specialty's notes to better coordinate patient's care.          Farnaz Dorantes MD     Please be aware that this note has been generated with the assistance of MModal voice-to-text.  Please excuse any spelling or grammatical errors.

## 2022-02-11 ENCOUNTER — TELEPHONE (OUTPATIENT)
Dept: ORTHOPEDICS | Facility: CLINIC | Age: 57
End: 2022-02-11
Payer: COMMERCIAL

## 2022-02-11 NOTE — TELEPHONE ENCOUNTER
Left a voice mail for pt to return my call.   Pt should stop Eliquis 2 days prior to surgery per Dr Reji Sanders.   Pending a return call from pt.

## 2022-02-11 NOTE — TELEPHONE ENCOUNTER
----- Message from Murali Dubon sent at 2/11/2022  1:54 PM CST -----  Regarding: call back  Contact: pt  Pt returning call back.      Pt @ [phone]     Telephone Encounter by Otilia Almeida at 2/10/2021  4:24 PM     Author: Otilia Almeida Service: -- Author Type: Patient Access    Filed: 2/10/2021  4:25 PM Encounter Date: 2/10/2021 Status: Signed    : Otilia Almeida (Patient Access)

## 2022-02-11 NOTE — TELEPHONE ENCOUNTER
Spoke with pt.  Advised, per Dr Sanders, pt to stop Eliquis 2 days prior to surgery 3/13  ( sx 3/15 )   Pt verbalized understanding.

## 2022-02-23 DIAGNOSIS — D84.9 IMMUNOSUPPRESSED STATUS: ICD-10-CM

## 2022-03-05 ENCOUNTER — LAB VISIT (OUTPATIENT)
Dept: PRIMARY CARE CLINIC | Facility: CLINIC | Age: 57
End: 2022-03-05
Payer: COMMERCIAL

## 2022-03-05 DIAGNOSIS — Z01.812 PRE-PROCEDURE LAB EXAM: ICD-10-CM

## 2022-03-05 PROCEDURE — U0005 INFEC AGEN DETEC AMPLI PROBE: HCPCS | Performed by: CLINICAL NURSE SPECIALIST

## 2022-03-05 PROCEDURE — U0003 INFECTIOUS AGENT DETECTION BY NUCLEIC ACID (DNA OR RNA); SEVERE ACUTE RESPIRATORY SYNDROME CORONAVIRUS 2 (SARS-COV-2) (CORONAVIRUS DISEASE [COVID-19]), AMPLIFIED PROBE TECHNIQUE, MAKING USE OF HIGH THROUGHPUT TECHNOLOGIES AS DESCRIBED BY CMS-2020-01-R: HCPCS | Performed by: CLINICAL NURSE SPECIALIST

## 2022-03-06 LAB
SARS-COV-2 RNA RESP QL NAA+PROBE: NOT DETECTED
SARS-COV-2- CYCLE NUMBER: NORMAL

## 2022-03-09 ENCOUNTER — ANESTHESIA EVENT (OUTPATIENT)
Dept: SURGERY | Facility: HOSPITAL | Age: 57
End: 2022-03-09
Payer: COMMERCIAL

## 2022-03-09 NOTE — ANESTHESIA PREPROCEDURE EVALUATION
03/09/2022  Edi Marie is a 56 y.o., male.    Pre-operative evaluation for Procedure(s) (LRB):  OSTECTOMY (Right)  EXCISION, GANGLION CYST, HAND (Right)    Edi Marie is a 56 y.o. male     Patient Active Problem List   Diagnosis    Spermatocele    Histiocytosis    H/O vasectomy (05/30/2014)    Chronic deep vein thrombosis (DVT) of left lower extremity    Post-thrombotic syndrome of left lower extremity    Greater trochanteric bursitis of both hips    Hip stiffness    Acute deep vein thrombosis (DVT) of distal vein of both lower extremities    Recurrent deep vein thrombosis (DVT)    Weakness of both hips    History of DVT (deep vein thrombosis)    Snoring    History of kidney stones    H/O Osgood-Schlatter disease    Abnormal urinalysis    Status post bilateral total hip replacement 12/7/2020    Hip pain    Pre-syncope    Antiphospholipid syndrome       Review of patient's allergies indicates:  No Known Allergies    No current facility-administered medications on file prior to encounter.     Current Outpatient Medications on File Prior to Encounter   Medication Sig Dispense Refill    apixaban (ELIQUIS) 5 mg Tab Take 1 tablet (5 mg total) by mouth 2 (two) times daily. 180 tablet 3       Past Surgical History:   Procedure Laterality Date    APPENDECTOMY      in his 20's    ARTHROPLASTY OF BOTH HIPS Bilateral 12/7/2020    Procedure: ARTHROPLASTY, HIP, BILATERAL: ANTERIOR: DEPUY-ACTIS+PINNACLE;  Surgeon: Chin Heath III, MD;  [facility]: [facility];  Service: Orthopedics;  Laterality: Bilateral;    COLONOSCOPY N/A 1/20/2017    Procedure: COLONOSCOPY;  Surgeon: Danis Frank MD;  Location: [address]);  Service: Endoscopy;  Laterality: N/A;    KNEE SURGERY      right knee- teenager -     LITHOTRIPSY           CBC:  No results found for: WBC, RBC, HGB, HCT, PLT, MCV,  "MCH, MCHC    CMP:   No results found for: NA, K, CL, CO2, BUN, CREATININE, GLU, MG, PHOS, CALCIUM, ALBUMIN, PROT, ALKPHOS, ALT, AST, BILITOT    INR:  No results found for: PT, INR, PROTIME, APTT      Diagnostic Studies:      EKG:   Results for orders placed or performed during the hospital encounter of 12/07/20   EKG 12-lead    Collection Time: 12/09/20 12:54 PM    Narrative    Test Reason : R55,    Vent. Rate : 099 BPM     Atrial Rate : 099 BPM     P-R Int : 144 ms          QRS Dur : 078 ms      QT Int : 340 ms       P-R-T Axes : 048 003 -17 degrees     QTc Int : 436 ms    Normal sinus rhythm  Nonspecific T wave abnormality  Abnormal ECG  No previous ECGs available  Confirmed by JESSICA HENDRIX MD ([id number]) on 12/9/2020 3:22:16 PM    Referred By: JOE FELDMAN           Confirmed By:JESSICA HENDRIX MD          Pre-op Vitals [03/15/22 0756]   BP Pulse Resp Temp SpO2   130/83 70 18 36.9 °C (98.4 °F) 98 %      Height Weight BMI (Calculated)     5' 11" 202 lb 28.2            Pre-op Assessment    I have reviewed the Patient Summary Reports.     I have reviewed the Nursing Notes.    I have reviewed the Medications.     Review of Systems  Anesthesia Hx:  No problems with previous Anesthesia    Social:  Non-Smoker, Alcohol Use    Hematology/Oncology:  Hematology Normal   Oncology Normal     EENT/Dental:EENT/Dental Normal   Cardiovascular:  Deep Venous Thrombosis (DVT), Hx of DVT, left lower extremity, right lower extremity    Pulmonary:  Pulmonary Normal    Renal/:   Chronic Renal Disease renal calculi    Hepatic/GI:  Hepatic/GI Normal    Musculoskeletal:   Arthritis     Neurological:  Neurology Normal    Endocrine:  Endocrine Normal    Dermatological:  Skin Normal    Psych:  Psychiatric Normal           Physical Exam  General: Well nourished and Cooperative    Airway:  Mallampati: III / II  Mouth Opening: Normal  TM Distance: Normal  Neck ROM: Normal ROM    Dental:  Intact    Chest/Lungs:  Clear to " auscultation    Heart:  Rate: Normal  Rhythm: Regular Rhythm        Anesthesia Plan  Type of Anesthesia, risks & benefits discussed:    Anesthesia Type: MAC, Gen Natural Airway  Intra-op Monitoring Plan: Standard ASA Monitors  Post Op Pain Control Plan: multimodal analgesia and IV/PO Opioids PRN  Induction:  IV  Informed Consent: Informed consent signed with the Patient and all parties understand the risks and agree with anesthesia plan.  All questions answered. Patient consented to blood products? Yes  ASA Score: 2  Day of Surgery Review of History & Physical: H&P Update referred to the surgeon/provider.    Ready For Surgery From Anesthesia Perspective.     .

## 2022-03-12 ENCOUNTER — LAB VISIT (OUTPATIENT)
Dept: PRIMARY CARE CLINIC | Facility: CLINIC | Age: 57
End: 2022-03-12
Payer: COMMERCIAL

## 2022-03-12 DIAGNOSIS — Z01.818 PRE-OP TESTING: ICD-10-CM

## 2022-03-12 LAB
SARS-COV-2 RNA RESP QL NAA+PROBE: NOT DETECTED
SARS-COV-2- CYCLE NUMBER: NORMAL

## 2022-03-12 PROCEDURE — U0005 INFEC AGEN DETEC AMPLI PROBE: HCPCS | Performed by: ORTHOPAEDIC SURGERY

## 2022-03-12 PROCEDURE — U0003 INFECTIOUS AGENT DETECTION BY NUCLEIC ACID (DNA OR RNA); SEVERE ACUTE RESPIRATORY SYNDROME CORONAVIRUS 2 (SARS-COV-2) (CORONAVIRUS DISEASE [COVID-19]), AMPLIFIED PROBE TECHNIQUE, MAKING USE OF HIGH THROUGHPUT TECHNOLOGIES AS DESCRIBED BY CMS-2020-01-R: HCPCS | Performed by: ORTHOPAEDIC SURGERY

## 2022-03-14 ENCOUNTER — TELEPHONE (OUTPATIENT)
Dept: ORTHOPEDICS | Facility: CLINIC | Age: 57
End: 2022-03-14
Payer: COMMERCIAL

## 2022-03-14 RX ORDER — PROMETHAZINE HYDROCHLORIDE 25 MG/1
25 TABLET ORAL EVERY 6 HOURS PRN
Qty: 15 TABLET | Refills: 0 | Status: ON HOLD | OUTPATIENT
Start: 2022-03-14 | End: 2022-04-26 | Stop reason: HOSPADM

## 2022-03-14 RX ORDER — HYDROCODONE BITARTRATE AND ACETAMINOPHEN 10; 325 MG/1; MG/1
1 TABLET ORAL EVERY 6 HOURS PRN
Qty: 15 TABLET | Refills: 0 | Status: ON HOLD | OUTPATIENT
Start: 2022-03-14 | End: 2022-04-26 | Stop reason: HOSPADM

## 2022-03-14 NOTE — TELEPHONE ENCOUNTER
Called and spoke to patient, nothing to eat or drink after midnight, if taking BP medication that's the only thing to take with a few sips of water. He is aware of arrival time of 7 am at the [place] location [address], did not need address. Explained that his wife can come in and give info to give her a call when patient is done with surgery.

## 2022-03-15 ENCOUNTER — ANESTHESIA (OUTPATIENT)
Dept: SURGERY | Facility: HOSPITAL | Age: 57
End: 2022-03-15
Payer: COMMERCIAL

## 2022-03-15 ENCOUNTER — HOSPITAL ENCOUNTER (OUTPATIENT)
Facility: HOSPITAL | Age: 57
Discharge: HOME | End: 2022-03-15
Attending: ORTHOPAEDIC SURGERY | Admitting: ORTHOPAEDIC SURGERY
Payer: COMMERCIAL

## 2022-03-15 VITALS
WEIGHT: 202 LBS | OXYGEN SATURATION: 97 % | DIASTOLIC BLOOD PRESSURE: 91 MMHG | HEIGHT: 71 IN | TEMPERATURE: 98 F | RESPIRATION RATE: 20 BRPM | SYSTOLIC BLOOD PRESSURE: 127 MMHG | HEART RATE: 69 BPM | BODY MASS INDEX: 28.28 KG/M2

## 2022-03-15 DIAGNOSIS — M19.041: ICD-10-CM

## 2022-03-15 DIAGNOSIS — M67.441 MUCOUS CYST OF DIGIT OF RIGHT HAND: Primary | ICD-10-CM

## 2022-03-15 DIAGNOSIS — M19.041 PRIMARY OSTEOARTHRITIS OF RIGHT HAND: ICD-10-CM

## 2022-03-15 PROCEDURE — 26160 PR EXCIS TENDON SHEATH LESION, HAND/FINGER: ICD-10-PCS | Mod: 59,51,RT, | Performed by: ORTHOPAEDIC SURGERY

## 2022-03-15 PROCEDURE — 25000003 PHARM REV CODE 250: Performed by: PHYSICIAN ASSISTANT

## 2022-03-15 PROCEDURE — 26160 REMOVE TENDON SHEATH LESION: CPT | Mod: 59,51,RT, | Performed by: ORTHOPAEDIC SURGERY

## 2022-03-15 PROCEDURE — 36000706: Performed by: ORTHOPAEDIC SURGERY

## 2022-03-15 PROCEDURE — D9220A PRA ANESTHESIA: Mod: ,,, | Performed by: ANESTHESIOLOGY

## 2022-03-15 PROCEDURE — 63600175 PHARM REV CODE 636 W HCPCS: Performed by: PHYSICIAN ASSISTANT

## 2022-03-15 PROCEDURE — 63600175 PHARM REV CODE 636 W HCPCS: Performed by: NURSE ANESTHETIST, CERTIFIED REGISTERED

## 2022-03-15 PROCEDURE — D9220A PRA ANESTHESIA: Mod: ,,, | Performed by: NURSE ANESTHETIST, CERTIFIED REGISTERED

## 2022-03-15 PROCEDURE — 25000003 PHARM REV CODE 250: Performed by: NURSE ANESTHETIST, CERTIFIED REGISTERED

## 2022-03-15 PROCEDURE — 37000008 HC ANESTHESIA 1ST 15 MINUTES: Performed by: ORTHOPAEDIC SURGERY

## 2022-03-15 PROCEDURE — 71000015 HC POSTOP RECOV 1ST HR: Performed by: ORTHOPAEDIC SURGERY

## 2022-03-15 PROCEDURE — D9220A PRA ANESTHESIA: ICD-10-PCS | Mod: ,,, | Performed by: NURSE ANESTHETIST, CERTIFIED REGISTERED

## 2022-03-15 PROCEDURE — 26080 EXPLORE/TREAT FINGER JOINT: CPT | Mod: RT,,, | Performed by: ORTHOPAEDIC SURGERY

## 2022-03-15 PROCEDURE — D9220A PRA ANESTHESIA: ICD-10-PCS | Mod: ,,, | Performed by: ANESTHESIOLOGY

## 2022-03-15 PROCEDURE — 88304 TISSUE EXAM BY PATHOLOGIST: CPT | Mod: 26,,, | Performed by: PATHOLOGY

## 2022-03-15 PROCEDURE — 88304 TISSUE EXAM BY PATHOLOGIST: CPT | Performed by: PATHOLOGY

## 2022-03-15 PROCEDURE — 94761 N-INVAS EAR/PLS OXIMETRY MLT: CPT

## 2022-03-15 PROCEDURE — 26080 PR EXPLORE/TREAT INTERPHALANGEAL JT,EA: ICD-10-PCS | Mod: RT,,, | Performed by: ORTHOPAEDIC SURGERY

## 2022-03-15 PROCEDURE — 37000009 HC ANESTHESIA EA ADD 15 MINS: Performed by: ORTHOPAEDIC SURGERY

## 2022-03-15 PROCEDURE — 71000033 HC RECOVERY, INTIAL HOUR: Performed by: ORTHOPAEDIC SURGERY

## 2022-03-15 PROCEDURE — 88304 PR  SURG PATH,LEVEL III: ICD-10-PCS | Mod: 26,,, | Performed by: PATHOLOGY

## 2022-03-15 PROCEDURE — 25000003 PHARM REV CODE 250: Performed by: ORTHOPAEDIC SURGERY

## 2022-03-15 PROCEDURE — 27201423 OPTIME MED/SURG SUP & DEVICES STERILE SUPPLY: Performed by: ORTHOPAEDIC SURGERY

## 2022-03-15 PROCEDURE — 99900035 HC TECH TIME PER 15 MIN (STAT)

## 2022-03-15 PROCEDURE — 25000003 PHARM REV CODE 250: Performed by: SURGERY

## 2022-03-15 PROCEDURE — 36000707: Performed by: ORTHOPAEDIC SURGERY

## 2022-03-15 RX ORDER — ACETAMINOPHEN 500 MG
1000 TABLET ORAL
Status: COMPLETED | OUTPATIENT
Start: 2022-03-15 | End: 2022-03-15

## 2022-03-15 RX ORDER — LIDOCAINE HCL/PF 100 MG/5ML
SYRINGE (ML) INTRAVENOUS
Status: DISCONTINUED | OUTPATIENT
Start: 2022-03-15 | End: 2022-03-15

## 2022-03-15 RX ORDER — LIDOCAINE HYDROCHLORIDE 10 MG/ML
INJECTION, SOLUTION EPIDURAL; INFILTRATION; INTRACAUDAL; PERINEURAL
Status: DISCONTINUED | OUTPATIENT
Start: 2022-03-15 | End: 2022-03-15 | Stop reason: HOSPADM

## 2022-03-15 RX ORDER — SODIUM CHLORIDE 0.9 % (FLUSH) 0.9 %
3 SYRINGE (ML) INJECTION
Status: DISCONTINUED | OUTPATIENT
Start: 2022-03-15 | End: 2022-03-15 | Stop reason: HOSPADM

## 2022-03-15 RX ORDER — MIDAZOLAM HYDROCHLORIDE 1 MG/ML
INJECTION, SOLUTION INTRAMUSCULAR; INTRAVENOUS
Status: DISCONTINUED | OUTPATIENT
Start: 2022-03-15 | End: 2022-03-15

## 2022-03-15 RX ORDER — LIDOCAINE HYDROCHLORIDE 10 MG/ML
1 INJECTION, SOLUTION EPIDURAL; INFILTRATION; INTRACAUDAL; PERINEURAL ONCE
Status: ACTIVE | OUTPATIENT
Start: 2022-03-15

## 2022-03-15 RX ORDER — MUPIROCIN 20 MG/G
OINTMENT TOPICAL
Status: DISCONTINUED | OUTPATIENT
Start: 2022-03-15 | End: 2022-03-15 | Stop reason: HOSPADM

## 2022-03-15 RX ORDER — CELECOXIB 200 MG/1
400 CAPSULE ORAL ONCE
Status: COMPLETED | OUTPATIENT
Start: 2022-03-15 | End: 2022-03-15

## 2022-03-15 RX ORDER — CEFAZOLIN SODIUM/WATER 2 G/20 ML
2 SYRINGE (ML) INTRAVENOUS
Status: COMPLETED | OUTPATIENT
Start: 2022-03-15 | End: 2022-03-15

## 2022-03-15 RX ORDER — BACITRACIN ZINC 500 UNIT/G
OINTMENT (GRAM) TOPICAL
Status: DISCONTINUED | OUTPATIENT
Start: 2022-03-15 | End: 2022-03-15 | Stop reason: HOSPADM

## 2022-03-15 RX ORDER — PROPOFOL 10 MG/ML
VIAL (ML) INTRAVENOUS CONTINUOUS PRN
Status: DISCONTINUED | OUTPATIENT
Start: 2022-03-15 | End: 2022-03-15

## 2022-03-15 RX ORDER — BUPIVACAINE HYDROCHLORIDE 2.5 MG/ML
INJECTION, SOLUTION EPIDURAL; INFILTRATION; INTRACAUDAL
Status: DISCONTINUED | OUTPATIENT
Start: 2022-03-15 | End: 2022-03-15 | Stop reason: HOSPADM

## 2022-03-15 RX ORDER — PROPOFOL 10 MG/ML
VIAL (ML) INTRAVENOUS
Status: DISCONTINUED | OUTPATIENT
Start: 2022-03-15 | End: 2022-03-15

## 2022-03-15 RX ORDER — FENTANYL CITRATE 50 UG/ML
INJECTION, SOLUTION INTRAMUSCULAR; INTRAVENOUS
Status: DISCONTINUED | OUTPATIENT
Start: 2022-03-15 | End: 2022-03-15

## 2022-03-15 RX ADMIN — CELECOXIB 400 MG: 200 CAPSULE ORAL at 08:03

## 2022-03-15 RX ADMIN — PROPOFOL 50 MG: 10 INJECTION, EMULSION INTRAVENOUS at 09:03

## 2022-03-15 RX ADMIN — FENTANYL CITRATE 25 MCG: 50 INJECTION, SOLUTION INTRAMUSCULAR; INTRAVENOUS at 09:03

## 2022-03-15 RX ADMIN — LIDOCAINE HYDROCHLORIDE 100 MG: 20 INJECTION, SOLUTION INTRAVENOUS at 09:03

## 2022-03-15 RX ADMIN — PROPOFOL 100 MCG/KG/MIN: 10 INJECTION, EMULSION INTRAVENOUS at 09:03

## 2022-03-15 RX ADMIN — SODIUM CHLORIDE: 9 INJECTION, SOLUTION INTRAVENOUS at 08:03

## 2022-03-15 RX ADMIN — Medication 2 G: at 09:03

## 2022-03-15 RX ADMIN — MUPIROCIN: 20 OINTMENT TOPICAL at 08:03

## 2022-03-15 RX ADMIN — ACETAMINOPHEN 1000 MG: 500 TABLET ORAL at 08:03

## 2022-03-15 RX ADMIN — MIDAZOLAM HYDROCHLORIDE 2 MG: 1 INJECTION, SOLUTION INTRAMUSCULAR; INTRAVENOUS at 09:03

## 2022-03-15 RX ADMIN — FENTANYL CITRATE 50 MCG: 50 INJECTION, SOLUTION INTRAMUSCULAR; INTRAVENOUS at 09:03

## 2022-03-15 NOTE — PATIENT INSTRUCTIONS
HAND SURGERY  Care of the Hand after Surgery       Post-Op Care  It is important to follow your orthopaedic surgeon's instructions carefully after you return home. You should ask   someone to check on you that evening. The protocols described here are general in nature. Every person and   every surgery is different so the information given here is for guidance only. If you have questions you should   contact us.   Day of Surgery  The hand may be placed in an ace wrap or a hard splint to protect any surgical repair that was performed at the time of surgery. Do not remove the splint unless advised to do so by your doctor.   The hand and fingers may be numb for quite some time after surgery. This is due to the local anesthetic used at the time of surgery for pain control.  Begin taking liquids and food as soon as you can. You should always eat some solid food, a sandwich or light meal, a little while before taking your pain meds.   Day 3  Things are much the same on the third day after your surgery. Usually you have less pain and feel like doing more. You can remove the dressings at about 72 hours after your surgery unless you have a hard splint. It is good at this time to cover the incision(s) with a regular band-aid(s). It is important to keep the incision absolutely dry while showering or bathing (use two plastic bags over your hand). If you feel that the pain medication you were given after surgery is stronger than you really need you can reduce the dose, take it less frequently or switch to ibuprofen or Tylenol. If you received antibiotics they should be taken until the entire prescription is completed.  Day 10-14  We will see you back after your surgery and remove your stitches. We will review with you what was done in surgery and will talk about rehab and answer any questions you may have.        HAND SURGERY  You can drive if you are comfortable and have regained full finger movements and if you have  sufficient power to control the vehicle. Timing of your return to work is variable according to your occupation and specific surgery. We should discuss this at your follow up visit.  Hand elevation is important to prevent swelling and stiffness of the fingers. One minute of leaving your hand dangling negates four hours of keeping it elevated. Please remember not to walk with your hand hanging down or to sit with your hand resting in your lap. It is fine, however, to lower your hand for light use and you should get back to normal light activities as soon as possible as guided by common sense. There are a number of exercises you should do to prevent stiffness.              Post-operative exercises  Bend your fingers  Relax your hand. Start with your fingers straight and close together. Bend the end and middle joints of your fingers. Keep your wrist and knuckles straight. Moving slowly and smoothly, return your hand to the starting position. Repeat with your other hand. If you can, perform multiple repetitions of this exercise on each hand.  Open your hand wide                     Spread your fingers apart as wide as you can and hold that position. Slowly relax your fingers and bring them together. Return to the open-wide position. Repeat with each hand and gradually add to the number of repetitions.         HAND SURGERY                  Post-operative Exercises  Make a fist  Start with your fingers straight and spread apart. Make a loose, gentle fist and wrap your thumb around the outside of your fingers. Be careful not to squeeze your fingers together too tightly. Moving slowly and smoothly, return to the starting position. Repeat. Perform this exercise on both hands.  Touch your fingertips  Straighten your fingers and thumb. Bend your thumb across your palm, touching the tip of your thumb to the pad of your hand just below your pinky finger. If you can't make your thumb touch, just stretch as far as you can. Return  your thumb to its starting position, as shown in images 1 through 3.  For the next exercise, form the letter O by touching your thumb to each fingertip, as shown in images 4 through 6. Moving slowly and smoothly, touch your index finger to your thumb, then straighten your fingers. Touch your middle finger to your thumb and straighten. Follow with your ring and pinky fingers.  Walk your fingers  Rest your hand on a flat surface, such as a tabletop, with your palm facing down and your fingers spread slightly apart. Moving one finger at a time, slowly walk your fingers toward your thumb. Start by lifting and moving your index finger toward your thumb. Follow by lifting and moving your middle finger toward your thumb. Proceed with moving your ring finger and then your pinky finger toward your thumb. Don't move your wrist or thumb while doing this exercise. Repeat with your other hand.            HAND SURGERY    Common Concerns and Frequently Asked Questions      When to Call the Doctor  Pain, burning, or numbness of the fingers or the back of the hand not relieved by elevation of the arm  Pale or cold finger; bluish nail beds  Red line going up the arm  Excessive swelling  Fever over 101ºF  Pain unrelieved by pain medication    Carpal Tunnel Release  You will have a soft dressing on your hand. It is important to begin your exercises right away and to keep the hand elevated (see above). You can remove your dressing in 72 hours and then cover the incision with a band-aid. Keep the incision dry(see above).      Your  strength generally returns to preoperative levels approximately 3 months after surgery, and pinch strength returns at 6 weeks.    Palmar tenderness, a particularly important consideration in manual laborers, may persist for longer than 6 months. Full recovery of nerve function may not occur, depending on the severity of nerve damage.    CMC Resection Arthroplasty (for thumb arthritis)  You will be in a  hard splint that keeps the thumb immobilized.  You may begin the   exercises (excluding the thumb) immediately. It is important to keep the hand constantly elevated for the first several days. We will remove the splint and stitches in the clinic at your follow up visit and you will be placed into a cast or a brace at that time. We will discuss the need for hand therapy at your follow up.  This operation takes about 6 months to get over. There will likely be an increase in the tip-pinch strength following surgery; however, you may experience a decrease in overall  strength.  This should not affect your functional improvement. Your  strength and pain can continue to improve for up to 2-9 years after surgery        HAND SURGERY      Common Orthopaedic Hand Surgeries            Dupuytrens Release (straightening a crooked finger)  You will be in a soft dressing with a splint to keep your crooked finger straight.  We will remove your initial operative dressing in the clinic as well as any stitches on your follow up appointment. You will probably need to have wound care with the hand therapist after your surgery until the wound closes completely.     Approximately twenty-five percent of patients experience some recurrence of their contracture although this may not necessitate re-operation.    Tendon Repair  You will be in a hard dressing and should leave this on until your follow up appointment. You will have an appointment for follow up with a hand therapist. The therapist will make a splint for you and teach you exercises to perform that are safe for your repair to withstand.    Trigger Finger Release  You will be in a soft dressing that you can remove after 2-3 days. It is important to begin moving the affected finger immediately. Keep the incision dry until your follow up appointment (see above).  The outcome after surgery is very favorable, with complete resolution of triggering in up to 97%.  Formal  "rehabilitation is generally not needed following this procedure.  Ganglion Cyst Excision  You will be in a soft dressing that you can remove after 3 days.  Follow the general instructions for post-operative hand surgery.   Formal rehabilitation is generally not needed following this procedure.          HAND SURGERY    Tips for one armed living  It helps to have...   In the shower   Plastic bags and rubber bands to cover bandages - the bags that newspapers come in are good to cover the hand and wrist. Otherwise small trash can liners will do. Use two at a time.   Bottle sponge (soft sponge on a long stick) - for the armpit of your "good" hand.   Shower brush   A hair brush in the shower will help you to wash your hair.   Cotton kimberlee cloth bathrobe - to dry your back.    In the bathroom   Toothpaste, shampoo, etc. in flip-top or pump (not screw top) dispensers.   Consider an electric razor.   Flossers (dental floss on a "Y" shaped handle).    In the kitchen   Dycem mat (rubber jar opener mat) - to help open jars, but also keep things from sliding around while you are working on them.    Double suction cup pads ("little Octopus") - to hold items while you use or wash them.   Electric can opener with a lid magnet strong enough to hold the can in the air - for one handed use.   In the bedroom   Back scratcher.   Large sleeve shirts and tops.   Put away clothing which buttons, fastens or snaps in the back or which uses drawstrings.    Sports bra or a camisole instead of a bra.   [employer] nylons can be pulled on one handed - most others can't.   A "wash and wear" haircut.    "

## 2022-03-15 NOTE — DISCHARGE INSTRUCTIONS
AFTER HAND SURGERY    DOS:  Keep affected wrist elevated above the level of your heart  Keep surgical dressing clean, dry, and intact until clinic visit.   Check circulation frequently in fingers by pressing on the nail bed. Nail bed should turn white and then pink when released.  Exercise fingers to promote circulation.  Advance diet as tolerated  Resume home medications today.      DONT:  No driving for 24 hours or while taking narcotic pain medication    CALL PHYSICIAN FOR:  Obvious bleeding  Excessive swelling  Drainage (pus) from the wound  Pain unrelieved by pain medication.

## 2022-03-15 NOTE — H&P
Farnaz Dorantes MD    Hand Surgery  Primary osteoarthritis of right hand +3 more    Dx  Right Hand - Pain; Referred by Carlos Oglesby MD    Reason for Visit       Progress Notes  Farnaz Dorantes MD (Physician)   Hand Surgery  Expand All Collapse All     [facility]  History & Physical  Orthopedics     SUBJECTIVE:       COVID-19 attestation:  This patient was treated during the COVID-19 pandemic.  This was discussed with the patient, they are aware of our current policies and procedures, were given the option of delaying their visit and or switching to a virtual visit, delaying their surgery when applicable, and they elect to proceed.     Chief Complaint:       Chief Complaint   Patient presents with    Right Hand - Pain         Referring Provider: Carlos Oglesby MD      History of Present Illness:  Patient is a 56 y.o. right hand dominant male who presents today with complaints of right index and long finger masses for a few years.  He states that he is noticing a nail deformity on the right long finger.  He has discomfort with flexion of the long finger DIP joint. He denies trauma to the fingers.  He types often on the computer for work.      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     Symptoms are aggravated by activity and movement.     Symptoms are alleviated by rest.     Symptoms consist of deformity.     The patient rates their pain as a 0/10.     Attempted treatment(s) and/or interventions include rest and activity modification.     The patient denies any fevers, chills, N/V, D/C and presents for evaluation.             Past Medical History:   Diagnosis Date    Arthritis      Deep vein thrombosis      DVT (deep venous thrombosis)      Hemorrhoid      Histiocytosis      Kidney stones      Spermatocele              Past Surgical History:   Procedure Laterality Date    APPENDECTOMY         in his 20's    ARTHROPLASTY OF BOTH HIPS Bilateral 12/7/2020  "    Procedure: ARTHROPLASTY, HIP, BILATERAL: ANTERIOR: DEPUY-ACTIS+PINNACLE;  Surgeon: Chin Heath III, MD;  Location: [facility];  Service: Orthopedics;  Laterality: Bilateral;    COLONOSCOPY N/A 1/20/2017     Procedure: COLONOSCOPY;  Surgeon: Danis Frank MD;  Location: [facility] (4TH FLR);  Service: Endoscopy;  Laterality: N/A;    KNEE SURGERY         right knee- teenager -     LITHOTRIPSY          Review of patient's allergies indicates:  No Known Allergies  Social History          Social History Narrative    Not on file            Family History   Problem Relation Age of Onset    Heart disease Father 79         MI 2004    Cancer Paternal Grandfather           colon    Melanoma Neg Hx      Lupus Neg Hx      Psoriasis Neg Hx      Eczema Neg Hx              Current Outpatient Medications:     apixaban (ELIQUIS) 5 mg Tab, Take 1 tablet (5 mg total) by mouth 2 (two) times daily., Disp: 180 tablet, Rfl: 3     ROS     Review of Systems:  Constitutional: no fever or chills  Eyes: no visual changes  ENT: no nasal congestion or sore throat  Respiratory: no cough or shortness of breath  Cardiovascular: no chest pain  Gastrointestinal: no nausea or vomiting, tolerating diet  Musculoskeletal: soreness     OBJECTIVE:       Vital Signs (Most Recent):  Vitals       Vitals:     02/09/22 1400   Weight: 91.8 kg (202 lb 6.1 oz)   Height: 5' 11" (1.803 m)         Body mass index is 28.23 kg/m².     Physical Exam     Physical Exam:  Constitutional: The patient appears well-developed and well-nourished. No distress.   Head: Normocephalic and atraumatic.   Nose: Nose normal.   Eyes: Conjunctivae and EOM are normal.   Neck: No tracheal deviation present.   Cardiovascular: Normal rate and intact distal pulses.    Pulmonary/Chest: Effort normal. No respiratory distress.   Abdominal: There is no guarding.   Lymphatic: Negative for adenopathy   Neurological: The patient is alert.   Psychiatric: The patient has a normal mood " and affect.         Bilateral Hand/Wrist Examination:     Observation/Inspection:  Swelling                       none                  Deformity                     5 x 5 mm smooth compressible and well-circumscribed dorsal mucous cyst to the radial and ulnar aspects of the right long finger with associated nail deformity, right index DIP joint mucous cyst and dorsal osteophyte radially  Discoloration               none                  Scars                           none                  Atrophy                        none     HAND/WRIST EXAMINATION:  Finkelstein's Test                                Neg  WHAT Test                                         Neg  Snuff box tenderness                          Neg  Mills's Test                                     Neg  Hook of Hamate Tenderness              Neg  CMC grind                                           Neg  Circumduction test                              Neg  TFCC Compression Test                    Neg     Decreased terminal flexion at right long finger DIP joint otherwise bilateral ROM hand/wrist/elbow full     Neurovascular Exam:  Digits WWP, brisk CR < 3s throughout  NVI motor/LTS to M/R/U nerves, radial pulse 2+     Diagnostic Results:     X-Ray Hips Bilateral 2 View Inc AP Pelvis  EXAMINATION:  XR HIPS BILATERAL 2 VIEW INCL AP PELVIS     CLINICAL HISTORY:  Presence of artificial hip joint, bilateral     FINDINGS:  Bilateral hips to include pelvis.     Right: There is a BRIGIDO in place good alignment no complication.     Left: There is a BRIGIDO in place good alignment no complication.     Electronically signed by:         Maximiliano Parra MD  Date:                                        12/07/2021  Time:                                       09:24        X-rays AP, lateral and oblique right index and long fingers taken 11/10/2021 are independently reviewed by me and show joint space narrowing at the IP joint as well as dorsal osteophyte formation on the index  and long the DIP joints.  Beaking and subchondral cyst at PIP joints of fingers.           ASSESSMENT/PLAN:       56 y.o. yo male with        Encounter Diagnoses   Name Primary?    Primary osteoarthritis of right hand Yes    Mucous cyst of digit of right hand      Osteophyte of right hand      Nail deformity        Plan:  We have discussed conservative vs. surgical treatment as well as risks, benefits and alternatives for right index and long finger osteophytes and mucous cysts.  Conservative measures have been exhausted and he would like to proceed with surgery. Surgery would include right index and long finger osteophyte excision as well as mucous cyst excision. Light use of the hand will be indicated for the first 4-6 weeks.     We have discussed risks of hand surgery which include but are not limited to blood clots in the legs that can travel to the lungs (pulmonary embolism). Pulmonary embolism can cause shortness of breath, chest pain, and even shock. Other risks include urinary tract infection, nausea and vomiting (usually related to pain medication), chronic pain, bleeding, nerve damage, blood vessel injury, scarring and infection of the hand which can require re-operation. Furthermore, the risks of anesthesia include potential heart, lung, kidney, and liver damage.  Informed consent was obtained.  The patient understands and would like to proceed with surgery in the near future.     The patient's pathophysiology was explained in detail with reference to x-rays, models, other visual aids as appropriate.  Treatment options were discussed in detail.  Questions were invited and answered to the patient's satisfaction. I reviewed Primary care , and other specialty's notes to better coordinate patient's care.             Farnaz Dorantes MD     · Please be aware that this note has been generated with the assistance of Jimmie voice-to-text.  Please excuse any spelling or grammatical errors.

## 2022-03-15 NOTE — TRANSFER OF CARE
"Anesthesia Transfer of Care Note    Patient: Edi Marie    Procedure(s) Performed: Procedure(s) (LRB):  OSTECTOMY, RIGHT INDEX AND LONG FINGERS DIP JOINT/ARTHROTOMY (Right)  EXCISION, GANGLION CYST, HAND (Right)    Patient location: PACU    Anesthesia Type: general    Transport from OR: Transported from OR on room air with adequate spontaneous ventilation    Post pain: adequate analgesia    Post assessment: no apparent anesthetic complications    Post vital signs: stable    Level of consciousness: awake and alert    Nausea/Vomiting: no nausea/vomiting    Complications: none    Transfer of care protocol was followed      Last vitals:   Visit Vitals  /83 (BP Location: Left arm, Patient Position: Lying)   Pulse 70   Temp 36.9 °C (98.4 °F) (Oral)   Resp 18   Ht 5' 11" (1.803 m)   Wt 91.6 kg (202 lb)   SpO2 98%   BMI 28.17 kg/m²     "

## 2022-03-15 NOTE — PLAN OF CARE
Patient ambulated to pre op room 6. AAO times 3  Addressed all questions and concerns  Wife will return to facility for discharge instructions.  Patient spoke with wife by cell phone and she is aware that she will return to facility when Dr Dorantes calls her after surgical procedure.  Bedside pharmacy will deliver home medications.  Belongings placed in locker.  Dr Dorantes at bedside. Site julissa for right hand. Surgical consent signed and corrected to reflect right hand surgery

## 2022-03-15 NOTE — BRIEF OP NOTE
[facility] - Surgery ([facility]  Brief Operative Note    Surgery Date: 3/15/2022     Surgeon(s) and Role:     * Farnaz Dorantes MD - Primary    Assisting Surgeon: None    Pre-op Diagnosis:  Mucous cyst of digit of right hand [M67.441]  Osteophyte of right hand [M25.741]    Post-op Diagnosis:  Post-Op Diagnosis Codes:     * Mucous cyst of digit of right hand [M67.441]     * Osteophyte of right hand [M25.741]    Procedure(s) (LRB):  OSTECTOMY, RIGHT INDEX AND LONG FINGERS DIP JOINT/ARTHROTOMY (Right)  EXCISION, GANGLION CYST, HAND (Right)    Anesthesia: Local MAC    Operative Findings: See full operative note    Estimated Blood Loss: Minimal         Specimens:   Specimen (24h ago, onward)             Start     Ordered    03/15/22 1040  Specimen to Pathology, Surgery Orthopedics  Once        Comments: Pre-op Diagnosis: Mucous cyst of digit of right hand [M67.441]  Osteophyte of right hand [M25.741]    Procedure(s):  OSTECTOMY, RIGHT INDEX AND LONG FINGERS DIP JOINT  EXCISION, GANGLION CYST, HAND     Number of specimens: 1    Name of specimens: 1.  RIGHT INDEX AND LONG OSTEOPHYTE/MUCOUS CYST   References:    Click here for ordering Quick Tip   Question Answer Comment   Procedure Type: Orthopedics    Release to patient Immediate        03/15/22 1039                  Discharge Note    OUTCOME: Patient tolerated treatment/procedure well without complication and is now ready for discharge.    DISPOSITION: Home or Self Care    FINAL DIAGNOSIS:  Mucous cyst of digit of right hand    FOLLOWUP: In clinic    DISCHARGE INSTRUCTIONS:    Discharge Procedure Orders   Diet Adult Regular     Notify your health care provider if you experience any of the following:  temperature >100.4     Notify your health care provider if you experience any of the following:  persistent nausea and vomiting or diarrhea     Notify your health care provider if you experience any of the following:  severe uncontrolled pain     Notify your health care  provider if you experience any of the following:  redness, tenderness, or signs of infection (pain, swelling, redness, odor or green/yellow discharge around incision site)     Notify your health care provider if you experience any of the following:  difficulty breathing or increased cough     Notify your health care provider if you experience any of the following:  severe persistent headache     Notify your health care provider if you experience any of the following:  worsening rash     Notify your health care provider if you experience any of the following:  persistent dizziness, light-headedness, or visual disturbances     Notify your health care provider if you experience any of the following:  increased confusion or weakness     Weight bearing restrictions (specify):   Order Comments: Light use of right hand

## 2022-03-15 NOTE — ANESTHESIA POSTPROCEDURE EVALUATION
Anesthesia Post Evaluation    Patient: Edi Marie    Procedure(s) Performed: Procedure(s) (LRB):  OSTECTOMY, RIGHT INDEX AND LONG FINGERS DIP JOINT/ARTHROTOMY (Right)  EXCISION, GANGLION CYST, HAND (Right)    Final Anesthesia Type: general      Patient location during evaluation: PACU  Patient participation: Yes- Able to Participate  Level of consciousness: awake and alert  Post-procedure vital signs: reviewed and stable  Pain management: adequate  Airway patency: patent    PONV status at discharge: No PONV  Anesthetic complications: no      Cardiovascular status: blood pressure returned to baseline  Respiratory status: unassisted  Hydration status: euvolemic  Follow-up not needed.          Vitals Value Taken Time   /91 03/15/22 1132   Temp 36.7 °C (98 °F) 03/15/22 1130   Pulse 70 03/15/22 1133   Resp 14 03/15/22 1132   SpO2 96 % 03/15/22 1133   Vitals shown include unvalidated device data.      Event Time   Out of Recovery 11:30:00         Pain/Hiro Score: Pain Rating Prior to Med Admin: 0 (3/15/2022  8:08 AM)  Hiro Score: 10 (3/15/2022 11:00 AM)

## 2022-03-21 LAB
FINAL PATHOLOGIC DIAGNOSIS: NORMAL
GROSS: NORMAL
Lab: NORMAL

## 2022-03-25 ENCOUNTER — OFFICE VISIT (OUTPATIENT)
Dept: ORTHOPEDICS | Facility: CLINIC | Age: 57
End: 2022-03-25
Payer: COMMERCIAL

## 2022-03-25 VITALS — BODY MASS INDEX: 28.63 KG/M2 | HEIGHT: 71 IN | WEIGHT: 204.5 LBS

## 2022-03-25 DIAGNOSIS — M67.441 MUCOUS CYST OF DIGIT OF RIGHT HAND: ICD-10-CM

## 2022-03-25 DIAGNOSIS — M19.041 PRIMARY OSTEOARTHRITIS OF RIGHT HAND: Primary | ICD-10-CM

## 2022-03-25 DIAGNOSIS — M25.741 OSTEOPHYTE OF RIGHT HAND: ICD-10-CM

## 2022-03-25 PROCEDURE — 99024 POSTOP FOLLOW-UP VISIT: CPT | Mod: S$GLB,,, | Performed by: PHYSICIAN ASSISTANT

## 2022-03-25 PROCEDURE — 1159F PR MEDICATION LIST DOCUMENTED IN MEDICAL RECORD: ICD-10-PCS | Mod: CPTII,S$GLB,, | Performed by: PHYSICIAN ASSISTANT

## 2022-03-25 PROCEDURE — 3008F BODY MASS INDEX DOCD: CPT | Mod: CPTII,S$GLB,, | Performed by: PHYSICIAN ASSISTANT

## 2022-03-25 PROCEDURE — 1159F MED LIST DOCD IN RCRD: CPT | Mod: CPTII,S$GLB,, | Performed by: PHYSICIAN ASSISTANT

## 2022-03-25 PROCEDURE — 3008F PR BODY MASS INDEX (BMI) DOCUMENTED: ICD-10-PCS | Mod: CPTII,S$GLB,, | Performed by: PHYSICIAN ASSISTANT

## 2022-03-25 PROCEDURE — 99999 PR PBB SHADOW E&M-EST. PATIENT-LVL III: ICD-10-PCS | Mod: PBBFAC,,, | Performed by: PHYSICIAN ASSISTANT

## 2022-03-25 PROCEDURE — 99024 PR POST-OP FOLLOW-UP VISIT: ICD-10-PCS | Mod: S$GLB,,, | Performed by: PHYSICIAN ASSISTANT

## 2022-03-25 PROCEDURE — 1160F RVW MEDS BY RX/DR IN RCRD: CPT | Mod: CPTII,S$GLB,, | Performed by: PHYSICIAN ASSISTANT

## 2022-03-25 PROCEDURE — 1160F PR REVIEW ALL MEDS BY PRESCRIBER/CLIN PHARMACIST DOCUMENTED: ICD-10-PCS | Mod: CPTII,S$GLB,, | Performed by: PHYSICIAN ASSISTANT

## 2022-03-25 PROCEDURE — 99999 PR PBB SHADOW E&M-EST. PATIENT-LVL III: CPT | Mod: PBBFAC,,, | Performed by: PHYSICIAN ASSISTANT

## 2022-03-25 NOTE — PROGRESS NOTES
First Postoperative Visit    History of Present Illness:   Edi Marie is s/p right index and long finger mucous cyst excision and osteophyte excision at DIP joint who comes to the office for 10 days post op evaluation (DOS 3/15/22). Overall he is doing well.  He reports appropriate post op soreness. There is no numbness or tinging.  No fever or chills.      Physical Examination:  He is alert, awake and oriented to time, place and person. He does not appear to be in any distress, and denies any constitutional symptoms. Examination of the right index and long fingers demonstrates healing incision with no erythema or swelling.  Sensation intact.  Mild stiffness at DIP    Assessment/Plan:  1. 10 days s/p right index and long finger mucous cyst excision and osteophyte excision at DIP  2. Sutures were removed today, steri-strips and mastisol applied.  Wound care instructions provided.  3. Continue with light use of the right hand  4. Range of motion exercises as shown in the office today  5. Follow up in 3 weeks with Dr. Dorantes    All questions were answered in detail. The patient is in full agreement with the treatment plan and will proceed accordingly.

## 2022-04-20 ENCOUNTER — PATIENT MESSAGE (OUTPATIENT)
Dept: ENDOSCOPY | Facility: HOSPITAL | Age: 57
End: 2022-04-20
Payer: COMMERCIAL

## 2022-04-26 ENCOUNTER — ANESTHESIA (OUTPATIENT)
Dept: ENDOSCOPY | Facility: HOSPITAL | Age: 57
End: 2022-04-26
Payer: COMMERCIAL

## 2022-04-26 ENCOUNTER — ANESTHESIA EVENT (OUTPATIENT)
Dept: ENDOSCOPY | Facility: HOSPITAL | Age: 57
End: 2022-04-26
Payer: COMMERCIAL

## 2022-04-26 ENCOUNTER — HOSPITAL ENCOUNTER (OUTPATIENT)
Facility: HOSPITAL | Age: 57
Discharge: HOME | End: 2022-04-26
Attending: COLON & RECTAL SURGERY | Admitting: COLON & RECTAL SURGERY
Payer: COMMERCIAL

## 2022-04-26 VITALS
BODY MASS INDEX: 27.02 KG/M2 | TEMPERATURE: 98 F | HEIGHT: 71 IN | DIASTOLIC BLOOD PRESSURE: 78 MMHG | SYSTOLIC BLOOD PRESSURE: 132 MMHG | RESPIRATION RATE: 14 BRPM | OXYGEN SATURATION: 99 % | WEIGHT: 193 LBS | HEART RATE: 74 BPM

## 2022-04-26 DIAGNOSIS — Z86.010 HISTORY OF COLON POLYPS: Primary | ICD-10-CM

## 2022-04-26 PROCEDURE — E9220 PRA ENDO ANESTHESIA: HCPCS | Mod: 33,,, | Performed by: NURSE ANESTHETIST, CERTIFIED REGISTERED

## 2022-04-26 PROCEDURE — 88305 TISSUE EXAM BY PATHOLOGIST: ICD-10-PCS | Mod: 26,,, | Performed by: PATHOLOGY

## 2022-04-26 PROCEDURE — 88305 TISSUE EXAM BY PATHOLOGIST: CPT | Mod: 26,,, | Performed by: PATHOLOGY

## 2022-04-26 PROCEDURE — 88305 TISSUE EXAM BY PATHOLOGIST: CPT | Performed by: PATHOLOGY

## 2022-04-26 PROCEDURE — 25000003 PHARM REV CODE 250: Performed by: NURSE ANESTHETIST, CERTIFIED REGISTERED

## 2022-04-26 PROCEDURE — 45380 COLONOSCOPY AND BIOPSY: CPT | Mod: 33,59,, | Performed by: COLON & RECTAL SURGERY

## 2022-04-26 PROCEDURE — 45385 COLONOSCOPY W/LESION REMOVAL: CPT | Mod: PT | Performed by: COLON & RECTAL SURGERY

## 2022-04-26 PROCEDURE — 45380 PR COLONOSCOPY,BIOPSY: ICD-10-PCS | Mod: 33,59,, | Performed by: COLON & RECTAL SURGERY

## 2022-04-26 PROCEDURE — 37000009 HC ANESTHESIA EA ADD 15 MINS: Performed by: COLON & RECTAL SURGERY

## 2022-04-26 PROCEDURE — E9220 PRA ENDO ANESTHESIA: ICD-10-PCS | Mod: 33,,, | Performed by: NURSE ANESTHETIST, CERTIFIED REGISTERED

## 2022-04-26 PROCEDURE — 37000008 HC ANESTHESIA 1ST 15 MINUTES: Performed by: COLON & RECTAL SURGERY

## 2022-04-26 PROCEDURE — 63600175 PHARM REV CODE 636 W HCPCS: Performed by: NURSE ANESTHETIST, CERTIFIED REGISTERED

## 2022-04-26 PROCEDURE — 25000003 PHARM REV CODE 250: Performed by: COLON & RECTAL SURGERY

## 2022-04-26 PROCEDURE — 27201089 HC SNARE, DISP (ANY): Performed by: COLON & RECTAL SURGERY

## 2022-04-26 PROCEDURE — 27201012 HC FORCEPS, HOT/COLD, DISP: Performed by: COLON & RECTAL SURGERY

## 2022-04-26 PROCEDURE — 45385 PR COLONOSCOPY,REMV LESN,SNARE: ICD-10-PCS | Mod: 33,,, | Performed by: COLON & RECTAL SURGERY

## 2022-04-26 PROCEDURE — 45380 COLONOSCOPY AND BIOPSY: CPT | Mod: PT,59 | Performed by: COLON & RECTAL SURGERY

## 2022-04-26 PROCEDURE — 45385 COLONOSCOPY W/LESION REMOVAL: CPT | Mod: 33,,, | Performed by: COLON & RECTAL SURGERY

## 2022-04-26 RX ORDER — PROPOFOL 10 MG/ML
VIAL (ML) INTRAVENOUS CONTINUOUS PRN
Status: DISCONTINUED | OUTPATIENT
Start: 2022-04-26 | End: 2022-04-26

## 2022-04-26 RX ORDER — PROPOFOL 10 MG/ML
VIAL (ML) INTRAVENOUS
Status: DISCONTINUED | OUTPATIENT
Start: 2022-04-26 | End: 2022-04-26

## 2022-04-26 RX ORDER — LIDOCAINE HCL/PF 100 MG/5ML
SYRINGE (ML) INTRAVENOUS
Status: DISCONTINUED | OUTPATIENT
Start: 2022-04-26 | End: 2022-04-26

## 2022-04-26 RX ORDER — SODIUM CHLORIDE 9 MG/ML
INJECTION, SOLUTION INTRAVENOUS CONTINUOUS
Status: DISCONTINUED | OUTPATIENT
Start: 2022-04-26 | End: 2022-04-26 | Stop reason: HOSPADM

## 2022-04-26 RX ADMIN — Medication 30 MG: at 08:04

## 2022-04-26 RX ADMIN — PROPOFOL 30 MG: 10 INJECTION, EMULSION INTRAVENOUS at 08:04

## 2022-04-26 RX ADMIN — PROPOFOL 50 MG: 10 INJECTION, EMULSION INTRAVENOUS at 08:04

## 2022-04-26 RX ADMIN — SODIUM CHLORIDE: 0.9 INJECTION, SOLUTION INTRAVENOUS at 08:04

## 2022-04-26 RX ADMIN — SODIUM CHLORIDE: 0.9 INJECTION, SOLUTION INTRAVENOUS at 09:04

## 2022-04-26 RX ADMIN — PROPOFOL 175 MCG/KG/MIN: 10 INJECTION, EMULSION INTRAVENOUS at 08:04

## 2022-04-26 RX ADMIN — SODIUM CHLORIDE: 0.9 INJECTION, SOLUTION INTRAVENOUS at 07:04

## 2022-04-26 RX ADMIN — PROPOFOL 20 MG: 10 INJECTION, EMULSION INTRAVENOUS at 08:04

## 2022-04-26 NOTE — PROVATION PATIENT INSTRUCTIONS
Discharge Summary/Instructions after an Endoscopic Procedure  Patient Name: Edi Marie  Patient MRN: [id number]  Patient YOB: 1965  Tuesday, April 26, 2022  Danis Frank MD  Dear patient,  As a result of recent federal legislation (The Federal Cures Act), you may   receive lab or pathology results from your procedure in your [facility]   account before your physician is able to contact you. Your physician or   their representative will relay the results to you with their   recommendations at their soonest availability.  Thank you,  RESTRICTIONS:  During your procedure today, you received medications for sedation.  These   medications may affect your judgment, balance and coordination.  Therefore,   for 24 hours, you have the following restrictions:   - DO NOT drive a car, operate machinery, make legal/financial decisions,   sign important papers or drink alcohol.    ACTIVITY:  Today: no heavy lifting, straining or running due to procedural   sedation/anesthesia.  The following day: return to full activity including work.  DIET:  Eat and drink normally unless instructed otherwise.     TREATMENT FOR COMMON SIDE EFFECTS:  - Mild abdominal pain, nausea, belching, bloating or excessive gas:  rest,   eat lightly and use a heating pad.  - Sore Throat: treat with throat lozenges and/or gargle with warm salt   water.  - Because air was used during the procedure, expelling large amounts of air   from your rectum or belching is normal.  - If a bowel prep was taken, you may not have a bowel movement for 1-3 days.    This is normal.  SYMPTOMS TO WATCH FOR AND REPORT TO YOUR PHYSICIAN:  1. Abdominal pain or bloating, other than gas cramps.  2. Chest pain.  3. Back pain.  4. Signs of infection such as: chills or fever occurring within 24 hours   after the procedure.  5. Rectal bleeding, which would show as bright red, maroon, or black stools.   (A tablespoon of blood from the rectum is not serious, especially if    hemorrhoids are present.)  6. Vomiting.  7. Weakness or dizziness.  GO DIRECTLY TO THE NEAREST EMERGENCY ROOM IF YOU HAVE ANY OF THE FOLLOWING:      Difficulty breathing              Chills and/or fever over 101 F   Persistent vomiting and/or vomiting blood   Severe abdominal pain   Severe chest pain   Black, tarry stools   Bleeding- more than one tablespoon   Any other symptom or condition that you feel may need urgent attention  Your doctor recommends these additional instructions:  If any biopsies were taken, your doctors clinic will contact you in 1 to 2   weeks with any results.  - Discharge patient to home.   - High fiber diet.   - Continue present medications.   - Resume Eliquis (apixaban) at prior dose in 3 days.  Refer to managing   physician for further adjustment of therapy.   - Await pathology results.   - Patient has a contact number available for emergencies.  The signs and   symptoms of potential delayed complications were discussed with the   patient.  Return to normal activities tomorrow.  Written discharge   instructions were provided to the patient.   - Repeat colonoscopy in 5 years for surveillance.  For questions, problems or results please call your physician - Danis Frank MD at Work:  [phone].  [facility], EMERGENCY ROOM PHONE NUMBER: [phone]  IF A COMPLICATION OR EMERGENCY SITUATION ARISES AND YOU ARE UNABLE TO REACH   YOUR PHYSICIAN - GO DIRECTLY TO THE EMERGENCY ROOM.  Danis Frank MD  4/26/2022 9:21:38 AM  This report has been verified and signed electronically.  Dear patient,  As a result of recent federal legislation (The Federal Cures Act), you may   receive lab or pathology results from your procedure in your [facility]   account before your physician is able to contact you. Your physician or   their representative will relay the results to you with their   recommendations at their soonest availability.  Thank you,  PROVATION

## 2022-04-26 NOTE — ANESTHESIA POSTPROCEDURE EVALUATION
Anesthesia Post Evaluation    Patient: Edi Marie    Procedure(s) Performed: Procedure(s) (LRB):  COLONOSCOPY (N/A)    Final Anesthesia Type: general      Patient location during evaluation: GI PACU  Patient participation: Yes- Able to Participate  Level of consciousness: awake and alert and oriented  Post-procedure vital signs: reviewed and stable  Pain management: adequate  Airway patency: patent    PONV status at discharge: No PONV  Anesthetic complications: no      Cardiovascular status: blood pressure returned to baseline  Respiratory status: unassisted, spontaneous ventilation and room air  Hydration status: euvolemic  Follow-up not needed.          Vitals Value Taken Time   /78 04/26/22 0953   Temp 36.7 °C (98.1 °F) 04/26/22 0953   Pulse 74 04/26/22 0953   Resp 14 04/26/22 0953   SpO2 99 % 04/26/22 0953         No case tracking events are documented in the log.      Pain/Hiro Score: Hiro Score: 10 (4/26/2022  9:52 AM)

## 2022-04-26 NOTE — H&P
Procedure : Colonoscopy    Indication(s):  personal history of colon polyps    Review of patient's allergies indicates:  No Known Allergies    Past Medical History:   Diagnosis Date    Arthritis     Deep vein thrombosis     DVT (deep venous thrombosis)     Hemorrhoid     Histiocytosis     Kidney stones     Spermatocele        Prior to Admission medications    Medication Sig Start Date End Date Taking? Authorizing Provider   apixaban (ELIQUIS) 5 mg Tab Take 1 tablet (5 mg total) by mouth 2 (two) times daily. 2/9/22   Carlos Oglesby MD   HYDROcodone-acetaminophen (NORCO)  mg per tablet Take 1 tablet by mouth every 6 (six) hours as needed for Pain. 3/14/22   Anjelica Lundy PA-C   promethazine (PHENERGAN) 25 MG tablet Take 1 tablet (25 mg total) by mouth every 6 (six) hours as needed for Nausea. 3/14/22   Anjelica Lundy PA-C       Sedation Problems: NO    Family History   Problem Relation Age of Onset    Heart disease Father 79        MI 2004    Cancer Paternal Grandfather         colon    Melanoma Neg Hx     Lupus Neg Hx     Psoriasis Neg Hx     Eczema Neg Hx        Fam Hx of Sedation Problems: NO    Social History     Socioeconomic History    Marital status:    Occupational History    Occupation: self employed     Employer: [employer]   Tobacco Use    Smoking status: Never Smoker    Smokeless tobacco: Never Used   Substance and Sexual Activity    Alcohol use: Yes     Alcohol/week: 7.0 standard drinks     Types: 7 Glasses of wine per week     Comment: 2 times a week- 2 drinks each time    Drug use: No    Sexual activity: Yes     Social Determinants of Health     Financial Resource Strain: Low Risk     Difficulty of Paying Living Expenses: Not hard at all   Food Insecurity: No Food Insecurity    Worried About Running Out of Food in the Last Year: Never true    Ran Out of Food in the Last Year: Never true   Transportation Needs: No Transportation Needs    Lack of  Transportation (Medical): No    Lack of Transportation (Non-Medical): No   Physical Activity: Insufficiently Active    Days of Exercise per Week: 1 day    Minutes of Exercise per Session: 20 min   Stress: No Stress Concern Present    Feeling of Stress : Only a little   Social Connections: Unknown    Frequency of Communication with Friends and Family: More than three times a week    Frequency of Social Gatherings with Friends and Family: Once a week    Active Member of Clubs or Organizations: No    Attends Club or Organization Meetings: Never    Marital Status:    Housing Stability: Low Risk     Unable to Pay for Housing in the Last Year: No    Number of Places Lived in the Last Year: 1    Unstable Housing in the Last Year: No       Review of Systems -     Respiratory ROS: no cough, shortness of breath, or wheezing  Cardiovascular ROS: no chest pain or dyspnea on exertion  Gastrointestinal ROS: no abdominal pain, change in bowel habits, or black or bloody stools  Musculoskeletal ROS: negative  Neurological ROS: no TIA or stroke symptoms        Physical Exam:  General: no distress  Head: normocephalic  Airway:  normal oropharynx, airway normal  Neck: supple, symmetrical, trachea midline  Lungs:  normal respiratory effort  Heart: regular rate and rhythm  Abdomen: soft, non-tender non-distented; bowel sounds normal; no masses,  no organomegaly  Extremities: no cyanosis or edema, or clubbing       Deep Sedation: Mallampati Score per anesthesia     SedationPlan :Moderate     ASA : II    Patient is medically cleared for anesthesia.    Anesthesia/Surgery risks, benefits and alternative options discussed and understood by patient/family.

## 2022-04-26 NOTE — ANESTHESIA PREPROCEDURE EVALUATION
04/26/2022  Edi Marie is a 56 y.o., male.    Past Medical History:   Diagnosis Date    Arthritis     Deep vein thrombosis     DVT (deep venous thrombosis)     Hemorrhoid     Histiocytosis     Kidney stones     Spermatocele      Past Surgical History:   Procedure Laterality Date    APPENDECTOMY      in his 20's    ARTHROPLASTY OF BOTH HIPS Bilateral 12/7/2020    Procedure: ARTHROPLASTY, HIP, BILATERAL: ANTERIOR: DEPUY-ACTIS+PINNACLE;  Surgeon: Chin Heath III, MD;  Location: [facility] OR;  Service: Orthopedics;  Laterality: Bilateral;    COLONOSCOPY N/A 1/20/2017    Procedure: COLONOSCOPY;  Surgeon: Danis Frank MD;  [facility]  Service: Endoscopy;  Laterality: N/A;    EXCISION OF GANGLION CYST OF HAND Right 3/15/2022    Procedure: EXCISION, GANGLION CYST, HAND;  Surgeon: Farnaz Dorantes MD;  Location: [facility];  Service: Orthopedics;  Laterality: Right;    KNEE SURGERY      right knee- teenager -     LITHOTRIPSY      OSTECTOMY Right 3/15/2022    Procedure: OSTECTOMY, RIGHT INDEX AND LONG FINGERS DIP JOINT/ARTHROTOMY;  Surgeon: Farnaz Dorantes MD;  Location: [facility];  Service: Orthopedics;  Laterality: Right;           Pre-op Assessment    I have reviewed the Patient Summary Reports.    I have reviewed the NPO Status.   I have reviewed the Medications.     Review of Systems  Anesthesia Hx:  No problems with previous Anesthesia    Social:  Alcohol Use, Non-Smoker    Hematology/Oncology:  Hematology Normal   Oncology Normal     EENT/Dental:EENT/Dental Normal   Cardiovascular:  Cardiovascular Normal     Pulmonary:  Pulmonary Normal    Renal/:   Chronic Renal Disease    Hepatic/GI:  Hepatic/GI Normal    Musculoskeletal:  Musculoskeletal Normal    Neurological:  Neurology Normal    Endocrine:  Endocrine Normal    Dermatological:  Skin Normal    Psych:  Psychiatric Normal            Physical Exam  General: Well nourished        Anesthesia Plan  Type of Anesthesia, risks & benefits discussed:    Anesthesia Type: Gen Natural Airway  Intra-op Monitoring Plan: Standard ASA Monitors  Induction:  IV  Informed Consent: Informed consent signed with the Patient and all parties understand the risks and agree with anesthesia plan.  All questions answered. Patient consented to blood products? No  ASA Score: 2    Ready For Surgery From Anesthesia Perspective.     .

## 2022-04-26 NOTE — TRANSFER OF CARE
"Anesthesia Transfer of Care Note    Patient: Edi Marie    Procedure(s) Performed: Procedure(s) (LRB):  COLONOSCOPY (N/A)    Patient location: GI    Anesthesia Type: general    Transport from OR: Transported from OR on room air with adequate spontaneous ventilation    Post pain: adequate analgesia    Post assessment: no apparent anesthetic complications and tolerated procedure well    Post vital signs: stable    Level of consciousness: awake, lethargic and oriented    Nausea/Vomiting: no nausea/vomiting    Complications: none    Transfer of care protocol was followed      Last vitals:   Visit Vitals  BP (!) 143/81 (BP Location: Left arm, Patient Position: Lying)   Pulse 76   Temp 36.7 °C (98.1 °F) (Temporal)   Resp 16   Ht 5' 11" (1.803 m)   Wt 87.5 kg (193 lb)   SpO2 98%   BMI 26.92 kg/m²     "

## 2022-04-27 ENCOUNTER — OFFICE VISIT (OUTPATIENT)
Dept: ORTHOPEDICS | Facility: CLINIC | Age: 57
End: 2022-04-27
Payer: COMMERCIAL

## 2022-04-27 VITALS — BODY MASS INDEX: 27 KG/M2 | WEIGHT: 192.88 LBS | HEIGHT: 71 IN

## 2022-04-27 DIAGNOSIS — M25.741 OSTEOPHYTE OF RIGHT HAND: ICD-10-CM

## 2022-04-27 DIAGNOSIS — M19.041 PRIMARY OSTEOARTHRITIS OF RIGHT HAND: Primary | ICD-10-CM

## 2022-04-27 DIAGNOSIS — M67.441 MUCOUS CYST OF DIGIT OF RIGHT HAND: ICD-10-CM

## 2022-04-27 PROCEDURE — 3008F BODY MASS INDEX DOCD: CPT | Mod: CPTII,S$GLB,, | Performed by: ORTHOPAEDIC SURGERY

## 2022-04-27 PROCEDURE — 1159F PR MEDICATION LIST DOCUMENTED IN MEDICAL RECORD: ICD-10-PCS | Mod: CPTII,S$GLB,, | Performed by: ORTHOPAEDIC SURGERY

## 2022-04-27 PROCEDURE — 1160F RVW MEDS BY RX/DR IN RCRD: CPT | Mod: CPTII,S$GLB,, | Performed by: ORTHOPAEDIC SURGERY

## 2022-04-27 PROCEDURE — 99999 PR PBB SHADOW E&M-EST. PATIENT-LVL III: ICD-10-PCS | Mod: PBBFAC,,, | Performed by: ORTHOPAEDIC SURGERY

## 2022-04-27 PROCEDURE — 1160F PR REVIEW ALL MEDS BY PRESCRIBER/CLIN PHARMACIST DOCUMENTED: ICD-10-PCS | Mod: CPTII,S$GLB,, | Performed by: ORTHOPAEDIC SURGERY

## 2022-04-27 PROCEDURE — 99999 PR PBB SHADOW E&M-EST. PATIENT-LVL III: CPT | Mod: PBBFAC,,, | Performed by: ORTHOPAEDIC SURGERY

## 2022-04-27 PROCEDURE — 3008F PR BODY MASS INDEX (BMI) DOCUMENTED: ICD-10-PCS | Mod: CPTII,S$GLB,, | Performed by: ORTHOPAEDIC SURGERY

## 2022-04-27 PROCEDURE — 99024 POSTOP FOLLOW-UP VISIT: CPT | Mod: S$GLB,,, | Performed by: ORTHOPAEDIC SURGERY

## 2022-04-27 PROCEDURE — 1159F MED LIST DOCD IN RCRD: CPT | Mod: CPTII,S$GLB,, | Performed by: ORTHOPAEDIC SURGERY

## 2022-04-27 PROCEDURE — 99024 PR POST-OP FOLLOW-UP VISIT: ICD-10-PCS | Mod: S$GLB,,, | Performed by: ORTHOPAEDIC SURGERY

## 2022-04-27 NOTE — PROGRESS NOTES
Hand Clinic Note    CC: post op follow up    History of Present Illness:   Edi Marie is s/p right index and long finger mucous cyst excision and osteophyte excision at DIP joint who comes to the office for post op eval. He is 6 weeks post op (DOS 3/15/22). Overall he is doing well.  He reports appropriate post op soreness. There is no numbness or tinging.  No fever or chills.      Physical Examination:  He is alert, awake and oriented to time, place and person. He does not appear to be in any distress, and denies any constitutional symptoms. Examination of the right index and long fingers demonstrates healing incision with no erythema or swelling.  Sensation intact.  Mild stiffness at DIP that is improving and he reports no pain or decreased ROM.     Assessment/Plan:  1. Follow up in 8 weeks  2. Scar massage, cont ROM and daily activity.  3. Call with any questions.    All questions were answered in detail. The patient is in agreement with the treatment plan and will proceed accordingly.      Farnaz Dorantes MD

## 2022-05-02 LAB
FINAL PATHOLOGIC DIAGNOSIS: NORMAL
GROSS: NORMAL
Lab: NORMAL

## 2022-05-19 NOTE — SUBJECTIVE & OBJECTIVE
"Principal Problem:Hip pain    Principal Orthopedic Problem: Same    Interval History: Patient seen and examined at bedside.  Patient was hypotensive in PACU and was transfused 2 units and given albumin.  Instructions given to pull key in PACU however because patient was hypotensive and could not work with PT so key was not pulled.  Hgb stable this morning.  Pain very well controlled.        Review of patient's allergies indicates:  No Known Allergies    Current Facility-Administered Medications   Medication    0.9%  NaCl infusion    acetaminophen tablet 1,000 mg    apixaban tablet 2.5 mg    celecoxib capsule 200 mg    ciprofloxacin HCl 0.3 % ophthalmic solution 2 drop    ePHEDrine sulfate 10 mg    famotidine tablet 20 mg    morphine injection 2 mg    mupirocin 2 % ointment 1 g    naloxone 0.4 mg/mL injection 0.02 mg    ondansetron injection 4 mg    oxyCODONE immediate release tablet 10 mg    oxyCODONE immediate release tablet 5 mg    polyethylene glycol packet 17 g    polyethylene glycol packet 17 g    pregabalin capsule 75 mg    promethazine (PHENERGAN) 6.25 mg in dextrose 5 % 50 mL IVPB    senna-docusate 8.6-50 mg per tablet 1 tablet     Objective:     Vital Signs (Most Recent):  Temp: 96.9 °F (36.1 °C) (12/08/20 0412)  Pulse: 76 (12/08/20 0412)  Resp: 16 (12/08/20 0412)  BP: (!) 105/56 (12/08/20 0412)  SpO2: 98 % (12/08/20 0412) Vital Signs (24h Range):  Temp:  [96.6 °F (35.9 °C)-98.4 °F (36.9 °C)] 96.9 °F (36.1 °C)  Pulse:  [52-85] 76  Resp:  [12-23] 16  SpO2:  [97 %-100 %] 98 %  BP: ()/(30-84) 105/56  Arterial Line BP: ()/(58-78) 102/58     Weight: 86.2 kg (190 lb)  Height: 5' 11" (180.3 cm)  Body mass index is 26.5 kg/m².      Intake/Output Summary (Last 24 hours) at 12/8/2020 0645  Last data filed at 12/8/2020 0400  Gross per 24 hour   Intake 4000 ml   Output 3100 ml   Net 900 ml       Ortho/SPM Exam     AAOx4  NAD  RRR  No increased WOB  Dressings in place   SILT and motor " Received patient continue on isolation , Alert and awake non verbal. O 2 sat is 99% on room air. Continue on iv fluids, appetite is poor she is an feeder. Bed alarm is on. Bed is in its lowest position. On iv abx continue to monitor.   intact T/SP/DP  WWP extremities  FCDs in place and functioning      Significant Labs: All pertinent labs within the past 24 hours have been reviewed.    Significant Imaging: I have reviewed and interpreted all pertinent imaging results/findings.

## 2022-09-23 ENCOUNTER — PATIENT MESSAGE (OUTPATIENT)
Dept: INTERNAL MEDICINE | Facility: CLINIC | Age: 57
End: 2022-09-23
Payer: COMMERCIAL

## 2023-06-16 ENCOUNTER — OFFICE VISIT (OUTPATIENT)
Dept: INTERNAL MEDICINE | Facility: CLINIC | Age: 58
End: 2023-06-16
Payer: COMMERCIAL

## 2023-06-16 VITALS
HEART RATE: 74 BPM | DIASTOLIC BLOOD PRESSURE: 80 MMHG | SYSTOLIC BLOOD PRESSURE: 126 MMHG | HEIGHT: 71 IN | BODY MASS INDEX: 28.33 KG/M2 | WEIGHT: 202.38 LBS | OXYGEN SATURATION: 98 %

## 2023-06-16 DIAGNOSIS — Z12.5 PROSTATE CANCER SCREENING: ICD-10-CM

## 2023-06-16 DIAGNOSIS — Z13.1 SCREENING FOR DIABETES MELLITUS: ICD-10-CM

## 2023-06-16 DIAGNOSIS — Z00.00 LABORATORY EXAMINATION ORDERED AS PART OF A ROUTINE GENERAL MEDICAL EXAMINATION: ICD-10-CM

## 2023-06-16 DIAGNOSIS — D68.61 ANTIPHOSPHOLIPID SYNDROME: ICD-10-CM

## 2023-06-16 DIAGNOSIS — Z00.00 ENCOUNTER FOR ANNUAL PHYSICAL EXAM: Primary | ICD-10-CM

## 2023-06-16 DIAGNOSIS — Z96.643 S/P BILATERAL HIP REPLACEMENTS: ICD-10-CM

## 2023-06-16 DIAGNOSIS — I82.409 RECURRENT DEEP VEIN THROMBOSIS (DVT): ICD-10-CM

## 2023-06-16 DIAGNOSIS — E78.2 MIXED HYPERLIPIDEMIA: ICD-10-CM

## 2023-06-16 PROCEDURE — 3074F PR MOST RECENT SYSTOLIC BLOOD PRESSURE < 130 MM HG: ICD-10-PCS | Mod: CPTII,S$GLB,, | Performed by: INTERNAL MEDICINE

## 2023-06-16 PROCEDURE — 1159F MED LIST DOCD IN RCRD: CPT | Mod: CPTII,S$GLB,, | Performed by: INTERNAL MEDICINE

## 2023-06-16 PROCEDURE — 1160F RVW MEDS BY RX/DR IN RCRD: CPT | Mod: CPTII,S$GLB,, | Performed by: INTERNAL MEDICINE

## 2023-06-16 PROCEDURE — 99999 PR PBB SHADOW E&M-EST. PATIENT-LVL III: CPT | Mod: PBBFAC,,, | Performed by: INTERNAL MEDICINE

## 2023-06-16 PROCEDURE — 3079F DIAST BP 80-89 MM HG: CPT | Mod: CPTII,S$GLB,, | Performed by: INTERNAL MEDICINE

## 2023-06-16 PROCEDURE — 3079F PR MOST RECENT DIASTOLIC BLOOD PRESSURE 80-89 MM HG: ICD-10-PCS | Mod: CPTII,S$GLB,, | Performed by: INTERNAL MEDICINE

## 2023-06-16 PROCEDURE — 1159F PR MEDICATION LIST DOCUMENTED IN MEDICAL RECORD: ICD-10-PCS | Mod: CPTII,S$GLB,, | Performed by: INTERNAL MEDICINE

## 2023-06-16 PROCEDURE — 99396 PR PREVENTIVE VISIT,EST,40-64: ICD-10-PCS | Mod: S$GLB,,, | Performed by: INTERNAL MEDICINE

## 2023-06-16 PROCEDURE — 99396 PREV VISIT EST AGE 40-64: CPT | Mod: S$GLB,,, | Performed by: INTERNAL MEDICINE

## 2023-06-16 PROCEDURE — 3074F SYST BP LT 130 MM HG: CPT | Mod: CPTII,S$GLB,, | Performed by: INTERNAL MEDICINE

## 2023-06-16 PROCEDURE — 1160F PR REVIEW ALL MEDS BY PRESCRIBER/CLIN PHARMACIST DOCUMENTED: ICD-10-PCS | Mod: CPTII,S$GLB,, | Performed by: INTERNAL MEDICINE

## 2023-06-16 PROCEDURE — 3008F PR BODY MASS INDEX (BMI) DOCUMENTED: ICD-10-PCS | Mod: CPTII,S$GLB,, | Performed by: INTERNAL MEDICINE

## 2023-06-16 PROCEDURE — 3008F BODY MASS INDEX DOCD: CPT | Mod: CPTII,S$GLB,, | Performed by: INTERNAL MEDICINE

## 2023-06-16 PROCEDURE — 99999 PR PBB SHADOW E&M-EST. PATIENT-LVL III: ICD-10-PCS | Mod: PBBFAC,,, | Performed by: INTERNAL MEDICINE

## 2023-06-16 NOTE — PATIENT INSTRUCTIONS
Schedule fasting labwork  Return to clinic in 1 year or sooner if needed.       If your labwork shows your cholesterol is still elevated with increased risk factors, we will start you on atorvastatin (lipitor) 10 mg daily.

## 2023-06-16 NOTE — PROGRESS NOTES
Subjective:       Patient ID: Edi Marie is a 58 y.o. male.    Chief Complaint: Annual Exam      HPI  Edi Marie is a 58 y.o. year old male with history of APLS, recurrent DVT of b/l LE on eliquis 5 bid,  hx of b/l hip replacements presents for annual exam. At baseline health. Oldest daughter getting  soon. Work / life balance is good. No issues with sleep.     Review of Systems   Constitutional:  Negative for activity change, appetite change, chills, fatigue, fever and unexpected weight change.   HENT:  Negative for congestion, rhinorrhea and sore throat.    Eyes:  Negative for visual disturbance.   Respiratory:  Negative for shortness of breath.    Cardiovascular:  Negative for chest pain.   Gastrointestinal:  Negative for abdominal pain, diarrhea, nausea and vomiting.   Genitourinary:  Negative for difficulty urinating and dysuria.   Musculoskeletal:  Negative for arthralgias, back pain, myalgias and neck pain.   Skin:  Negative for color change and rash.   Neurological:  Negative for dizziness, weakness, light-headedness and headaches.   Psychiatric/Behavioral:  Negative for dysphoric mood and sleep disturbance.        Past Medical History:   Diagnosis Date    Arthritis     Deep vein thrombosis     DVT (deep venous thrombosis)     Hemorrhoid     Histiocytosis     Kidney stones     Spermatocele         Prior to Admission medications    Medication Sig Start Date End Date Taking? Authorizing Provider   ELIQUIS 5 mg Tab TAKE 1/2 TAB (2.5MG) TWICE DAILY UNTIL 48 HOURS POST OP AND THEN BEGIN TAKING 1 TABLET (5MG) TWICE DAILY 3/6/23 6/16/23 Yes Reji Sanders MD   apixaban (ELIQUIS) 5 mg Tab Take 1 tablet (5 mg total) by mouth 2 (two) times daily. 6/16/23   Carlos Oglesby MD        Past medical history, surgical history, and family medical history reviewed and updated as appropriate.    Medications and allergies reviewed.     Objective:          Vitals:    06/16/23 0925   BP: 126/80   BP Location:  "Right arm   Patient Position: Sitting   Pulse: 74   SpO2: 98%   Weight: 91.8 kg (202 lb 6.1 oz)   Height: 5' 11" (1.803 m)     Body mass index is 28.23 kg/m².  Physical Exam  Constitutional:       General: He is not in acute distress.     Appearance: He is well-developed.   HENT:      Head: Normocephalic and atraumatic.      Nose: Nose normal.   Eyes:      General: No scleral icterus.     Extraocular Movements: Extraocular movements intact.      Pupils: Pupils are equal, round, and reactive to light.   Neck:      Thyroid: No thyromegaly.      Vascular: No JVD.      Trachea: No tracheal deviation.   Cardiovascular:      Rate and Rhythm: Normal rate and regular rhythm.      Heart sounds: Normal heart sounds. No murmur heard.    No friction rub. No gallop.   Pulmonary:      Effort: Pulmonary effort is normal. No respiratory distress.      Breath sounds: Normal breath sounds. No wheezing or rales.   Abdominal:      General: Bowel sounds are normal. There is no distension.      Palpations: Abdomen is soft. There is no mass.      Tenderness: There is no abdominal tenderness.   Musculoskeletal:         General: No tenderness. Normal range of motion.      Cervical back: Normal range of motion and neck supple.   Lymphadenopathy:      Cervical: No cervical adenopathy.   Skin:     General: Skin is warm and dry.      Findings: No rash.   Neurological:      Mental Status: He is alert and oriented to person, place, and time.      Cranial Nerves: No cranial nerve deficit.      Deep Tendon Reflexes: Reflexes normal.   Psychiatric:         Behavior: Behavior normal.       Lab Results   Component Value Date    WBC 7.04 08/17/2021    HGB 17.7 08/17/2021    HCT 51.8 08/17/2021     08/17/2021    CHOL 233 (H) 02/03/2022    TRIG 102 02/03/2022    HDL 54 02/03/2022    ALT 40 08/17/2021    AST 30 08/17/2021     08/17/2021    K 4.6 08/17/2021     08/17/2021    CREATININE 1.0 08/17/2021    BUN 25 (H) 08/17/2021    CO2 23 " 08/17/2021    TSH 0.834 02/03/2022    PSA 0.59 08/17/2021    INR 1.0 11/30/2020       Assessment:       1. Encounter for annual physical exam    2. Recurrent deep vein thrombosis (DVT)    3. Antiphospholipid syndrome    4. S/P bilateral hip replacements    5. Prostate cancer screening    6. Laboratory examination ordered as part of a routine general medical examination    7. Screening for diabetes mellitus    8. Mixed hyperlipidemia          Plan:     Edi was seen today for annual exam.    Diagnoses and all orders for this visit:    Encounter for annual physical exam    Recurrent deep vein thrombosis (DVT)  Comments:  on eliquis 5 mg bid; refilled; known history of APLS, recurrent b/l lower extremity  Orders:  -     apixaban (ELIQUIS) 5 mg Tab; Take 1 tablet (5 mg total) by mouth 2 (two) times daily.    Antiphospholipid syndrome  Comments:  on eliquis 5 mg bid, refill sent  Orders:  -     apixaban (ELIQUIS) 5 mg Tab; Take 1 tablet (5 mg total) by mouth 2 (two) times daily.    S/P bilateral hip replacements  Comments:  doing very well, 0 pain.     Prostate cancer screening  Comments:  would like to defer for 1 more year. no lower urinary tract symptoms.    Laboratory examination ordered as part of a routine general medical examination  -     CBC Auto Differential; Future  -     Comprehensive Metabolic Panel; Future  -     TSH; Future    Screening for diabetes mellitus  -     Hemoglobin A1C; Future    Mixed hyperlipidemia  -     Lipid Panel; Future    Benign physical examination, no issues identified. Will obtain routine labwork and age appropriate health screenings.     The 10-year ASCVD risk score (Camilla MENDOZA, et al., 2019) is: 8.4%    Values used to calculate the score:      Age: 58 years      Sex: Male      Is Non- : No      Diabetic: No      Tobacco smoker: No      Systolic Blood Pressure: 126 mmHg      Is BP treated: No      HDL Cholesterol: 54 mg/dL      Total Cholesterol: 233  mg/dL    Will wait for lipid panel and if still elevated, will start atorvastatin 10 mg daily.    Health maintenance reviewed with patient.     Follow up in about 1 year (around 6/16/2024).    Carlos Oglesby MD  Internal Medicine / Primary Care  [facility]  6/16/2023

## 2023-06-27 ENCOUNTER — LAB VISIT (OUTPATIENT)
Dept: LAB | Facility: HOSPITAL | Age: 58
End: 2023-06-27
Attending: INTERNAL MEDICINE
Payer: COMMERCIAL

## 2023-06-27 DIAGNOSIS — Z00.00 LABORATORY EXAMINATION ORDERED AS PART OF A ROUTINE GENERAL MEDICAL EXAMINATION: ICD-10-CM

## 2023-06-27 DIAGNOSIS — Z13.1 SCREENING FOR DIABETES MELLITUS: ICD-10-CM

## 2023-06-27 DIAGNOSIS — E78.2 MIXED HYPERLIPIDEMIA: ICD-10-CM

## 2023-06-27 LAB
ALBUMIN SERPL BCP-MCNC: 4.1 G/DL (ref 3.5–5.2)
ALP SERPL-CCNC: 64 U/L (ref 55–135)
ALT SERPL W/O P-5'-P-CCNC: 31 U/L (ref 10–44)
ANION GAP SERPL CALC-SCNC: 9 MMOL/L (ref 8–16)
AST SERPL-CCNC: 27 U/L (ref 10–40)
BASOPHILS # BLD AUTO: 0.07 K/UL (ref 0–0.2)
BASOPHILS NFR BLD: 0.9 % (ref 0–1.9)
BILIRUB SERPL-MCNC: 0.5 MG/DL (ref 0.1–1)
BUN SERPL-MCNC: 30 MG/DL (ref 6–20)
CALCIUM SERPL-MCNC: 9.4 MG/DL (ref 8.7–10.5)
CHLORIDE SERPL-SCNC: 106 MMOL/L (ref 95–110)
CHOLEST SERPL-MCNC: 214 MG/DL (ref 120–199)
CHOLEST/HDLC SERPL: 4 {RATIO} (ref 2–5)
CO2 SERPL-SCNC: 25 MMOL/L (ref 23–29)
CREAT SERPL-MCNC: 1.2 MG/DL (ref 0.5–1.4)
DIFFERENTIAL METHOD: NORMAL
EOSINOPHIL # BLD AUTO: 0.2 K/UL (ref 0–0.5)
EOSINOPHIL NFR BLD: 2.8 % (ref 0–8)
ERYTHROCYTE [DISTWIDTH] IN BLOOD BY AUTOMATED COUNT: 13.8 % (ref 11.5–14.5)
EST. GFR  (NO RACE VARIABLE): >60 ML/MIN/1.73 M^2
ESTIMATED AVG GLUCOSE: 94 MG/DL (ref 68–131)
GLUCOSE SERPL-MCNC: 103 MG/DL (ref 70–110)
HBA1C MFR BLD: 4.9 % (ref 4–5.6)
HCT VFR BLD AUTO: 49.4 % (ref 40–54)
HDLC SERPL-MCNC: 53 MG/DL (ref 40–75)
HDLC SERPL: 24.8 % (ref 20–50)
HGB BLD-MCNC: 16.5 G/DL (ref 14–18)
IMM GRANULOCYTES # BLD AUTO: 0.03 K/UL (ref 0–0.04)
IMM GRANULOCYTES NFR BLD AUTO: 0.4 % (ref 0–0.5)
LDLC SERPL CALC-MCNC: 132.8 MG/DL (ref 63–159)
LYMPHOCYTES # BLD AUTO: 1.8 K/UL (ref 1–4.8)
LYMPHOCYTES NFR BLD: 23.6 % (ref 18–48)
MCH RBC QN AUTO: 31 PG (ref 27–31)
MCHC RBC AUTO-ENTMCNC: 33.4 G/DL (ref 32–36)
MCV RBC AUTO: 93 FL (ref 82–98)
MONOCYTES # BLD AUTO: 0.7 K/UL (ref 0.3–1)
MONOCYTES NFR BLD: 8.5 % (ref 4–15)
NEUTROPHILS # BLD AUTO: 4.9 K/UL (ref 1.8–7.7)
NEUTROPHILS NFR BLD: 63.8 % (ref 38–73)
NONHDLC SERPL-MCNC: 161 MG/DL
NRBC BLD-RTO: 0 /100 WBC
PLATELET # BLD AUTO: 444 K/UL (ref 150–450)
PMV BLD AUTO: 10.3 FL (ref 9.2–12.9)
POTASSIUM SERPL-SCNC: 4.6 MMOL/L (ref 3.5–5.1)
PROT SERPL-MCNC: 7.1 G/DL (ref 6–8.4)
RBC # BLD AUTO: 5.33 M/UL (ref 4.6–6.2)
SODIUM SERPL-SCNC: 140 MMOL/L (ref 136–145)
T4 FREE SERPL-MCNC: 1.06 NG/DL (ref 0.71–1.51)
TRIGL SERPL-MCNC: 141 MG/DL (ref 30–150)
TSH SERPL DL<=0.005 MIU/L-ACNC: 0.38 UIU/ML (ref 0.4–4)
WBC # BLD AUTO: 7.72 K/UL (ref 3.9–12.7)

## 2023-06-27 PROCEDURE — 84439 ASSAY OF FREE THYROXINE: CPT | Performed by: INTERNAL MEDICINE

## 2023-06-27 PROCEDURE — 83036 HEMOGLOBIN GLYCOSYLATED A1C: CPT | Performed by: INTERNAL MEDICINE

## 2023-06-27 PROCEDURE — 36415 COLL VENOUS BLD VENIPUNCTURE: CPT | Performed by: INTERNAL MEDICINE

## 2023-06-27 PROCEDURE — 80053 COMPREHEN METABOLIC PANEL: CPT | Performed by: INTERNAL MEDICINE

## 2023-06-27 PROCEDURE — 85025 COMPLETE CBC W/AUTO DIFF WBC: CPT | Performed by: INTERNAL MEDICINE

## 2023-06-27 PROCEDURE — 84443 ASSAY THYROID STIM HORMONE: CPT | Performed by: INTERNAL MEDICINE

## 2023-06-27 PROCEDURE — 80061 LIPID PANEL: CPT | Performed by: INTERNAL MEDICINE

## 2023-10-05 DIAGNOSIS — D68.61 ANTIPHOSPHOLIPID SYNDROME: ICD-10-CM

## 2023-10-05 DIAGNOSIS — I82.409 RECURRENT DEEP VEIN THROMBOSIS (DVT): ICD-10-CM

## 2023-10-05 NOTE — TELEPHONE ENCOUNTER
Refill Routing Note     Refill Routing Note   Medication(s) are not appropriate for processing by [facility] for the following reason(s):      Medication outside of protocol    [facility] action(s):  Route Care Due:  None identified            Appointments  past 12m or future 3m with PCP    Date Provider   Last Visit   6/16/2023 Carlos Oglesby MD   Next Visit   Visit date not found Carlos Oglesby MD   ED visits in past 90 days: 0        Note composed:7:17 AM 10/05/2023

## 2023-11-02 ENCOUNTER — PATIENT MESSAGE (OUTPATIENT)
Dept: INTERNAL MEDICINE | Facility: CLINIC | Age: 58
End: 2023-11-02
Payer: COMMERCIAL

## 2023-11-02 DIAGNOSIS — L60.9 NAIL PROBLEM: Primary | ICD-10-CM

## 2023-11-15 ENCOUNTER — OFFICE VISIT (OUTPATIENT)
Dept: PODIATRY | Facility: CLINIC | Age: 58
End: 2023-11-15
Payer: COMMERCIAL

## 2023-11-15 VITALS
BODY MASS INDEX: 28.49 KG/M2 | OXYGEN SATURATION: 99 % | DIASTOLIC BLOOD PRESSURE: 85 MMHG | SYSTOLIC BLOOD PRESSURE: 126 MMHG | RESPIRATION RATE: 18 BRPM | WEIGHT: 203.5 LBS | HEIGHT: 71 IN | HEART RATE: 64 BPM | TEMPERATURE: 98 F

## 2023-11-15 DIAGNOSIS — B35.3 TINEA PEDIS OF BOTH FEET: ICD-10-CM

## 2023-11-15 DIAGNOSIS — B35.1 TINEA UNGUIUM: Primary | ICD-10-CM

## 2023-11-15 PROCEDURE — 3044F PR MOST RECENT HEMOGLOBIN A1C LEVEL <7.0%: ICD-10-PCS | Mod: CPTII,S$GLB,, | Performed by: STUDENT IN AN ORGANIZED HEALTH CARE EDUCATION/TRAINING PROGRAM

## 2023-11-15 PROCEDURE — 3044F HG A1C LEVEL LT 7.0%: CPT | Mod: CPTII,S$GLB,, | Performed by: STUDENT IN AN ORGANIZED HEALTH CARE EDUCATION/TRAINING PROGRAM

## 2023-11-15 PROCEDURE — 3079F DIAST BP 80-89 MM HG: CPT | Mod: CPTII,S$GLB,, | Performed by: STUDENT IN AN ORGANIZED HEALTH CARE EDUCATION/TRAINING PROGRAM

## 2023-11-15 PROCEDURE — 99999 PR PBB SHADOW E&M-EST. PATIENT-LVL IV: ICD-10-PCS | Mod: PBBFAC,,, | Performed by: STUDENT IN AN ORGANIZED HEALTH CARE EDUCATION/TRAINING PROGRAM

## 2023-11-15 PROCEDURE — 3079F PR MOST RECENT DIASTOLIC BLOOD PRESSURE 80-89 MM HG: ICD-10-PCS | Mod: CPTII,S$GLB,, | Performed by: STUDENT IN AN ORGANIZED HEALTH CARE EDUCATION/TRAINING PROGRAM

## 2023-11-15 PROCEDURE — 99204 PR OFFICE/OUTPT VISIT, NEW, LEVL IV, 45-59 MIN: ICD-10-PCS | Mod: S$GLB,,, | Performed by: STUDENT IN AN ORGANIZED HEALTH CARE EDUCATION/TRAINING PROGRAM

## 2023-11-15 PROCEDURE — 3008F PR BODY MASS INDEX (BMI) DOCUMENTED: ICD-10-PCS | Mod: CPTII,S$GLB,, | Performed by: STUDENT IN AN ORGANIZED HEALTH CARE EDUCATION/TRAINING PROGRAM

## 2023-11-15 PROCEDURE — 1159F PR MEDICATION LIST DOCUMENTED IN MEDICAL RECORD: ICD-10-PCS | Mod: CPTII,S$GLB,, | Performed by: STUDENT IN AN ORGANIZED HEALTH CARE EDUCATION/TRAINING PROGRAM

## 2023-11-15 PROCEDURE — 3008F BODY MASS INDEX DOCD: CPT | Mod: CPTII,S$GLB,, | Performed by: STUDENT IN AN ORGANIZED HEALTH CARE EDUCATION/TRAINING PROGRAM

## 2023-11-15 PROCEDURE — 3074F PR MOST RECENT SYSTOLIC BLOOD PRESSURE < 130 MM HG: ICD-10-PCS | Mod: CPTII,S$GLB,, | Performed by: STUDENT IN AN ORGANIZED HEALTH CARE EDUCATION/TRAINING PROGRAM

## 2023-11-15 PROCEDURE — 1159F MED LIST DOCD IN RCRD: CPT | Mod: CPTII,S$GLB,, | Performed by: STUDENT IN AN ORGANIZED HEALTH CARE EDUCATION/TRAINING PROGRAM

## 2023-11-15 PROCEDURE — 99999 PR PBB SHADOW E&M-EST. PATIENT-LVL IV: CPT | Mod: PBBFAC,,, | Performed by: STUDENT IN AN ORGANIZED HEALTH CARE EDUCATION/TRAINING PROGRAM

## 2023-11-15 PROCEDURE — 99204 OFFICE O/P NEW MOD 45 MIN: CPT | Mod: S$GLB,,, | Performed by: STUDENT IN AN ORGANIZED HEALTH CARE EDUCATION/TRAINING PROGRAM

## 2023-11-15 PROCEDURE — 3074F SYST BP LT 130 MM HG: CPT | Mod: CPTII,S$GLB,, | Performed by: STUDENT IN AN ORGANIZED HEALTH CARE EDUCATION/TRAINING PROGRAM

## 2023-11-15 RX ORDER — TERBINAFINE HYDROCHLORIDE 250 MG/1
250 TABLET ORAL DAILY
Qty: 90 EACH | Refills: 0 | Status: SHIPPED | OUTPATIENT
Start: 2023-11-15 | End: 2024-02-13

## 2023-11-15 NOTE — PROGRESS NOTES
Subjective:     Patient    Edi Marie is a 58 y.o. male.    Problem    New to [facility]. Presents for thick discolored toenails, worse on left and may be spreading. The nails do not cause pain. Also has dry scaling skin which does not itch. Had an issue with fungal infection of the feet years ago and took fluconazole which cleared the infection.      History    History obtained from patient and review of medical records.     Past Medical History:   Diagnosis Date    Arthritis     Deep vein thrombosis     DVT (deep venous thrombosis)     Hemorrhoid     Histiocytosis     Kidney stones     Spermatocele        Past Surgical History:   Procedure Laterality Date    APPENDECTOMY      in his 20's    ARTHROPLASTY OF BOTH HIPS Bilateral 12/7/2020    Procedure: ARTHROPLASTY, HIP, BILATERAL: ANTERIOR: DEPUY-ACTIS+PINNACLE;  Surgeon: Chin Heath III, MD;  Location: [facility] OR;  Service: Orthopedics;  Laterality: Bilateral;    COLONOSCOPY N/A 1/20/2017    Procedure: COLONOSCOPY;  Surgeon: Danis Frank MD;  Location: [address]);  Service: Endoscopy;  Laterality: N/A;    COLONOSCOPY N/A 4/26/2022    Procedure: COLONOSCOPY;  Surgeon: Danis Frank MD;  Location: [facility] ([address] [place] [address]);  Service: Endoscopy;  Laterality: N/A;  Holding Eliquis for 2 days prior tp per Dr. Sanders. [facility] DVT. see telephone encounter om 1/12/22.EC  instructions emailed -  fully vaccinated - 4/13/22 - LVM to inform Pt RAPID no longer needed -ERW @ [id number]    EXCISION OF GANGLION CYST OF HAND Right 3/15/2022    Procedure: EXCISION, GANGLION CYST, HAND;  Surgeon: Farnaz Dorantes MD;  Location: [facility] OR;  Service: Orthopedics;  Laterality: Right;    KNEE SURGERY      right knee- teenager -     LITHOTRIPSY      OSTECTOMY Right 3/15/2022    Procedure: OSTECTOMY, RIGHT INDEX AND LONG FINGERS DIP JOINT/ARTHROTOMY;  Surgeon: Farnaz Dorantes MD;  Location: [facility] OR;  Service: Orthopedics;  Laterality: Right;        Objective:     Vitals  Wt  Readings from Last 1 Encounters:   11/15/23 92.3 kg (203 lb 7.8 oz)     Temp Readings from Last 1 Encounters:   11/15/23 98.1 °F (36.7 °C)     BP Readings from Last 1 Encounters:   11/15/23 126/85     Pulse Readings from Last 1 Encounters:   11/15/23 64       Dermatological Exam    Skin:  Pedal hair growth, skin color, and skin texture normal on right; dry scaling interdigital skin  Pedal hair growth, skin color, and skin texture normal on left; dry scaling interdigital skin    Nails:  Left 3rd and bilateral 4th and 5th nail(s) thickened and Left 3rd and bilateral 4th and 5th nail(s) discolored    Vascular Exam    Arteries:  Posterior tibial artery palpable on right  Dorsalis pedis artery palpable on right  Posterior tibial artery palpable on left  Dorsalis pedis artery palpable on left    Veins:  Superficial veins unremarkable on right  Superficial veins unremarkable on left    Swelling:  None on right  None on left    Neurological Exam    [place] touch test:  6/6 sites sensed, light touch intact     Musculoskeletal Exam    Footwear:  Casual on right  Casual on left    Gait Exam:   Ambulatory Status: Ambulatory  Gait: Normal  Assistive Devices: None    Foot Progression Angle:  Normal on right  Normal on left       Right Lower Extremity Additional Findings:  Right foot and ankle function, strength, and range of motion unremarkable except as noted above.     Left Lower Extremity Additional Findings:  Left foot and ankle function, strength, and range of motion unremarkable except as noted above.    Imaging and Other Tests    Imaging:  Independently reviewed and interpreted imaging, findings are as follows: N/A     Assessment:     Encounter Diagnoses   Name Primary?    Tinea unguium Yes    Tinea pedis of both feet         Plan:     I counseled the patient on his conditions, their implications and medical management.    Tinea unguium, tinea pedis: chronic stable   -Discussed treatment options including (1) monitoring,  (2) topical antifungals, (3) oral antifungals. Discussed potential risks, benefits, alternatives to each. Patient opted for oral antifungals.  -Reviewed most recent CMP.   -Ordered 3 months terbinafine.       Return to clinic in 3-4 months for nail check, call sooner PRN.

## 2024-01-06 ENCOUNTER — PATIENT MESSAGE (OUTPATIENT)
Dept: INTERNAL MEDICINE | Facility: CLINIC | Age: 59
End: 2024-01-06
Payer: COMMERCIAL
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
## 2024-01-06 DIAGNOSIS — S90.859A WOOD SPLINTER IN FOOT: ICD-10-CM

## 2024-01-11 ENCOUNTER — PATIENT MESSAGE (OUTPATIENT)
Dept: PODIATRY | Facility: CLINIC | Age: 59
End: 2024-01-11
Payer: COMMERCIAL

## 2024-01-12 ENCOUNTER — TELEPHONE (OUTPATIENT)
Dept: PODIATRY | Facility: CLINIC | Age: 59
End: 2024-01-12
Payer: COMMERCIAL

## 2024-01-12 NOTE — TELEPHONE ENCOUNTER
Left Vm on [employer] and spouse Promise phone to inform them of possible closure of clinic with emergency freeze plan in place. Left call back number [phone] if any questions or concerns in this matter.

## 2024-01-16 ENCOUNTER — TELEPHONE (OUTPATIENT)
Dept: PODIATRY | Facility: CLINIC | Age: 59
End: 2024-01-16
Payer: COMMERCIAL

## 2024-01-17 ENCOUNTER — OFFICE VISIT (OUTPATIENT)
Dept: PODIATRY | Facility: CLINIC | Age: 59
End: 2024-01-17
Payer: COMMERCIAL

## 2024-01-17 VITALS
BODY MASS INDEX: 27.93 KG/M2 | OXYGEN SATURATION: 96 % | WEIGHT: 199.5 LBS | SYSTOLIC BLOOD PRESSURE: 126 MMHG | HEIGHT: 71 IN | TEMPERATURE: 98 F | DIASTOLIC BLOOD PRESSURE: 84 MMHG | RESPIRATION RATE: 18 BRPM | HEART RATE: 68 BPM

## 2024-01-17 DIAGNOSIS — M79.671 RIGHT FOOT PAIN: ICD-10-CM

## 2024-01-17 DIAGNOSIS — M79.5 FOREIGN BODY IN SOFT TISSUE: ICD-10-CM
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
## 2024-01-17 PROCEDURE — 3074F SYST BP LT 130 MM HG: CPT | Mod: CPTII,S$GLB,, | Performed by: STUDENT IN AN ORGANIZED HEALTH CARE EDUCATION/TRAINING PROGRAM

## 2024-01-17 PROCEDURE — 99999 PR PBB SHADOW E&M-EST. PATIENT-LVL IV: CPT | Mod: PBBFAC,,, | Performed by: STUDENT IN AN ORGANIZED HEALTH CARE EDUCATION/TRAINING PROGRAM

## 2024-01-17 PROCEDURE — 3079F DIAST BP 80-89 MM HG: CPT | Mod: CPTII,S$GLB,, | Performed by: STUDENT IN AN ORGANIZED HEALTH CARE EDUCATION/TRAINING PROGRAM

## 2024-01-17 PROCEDURE — 3008F BODY MASS INDEX DOCD: CPT | Mod: CPTII,S$GLB,, | Performed by: STUDENT IN AN ORGANIZED HEALTH CARE EDUCATION/TRAINING PROGRAM

## 2024-01-17 PROCEDURE — 1159F MED LIST DOCD IN RCRD: CPT | Mod: CPTII,S$GLB,, | Performed by: STUDENT IN AN ORGANIZED HEALTH CARE EDUCATION/TRAINING PROGRAM

## 2024-01-17 PROCEDURE — 28190 REMOVAL OF FOOT FOREIGN BODY: CPT | Mod: RT,S$GLB,, | Performed by: STUDENT IN AN ORGANIZED HEALTH CARE EDUCATION/TRAINING PROGRAM

## 2024-01-17 PROCEDURE — 99213 OFFICE O/P EST LOW 20 MIN: CPT | Mod: 25,S$GLB,, | Performed by: STUDENT IN AN ORGANIZED HEALTH CARE EDUCATION/TRAINING PROGRAM

## 2024-01-17 NOTE — PROGRESS NOTES
Subjective:     Patient    Edi Marie is a 58 y.o. male.    Problem    11/15/23: New to [facility]. Presents for thick discolored toenails, worse on left and may be spreading. The nails do not cause pain. Also has dry scaling skin which does not itch. Had an issue with fungal infection of the feet years ago and took fluconazole which cleared the infection.      01/17/24: Returns for right 1st toe foreign body. Got 3 cactus needles stuck into the right 1st toe about 5 weeks ago, was able to remove 2 entirely and 1 partially but needs help removing the remainder of the remaining needle. Using terbinafine, may have been some improvement. No concerns.     History    History obtained from patient and review of medical records.     Past Medical History:   Diagnosis Date    Arthritis     Deep vein thrombosis     DVT (deep venous thrombosis)     Hemorrhoid     Histiocytosis     Kidney stones     Spermatocele        Past Surgical History:   Procedure Laterality Date    APPENDECTOMY      in his 20's    ARTHROPLASTY OF BOTH HIPS Bilateral 12/7/2020    Procedure: ARTHROPLASTY, HIP, BILATERAL: ANTERIOR: DEPUY-ACTIS+PINNACLE;  Surgeon: Chin Heath III, MD;  Location: [facility] OR;  Service: Orthopedics;  Laterality: Bilateral;    COLONOSCOPY N/A 1/20/2017    Procedure: COLONOSCOPY;  Surgeon: Danis Frank MD;  Location: [address]);  Service: Endoscopy;  Laterality: N/A;    COLONOSCOPY N/A 4/26/2022    Procedure: COLONOSCOPY;  Surgeon: Danis Frank MD;  Location: [address]);  Service: Endoscopy;  Laterality: N/A;  Holding Eliquis for 2 days prior tp per Dr. Sanders. [facility] DVT. see telephone encounter om 1/12/22.EC  instructions emailed -sm  fully vaccinated - 4/13/22 - LVM to inform Pt RAPID no longer needed -ERW @ [phone]    EXCISION OF GANGLION CYST OF HAND Right 3/15/2022    Procedure: EXCISION, GANGLION CYST, HAND;  Surgeon: Farnaz Dorantes MD;  Location: [facility] OR;  Service: Orthopedics;   Laterality: Right;    KNEE SURGERY      right knee- teenager -     LITHOTRIPSY      OSTECTOMY Right 3/15/2022    Procedure: OSTECTOMY, RIGHT INDEX AND LONG FINGERS DIP JOINT/ARTHROTOMY;  Surgeon: Farnaz Dorantes MD;  Location: [facility];  Service: Orthopedics;  Laterality: Right;        Objective:     Vitals  Wt Readings from Last 1 Encounters:   01/17/24 90.5 kg (199 lb 8.3 oz)     Temp Readings from Last 1 Encounters:   01/17/24 98.4 °F (36.9 °C)     BP Readings from Last 1 Encounters:   01/17/24 126/84     Pulse Readings from Last 1 Encounters:   01/17/24 68       Dermatological Exam    Skin:  Pedal hair growth, skin color, and skin texture normal on right; dry scaling interdigital skin (mild improvement); distal 1st toe biological foreign body present within skin, mildly tender on palpation  Pedal hair growth, skin color, and skin texture normal on left; dry scaling interdigital skin (mild improvement)    Nails:  Left 3rd and bilateral 4th and 5th nail(s) thickened and Left 3rd and bilateral 4th and 5th nail(s) discolored    Vascular Exam    Arteries:  Posterior tibial artery palpable on right  Dorsalis pedis artery palpable on right  Posterior tibial artery palpable on left  Dorsalis pedis artery palpable on left    Veins:  Superficial veins unremarkable on right  Superficial veins unremarkable on left    Swelling:  None on right  None on left    Neurological Exam    [place] touch test:  6/6 sites sensed, light touch intact     Musculoskeletal Exam    Footwear:  Casual on right  Casual on left    Gait Exam:   Ambulatory Status: Ambulatory  Gait: Normal  Assistive Devices: None    Foot Progression Angle:  Normal on right  Normal on left       Right Lower Extremity Additional Findings:  Right foot and ankle function, strength, and range of motion unremarkable except as noted above.     Left Lower Extremity Additional Findings:  Left foot and ankle function, strength, and range of motion unremarkable except as noted  above.    Imaging and Other Tests    Imaging:  Independently reviewed and interpreted imaging, findings are as follows: N/A     Assessment:     Encounter Diagnoses   Name Primary?    Contact with cactus Yes    Foreign body in soft tissue     Right foot pain         Plan:     I counseled the patient on his conditions, their implications and medical management.    Tinea unguium, tinea pedis: chronic stable   -Previously discussed treatment options including (1) monitoring, (2) topical antifungals, (3) oral antifungals. Discussed potential risks, benefits, alternatives to each. Patient opted for oral antifungals.   -Continue 3 months terbinafine.     Right foot pain, cactus needle foreign body: subacute  -Removed superficial foreign body using dermal curette, nail curette, forceps. Pain immediately resolved. Pinpoint bleeding, covered with antibiotic ointment and adhesive bandage. No further dressings needed after today.       Return to clinic in 2 months for nail check, call sooner PRN.

## 2024-03-15 ENCOUNTER — OFFICE VISIT (OUTPATIENT)
Dept: PODIATRY | Facility: CLINIC | Age: 59
End: 2024-03-15
Payer: COMMERCIAL

## 2024-03-15 VITALS
SYSTOLIC BLOOD PRESSURE: 127 MMHG | RESPIRATION RATE: 18 BRPM | HEIGHT: 71 IN | BODY MASS INDEX: 28.33 KG/M2 | WEIGHT: 202.38 LBS | TEMPERATURE: 99 F | HEART RATE: 69 BPM | DIASTOLIC BLOOD PRESSURE: 81 MMHG

## 2024-03-15 DIAGNOSIS — B35.3 TINEA PEDIS OF BOTH FEET: ICD-10-CM

## 2024-03-15 DIAGNOSIS — B35.1 TINEA UNGUIUM: Primary | ICD-10-CM

## 2024-03-15 PROCEDURE — 3079F DIAST BP 80-89 MM HG: CPT | Mod: CPTII,S$GLB,, | Performed by: STUDENT IN AN ORGANIZED HEALTH CARE EDUCATION/TRAINING PROGRAM

## 2024-03-15 PROCEDURE — 99213 OFFICE O/P EST LOW 20 MIN: CPT | Mod: S$GLB,,, | Performed by: STUDENT IN AN ORGANIZED HEALTH CARE EDUCATION/TRAINING PROGRAM

## 2024-03-15 PROCEDURE — 99999 PR PBB SHADOW E&M-EST. PATIENT-LVL III: CPT | Mod: PBBFAC,,, | Performed by: STUDENT IN AN ORGANIZED HEALTH CARE EDUCATION/TRAINING PROGRAM

## 2024-03-15 PROCEDURE — 3074F SYST BP LT 130 MM HG: CPT | Mod: CPTII,S$GLB,, | Performed by: STUDENT IN AN ORGANIZED HEALTH CARE EDUCATION/TRAINING PROGRAM

## 2024-03-15 PROCEDURE — 1159F MED LIST DOCD IN RCRD: CPT | Mod: CPTII,S$GLB,, | Performed by: STUDENT IN AN ORGANIZED HEALTH CARE EDUCATION/TRAINING PROGRAM

## 2024-03-15 PROCEDURE — 3008F BODY MASS INDEX DOCD: CPT | Mod: CPTII,S$GLB,, | Performed by: STUDENT IN AN ORGANIZED HEALTH CARE EDUCATION/TRAINING PROGRAM

## 2024-03-15 NOTE — PROGRESS NOTES
Subjective:     Patient    Edi Marie is a 58 y.o. male.    Problem    11/15/23: New to [facility]. Presents for thick discolored toenails, worse on left and may be spreading. The nails do not cause pain. Also has dry scaling skin which does not itch. Had an issue with fungal infection of the feet years ago and took fluconazole which cleared the infection.      01/17/24: Returns for right 1st toe foreign body. Got 3 cactus needles stuck into the right 1st toe about 5 weeks ago, was able to remove 2 entirely and 1 partially but needs help removing the remainder of the remaining needle. Using terbinafine, may have been some improvement. No concerns.     03/15/24: Returns for nail check after completing course of terbinafine. Nails have significantly improved, continue to improve. No further pain right 1st toe s/p cactus needle removal.     History    History obtained from patient and review of medical records.     Past Medical History:   Diagnosis Date    Arthritis     Deep vein thrombosis     DVT (deep venous thrombosis)     Hemorrhoid     Histiocytosis     Kidney stones     Spermatocele        Past Surgical History:   Procedure Laterality Date    APPENDECTOMY      in his 20's    ARTHROPLASTY OF BOTH HIPS Bilateral 12/7/2020    Procedure: ARTHROPLASTY, HIP, BILATERAL: ANTERIOR: DEPUY-ACTIS+PINNACLE;  Surgeon: Chin Heath III, MD;  Location: [facility];  Service: Orthopedics;  Laterality: Bilateral;    COLONOSCOPY N/A 1/20/2017    Procedure: COLONOSCOPY;  Surgeon: Danis Frank MD;  Location: [address]);  Service: Endoscopy;  Laterality: N/A;    COLONOSCOPY N/A 4/26/2022    Procedure: COLONOSCOPY;  Surgeon: Danis Frank MD;  Location: [address]);  Service: Endoscopy;  Laterality: N/A;  Holding Eliquis for 2 days prior tp per Dr. Sanders. [facility] DVT. see telephone encounter om 1/12/22.EC  instructions emailed -sm  fully vaccinated - 4/13/22 - LVM to inform Pt RAPID no longer needed  -ERW @ 1435    EXCISION OF GANGLION CYST OF HAND Right 3/15/2022    Procedure: EXCISION, GANGLION CYST, HAND;  Surgeon: Farnaz Dorantes MD;  Location: [facility] OR;  Service: Orthopedics;  Laterality: Right;    KNEE SURGERY      right knee- teenager -     LITHOTRIPSY      OSTECTOMY Right 3/15/2022    Procedure: OSTECTOMY, RIGHT INDEX AND LONG FINGERS DIP JOINT/ARTHROTOMY;  Surgeon: Farnaz Dorantes MD;  Location: [facility] OR;  Service: Orthopedics;  Laterality: Right;        Objective:     Vitals  Wt Readings from Last 1 Encounters:   03/15/24 91.8 kg (202 lb 6.1 oz)     Temp Readings from Last 1 Encounters:   03/15/24 98.9 °F (37.2 °C)     BP Readings from Last 1 Encounters:   03/15/24 127/81     Pulse Readings from Last 1 Encounters:   03/15/24 69       Dermatological Exam    Skin:  Pedal hair growth, skin color, and skin texture normal on right; dry scaling interdigital skin resolved  Pedal hair growth, skin color, and skin texture normal on left; dry scaling interdigital skin resolved    Nails:  Left 3rd and bilateral 4th and 5th nail(s) thickened and Left 3rd and bilateral 4th and 5th nail(s) discolored; drastically improved    Vascular Exam    Arteries:  Posterior tibial artery palpable on right  Dorsalis pedis artery palpable on right  Posterior tibial artery palpable on left  Dorsalis pedis artery palpable on left    Veins:  Superficial veins unremarkable on right  Superficial veins unremarkable on left    Swelling:  None on right  None on left    Neurological Exam    [place] touch test:  6/6 sites sensed, light touch intact     Musculoskeletal Exam    Footwear:  Casual on right  Casual on left    Gait Exam:   Ambulatory Status: Ambulatory  Gait: Normal  Assistive Devices: None    Foot Progression Angle:  Normal on right  Normal on left       Right Lower Extremity Additional Findings:  Right foot and ankle function, strength, and range of motion unremarkable except as noted above.     Left Lower Extremity Additional  Findings:  Left foot and ankle function, strength, and range of motion unremarkable except as noted above.    Imaging and Other Tests    Imaging:  Independently reviewed and interpreted imaging, findings are as follows: N/A     Assessment:     Encounter Diagnoses   Name Primary?    Tinea unguium Yes    Tinea pedis of both feet           Plan:     I counseled the patient on his conditions, their implications and medical management.    Tinea unguium, tinea pedis: chronic improved  -Completed 3 month course of terbinafine.   -Monitor for an additional 5-6 months for continued improvement, if not cleared at that point reach out.     Right foot pain, cactus needle foreign body: resolved  -No further issues.       Return to clinic PRN.

## 2024-04-11 ENCOUNTER — OFFICE VISIT (OUTPATIENT)
Dept: URGENT CARE | Facility: CLINIC | Age: 59
End: 2024-04-11
Payer: COMMERCIAL

## 2024-04-11 VITALS
HEIGHT: 71 IN | RESPIRATION RATE: 18 BRPM | OXYGEN SATURATION: 97 % | DIASTOLIC BLOOD PRESSURE: 87 MMHG | SYSTOLIC BLOOD PRESSURE: 140 MMHG | WEIGHT: 202 LBS | BODY MASS INDEX: 28.28 KG/M2 | HEART RATE: 70 BPM | TEMPERATURE: 97 F

## 2024-04-11 DIAGNOSIS — R07.89 CHEST TIGHTNESS: Primary | ICD-10-CM

## 2024-04-11 DIAGNOSIS — J02.9 SORE THROAT: ICD-10-CM

## 2024-04-11 LAB
CTP QC/QA: YES
MOLECULAR STREP A: NEGATIVE
POC MOLECULAR INFLUENZA A AGN: NEGATIVE
POC MOLECULAR INFLUENZA B AGN: NEGATIVE
SARS-COV-2 AG RESP QL IA.RAPID: NEGATIVE

## 2024-04-11 PROCEDURE — 93010 ELECTROCARDIOGRAM REPORT: CPT | Mod: S$GLB,,, | Performed by: INTERNAL MEDICINE

## 2024-04-11 PROCEDURE — 87811 SARS-COV-2 COVID19 W/OPTIC: CPT | Mod: QW,S$GLB,, | Performed by: NURSE PRACTITIONER

## 2024-04-11 PROCEDURE — 87502 INFLUENZA DNA AMP PROBE: CPT | Mod: QW,S$GLB,, | Performed by: NURSE PRACTITIONER

## 2024-04-11 PROCEDURE — 93005 ELECTROCARDIOGRAM TRACING: CPT | Mod: S$GLB,,, | Performed by: NURSE PRACTITIONER

## 2024-04-11 PROCEDURE — 87651 STREP A DNA AMP PROBE: CPT | Mod: QW,S$GLB,, | Performed by: NURSE PRACTITIONER

## 2024-04-11 PROCEDURE — 99204 OFFICE O/P NEW MOD 45 MIN: CPT | Mod: S$GLB,,, | Performed by: NURSE PRACTITIONER

## 2024-04-11 NOTE — PROGRESS NOTES
"Subjective:      Patient ID: Edi Marie is a 58 y.o. male.    Vitals:  height is 5' 11" (1.803 m) and weight is 91.6 kg (202 lb). His oral temperature is 97.4 °F (36.3 °C). His blood pressure is 140/87 (abnormal) and his pulse is 70. His respiration is 18 and oxygen saturation is 97%.     Chief Complaint: Sore Throat    57 y/o male presents to  today with c/o chest pain when breathing in. He is also having a sore throat that is "not a normal sore throat".    Symptoms started this am.   States he feels pressure to upper chest and throat-as if someone is choking him.   Does not really feel ill--or states he might feeling the sensation where he is about to be sick (hard for him to tell).  Denies heartburn, belching, and gassiness.   Denies palpitations, dizziness, left neck, jaw, or arm pain.   Denies any known allergic reaction-did not have any new foods, meds, or products that might be causing allergic reaction.     URI   This is a new problem. Associated symptoms include a sore throat (/pressure). Pertinent negatives include no abdominal pain, congestion, coughing, diarrhea, dysuria, ear pain, headaches, nausea, swollen glands, vomiting or wheezing.       Constitution: Positive for chills. Negative for sweating, fatigue and fever.   HENT:  Positive for sore throat (/pressure). Negative for ear pain, congestion and postnasal drip.    Respiratory:  Positive for chest tightness. Negative for cough, COPD, shortness of breath, wheezing and asthma.    Gastrointestinal:  Negative for abdominal pain, abdominal bloating, nausea, vomiting, diarrhea and heartburn.   Genitourinary:  Negative for dysuria.   Musculoskeletal:  Negative for back pain and muscle cramps.   Skin:  Negative for hives.   Allergic/Immunologic: Negative for asthma, hives and itching.   Neurological:  Negative for dizziness, light-headedness, passing out, facial drooping, speech difficulty, headaches, numbness and tingling.      Objective: "     Physical Exam   Constitutional: He is oriented to person, place, and time. He appears well-developed. He is cooperative.  Non-toxic appearance. He does not appear ill. No distress.   HENT:   Head: Normocephalic.   Ears:   Right Ear: Hearing, tympanic membrane, external ear and ear canal normal.   Left Ear: Hearing, tympanic membrane, external ear and ear canal normal.   Nose: Nose normal. No mucosal edema, rhinorrhea or nasal deformity. No epistaxis. Right sinus exhibits no maxillary sinus tenderness and no frontal sinus tenderness. Left sinus exhibits no maxillary sinus tenderness and no frontal sinus tenderness.   Mouth/Throat: Uvula is midline, oropharynx is clear and moist and mucous membranes are normal. Mucous membranes are moist. No trismus in the jaw. Normal dentition. No uvula swelling. No oropharyngeal exudate, posterior oropharyngeal edema or posterior oropharyngeal erythema. Oropharynx is clear.   Eyes: Lids are normal. No scleral icterus.   Neck: Trachea normal and phonation normal. Neck supple. No edema present. No erythema present. No neck rigidity present.   Cardiovascular: Normal rate, regular rhythm and normal heart sounds.   No murmur heard.  Pulmonary/Chest: Effort normal and breath sounds normal. No stridor. No respiratory distress. He has no decreased breath sounds. He has no wheezes. He has no rhonchi. He has no rales. He exhibits no tenderness (no reproducable chest pain).   Abdominal: Normal appearance. Soft. flat abdomen There is no abdominal tenderness.   Musculoskeletal: Normal range of motion.         General: No deformity. Normal range of motion.      Cervical back: He exhibits no tenderness.   Lymphadenopathy:     He has no cervical adenopathy.   Neurological: He is alert and oriented to person, place, and time. He exhibits normal muscle tone. Coordination normal.   Skin: Skin is warm, dry, intact, not diaphoretic, not pale and no rash.   Psychiatric: His speech is normal and  behavior is normal. Judgment and thought content normal.   Nursing note and vitals reviewed.    Results for orders placed or performed in visit on 04/11/24   SARS Coronavirus 2 Antigen, POCT Manual Read   Result Value Ref Range    SARS Coronavirus 2 Antigen Negative Negative     Acceptable Yes    POCT Influenza A/B MOLECULAR   Result Value Ref Range    POC Molecular Influenza A Ag Negative Negative    POC Molecular Influenza B Ag Negative Negative     Acceptable Yes    POCT Strep A, Molecular   Result Value Ref Range    Molecular Strep A, POC Negative Negative     Acceptable Yes       EKG: normal sinus rhythm    Assessment:     1. Chest tightness    2. Sore throat        Plan:       Chest tightness  -     SARS Coronavirus 2 Antigen, POCT Manual Read  -     POCT Influenza A/B MOLECULAR  -     IN OFFICE EKG 12-LEAD (to Muse)    Sore throat  -     SARS Coronavirus 2 Antigen, POCT Manual Read  -     POCT Influenza A/B MOLECULAR  -     POCT Strep A, Molecular      Follow up if symptoms persist  Oral fluids-stay well hydrated   Rest        Additional MDM:     Heart Failure Score:   COPD = No

## 2024-04-12 LAB
OHS QRS DURATION: 78 MS
OHS QTC CALCULATION: 471 MS

## 2024-04-24 ENCOUNTER — OFFICE VISIT (OUTPATIENT)
Dept: OPTOMETRY | Facility: CLINIC | Age: 59
End: 2024-04-24
Payer: COMMERCIAL

## 2024-04-24 DIAGNOSIS — H25.13 NUCLEAR SCLEROSIS OF BOTH EYES: Primary | ICD-10-CM

## 2024-04-24 DIAGNOSIS — H52.4 PRESBYOPIA OF BOTH EYES: ICD-10-CM

## 2024-04-24 PROCEDURE — 92004 COMPRE OPH EXAM NEW PT 1/>: CPT | Mod: S$GLB,,, | Performed by: OPTOMETRIST

## 2024-04-24 PROCEDURE — 1159F MED LIST DOCD IN RCRD: CPT | Mod: CPTII,S$GLB,, | Performed by: OPTOMETRIST

## 2024-04-24 PROCEDURE — 99999 PR PBB SHADOW E&M-EST. PATIENT-LVL II: CPT | Mod: PBBFAC,,, | Performed by: OPTOMETRIST

## 2024-04-24 NOTE — PROGRESS NOTES
HPI    Pt is here today for routine eye exam. Patient denies pain/discomfort.  DLS: 10 years ago  (-)Flashes   (-)Floaters   (-)Diplopia   (-)Headaches   (-)Itching   (-)Tearing  (-)Burning  (-)Dryness   (-)Photophobia  (-)Glare   (-)Blurred VA  Past Eye Sx: Radial keratotomy OU 20 years ago  Eye Meds: (-)   (-) FOHx POAG  Last edited by Kim Harper, OD on 4/24/2024 12:27 PM.            Assessment /Plan     For exam results, see Encounter Report.    Nuclear sclerosis of both eyes    Presbyopia of both eyes      1. Educated pt on findings. Not visually significant. No need for removal at this time. Monitor yearly.      2. Continue use of OTC reading glasses prn. Monitor yearly.      RTC in 1 year for annual eye exam unless changes noted sooner.

## 2024-06-10 ENCOUNTER — PATIENT MESSAGE (OUTPATIENT)
Dept: INTERNAL MEDICINE | Facility: CLINIC | Age: 59
End: 2024-06-10
Payer: COMMERCIAL

## 2024-06-13 ENCOUNTER — PATIENT OUTREACH (OUTPATIENT)
Dept: ADMINISTRATIVE | Facility: HOSPITAL | Age: 59
End: 2024-06-13
Payer: COMMERCIAL

## 2024-07-03 ENCOUNTER — OFFICE VISIT (OUTPATIENT)
Dept: INTERNAL MEDICINE | Facility: CLINIC | Age: 59
End: 2024-07-03
Payer: COMMERCIAL

## 2024-07-03 VITALS
HEART RATE: 75 BPM | WEIGHT: 202.19 LBS | SYSTOLIC BLOOD PRESSURE: 114 MMHG | DIASTOLIC BLOOD PRESSURE: 80 MMHG | BODY MASS INDEX: 28.31 KG/M2 | OXYGEN SATURATION: 97 % | HEIGHT: 71 IN

## 2024-07-03 DIAGNOSIS — Z96.643 S/P BILATERAL HIP REPLACEMENTS: ICD-10-CM

## 2024-07-03 DIAGNOSIS — Z12.5 PROSTATE CANCER SCREENING: ICD-10-CM

## 2024-07-03 DIAGNOSIS — Z12.11 COLON CANCER SCREENING: ICD-10-CM

## 2024-07-03 DIAGNOSIS — Z13.1 SCREENING FOR DIABETES MELLITUS: ICD-10-CM

## 2024-07-03 DIAGNOSIS — Z12.5 SCREENING FOR PROSTATE CANCER: ICD-10-CM

## 2024-07-03 DIAGNOSIS — R94.6 BORDERLINE ABNORMAL TFTS: ICD-10-CM

## 2024-07-03 DIAGNOSIS — Z98.52 H/O VASECTOMY: ICD-10-CM

## 2024-07-03 DIAGNOSIS — E78.2 MIXED HYPERLIPIDEMIA: ICD-10-CM

## 2024-07-03 DIAGNOSIS — I82.409 RECURRENT DEEP VEIN THROMBOSIS (DVT): ICD-10-CM

## 2024-07-03 DIAGNOSIS — D68.61 ANTIPHOSPHOLIPID SYNDROME: ICD-10-CM

## 2024-07-03 DIAGNOSIS — Z00.00 ENCOUNTER FOR ANNUAL PHYSICAL EXAM: Primary | ICD-10-CM

## 2024-07-03 DIAGNOSIS — Z86.010 HISTORY OF COLON POLYPS: ICD-10-CM

## 2024-07-03 DIAGNOSIS — Z91.89 FRAMINGHAM CARDIAC RISK 10-20% IN NEXT 10 YEARS: ICD-10-CM

## 2024-07-03 DIAGNOSIS — Z87.442 HISTORY OF KIDNEY STONES: ICD-10-CM

## 2024-07-03 DIAGNOSIS — Z00.00 LABORATORY EXAMINATION ORDERED AS PART OF A ROUTINE GENERAL MEDICAL EXAMINATION: ICD-10-CM

## 2024-07-03 PROBLEM — I82.502 CHRONIC DEEP VEIN THROMBOSIS (DVT) OF LEFT LOWER EXTREMITY: Status: RESOLVED | Noted: 2017-11-16 | Resolved: 2024-07-03

## 2024-07-03 PROBLEM — Z86.0100 HISTORY OF COLON POLYPS: Status: ACTIVE | Noted: 2024-07-03

## 2024-07-03 PROCEDURE — 3079F DIAST BP 80-89 MM HG: CPT | Mod: CPTII,S$GLB,, | Performed by: INTERNAL MEDICINE

## 2024-07-03 PROCEDURE — 1159F MED LIST DOCD IN RCRD: CPT | Mod: CPTII,S$GLB,, | Performed by: INTERNAL MEDICINE

## 2024-07-03 PROCEDURE — 3008F BODY MASS INDEX DOCD: CPT | Mod: CPTII,S$GLB,, | Performed by: INTERNAL MEDICINE

## 2024-07-03 PROCEDURE — 1160F RVW MEDS BY RX/DR IN RCRD: CPT | Mod: CPTII,S$GLB,, | Performed by: INTERNAL MEDICINE

## 2024-07-03 PROCEDURE — 99396 PREV VISIT EST AGE 40-64: CPT | Mod: S$GLB,,, | Performed by: INTERNAL MEDICINE

## 2024-07-03 PROCEDURE — 99999 PR PBB SHADOW E&M-EST. PATIENT-LVL III: CPT | Mod: PBBFAC,,, | Performed by: INTERNAL MEDICINE

## 2024-07-03 PROCEDURE — 3074F SYST BP LT 130 MM HG: CPT | Mod: CPTII,S$GLB,, | Performed by: INTERNAL MEDICINE

## 2024-07-03 RX ORDER — PRAVASTATIN SODIUM 20 MG/1
20 TABLET ORAL DAILY
Qty: 90 TABLET | Refills: 3 | Status: SHIPPED | OUTPATIENT
Start: 2024-07-03 | End: 2025-07-03

## 2024-07-03 NOTE — PROGRESS NOTES
"Subjective:       Patient ID: Edi Marie is a 59 y.o. male.    Chief Complaint: Annual Exam      HPI  Edi Marie is a 59 y.o. year old male with APLS, hx of DVT, on chronic OAC, mixed hyperlipidemia presents for annual exam.     Review of Systems   Constitutional:  Negative for activity change, appetite change, chills, fatigue, fever and unexpected weight change.   HENT:  Negative for congestion, rhinorrhea and sore throat.    Eyes:  Negative for visual disturbance.   Respiratory:  Negative for shortness of breath.    Cardiovascular:  Negative for chest pain.   Gastrointestinal:  Negative for abdominal pain, diarrhea, nausea and vomiting.   Genitourinary:  Negative for difficulty urinating and dysuria.   Musculoskeletal:  Negative for arthralgias, back pain and myalgias.   Skin:  Negative for color change and rash.   Neurological:  Negative for dizziness, weakness and headaches.         Past Medical History:   Diagnosis Date    Arthritis     Deep vein thrombosis     DVT (deep venous thrombosis)     Hemorrhoid     Histiocytosis     Kidney stones     Spermatocele         Prior to Admission medications    Medication Sig Start Date End Date Taking? Authorizing Provider   apixaban (ELIQUIS) 5 mg Tab Take 1 tablet (5 mg total) by mouth 2 (two) times daily. 10/5/23   Carlos Oglesby MD        Past medical history, surgical history, and family medical history reviewed and updated as appropriate.    Medications and allergies reviewed.     Objective:          Vitals:    07/03/24 1343   BP: 114/80   BP Location: Right arm   Patient Position: Sitting   Pulse: 75   SpO2: 97%   Weight: 91.7 kg (202 lb 2.6 oz)   Height: 5' 11" (1.803 m)     Body mass index is 28.2 kg/m².  Physical Exam  Constitutional:       General: He is not in acute distress.     Appearance: He is well-developed.   HENT:      Head: Normocephalic and atraumatic.      Nose: Nose normal.   Eyes:      General: No scleral icterus.     Extraocular Movements: " Extraocular movements intact.   Neck:      Thyroid: No thyromegaly.      Vascular: No JVD.      Trachea: No tracheal deviation.   Cardiovascular:      Rate and Rhythm: Normal rate and regular rhythm.      Heart sounds: Normal heart sounds. No murmur heard.     No friction rub. No gallop.   Pulmonary:      Effort: Pulmonary effort is normal. No respiratory distress.      Breath sounds: Normal breath sounds. No wheezing or rales.   Abdominal:      General: Bowel sounds are normal. There is no distension.      Palpations: Abdomen is soft. There is no mass.      Tenderness: There is no abdominal tenderness.   Musculoskeletal:         General: No tenderness. Normal range of motion.      Cervical back: Normal range of motion and neck supple.   Lymphadenopathy:      Cervical: No cervical adenopathy.   Skin:     General: Skin is warm and dry.      Findings: No rash.   Neurological:      Mental Status: He is alert and oriented to person, place, and time.      Cranial Nerves: No cranial nerve deficit.      Deep Tendon Reflexes: Reflexes normal.   Psychiatric:         Behavior: Behavior normal.         Lab Results   Component Value Date    WBC 7.72 06/27/2023    HGB 16.5 06/27/2023    HCT 49.4 06/27/2023     06/27/2023    CHOL 214 (H) 06/27/2023    TRIG 141 06/27/2023    HDL 53 06/27/2023    ALT 31 06/27/2023    AST 27 06/27/2023     06/27/2023    K 4.6 06/27/2023     06/27/2023    CREATININE 1.2 06/27/2023    BUN 30 (H) 06/27/2023    CO2 25 06/27/2023    TSH 0.380 (L) 06/27/2023    PSA 0.59 08/17/2021    INR 1.0 11/30/2020    HGBA1C 4.9 06/27/2023       Assessment:       1. Encounter for annual physical exam    2. Antiphospholipid syndrome    3. Recurrent deep vein thrombosis (DVT)    4. Mixed hyperlipidemia    5. S/P bilateral hip replacements    6. Laboratory examination ordered as part of a routine general medical examination    7. Prostate cancer screening    8. Screening for diabetes mellitus    9. H/O  vasectomy (05/30/2014)    10. History of kidney stones    11. History of colon polyps    12. Colon cancer screening due 4/2027    13. Screening for prostate cancer    14. Borderline abnormal TFTs    15. [place] cardiac risk 10-20% in next 10 years          Plan:     Edi was seen today for annual exam.    Diagnoses and all orders for this visit:    Encounter for annual physical exam    Antiphospholipid syndrome  -     apixaban (ELIQUIS) 5 mg Tab; Take 1 tablet (5 mg total) by mouth 2 (two) times daily.    Recurrent deep vein thrombosis (DVT)  -     apixaban (ELIQUIS) 5 mg Tab; Take 1 tablet (5 mg total) by mouth 2 (two) times daily.    Mixed hyperlipidemia  -     TSH; Future  -     Lipid Panel; Future  -     pravastatin (PRAVACHOL) 20 MG tablet; Take 1 tablet (20 mg total) by mouth once daily.    S/P bilateral hip replacements    Laboratory examination ordered as part of a routine general medical examination  -     CBC Auto Differential; Future  -     Comprehensive Metabolic Panel; Future  -     TSH; Future  -     Hemoglobin A1C; Future  -     Lipid Panel; Future    Prostate cancer screening    Screening for diabetes mellitus    H/O vasectomy (05/30/2014)    History of kidney stones    History of colon polyps    Colon cancer screening due 4/2027    Screening for prostate cancer  -     PSA, Screening; Future    Borderline abnormal TFTs  -     TSH; Future    [place] cardiac risk 10-20% in next 10 years  -     pravastatin (PRAVACHOL) 20 MG tablet; Take 1 tablet (20 mg total) by mouth once daily.    The 10-year ASCVD risk score (Camilla MENDOZA, et al., 2019) is: 7.2%    Values used to calculate the score:      Age: 59 years      Sex: Male      Is Non- : No      Diabetic: No      Tobacco smoker: No      Systolic Blood Pressure: 114 mmHg      Is BP treated: No      HDL Cholesterol: 53 mg/dL      Total Cholesterol: 214 mg/dL      Health maintenance reviewed with patient.     Follow up in about 1  year (around 7/3/2025).    Carlos Oglesby MD  Internal Medicine / Primary Care  [facility]  7/3/2024

## 2024-07-03 NOTE — PATIENT INSTRUCTIONS
Schedule fasting labwork.    Start pravastatin 20 mg 1 tablet nightly.     Return to clinic in 1 year or sooner if needed.

## 2024-07-09 ENCOUNTER — LAB VISIT (OUTPATIENT)
Dept: LAB | Facility: HOSPITAL | Age: 59
End: 2024-07-09
Attending: INTERNAL MEDICINE
Payer: COMMERCIAL

## 2024-07-09 DIAGNOSIS — E78.2 MIXED HYPERLIPIDEMIA: ICD-10-CM

## 2024-07-09 DIAGNOSIS — R94.6 BORDERLINE ABNORMAL TFTS: ICD-10-CM

## 2024-07-09 DIAGNOSIS — Z12.5 SCREENING FOR PROSTATE CANCER: ICD-10-CM

## 2024-07-09 DIAGNOSIS — Z00.00 LABORATORY EXAMINATION ORDERED AS PART OF A ROUTINE GENERAL MEDICAL EXAMINATION: ICD-10-CM

## 2024-07-09 LAB
ALBUMIN SERPL BCP-MCNC: 4.3 G/DL (ref 3.5–5.2)
ALP SERPL-CCNC: 55 U/L (ref 55–135)
ALT SERPL W/O P-5'-P-CCNC: 25 U/L (ref 10–44)
ANION GAP SERPL CALC-SCNC: 9 MMOL/L (ref 8–16)
AST SERPL-CCNC: 20 U/L (ref 10–40)
BASOPHILS # BLD AUTO: 0.05 K/UL (ref 0–0.2)
BASOPHILS NFR BLD: 0.8 % (ref 0–1.9)
BILIRUB SERPL-MCNC: 0.6 MG/DL (ref 0.1–1)
BUN SERPL-MCNC: 20 MG/DL (ref 6–20)
CALCIUM SERPL-MCNC: 10.1 MG/DL (ref 8.7–10.5)
CHLORIDE SERPL-SCNC: 109 MMOL/L (ref 95–110)
CHOLEST SERPL-MCNC: 233 MG/DL (ref 120–199)
CHOLEST/HDLC SERPL: 4.9 {RATIO} (ref 2–5)
CO2 SERPL-SCNC: 24 MMOL/L (ref 23–29)
COMPLEXED PSA SERPL-MCNC: 1 NG/ML (ref 0–4)
CREAT SERPL-MCNC: 1 MG/DL (ref 0.5–1.4)
DIFFERENTIAL METHOD BLD: ABNORMAL
EOSINOPHIL # BLD AUTO: 0.1 K/UL (ref 0–0.5)
EOSINOPHIL NFR BLD: 2.2 % (ref 0–8)
ERYTHROCYTE [DISTWIDTH] IN BLOOD BY AUTOMATED COUNT: 13.8 % (ref 11.5–14.5)
EST. GFR  (NO RACE VARIABLE): >60 ML/MIN/1.73 M^2
ESTIMATED AVG GLUCOSE: 97 MG/DL (ref 68–131)
GLUCOSE SERPL-MCNC: 102 MG/DL (ref 70–110)
HBA1C MFR BLD: 5 % (ref 4–5.6)
HCT VFR BLD AUTO: 49.9 % (ref 40–54)
HDLC SERPL-MCNC: 48 MG/DL (ref 40–75)
HDLC SERPL: 20.6 % (ref 20–50)
HGB BLD-MCNC: 16.6 G/DL (ref 14–18)
IMM GRANULOCYTES # BLD AUTO: 0.02 K/UL (ref 0–0.04)
IMM GRANULOCYTES NFR BLD AUTO: 0.3 % (ref 0–0.5)
LDLC SERPL CALC-MCNC: 162.6 MG/DL (ref 63–159)
LYMPHOCYTES # BLD AUTO: 1.4 K/UL (ref 1–4.8)
LYMPHOCYTES NFR BLD: 22.9 % (ref 18–48)
MCH RBC QN AUTO: 31.1 PG (ref 27–31)
MCHC RBC AUTO-ENTMCNC: 33.3 G/DL (ref 32–36)
MCV RBC AUTO: 94 FL (ref 82–98)
MONOCYTES # BLD AUTO: 0.4 K/UL (ref 0.3–1)
MONOCYTES NFR BLD: 6.7 % (ref 4–15)
NEUTROPHILS # BLD AUTO: 4.2 K/UL (ref 1.8–7.7)
NEUTROPHILS NFR BLD: 67.1 % (ref 38–73)
NONHDLC SERPL-MCNC: 185 MG/DL
NRBC BLD-RTO: 0 /100 WBC
PLATELET # BLD AUTO: 458 K/UL (ref 150–450)
PMV BLD AUTO: 10.4 FL (ref 9.2–12.9)
POTASSIUM SERPL-SCNC: 5.6 MMOL/L (ref 3.5–5.1)
PROT SERPL-MCNC: 7.4 G/DL (ref 6–8.4)
RBC # BLD AUTO: 5.33 M/UL (ref 4.6–6.2)
SODIUM SERPL-SCNC: 142 MMOL/L (ref 136–145)
TRIGL SERPL-MCNC: 112 MG/DL (ref 30–150)
TSH SERPL DL<=0.005 MIU/L-ACNC: 1.3 UIU/ML (ref 0.4–4)
WBC # BLD AUTO: 6.28 K/UL (ref 3.9–12.7)

## 2024-07-09 PROCEDURE — 84153 ASSAY OF PSA TOTAL: CPT | Performed by: INTERNAL MEDICINE

## 2024-07-09 PROCEDURE — 83036 HEMOGLOBIN GLYCOSYLATED A1C: CPT | Performed by: INTERNAL MEDICINE

## 2024-07-09 PROCEDURE — 85025 COMPLETE CBC W/AUTO DIFF WBC: CPT | Performed by: INTERNAL MEDICINE

## 2024-07-09 PROCEDURE — 80053 COMPREHEN METABOLIC PANEL: CPT | Performed by: INTERNAL MEDICINE

## 2024-07-09 PROCEDURE — 80061 LIPID PANEL: CPT | Performed by: INTERNAL MEDICINE

## 2024-07-09 PROCEDURE — 36415 COLL VENOUS BLD VENIPUNCTURE: CPT | Performed by: INTERNAL MEDICINE

## 2024-07-09 PROCEDURE — 84443 ASSAY THYROID STIM HORMONE: CPT | Performed by: INTERNAL MEDICINE

## 2024-10-15 NOTE — PROGRESS NOTES
PATIENT: Edi Marie  MRN: [id number]  DATE: 3/1/2018      Diagnosis:   No diagnosis found.    Chief Complaint: Follow-up    Subjective:   1.  Initial History: Mr. Marie is a 52 y.o. who initially presented to [facility] on 11/16/17 with complaint of left leg pain.  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  The patient then began having worsening swelling of the left leg over the subsequent month.  When in the hospital the patient underwent an US on 11/16/17 showing partially occlusive thrombus involving the left common femoral vein and left iliac vein with completely occlusive thrombus of the left superficial femoral vein, left popliteal vein, and left peroneal vein. The patient then underwent catheter assisted thrombolysis on 11/17/17 with the use of a EKOS device.  The patient was then continued on continuous tPA and angiomax for 30 hours and then discharged home on Eliquis 10mg PO for 7 days and then 5mg BID thereafter.  The patient then had a follow up US performed on 11/29/17 which showed  PCP on  reidentification of largely occlusive thrombus within the left external iliac, common femoral, femoral, popliteal, and peroneal veins and new thrombosis of the posterior tibial vein.  The patient saw his PCP on 12/14/17 and was then sent to the Hematology clinic given the new thrombus in the posterior tibial vein seen.  The patient denies any history of clotting.  He has undergone prior surgeries with appendectomy and right knee surgery without developing clots.  The patient states his mother developed a clot in an unknown location after a groin injury when she was 60.  She was on coumadin for 2 years and then taken off.  The patient denied any other family history of clotting.  The patient denied any FH of early pregnancy loss.  He stated he has a daughter with suspected Behçet disease.    Currently the patient  states he is feeling well.  He denies any bleeding diatheses.  He states he occasionally has swelling in the left ankle but denies swelling in any of his other extremities.  The patient denies CP, SOb.    Past Medical History:  Past Medical History:   Diagnosis Date    Arthritis     Hemorrhoid     Histiocytosis     Kidney stones     Spermatocele        Past Surgical HIstory:   Past Surgical History:   Procedure Laterality Date    APPENDECTOMY      COLONOSCOPY N/A 1/20/2017    Procedure: COLONOSCOPY;  Surgeon: Danis Frank MD;  Location: [facility] ([address] [place] [address]);  Service: Endoscopy;  Laterality: N/A;    KNEE SURGERY      right knee    LITHOTRIPSY         Family History:   Family History   Problem Relation Age of Onset    Heart disease Father      MI 2004    Cancer Paternal Grandfather      colon    Melanoma Neg Hx     Lupus Neg Hx     Psoriasis Neg Hx     Eczema Neg Hx        Social History:  reports that he has never smoked. He has never used smokeless tobacco. He reports that he drinks about 4.2 oz of alcohol per week . He reports that he does not use drugs.    Allergies:  Review of patient's allergies indicates:  No Known Allergies    Medications:  Current Outpatient Prescriptions   Medication Sig Dispense Refill    apixaban 5 mg Tab Take 1 tablet (5 mg total) by mouth 2 (two) times daily. 60 tablet 3     No current facility-administered medications for this visit.        Review of Systems   Constitutional: Negative for chills and fever.   HENT: Negative for sore throat and trouble swallowing.    Respiratory: Negative for cough and shortness of breath.    Cardiovascular: Negative for chest pain and palpitations.   Gastrointestinal: Negative for abdominal pain, constipation, diarrhea, nausea and vomiting.   Musculoskeletal:        Occasional swelling of the left ankle   Skin: Negative for color change and rash.   Neurological: Negative for headaches.       ECOG Performance Status: 1   Objective:     "  Vitals:   Vitals:    03/01/18 1136   BP: 132/84   BP Location: Left arm   Patient Position: Sitting   BP Method: Medium (Automatic)   Pulse: 72   Resp: 18   Temp: 98.3 °F (36.8 °C)   TempSrc: Oral   SpO2: 98%   Weight: 86.3 kg (190 lb 4.1 oz)   Height: 5' 11" (1.803 m)     BMI: Body mass index is 26.54 kg/m².    Physical Exam   Constitutional: He is oriented to person, place, and time. He appears well-developed and well-nourished. No distress.   HENT:   Head: Normocephalic and atraumatic.   Cardiovascular: Normal rate, regular rhythm and normal heart sounds.  Exam reveals no gallop and no friction rub.    No murmur heard.  Pulmonary/Chest: Effort normal and breath sounds normal. No respiratory distress. He has no wheezes. He has no rales. He exhibits no tenderness.   Musculoskeletal:   No swelling appreciated in left lower extremity.   Neurological: He is alert and oriented to person, place, and time.   Skin: He is not diaphoretic.       Laboratory Data:  Lab Visit on 03/01/2018   Component Date Value Ref Range Status    D-Dimer 03/01/2018 0.22  <0.50 mg/L FEU Final    Comment: The quantitative D-dimer assay should be used as an aid in   the diagnosis of deep vein thrombosis and pulmonary embolism  in patients with the appropriate presentation and clinical  history. The upper limit of the reference interval and the clinical   cut off   point are identical. Causes of a positive (>0.50 mg/L FEU) D-Dimer   test  include, but are not limited to: DVT, PE, DIC, thrombolytic   therapy, anticoagulant therapy, recent surgery, trauma, or   pregnancy, disseminated malignancy, aortic aneurysm, cirrhosis,  and severe infection. False negative results may occur in   patients with distal DVT.           Imaging:    US LE 2/26/18    Right - There is no evidence of acute venous thrombosis in the common femoral, superficial femoral, greater saphenous, popliteal, peroneal, anterior tibial and posterior tibial veins.  There is no " reflux to suggest valvular incompetence.    Left - There is occlusive thrombosis of the superficial femoral vein.  There is partially occlusive thrombosis of the popliteal vein.  There is no evidence of acute venous thrombosis in the common femoral, greater saphenous, peroneal, anterior tibial and posterior tibial veins.  There is no reflux to suggest valvular incompetence.    Impression:    Deep venous occlusive thrombosis of the left superficial femoral vein and partially occlusive thrombosis of the left popliteal vein.  Overall mildly improved from prior.        US LE 11/16/17  Duplex and color flow Doppler is remarkable for thrombus involving the left common femoral vein, left superficial femoral vein, left popliteal vein, left peroneal vein.  Thrombus is partially occlusive involving the left common femoral vein, with occlusive thrombus involving the remainder of the thrombosed vessels.  The posterior tibial veins and anterior tibial veins are patent.  There is partial thrombus involving the left iliac vein.  There is no reflux to suggest valvular incompetence.    US LE 11/29/17  There is reidentification of largely occlusive thrombus within the left external iliac, common femoral, femoral, popliteal, and peroneal veins.  There is thrombosis of the posterior tibial veins, not seen previously.  The anterior tibial and greater saphenous veins are patent.      Assessment:       No diagnosis found.     Plan:     Chronic deep vein thrombosis (DVT) of left lower extremity, unspecified vein  -The patient has recently been diagnosed with a provoked DVT after injuring his ankle at work.  -Repeat US on 2/26/18 states the clot has minimally changed in size despite stating the absence of clot in the common femoral and peroneal vein.   -US reviewed with radiologist whom reiterates the change is truly minimal  -The patient was instructed to continue Eliquis for 3 months with repeat US and D-Dimer at that time.    -Discussed  with Dr Fatoumata Ferrer MD PGY-IV  Hematology and Oncology  Pager:[phone]    Distress Screening Results: Psychosocial Distress screening score of Distress Score: 0 noted and reviewed. No intervention indicated.     Agree with Dr. Ferrer's  history, physical, assessment, and plan with the following addendums.   Proved DVT of LLE having completed 3 months of eliquis.  Remains mildly symptomatic with significant residual clot burden on repeat US today.  Repeat D-Dimer normal.  Given symptoms and persistence of clot recommend complete and additional 3 months of anticoagulation.  Educated on bleeding precautions and to seek medical attn if bleeding occurs. Defer thrombophilia testing due to provoked nature of DVT.   Mother with history of provoked DVT. Otherwise personal and family history unremarkable.    FU in 3 months with LE US, cbc, cmp, and d dimer.        no

## 2025-06-09 ENCOUNTER — OFFICE VISIT (OUTPATIENT)
Dept: INTERNAL MEDICINE | Facility: CLINIC | Age: 60
End: 2025-06-09
Payer: COMMERCIAL

## 2025-06-09 VITALS
HEIGHT: 71 IN | OXYGEN SATURATION: 98 % | DIASTOLIC BLOOD PRESSURE: 78 MMHG | SYSTOLIC BLOOD PRESSURE: 114 MMHG | HEART RATE: 66 BPM | WEIGHT: 198.44 LBS | BODY MASS INDEX: 27.78 KG/M2

## 2025-06-09 DIAGNOSIS — I82.409 RECURRENT DEEP VEIN THROMBOSIS (DVT): ICD-10-CM

## 2025-06-09 DIAGNOSIS — N52.9 ERECTILE DYSFUNCTION, UNSPECIFIED ERECTILE DYSFUNCTION TYPE: ICD-10-CM

## 2025-06-09 DIAGNOSIS — D68.61 ANTIPHOSPHOLIPID SYNDROME: ICD-10-CM

## 2025-06-09 DIAGNOSIS — E78.2 MIXED HYPERLIPIDEMIA: ICD-10-CM

## 2025-06-09 DIAGNOSIS — Z91.89 FRAMINGHAM CARDIAC RISK 10-20% IN NEXT 10 YEARS: ICD-10-CM

## 2025-06-09 DIAGNOSIS — Z00.00 LABORATORY EXAMINATION ORDERED AS PART OF A ROUTINE GENERAL MEDICAL EXAMINATION: ICD-10-CM

## 2025-06-09 DIAGNOSIS — Z00.00 ENCOUNTER FOR ANNUAL PHYSICAL EXAM: Primary | ICD-10-CM

## 2025-06-09 PROCEDURE — 3008F BODY MASS INDEX DOCD: CPT | Mod: CPTII,S$GLB,, | Performed by: INTERNAL MEDICINE

## 2025-06-09 PROCEDURE — 3078F DIAST BP <80 MM HG: CPT | Mod: CPTII,S$GLB,, | Performed by: INTERNAL MEDICINE

## 2025-06-09 PROCEDURE — 1160F RVW MEDS BY RX/DR IN RCRD: CPT | Mod: CPTII,S$GLB,, | Performed by: INTERNAL MEDICINE

## 2025-06-09 PROCEDURE — 99396 PREV VISIT EST AGE 40-64: CPT | Mod: S$GLB,,, | Performed by: INTERNAL MEDICINE

## 2025-06-09 PROCEDURE — 99999 PR PBB SHADOW E&M-EST. PATIENT-LVL III: CPT | Mod: PBBFAC,,, | Performed by: INTERNAL MEDICINE

## 2025-06-09 PROCEDURE — 3074F SYST BP LT 130 MM HG: CPT | Mod: CPTII,S$GLB,, | Performed by: INTERNAL MEDICINE

## 2025-06-09 PROCEDURE — 1159F MED LIST DOCD IN RCRD: CPT | Mod: CPTII,S$GLB,, | Performed by: INTERNAL MEDICINE

## 2025-06-09 RX ORDER — ROSUVASTATIN CALCIUM 10 MG/1
10 TABLET, COATED ORAL DAILY
Qty: 90 TABLET | Refills: 3 | Status: SHIPPED | OUTPATIENT
Start: 2025-06-09 | End: 2026-06-09

## 2025-06-09 NOTE — PROGRESS NOTES
Subjective:       Patient ID: Edi Marie is a 59 y.o. male.    Chief Complaint: Annual Exam      HPI  Edi Marie is a 59 y.o. year old male with APLS, hx of DVT, on chronic OAC, mixed hyperlipidemia presents for annual exam. Took pravastatin for 3-4 days, states it caused him to have nausea / uneasy stomach, so he stopped taking it. Would like to recheck his cholesterol levels.     Review of Systems   Constitutional:  Negative for activity change, appetite change, chills, fatigue, fever and unexpected weight change.        Patient has been exercising more, swimming more   HENT:  Negative for congestion, rhinorrhea and sore throat.    Eyes:  Negative for visual disturbance.   Respiratory:  Negative for shortness of breath.    Cardiovascular:  Negative for chest pain.   Gastrointestinal:  Negative for abdominal pain, diarrhea, nausea and vomiting.   Genitourinary:  Negative for difficulty urinating and dysuria.   Musculoskeletal:  Negative for arthralgias, back pain and myalgias.   Skin:  Negative for color change and rash.   Neurological:  Negative for dizziness, weakness and headaches.   Psychiatric/Behavioral:  Negative for dysphoric mood and sleep disturbance. The patient is not nervous/anxious.          Past Medical History:   Diagnosis Date    Arthritis     Deep vein thrombosis     DVT (deep venous thrombosis)     Hemorrhoid     Histiocytosis     Kidney stones     Spermatocele         Prior to Admission medications    Medication Sig Start Date End Date Taking? Authorizing Provider   apixaban (ELIQUIS) 5 mg Tab Take 1 tablet (5 mg total) by mouth 2 (two) times daily. 7/3/24  Yes Carlos Oglesby MD   pravastatin (PRAVACHOL) 20 MG tablet Take 1 tablet (20 mg total) by mouth once daily. 7/3/24 7/3/25  Carlos Oglesby MD        Past medical history, surgical history, and family medical history reviewed and updated as appropriate.    Medications and allergies reviewed.     Objective:          Vitals:    06/09/25  "1402   BP: 114/78   BP Location: Right arm   Patient Position: Sitting   Pulse: 66   SpO2: 98%   Weight: 90 kg (198 lb 6.6 oz)   Height: 5' 11" (1.803 m)     Body mass index is 27.67 kg/m².  Physical Exam  Constitutional:       General: He is not in acute distress.     Appearance: He is well-developed.   HENT:      Head: Normocephalic and atraumatic.      Nose: Nose normal.   Eyes:      General: No scleral icterus.     Extraocular Movements: Extraocular movements intact.   Neck:      Thyroid: No thyromegaly.      Vascular: No JVD.      Trachea: No tracheal deviation.   Cardiovascular:      Rate and Rhythm: Normal rate and regular rhythm.      Heart sounds: Normal heart sounds. No murmur heard.     No friction rub. No gallop.   Pulmonary:      Effort: Pulmonary effort is normal. No respiratory distress.      Breath sounds: Normal breath sounds. No wheezing or rales.   Abdominal:      General: Bowel sounds are normal. There is no distension.      Palpations: Abdomen is soft. There is no mass.      Tenderness: There is no abdominal tenderness.   Musculoskeletal:         General: No tenderness. Normal range of motion.      Cervical back: Normal range of motion and neck supple.   Lymphadenopathy:      Cervical: No cervical adenopathy.   Skin:     General: Skin is warm and dry.      Findings: No rash.   Neurological:      Mental Status: He is alert and oriented to person, place, and time.      Cranial Nerves: No cranial nerve deficit.      Deep Tendon Reflexes: Reflexes normal.   Psychiatric:         Behavior: Behavior normal.         Lab Results   Component Value Date    WBC 6.28 07/09/2024    HGB 16.6 07/09/2024    HCT 49.9 07/09/2024     (H) 07/09/2024    CHOL 233 (H) 07/09/2024    TRIG 112 07/09/2024    HDL 48 07/09/2024    ALT 25 07/09/2024    AST 20 07/09/2024     07/09/2024    K 5.6 (H) 07/09/2024     07/09/2024    CREATININE 1.0 07/09/2024    BUN 20 07/09/2024    CO2 24 07/09/2024    TSH 1.302 " 07/09/2024    PSA 1.0 07/09/2024    INR 1.0 11/30/2020    HGBA1C 5.0 07/09/2024       Assessment:       1. Encounter for annual physical exam    2. Antiphospholipid syndrome    3. Recurrent deep vein thrombosis (DVT)    4. Mixed hyperlipidemia    5. [place] cardiac risk 10-20% in next 10 years    6. Erectile dysfunction, unspecified erectile dysfunction type    7. Laboratory examination ordered as part of a routine general medical examination          Plan:     Edi was seen today for annual exam.    Diagnoses and all orders for this visit:    Encounter for annual physical exam    Antiphospholipid syndrome  Comments:  anti-beta-2 glycoprotein-I IgM antibody in high titer on two occasions at least three months apart. He has history of VTE. indefinite anticoagulation    Recurrent deep vein thrombosis (DVT)  Comments:  known history, APLA, seen previously by Dr. Sanders, on eliquis 5 mg BID.    Mixed hyperlipidemia  Comments:  off meds, pt reports had GI upset. Did not really give it a fair try per patient. If still elevated, will send prescription for crestor.  Orders:  -     TSH; Future  -     Lipid Panel; Future  -     rosuvastatin (CRESTOR) 10 MG tablet; Take 1 tablet (10 mg total) by mouth once daily.    [place] cardiac risk 10-20% in next 10 years  -     rosuvastatin (CRESTOR) 10 MG tablet; Take 1 tablet (10 mg total) by mouth once daily.    Erectile dysfunction, unspecified erectile dysfunction type  Comments:  edwardo norris (online mail order) cialis/viagra combo.    Laboratory examination ordered as part of a routine general medical examination  -     CBC Auto Differential; Future  -     Comprehensive Metabolic Panel; Future  -     TSH; Future  -     Hemoglobin A1C; Future  -     Lipid Panel; Future    Benign physical examination, no issues identified. Will obtain routine labwork and age appropriate health screenings.     Health maintenance reviewed with patient.     Follow up in about 1 year (around  6/9/2026).    Carlos Oglesby MD  Internal Medicine / Primary Care  [facility]  6/9/2025

## 2025-06-13 ENCOUNTER — RESULTS FOLLOW-UP (OUTPATIENT)
Dept: INTERNAL MEDICINE | Facility: CLINIC | Age: 60
End: 2025-06-13

## 2025-06-13 ENCOUNTER — LAB VISIT (OUTPATIENT)
Dept: LAB | Facility: HOSPITAL | Age: 60
End: 2025-06-13
Attending: INTERNAL MEDICINE
Payer: COMMERCIAL

## 2025-06-13 DIAGNOSIS — E78.2 MIXED HYPERLIPIDEMIA: ICD-10-CM

## 2025-06-13 DIAGNOSIS — Z00.00 LABORATORY EXAMINATION ORDERED AS PART OF A ROUTINE GENERAL MEDICAL EXAMINATION: ICD-10-CM

## 2025-06-13 LAB
ABSOLUTE EOSINOPHIL (OHS): 0.17 K/UL
ABSOLUTE MONOCYTE (OHS): 0.44 K/UL (ref 0.3–1)
ABSOLUTE NEUTROPHIL COUNT (OHS): 4.9 K/UL (ref 1.8–7.7)
ALBUMIN SERPL BCP-MCNC: 4.5 G/DL (ref 3.5–5.2)
ALP SERPL-CCNC: 58 UNIT/L (ref 40–150)
ALT SERPL W/O P-5'-P-CCNC: 27 UNIT/L (ref 10–44)
ANION GAP (OHS): 7 MMOL/L (ref 8–16)
AST SERPL-CCNC: 19 UNIT/L (ref 11–45)
BASOPHILS # BLD AUTO: 0.06 K/UL
BASOPHILS NFR BLD AUTO: 0.9 %
BILIRUB SERPL-MCNC: 0.6 MG/DL (ref 0.1–1)
BUN SERPL-MCNC: 20 MG/DL (ref 8–23)
CALCIUM SERPL-MCNC: 9.2 MG/DL (ref 8.7–10.5)
CHLORIDE SERPL-SCNC: 106 MMOL/L (ref 95–110)
CHOLEST SERPL-MCNC: 221 MG/DL (ref 120–199)
CHOLEST/HDLC SERPL: 4.6 {RATIO} (ref 2–5)
CO2 SERPL-SCNC: 27 MMOL/L (ref 23–29)
CREAT SERPL-MCNC: 1 MG/DL (ref 0.5–1.4)
EAG (OHS): 100 MG/DL (ref 68–131)
ERYTHROCYTE [DISTWIDTH] IN BLOOD BY AUTOMATED COUNT: 13.7 % (ref 11.5–14.5)
GFR SERPLBLD CREATININE-BSD FMLA CKD-EPI: >60 ML/MIN/1.73/M2
GLUCOSE SERPL-MCNC: 97 MG/DL (ref 70–110)
HBA1C MFR BLD: 5.1 % (ref 4–5.6)
HCT VFR BLD AUTO: 47.3 % (ref 40–54)
HDLC SERPL-MCNC: 48 MG/DL (ref 40–75)
HDLC SERPL: 21.7 % (ref 20–50)
HGB BLD-MCNC: 15.6 GM/DL (ref 14–18)
IMM GRANULOCYTES # BLD AUTO: 0.03 K/UL (ref 0–0.04)
IMM GRANULOCYTES NFR BLD AUTO: 0.4 % (ref 0–0.5)
LDLC SERPL CALC-MCNC: 153.8 MG/DL (ref 63–159)
LYMPHOCYTES # BLD AUTO: 1.35 K/UL (ref 1–4.8)
MCH RBC QN AUTO: 30.1 PG (ref 27–31)
MCHC RBC AUTO-ENTMCNC: 33 G/DL (ref 32–36)
MCV RBC AUTO: 91 FL (ref 82–98)
NONHDLC SERPL-MCNC: 173 MG/DL
NUCLEATED RBC (/100WBC) (OHS): 0 /100 WBC
PLATELET # BLD AUTO: 434 K/UL (ref 150–450)
PMV BLD AUTO: 10.2 FL (ref 9.2–12.9)
POTASSIUM SERPL-SCNC: 4.8 MMOL/L (ref 3.5–5.1)
PROT SERPL-MCNC: 7.5 GM/DL (ref 6–8.4)
RBC # BLD AUTO: 5.18 M/UL (ref 4.6–6.2)
RELATIVE EOSINOPHIL (OHS): 2.4 %
RELATIVE LYMPHOCYTE (OHS): 19.4 % (ref 18–48)
RELATIVE MONOCYTE (OHS): 6.3 % (ref 4–15)
RELATIVE NEUTROPHIL (OHS): 70.6 % (ref 38–73)
SODIUM SERPL-SCNC: 140 MMOL/L (ref 136–145)
TRIGL SERPL-MCNC: 96 MG/DL (ref 30–150)
TSH SERPL-ACNC: 1.35 UIU/ML (ref 0.4–4)
WBC # BLD AUTO: 6.95 K/UL (ref 3.9–12.7)

## 2025-06-13 PROCEDURE — 85025 COMPLETE CBC W/AUTO DIFF WBC: CPT

## 2025-06-13 PROCEDURE — 83036 HEMOGLOBIN GLYCOSYLATED A1C: CPT

## 2025-06-13 PROCEDURE — 84075 ASSAY ALKALINE PHOSPHATASE: CPT

## 2025-06-13 PROCEDURE — 82465 ASSAY BLD/SERUM CHOLESTEROL: CPT

## 2025-06-13 PROCEDURE — 36415 COLL VENOUS BLD VENIPUNCTURE: CPT

## 2025-06-13 PROCEDURE — 84443 ASSAY THYROID STIM HORMONE: CPT

## (undated) DEVICE — SUT PROLENE 3-0 FS-2 18

## (undated) DEVICE — Device

## (undated) DEVICE — ADHESIVE DERMABOND ADVANCED

## (undated) DEVICE — SEE L#156916

## (undated) DEVICE — BLADE SURG #15 CARBON STEEL

## (undated) DEVICE — SEE MEDLINE ITEM 157144

## (undated) DEVICE — NDL 18GA X1 1/2 REG BEVEL

## (undated) DEVICE — CUP MEDICINE STERILE 2OZ

## (undated) DEVICE — KIT IRR SUCTION HND PIECE

## (undated) DEVICE — SOL 9P NACL IRR PIC IL

## (undated) DEVICE — DRESSING TRANS 4X4 TEGADERM

## (undated) DEVICE — SOL BETADINE 5%

## (undated) DEVICE — UNDERGLOVES BIOGEL PI SIZE 8.5

## (undated) DEVICE — GAUZE SPONGE 4X4 12PLY

## (undated) DEVICE — GLOVE BIOGEL SKINSENSE PI 7.0

## (undated) DEVICE — TOURNIQUET SB QC DP 18X4IN

## (undated) DEVICE — SEE MEDLINE ITEM 152529

## (undated) DEVICE — MARKER SKIN STND TIP BLUE BARR

## (undated) DEVICE — SUT 1 36IN COATED VICRYL UN

## (undated) DEVICE — STOCKINETTE DBL PLY ST 4X

## (undated) DEVICE — SUT MONOCRYL 4-0 PS-2

## (undated) DEVICE — NDL 22GA X1 1/2 REG BEVEL

## (undated) DEVICE — SEE MEDLINE ITEM 157150

## (undated) DEVICE — BLADE SAGITTAL 18 X 1.27 X 90M

## (undated) DEVICE — DRAPE SURG W/TWL 17 5/8X23

## (undated) DEVICE — KIT SURGIFLO HEMOSTATIC MATRIX

## (undated) DEVICE — PAD CAST SPECIALIST STRL 3

## (undated) DEVICE — SUT MONOCYRL 4-0 PS2 UND

## (undated) DEVICE — SUT ETHIBOND EXCEL 5-0 V-40

## (undated) DEVICE — MASK FLYTE HOOD PEEL AWAY

## (undated) DEVICE — SEE MEDLINE ITEM 157131

## (undated) DEVICE — DRESSING TELFA STRL 4X3 LF

## (undated) DEVICE — DRESSING N ADH OIL EMUL 3X3

## (undated) DEVICE — TAPE SILK 3IN

## (undated) DEVICE — SEE MEDLINE ITEM 152548

## (undated) DEVICE — ELECTRODE REM PLYHSV RETURN 9

## (undated) DEVICE — BANDAGE ESMARK ELASTIC ST 4X9

## (undated) DEVICE — GLOVE BIOGEL SKINSENSE PI 8.0

## (undated) DEVICE — DRAPE INCISE IOBAN 2 23X33IN

## (undated) DEVICE — TRAY MINOR ORTHO

## (undated) DEVICE — DRESSING AQUACEL AG RBBN 2X45

## (undated) DEVICE — GLOVE BIOGEL PI MICRO INDIC 7

## (undated) DEVICE — SOL IRR NACL .9% 3000ML

## (undated) DEVICE — CLEANER TIP ELECSURG 2X2IN

## (undated) DEVICE — APPLICATOR CHLORAPREP ORN 26ML

## (undated) DEVICE — SUT 2/0 36IN COATED VICRYL

## (undated) DEVICE — BNDG COFLEX FOAM LF2 ST 3X5YD

## (undated) DEVICE — GLOVE BIOGEL ECLIPSE SZ 7

## (undated) DEVICE — TOWEL OR XRAY WHITE 17X26IN